# Patient Record
Sex: MALE | Race: WHITE | Employment: OTHER | URBAN - METROPOLITAN AREA
[De-identification: names, ages, dates, MRNs, and addresses within clinical notes are randomized per-mention and may not be internally consistent; named-entity substitution may affect disease eponyms.]

---

## 2017-01-13 ENCOUNTER — OFFICE VISIT (OUTPATIENT)
Dept: INTERNAL MEDICINE | Facility: CLINIC | Age: 82
End: 2017-01-13
Payer: MEDICARE

## 2017-01-13 ENCOUNTER — DOCUMENTATION ONLY (OUTPATIENT)
Dept: CARDIOLOGY | Facility: CLINIC | Age: 82
End: 2017-01-13

## 2017-01-13 ENCOUNTER — HOSPITAL ENCOUNTER (OUTPATIENT)
Dept: CARDIOLOGY | Facility: CLINIC | Age: 82
Discharge: HOME | End: 2017-01-13
Attending: INTERNAL MEDICINE | Admitting: INTERNAL MEDICINE
Payer: MEDICARE

## 2017-01-13 VITALS
HEIGHT: 72 IN | RESPIRATION RATE: 16 BRPM | BODY MASS INDEX: 25.6 KG/M2 | SYSTOLIC BLOOD PRESSURE: 128 MMHG | OXYGEN SATURATION: 96 % | WEIGHT: 189 LBS | DIASTOLIC BLOOD PRESSURE: 80 MMHG | HEART RATE: 92 BPM | TEMPERATURE: 96.9 F

## 2017-01-13 DIAGNOSIS — I48.20 CHRONIC ATRIAL FIBRILLATION (H): Primary | ICD-10-CM

## 2017-01-13 DIAGNOSIS — E78.5 HYPERLIPIDEMIA LDL GOAL <130: ICD-10-CM

## 2017-01-13 DIAGNOSIS — I71.20 THORACIC AORTIC ANEURYSM WITHOUT RUPTURE (H): ICD-10-CM

## 2017-01-13 DIAGNOSIS — Z79.01 LONG-TERM (CURRENT) USE OF ANTICOAGULANTS: ICD-10-CM

## 2017-01-13 DIAGNOSIS — Z23 NEED FOR PNEUMOCOCCAL VACCINE: ICD-10-CM

## 2017-01-13 DIAGNOSIS — Z96.652 STATUS POST TOTAL LEFT KNEE REPLACEMENT: ICD-10-CM

## 2017-01-13 DIAGNOSIS — Z00.00 ENCOUNTER FOR ROUTINE ADULT HEALTH EXAMINATION WITHOUT ABNORMAL FINDINGS: ICD-10-CM

## 2017-01-13 DIAGNOSIS — I25.10 CORONARY ARTERY DISEASE INVOLVING NATIVE CORONARY ARTERY OF NATIVE HEART WITHOUT ANGINA PECTORIS: ICD-10-CM

## 2017-01-13 LAB
ALBUMIN SERPL-MCNC: 3.6 G/DL (ref 3.4–5)
ALP SERPL-CCNC: 132 U/L (ref 40–150)
ALT SERPL W P-5'-P-CCNC: 35 U/L (ref 0–70)
ANION GAP SERPL CALCULATED.3IONS-SCNC: 3 MMOL/L (ref 3–14)
AST SERPL W P-5'-P-CCNC: 21 U/L (ref 0–45)
BILIRUB SERPL-MCNC: 0.9 MG/DL (ref 0.2–1.3)
BUN SERPL-MCNC: 28 MG/DL (ref 7–30)
CALCIUM SERPL-MCNC: 8.7 MG/DL (ref 8.5–10.1)
CHLORIDE SERPL-SCNC: 107 MMOL/L (ref 94–109)
CHOLEST SERPL-MCNC: 117 MG/DL
CO2 SERPL-SCNC: 35 MMOL/L (ref 20–32)
CREAT SERPL-MCNC: 1.1 MG/DL (ref 0.66–1.25)
ERYTHROCYTE [DISTWIDTH] IN BLOOD BY AUTOMATED COUNT: 13.9 % (ref 10–15)
GFR SERPL CREATININE-BSD FRML MDRD: 64 ML/MIN/1.7M2
GLUCOSE SERPL-MCNC: 92 MG/DL (ref 70–99)
HCT VFR BLD AUTO: 43.4 % (ref 40–53)
HDLC SERPL-MCNC: 47 MG/DL
HGB BLD-MCNC: 13.8 G/DL (ref 13.3–17.7)
LDLC SERPL CALC-MCNC: 58 MG/DL
MCH RBC QN AUTO: 30.3 PG (ref 26.5–33)
MCHC RBC AUTO-ENTMCNC: 31.8 G/DL (ref 31.5–36.5)
MCV RBC AUTO: 95 FL (ref 78–100)
NONHDLC SERPL-MCNC: 70 MG/DL
PLATELET # BLD AUTO: 252 10E9/L (ref 150–450)
POTASSIUM SERPL-SCNC: 4.7 MMOL/L (ref 3.4–5.3)
PROT SERPL-MCNC: 6.8 G/DL (ref 6.8–8.8)
RBC # BLD AUTO: 4.56 10E12/L (ref 4.4–5.9)
SODIUM SERPL-SCNC: 145 MMOL/L (ref 133–144)
TRIGL SERPL-MCNC: 61 MG/DL
WBC # BLD AUTO: 5.9 10E9/L (ref 4–11)

## 2017-01-13 PROCEDURE — 93306 TTE W/DOPPLER COMPLETE: CPT

## 2017-01-13 PROCEDURE — 80061 LIPID PANEL: CPT | Performed by: INTERNAL MEDICINE

## 2017-01-13 PROCEDURE — 93306 TTE W/DOPPLER COMPLETE: CPT | Mod: 26 | Performed by: INTERNAL MEDICINE

## 2017-01-13 PROCEDURE — 80053 COMPREHEN METABOLIC PANEL: CPT | Performed by: INTERNAL MEDICINE

## 2017-01-13 PROCEDURE — G0439 PPPS, SUBSEQ VISIT: HCPCS | Performed by: INTERNAL MEDICINE

## 2017-01-13 PROCEDURE — 90670 PCV13 VACCINE IM: CPT | Performed by: INTERNAL MEDICINE

## 2017-01-13 PROCEDURE — 36415 COLL VENOUS BLD VENIPUNCTURE: CPT | Performed by: INTERNAL MEDICINE

## 2017-01-13 PROCEDURE — 85027 COMPLETE CBC AUTOMATED: CPT | Performed by: INTERNAL MEDICINE

## 2017-01-13 PROCEDURE — G0009 ADMIN PNEUMOCOCCAL VACCINE: HCPCS | Performed by: INTERNAL MEDICINE

## 2017-01-13 RX ORDER — METOPROLOL SUCCINATE 25 MG/1
12.5 TABLET, EXTENDED RELEASE ORAL DAILY
Qty: 90 TABLET | Refills: 2 | Status: SHIPPED | OUTPATIENT
Start: 2017-01-13 | End: 2018-02-10

## 2017-01-13 RX ORDER — FUROSEMIDE 20 MG
20 TABLET ORAL DAILY
Qty: 30 TABLET | Refills: 11 | Status: SHIPPED | OUTPATIENT
Start: 2017-01-13 | End: 2018-02-04

## 2017-01-13 RX ORDER — ATORVASTATIN CALCIUM 20 MG/1
20 TABLET, FILM COATED ORAL DAILY
Qty: 90 TABLET | Refills: 1 | Status: SHIPPED | OUTPATIENT
Start: 2017-01-13 | End: 2017-08-21

## 2017-01-13 RX ORDER — WARFARIN SODIUM 5 MG/1
TABLET ORAL
Qty: 75 TABLET | Refills: 1 | Status: SHIPPED | OUTPATIENT
Start: 2017-01-13 | End: 2017-08-18

## 2017-01-13 ASSESSMENT — PAIN SCALES - GENERAL: PAINLEVEL: NO PAIN (0)

## 2017-01-13 NOTE — PROGRESS NOTES
Results of Echo 17 for surveillance of mildly dilated aortic root and ascending aorta shown below. Per report, no significant changes from previous study 3/13/15. Call made to Kit to update him w/ the results. He is scheduled to follow up w/ Dr. Estes 17. Will forward to Dr. Estes for review. DIANNA Bright RN    Results    Echocardiogram (Order [id number])         External Result Report      External Result Report       Echocardiogram   Status: Final result     Visible to patient:  Not Released     Next appt: 2017 at 01:15 PM in Anticoagulation ([facility])     Dx:  Thoracic aortic aneurysm without rupt...               Details      Reading Physician Reading Date Result Priority     Orlando Palencia MD 2017             Narrative           Interpretation Summary                    [facility]  Echocardiography Laboratory  [address] [place] [address]        Name: ELIER BUTLER  MRN: [id number]  : 1932  Study Date: 2017 09:32 AM  Age: 84 yrs  Gender: Male  Patient Location: [facility]  Reason For Study:     History: Afib, Hyperlipidemia, Ablation  Ordering Physician: SANDOVAL ESTES  Referring Physician: Gabriel Tan  Performed By: Lar[facility]     BSA: 2.0 m2  Height: 71 in  Weight: 185 lb  HR: 112  BP: 104/58 mmHg  ______________________________________________________________________________        Procedure  Complete Echo Adult.  ______________________________________________________________________________     Interpretation Summary     The rhythm was atrial fibrillation.  Left ventricular systolic function is normal.  The visual ejection fraction is estimated at 55-60%.  There is moderate concentric left ventricular hypertrophy.  The left ventricle is normal in size.  There is severe biatrial enlargement.  There is mild mitral annular calcification.  There is mild (1+) mitral regurgitation.  Mild aortic root  dilatation.  The ascending aorta is Mildly dilated.     No significant change since 3/13/2015.  ______________________________________________________________________________           Left Ventricle  The left ventricle is normal in size. There is moderate concentric left  ventricular hypertrophy. Left ventricular systolic function is normal. The  visual ejection fraction is estimated at 55-60%. No regional wall motion  abnormalities noted. There is no thrombus seen in the left ventricle.     Right Ventricle  The right ventricle is mildly dilated. There is normal right ventricular wall  thickness. The right ventricular systolic function is normal. There is no  mass or thrombus in the right ventricle.  Atria  There is severe biatrial enlargement. There is no atrial shunt seen.     Mitral Valve  There is mild mitral annular calcification. There is mild (1+) mitral  regurgitation. There is no mitral valve stenosis.     Tricuspid Valve  Normal tricuspid valve. The right ventricular systolic pressure is  approximated at 28.2 mmHg plus the right atrial pressure. Right ventricle  systolic pressure estimate normal. There is no tricuspid stenosis.     Aortic Valve  There is moderate trileaflet aortic sclerosis. No aortic regurgitation is  present. No aortic stenosis is present.     Pulmonic Valve  Normal pulmonic valve. There is mild (1+) pulmonic valvular regurgitation.  There is no pulmonic valvular stenosis.     Vessels  Mild aortic root dilatation. The ascending aorta is Mildly dilated. The IVC  is normal in size and reactivity with respiration, suggesting normal central  venous pressure. The pulmonary artery is normal size.  Pericardial/Pleural  The pericardium appears normal. There is no pleural effusion.     Rhythm  The rhythm was atrial fibrillation.     ______________________________________________________________________________  MMode/2D Measurements & Calculations  IVSd: 1.4 cm  LVIDd: 3.7 cm  LVIDs: 2.8  cm  LVPWd: 1.6 cm  FS: 24.5 %  EDV(Teich): 59.5 ml  ESV(Teich): 30.1 ml  LV mass(C)d: 214.5 grams  Ao root diam: 4.0 cm  LA dimension: 5.9 cm  asc Aorta Diam: 3.8 cm  LA/Ao: 1.5  LA Volume (BP): 85.3 ml     LA Volume Index (BP): 41.8 ml/m2        Doppler Measurements & Calculations  MV E max za: 113.8 cm/sec  MV dec time: 0.20 sec  Ao V2 max: 131.2 cm/sec  Ao max P.0 mmHg  LV V1 max PG: 3.0 mmHg  LV V1 max: 86.7 cm/sec  PA acc time: 0.12 sec  TR max za: 265.4 cm/sec  TR max P.2 mmHg  Lateral E/e': 14.3  Medial E/e': 15.4              ______________________________________________________________________________        Report approved by: Dr. Orlando Johnson 2017 12:56 PM

## 2017-01-13 NOTE — Clinical Note
[address] [address]  Phone: [phone]          January 13, 2017    Tomer Weiss  [address] [address] [place] [id number]          Dear Tomer,      LAB RESULTS:     The results of your recent lab work were good, no change with plan of care.  If you have any further questions or problems, please contact our office.          Sincerely,      Gabriel Tan MD

## 2017-01-13 NOTE — PATIENT INSTRUCTIONS
Preventive Health Recommendations:       Male Ages 65 and over    Yearly exam:             See your health care provider every year in order to  o   Review health changes.   o   Discuss preventive care.    o   Review your medicines if your doctor has prescribed any.    Talk with your health care provider about whether you should have a test to screen for prostate cancer (PSA).    Every 3 years, have a diabetes test (fasting glucose). If you are at risk for diabetes, you should have this test more often.    Every 5 years, have a cholesterol test. Have this test more often if you are at risk for high cholesterol or heart disease.     Every 10 years, have a colonoscopy. Or, have a yearly FIT test (stool test). These exams will check for colon cancer.    Talk to with your health care provider about screening for Abdominal Aortic Aneurysm if you have a family history of AAA or have a history of smoking.  Shots:     Get a flu shot each year.     Get a tetanus shot every 10 years.     Talk to your doctor about your pneumonia vaccines. There are now two you should receive - Pneumovax (PPSV 23) and Prevnar (PCV 13).    Talk to your doctor about a shingles vaccine.     Talk to your doctor about the hepatitis B vaccine.  Nutrition:     Eat at least 5 servings of fruits and vegetables each day.     Eat whole-grain bread, whole-wheat pasta and brown rice instead of white grains and rice.     Talk to your doctor about Calcium and Vitamin D.   Lifestyle    Exercise for at least 150 minutes a week (30 minutes a day, 5 days a week). This will help you control your weight and prevent disease.     Limit alcohol to one drink per day.     No smoking.     Wear sunscreen to prevent skin cancer.     See your dentist every six months for an exam and cleaning.     See your eye doctor every 1 to 2 years to screen for conditions such as glaucoma, macular degeneration and cataracts.  Service Indications Recommend Completed    Vaccines  Pneumococcal   Once for patients >65     Influenza Yearly, for all beneficiaries     Hepatitis B (if medium/high risk) Medium/high risk factors:  ESRD  Hemophiliacs who received Factor XIII or IX concentrates  Clients of institutions for mentally handicapped  Persons who live in same house as a HepB carrier  Homosexual men  Illicit injectable drug users  Health care workers  Staff of institutions for mentally handicapped     Mammogram One-time screen between age 35-39, annually for age >39     Pap and Pelvic Exam Annually if  high risk,  or childbearing age with abnormal Pap in last 3 years.  Q24 months for all other women     Prostate Cancer Screening  Digital rectal exam  PSA Annually for all men > age 50     Colorectal Cancer Screening      Diabetes self-management training Requires referral by treating physician for patient with diabetes     Diabetes screening tests  Fasting blood sugar or glucose tolerance test Patient must be diagnosed with one of the following:  Hypertension  Dyslipidemia  Obesity (BMI ? 30 kg/m )  Previous impaired fasting glucose or impaired glucose tolerance             . . . or any two of the following:  Overweight (BMI ? 25, < 30)  Family history of diabetes  Age 65 years or older  History of gestational diabetes, or birth to baby weighing > 9 lbs.     Cardiovascular screening blood tests  Total cholesterol  HDL  Triglycerides All asymptomatic beneficiaries every 5 years     Medical nutrition therapy for diabetes or renal disease Requires referral by treating physician for patient with diabetes or renal disease     Glaucoma screening Patient must have one of the following risk factors:  Diabetes mellitus  Family history of glaucoma  -American age 50 and over  -American age 65 and over     Bone Density Measurement  (Dexa scan) Patient must have one of the following risk factors:  Determined by provider to be estrogen deficient  Vertebral abnormalities on x-ray  indicative of osteoporosis, osteopenia, or vertebral fracture  Receiving, or expected to receive, equivalent of 5 mg of Prednisone, or greater, per day, for > 3 months.  Known hyperparathyroidism  Patient being monitored for response to osteoporosis therapy     One-time AAA screen  ** Must be ordered as part of Medicare IPPE Patient must have one of the following:  Family history of AAA  Age 65-75, with history of smoking at least 100 cigarettes in lifetime

## 2017-01-13 NOTE — NURSING NOTE
Chief Complaint   Patient presents with     Wellness Visit       Initial /80 mmHg  Pulse 92  Temp(Src) 96.9  F (36.1  C) (Temporal)  Resp 16  Ht 6' (1.829 m)  Wt 189 lb (85.73 kg)  BMI 25.63 kg/m2  SpO2 96% Estimated body mass index is 25.63 kg/(m^2) as calculated from the following:    Height as of this encounter: 6' (1.829 m).    Weight as of this encounter: 189 lb (85.73 kg).  BP completed using cuff size: gale Olmos MA

## 2017-01-13 NOTE — MR AVS SNAPSHOT
After Visit Summary   1/13/2017    Tomer Weiss    MRN: [id number]           Patient Information     Date Of Birth          4/9/1932        Visit Information        Provider Department      1/13/2017 8:30 AM Gabriel Tan MD [facility] Instructions      Preventive Health Recommendations:       Male Ages 65 and over    Yearly exam:             See your health care provider every year in order to  o   Review health changes.   o   Discuss preventive care.    o   Review your medicines if your doctor has prescribed any.    Talk with your health care provider about whether you should have a test to screen for prostate cancer (PSA).    Every 3 years, have a diabetes test (fasting glucose). If you are at risk for diabetes, you should have this test more often.    Every 5 years, have a cholesterol test. Have this test more often if you are at risk for high cholesterol or heart disease.     Every 10 years, have a colonoscopy. Or, have a yearly FIT test (stool test). These exams will check for colon cancer.    Talk to with your health care provider about screening for Abdominal Aortic Aneurysm if you have a family history of AAA or have a history of smoking.  Shots:     Get a flu shot each year.     Get a tetanus shot every 10 years.     Talk to your doctor about your pneumonia vaccines. There are now two you should receive - Pneumovax (PPSV 23) and Prevnar (PCV 13).    Talk to your doctor about a shingles vaccine.     Talk to your doctor about the hepatitis B vaccine.  Nutrition:     Eat at least 5 servings of fruits and vegetables each day.     Eat whole-grain bread, whole-wheat pasta and brown rice instead of white grains and rice.     Talk to your doctor about Calcium and Vitamin D.   Lifestyle    Exercise for at least 150 minutes a week (30 minutes a day, 5 days a week). This will help you control your weight and prevent disease.     Limit alcohol to one drink per  day.     No smoking.     Wear sunscreen to prevent skin cancer.     See your dentist every six months for an exam and cleaning.     See your eye doctor every 1 to 2 years to screen for conditions such as glaucoma, macular degeneration and cataracts.  Service Indications Recommend Completed   Vaccines  Pneumococcal   Once for patients >65     Influenza Yearly, for all beneficiaries     Hepatitis B (if medium/high risk) Medium/high risk factors:  ESRD  Hemophiliacs who received Factor XIII or IX concentrates  Clients of institutions for mentally handicapped  Persons who live in same house as a HepB carrier  Homosexual men  Illicit injectable drug users  Health care workers  Staff of institutions for mentally handicapped     Mammogram One-time screen between age 35-39, annually for age >39     Pap and Pelvic Exam Annually if  high risk,  or childbearing age with abnormal Pap in last 3 years.  Q24 months for all other women     Prostate Cancer Screening  Digital rectal exam  PSA Annually for all men > age 50     Colorectal Cancer Screening      Diabetes self-management training Requires referral by treating physician for patient with diabetes     Diabetes screening tests  Fasting blood sugar or glucose tolerance test Patient must be diagnosed with one of the following:  Hypertension  Dyslipidemia  Obesity (BMI ? 30 kg/m )  Previous impaired fasting glucose or impaired glucose tolerance             . . . or any two of the following:  Overweight (BMI ? 25, < 30)  Family history of diabetes  Age 65 years or older  History of gestational diabetes, or birth to baby weighing > 9 lbs.     Cardiovascular screening blood tests  Total cholesterol  HDL  Triglycerides All asymptomatic beneficiaries every 5 years     Medical nutrition therapy for diabetes or renal disease Requires referral by treating physician for patient with diabetes or renal disease     Glaucoma screening Patient must have one of the following risk  factors:  Diabetes mellitus  Family history of glaucoma  -American age 50 and over  -American age 65 and over     Bone Density Measurement  (Dexa scan) Patient must have one of the following risk factors:  Determined by provider to be estrogen deficient  Vertebral abnormalities on x-ray indicative of osteoporosis, osteopenia, or vertebral fracture  Receiving, or expected to receive, equivalent of 5 mg of Prednisone, or greater, per day, for > 3 months.  Known hyperparathyroidism  Patient being monitored for response to osteoporosis therapy     One-time AAA screen  ** Must be ordered as part of Medicare IPPE Patient must have one of the following:  Family history of AAA  Age 65-75, with history of smoking at least 100 cigarettes in lifetime               Follow-ups after your visit        Your next 10 appointments already scheduled     Jan 13, 2017 10:00 AM   Ech Complete with NEMESIO Cheek Echocardi[facility] ([facility])    [address] [place] [address] [address] [place] [id number] [phone]           1.  Please bring or wear a comfortable two-piece outfit. 2.  You may eat, drink and take your normal medicines. 3.  For any questions that cannot be answered, please contact the ordering physician            Jan 25, 2017  1:15 PM   Anticoagulation Visit with SARA ANTI COAG   [facility] ([facility])    [address] [address] [phone]            Feb 09, 2017  1:00 PM   Return Visit with MD Angel [place]eton ([facility])    [address] [id number] [phone]              Who to contact     If you have questions or need follow up information about today's clinic visit or your schedule please contact [facility] directly at [phone].  Normal or non-critical lab and imaging results will be communicated to you by MyChart, letter or phone within 4  "business days after the clinic has received the results. If you do not hear from us within 7 days, please contact the clinic through [employer] or phone. If you have a critical or abnormal lab result, we will notify you by phone as soon as possible.  Submit refill requests through [employer] or call your pharmacy and they will forward the refill request to us. Please allow 3 business days for your refill to be completed.          Additional Information About Your Visit        [employer] Information     [employer] lets you send messages to your doctor, view your test results, renew your prescriptions, schedule appointments and more. To sign up, go to [email or web address] . Click on \"Log in\" on the left side of the screen, which will take you to the Welcome page. Then click on \"Sign up Now\" on the right side of the page.     You will be asked to enter the access code listed below, as well as some personal information. Please follow the directions to create your username and password.     Your access code is: [email or web address]  Expires: 2017  8:41 AM     Your access code will  in 90 days. If you need help or a new code, please call your [facility] clinic or [phone].        Care EveryWhere ID     This is your Care EveryWhere ID. This could be used by other organizations to access your [facility] medical records  [id number]        Your Vitals Were     Pulse Temperature Respirations Height BMI (Body Mass Index) Pulse Oximetry    92 96.9  F (36.1  C) (Temporal) 16 6' (1.829 m) 25.63 kg/m2 96%       Blood Pressure from Last 3 Encounters:   17 128/80   16 102/48   16 114/60    Weight from Last 3 Encounters:   17 189 lb (85.73 kg)   16 180 lb (81.647 kg)   16 180 lb (81.647 kg)              Today, you had the following     No orders found for display       Primary Care Provider Office Phone # Fax #    Gabriel Tan [phone]       [address] " [address] [place] [address]        Thank you!     Thank you for choosing [facility]  for your care. Our goal is always to provide you with excellent care. Hearing back from our patients is one way we can continue to improve our services. Please take a few minutes to complete the written survey that you may receive in the mail after your visit with us. Thank you!             Your Updated Medication List - Protect others around you: Learn how to safely use, store and throw away your medicines at www.disposemymeds.org.          This list is accurate as of: 1/13/17  8:41 AM.  Always use your most recent med list.                   Brand Name Dispense Instructions for use    atorvastatin 20 MG tablet    LIPITOR    90 tablet    Take 1 tablet (20 mg) by mouth daily       furosemide 20 MG tablet    LASIX    30 tablet    Take 1 tablet (20 mg) by mouth daily       glucosamine 500 MG Caps      2 Tablets every morning       iron 66 MG Tabs     1 tablet    Take 1 tablet (66 mg) by mouth daily       metoprolol 25 MG 24 hr tablet    TOPROL-XL    90 tablet    Take 0.5 tablets (12.5 mg) by mouth daily       order for DME     1 Device    Equipment being ordered: Walker () Treatment Diagnosis: s/p joint replacement       warfarin 5 MG tablet    COUMADIN    75 tablet    Take 2.5 mg (1/2 tablet) Tuesday and Saturday and 5 mg other 5 days or as directed by the coumadin clinic

## 2017-01-13 NOTE — PROGRESS NOTES
SUBJECTIVE:                                                            Tomer Weiss is a 84 year old male who presents for Preventive Visit.    Are you in the first 12 months of your Medicare Part B coverage?  No    Healthy Habits:    Do you get at least three servings of calcium containing foods daily (dairy, green leafy vegetables, etc.)? 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 yes    Do you see a dentist twice per year? yes    Do you have sleep apnea, excessive snoring or daytime drowsiness?no    COGNITIVE SCREEN  1) Repeat 3 items (Banana, Sunrise, Chair)    2) Clock draw: ABNORMAL   3) 3 item recall: Recalls 1 object   Results: ABNORMAL clock, 2 items recalled: PROBABLE COGNITIVE IMPAIRMENT, **INFORM PROVIDER**  Doing well.   Mini-CogTM Copyright S Maryann. Licensed by the author for use in [facility]; reprinted with permission (sorhea@[facility]). All rights reserved.      Health has been pretty good.  Feeling good, no sob and chest pain.       All Histories reviewed and updated in [facility] as appropriate.  Social History   Substance Use Topics     Smoking status: Never Smoker      Smokeless tobacco: Never Used     Alcohol Use: No       The patient does not drink >3 drinks per day nor >7 drinks per week.    Today's PHQ-2 Score:   PHQ-2 ( 1999 Pfizer) 1/13/2017 3/7/2016   Q1: Little interest or pleasure in doing things 0 0   Q2: Feeling down, depressed or hopeless 0 0   PHQ-2 Score 0 0       Do you feel safe in your environment - Yes    Do you have a Health Care Directive?: Yes: Patient states has Advance Directive and will bring in a copy to clinic.    Current providers sharing in care for this patient include:   Patient Care Team:  Gabriel Tan MD as PCP - General (Internal  Medicine)  Deedee Friedman APRN CNP as Nurse Practitioner (Family Practice)      Hearing impairment: Yes, suppose to wear hearing aids    Ability to successfully perform activities of daily living: Yes, no assistance needed     Fall risk:  Fallen 2 or more times in the past year?: No  Any fall with injury in the past year?: No    Home safety:  none identified      The following health maintenance items are reviewed in Epic and correct as of today:  Health Maintenance   Topic Date Due     PNEUMOCOCCAL (2 of 2 - PCV13) 09/16/2010     INFLUENZA VACCINE (SYSTEM ASSIGNED)  09/01/2016     FALL RISK ASSESSMENT  03/07/2017     OP ANNUAL INR REFERRAL  03/21/2017     ADVANCE DIRECTIVE PLANNING Q5 YRS (NO INBASKET)  08/14/2020     TETANUS IMMUNIZATION (SYSTEM ASSIGNED)  07/19/2023         Pneumonia Vaccine:will do prevnar 13     ROS:  C: NEGATIVE for fever, chills, change in weight  I: NEGATIVE for worrisome rashes, moles or lesions  E: NEGATIVE for vision changes or irritation  E/M: NEGATIVE for ear, mouth and throat problems  R: NEGATIVE for significant cough or SOB  B: NEGATIVE for masses, tenderness or discharge  CV: NEGATIVE for chest pain, --some palpitations  GI: NEGATIVE for nausea, abdominal pain, heartburn, or change in bowel habits  : NEGATIVE for frequency, dysuria, or hematuria  M: NEGATIVE for significant arthralgias or myalgia  N: NEGATIVE for weakness, dizziness or paresthesias  E: NEGATIVE for temperature intolerance, skin/hair changes  H: NEGATIVE for bleeding problems  P: NEGATIVE for changes in mood or affect    Problem list, Medication list, Allergies, and Medical/Social/Surgical histories reviewed in [facility] and updated as appropriate.  OBJECTIVE:                                                            /80 mmHg  Pulse 92  Temp(Src) 96.9  F (36.1  C) (Temporal)  Resp 16  Ht 6' (1.829 m)  Wt 189 lb (85.73 kg)  BMI 25.63 kg/m2  SpO2 96% Estimated body mass index is 25.63 kg/(m^2) as  calculated from the following:    Height as of this encounter: 6' (1.829 m).    Weight as of this encounter: 189 lb (85.73 kg).  EXAM:   GENERAL: healthy, alert and no distress  EYES: Eyes grossly normal to inspection, PERRL and conjunctivae and sclerae normal  HENT: ear canals and TM's normal, nose and mouth without ulcers or lesions  NECK: no adenopathy, no asymmetry, masses, or scars and thyroid normal to palpation  RESP: lungs clear to auscultation - no rales, rhonchi or wheezes  CV: irregularly irregular rhythm, normal S1 S2, no S3 or S4, no murmur, click or rub, peripheral pulses strong and no peripheral edema  ABDOMEN: soft, nontender, no hepatosplenomegaly, no masses and bowel sounds normal  MS: no gross musculoskeletal defects noted, no edema  SKIN: no suspicious lesions or rashes  NEURO: Normal strength and tone, mentation intact and speech normal  PSYCH: mentation appears normal, affect normal/bright    ASSESSMENT / PLAN:                                                                ICD-10-CM    1. Chronic atrial fibrillation (H) I48.2 furosemide (LASIX) 20 MG tablet     metoprolol (TOPROL-XL) 25 MG 24 hr tablet     CBC with platelets     Comprehensive metabolic panel     Lipid Profile   2. Hyperlipidemia LDL goal <130 E78.5 atorvastatin (LIPITOR) 20 MG tablet     CBC with platelets     Comprehensive metabolic panel     Lipid Profile   3. Long-term (current) use of anticoagulants [Z79.01] Z79.01 warfarin (COUMADIN) 5 MG tablet     CBC with platelets     Comprehensive metabolic panel     Lipid Profile   4. Status post total left knee replacement Z96.652 CBC with platelets     Comprehensive metabolic panel     Lipid Profile   5. Encounter for routine adult health examination without abnormal findings Z00.00      Doing well, still active.  Appears to have recurrent a fib after ablation, will discuss with cardiology. Asymptomatic so may stay on regular meds and keep coumadin going.    End of Life  Planning:  Patient currently has an advanced directive: Yes.  Practitioner is supportive of decision.    COUNSELING:  Reviewed preventive health counseling, as reflected in patient instructions       Regular exercise       Healthy diet/nutrition        Estimated body mass index is 25.63 kg/(m^2) as calculated from the following:    Height as of this encounter: 6' (1.829 m).    Weight as of this encounter: 189 lb (85.73 kg).     reports that he has never smoked. He has never used smokeless tobacco.      Appropriate preventive services were discussed with this patient, including applicable screening as appropriate for cardiovascular disease, diabetes, osteopenia/osteoporosis, and glaucoma.  As appropriate for age/gender, discussed screening for colorectal cancer, prostate cancer, breast cancer, and cervical cancer. Checklist reviewing preventive services available has been given to the patient.    Reviewed patients plan of care and provided an AVS. The Basic Care Plan (routine screening as documented in Health Maintenance) for Tomer meets the Care Plan requirement. This Care Plan has been established and reviewed with the Patient.    Counseling Resources:  ATP IV Guidelines  Pooled Cohorts Equation Calculator  Breast Cancer Risk Calculator  FRAX Risk Assessment  ICSI Preventive Guidelines  Dietary Guidelines for Americans, 2010  [employer]'s MyPlate  ASA Prophylaxis  Lung CA Screening    Gabriel Tan MD  [facility]

## 2017-01-13 NOTE — PROGRESS NOTES
Screening Questionnaire for Adult Immunization    Are you sick today?   No   Do you have allergies to medications, food, a vaccine component or latex?   No   Have you ever had a serious reaction after receiving a vaccination?   No   Do you have a long-term health problem with heart disease, lung disease, asthma, kidney disease, metabolic disease (e.g. diabetes), anemia, or other blood disorder?   Yes   Do you have cancer, leukemia, HIV/AIDS, or any other immune system problem?   No   In the past 3 months, have you taken medications that affect  your immune system, such as prednisone, other steroids, or anticancer drugs; drugs for the treatment of rheumatoid arthritis, Crohn s disease, or psoriasis; or have you had radiation treatments?   No   Have you had a seizure, or a brain or other nervous system problem?   No   During the past year, have you received a transfusion of blood or blood     products, or been given immune (gamma) globulin or antiviral drug?   No   For women: Are you pregnant or is there a chance you could become        pregnant during the next month?   No   Have you received any vaccinations in the past 4 weeks?   No     Immunization questionnaire was positive for at least one answer.  Notified yes.      MNVFC doesn't apply on this patient    Per orders of Dr. Gabriel Tan, injection of prevnar given by Jana Olmos. Patient instructed to remain in clinic for 20 minutes afterwards, and to report any adverse reaction to me immediately.       Screening performed by Jana Olmos on 1/13/2017 at 9:09 AM.

## 2017-01-25 ENCOUNTER — ANTICOAGULATION THERAPY VISIT (OUTPATIENT)
Dept: ANTICOAGULATION | Facility: OTHER | Age: 82
End: 2017-01-25
Payer: MEDICARE

## 2017-01-25 DIAGNOSIS — I48.91 ATRIAL FIBRILLATION, UNSPECIFIED TYPE (H): ICD-10-CM

## 2017-01-25 DIAGNOSIS — Z79.01 LONG-TERM (CURRENT) USE OF ANTICOAGULANTS: Primary | ICD-10-CM

## 2017-01-25 LAB — INR POINT OF CARE: 2.5 (ref 0.86–1.14)

## 2017-01-25 PROCEDURE — 36416 COLLJ CAPILLARY BLOOD SPEC: CPT

## 2017-01-25 PROCEDURE — 99207 ZZC NO CHARGE NURSE ONLY: CPT

## 2017-01-25 PROCEDURE — 85610 PROTHROMBIN TIME: CPT | Mod: QW

## 2017-01-25 RX ORDER — WARFARIN SODIUM 5 MG/1
TABLET ORAL
Qty: 30 TABLET | Refills: 0 | OUTPATIENT
Start: 2017-01-25

## 2017-01-25 NOTE — MR AVS SNAPSHOT
Tomer Weiss   1/25/2017 1:15 PM   Anticoagulation Therapy Visit    Description:  84 year old male   Provider:  [facility]   Department:  [facility]           INR as of 1/25/2017     Selected INR 2.5 (1/25/2017)      Anticoagulation Summary as of 1/25/2017     INR goal 2.0-3.0   Selected INR 2.5 (1/25/2017)   Full instructions 2.5 mg on Tue, Sat; 5 mg all other days   Next INR check 3/15/2017    Indications   Long-term (current) use of anticoagulants [Z79.01] [Z79.01]  Atrial fibrillation (HCC) [I48.91] [I48.91]         Your next Anticoagulation Clinic appointment(s)     Mar 15, 2017  1:15 PM   Anticoagulation Visit with [facility]   [facility]    [address] [address] [phone]              Contact Numbers     Clinic Number:         January 2017 Details    Sun Mon Tue Wed Thu Fri Sat     1               2               3               4               5               6               7                 8               9               10               11               12               13               14                 15               16               17               18               19               20               21                 22               23               24               25      5 mg   See details      26      5 mg         27      5 mg         28      2.5 mg           29      5 mg         30      5 mg         31      2.5 mg              Date Details   01/25 This INR check               How to take your warfarin dose     To take:  2.5 mg Take 0.5 of a 5 mg tablet.    To take:  5 mg Take 1 of the 5 mg tablets.           February 2017 Details    Sun Mon Tue Wed Thu Fri Sat        1      5 mg         2      5 mg         3      5 mg         4      2.5 mg           5      5 mg         6      5 mg         7      2.5 mg         8      5 mg         9      5 mg         10      5 mg         11      2.5 mg           12       5 mg         13      5 mg         14      2.5 mg         15      5 mg         16      5 mg         17      5 mg         18      2.5 mg           19      5 mg         20      5 mg         21      2.5 mg         22      5 mg         23      5 mg         24      5 mg         25      2.5 mg           26      5 mg         27      5 mg         28      2.5 mg              Date Details   No additional details            How to take your warfarin dose     To take:  2.5 mg Take 0.5 of a 5 mg tablet.    To take:  5 mg Take 1 of the 5 mg tablets.           March 2017 Details    Sun Mon Tue Wed Thu Fri Sat        1      5 mg         2      5 mg         3      5 mg         4      2.5 mg           5      5 mg         6      5 mg         7      2.5 mg         8      5 mg         9      5 mg         10      5 mg         11      2.5 mg           12      5 mg         13      5 mg         14      2.5 mg         15            16               17               18                 19               20               21               22               23               24               25                 26               27               28               29               30               31                 Date Details   No additional details    Date of next INR:  3/15/2017         How to take your warfarin dose     To take:  2.5 mg Take 0.5 of a 5 mg tablet.    To take:  5 mg Take 1 of the 5 mg tablets.

## 2017-01-25 NOTE — PROGRESS NOTES
ANTICOAGULATION FOLLOW-UP CLINIC VISIT    Patient Name:  Tomer Weiss  Date:  1/25/2017  Contact Type:  Face to Face    SUBJECTIVE:     Patient Findings     Positives No Problem Findings    Comments Kit is going on a missions trip for 2 weeks so we'll push his next visit out 1 week             OBJECTIVE    INR PROTIME   Date Value Ref Range Status   01/25/2017 2.5* 0.86 - 1.14 Final     CHROMOGENIC FACTOR 10   Date Value Ref Range Status   04/24/2015 21* 70 - 130 % Final     Comment:     Therapeutic Range:  A Chromogenic Factor 10 level of approximately 20-40%   inversely correlates with an INR of 2-3 for patients receiving Warfarin.   Chromogenic Factor 10 levels below 20% indicate an INR greater than 3 and   levels above 40% indicate an INR less than 2.         ASSESSMENT / PLAN  INR assessment THER    Recheck INR In: 7 WEEKS    INR Location Clinic      Anticoagulation Summary as of 1/25/2017     INR goal 2.0-3.0   Selected INR 2.5 (1/25/2017)   Maintenance plan 2.5 mg (5 mg x 0.5) on Tue, Sat; 5 mg (5 mg x 1) all other days   Full instructions 2.5 mg on Tue, Sat; 5 mg all other days   Weekly total 30 mg   No change documented Tana Pérez, RN   Plan last modified Tana Pérez, RN (4/27/2016)   Next INR check 3/15/2017   Target end date     Indications   Long-term (current) use of anticoagulants [Z79.01] [Z79.01]  Atrial fibrillation (HCC) [I48.91] [I48.91]         Anticoagulation Episode Summary     INR check location     Preferred lab     Send INR reminders to MI POOL    Comments 5 mg tablets, AM dose, AVS      Anticoagulation Care Providers     Provider Role Specialty Phone number    Gabriel Tan MD [facility] Internal Medicine [phone]            See the Encounter Report to view Anticoagulation Flowsheet and Dosing Calendar (Go to Encounters tab in chart review, and find the Anticoagulation Therapy Visit)    Dosage adjustment made based on physician directed care plan.    Tana  JUANY Pérez

## 2017-02-09 ENCOUNTER — OFFICE VISIT (OUTPATIENT)
Dept: CARDIOLOGY | Facility: CLINIC | Age: 82
End: 2017-02-09
Payer: MEDICARE

## 2017-02-09 ENCOUNTER — HOSPITAL ENCOUNTER (OUTPATIENT)
Dept: CARDIOLOGY | Facility: CLINIC | Age: 82
Discharge: HOME | End: 2017-02-09
Attending: INTERNAL MEDICINE | Admitting: INTERNAL MEDICINE
Payer: MEDICARE

## 2017-02-09 VITALS
BODY MASS INDEX: 25.64 KG/M2 | WEIGHT: 189.3 LBS | SYSTOLIC BLOOD PRESSURE: 110 MMHG | OXYGEN SATURATION: 100 % | DIASTOLIC BLOOD PRESSURE: 70 MMHG | HEIGHT: 72 IN | HEART RATE: 86 BPM

## 2017-02-09 DIAGNOSIS — I25.10 CORONARY ARTERY DISEASE INVOLVING NATIVE CORONARY ARTERY OF NATIVE HEART WITHOUT ANGINA PECTORIS: ICD-10-CM

## 2017-02-09 DIAGNOSIS — I48.91 ATRIAL FIBRILLATION, UNSPECIFIED TYPE (H): Primary | ICD-10-CM

## 2017-02-09 PROCEDURE — 93010 ELECTROCARDIOGRAM REPORT: CPT | Performed by: INTERNAL MEDICINE

## 2017-02-09 PROCEDURE — 93005 ELECTROCARDIOGRAM TRACING: CPT

## 2017-02-09 PROCEDURE — 99213 OFFICE O/P EST LOW 20 MIN: CPT | Performed by: INTERNAL MEDICINE

## 2017-02-09 NOTE — MR AVS SNAPSHOT
After Visit Summary   2/9/2017    Tomer Weiss    MRN: [id number]           Patient Information     Date Of Birth          4/9/1932        Visit Information        Provider Department      2/9/2017 1:00 PM Vance Brennan MD [facility]        Today's Diagnoses     Atrial fibrillation, unspecified type (H)    -  1     Coronary artery disease involving native coronary artery of native heart without angina pectoris            Follow-ups after your visit        Additional Services     Follow-Up with Cardiologist                 Your next 10 appointments already scheduled     Mar 15, 2017  1:15 PM   Anticoagulation Visit with [facility]   [facility] ([facility]    [address] [address] [phone]              Future tests that were ordered for you today     Open Future Orders        Priority Expected Expires Ordered    Follow-Up with Cardiologist Routine 2/9/2018 6/24/2018 2/9/2017            Who to contact     If you have questions or need follow up information about today's clinic visit or your schedule please contact [facility] directly at [phone].  Normal or non-critical lab and imaging results will be communicated to you by [employer], letter or phone within 4 business days after the clinic has received the results. If you do not hear from us within 7 days, please contact the clinic through [employer] or phone. If you have a critical or abnormal lab result, we will notify you by phone as soon as possible.  Submit refill requests through [employer] or call your pharmacy and they will forward the refill request to us. Please allow 3 business days for your refill to be completed.          Additional Information About Your Visit        MyChart Information     [employer] lets you send messages to your doctor, view your test results, renew your prescriptions, schedule appointments and more. To sign up, go to  [email or web address] . Click on \"Log in\" on the left side of the screen, which will take you to the Welcome page. Then click on \"Sign up Now\" on the right side of the page.     You will be asked to enter the access code listed below, as well as some personal information. Please follow the directions to create your username and password.     Your access code is: [email or web address]  Expires: 2017  8:41 AM     Your access code will  in 90 days. If you need help or a new code, please call your [place] clinic or [phone].        Care EveryWhere ID     This is your Care EveryWhere ID. This could be used by other organizations to access your [place] medical records  [id number]        Your Vitals Were     Pulse Height BMI (Body Mass Index) Pulse Oximetry          86 1.829 m (6') 25.67 kg/m2 100%         Blood Pressure from Last 3 Encounters:   17 110/70   17 128/80   16 102/48    Weight from Last 3 Encounters:   17 85.866 kg (189 lb 4.8 oz)   17 85.73 kg (189 lb)   16 81.647 kg (180 lb)              We Performed the Following     EKG 12-LEAD CLINIC READ     Follow-Up with Cardiologist        Primary Care Provider Office Phone # Fax #    Gabriel Tan [phone]       [facility] [address] [place] [address]        Thank you!     Thank you for choosing [facility]  for your care. Our goal is always to provide you with excellent care. Hearing back from our patients is one way we can continue to improve our services. Please take a few minutes to complete the written survey that you may receive in the mail after your visit with us. Thank you!             Your Updated Medication List - Protect others around you: Learn how to safely use, store and throw away your medicines at www.disposemymeds.org.          This list is accurate as of: 17  1:31 PM.  Always use your most recent med list.                   Brand Name " Dispense Instructions for use    atorvastatin 20 MG tablet    LIPITOR    90 tablet    Take 1 tablet (20 mg) by mouth daily       furosemide 20 MG tablet    LASIX    30 tablet    Take 1 tablet (20 mg) by mouth daily       glucosamine 500 MG Caps      2 Tablets every morning       iron 66 MG Tabs     1 tablet    Take 1 tablet (66 mg) by mouth daily       metoprolol 25 MG 24 hr tablet    TOPROL-XL    90 tablet    Take 0.5 tablets (12.5 mg) by mouth daily       order for DME     1 Device    Equipment being ordered: Walker () Treatment Diagnosis: s/p joint replacement       warfarin 5 MG tablet    COUMADIN    75 tablet    Take 2.5 mg (1/2 tablet) Tuesday and Saturday and 5 mg other 5 days or as directed by the coumadin clinic

## 2017-02-09 NOTE — LETTER
2/9/2017    Gabriel Tan MD  [facility]   [address]   [place] [address]    RE: Tomer Weiss       Dear Colleague,    I had the pleasure of seeing Tomer Weiss in the [facility].    REASON FOR CLINIC VISIT:  Followup atrial fibrillation.      Mr. Weiss is a very pleasant 83-year-old gentleman with history of atrial flutter and atrial fibrillation with history of atrial flutter ablation.  The patient has been seen in the past by EP and was put on flecainide.  That had to be discontinued because stress test showed mild to moderate inferior ischemia.      Today, he is coming for routine followup.  To be noted, in terms of his coronary artery disease diagnosed on the basis of abnormal stress test, he is essentially asymptomatic without any anginal symptoms.  He is fairly active.  He walks at least 2-3 miles a day and has not noticed any chest discomfort or shortness of breath.  He had an echocardiogram done last month that showed normal LV function, severe biatrial enlargement and mildly dilated aortic root and ascending aorta similar to back in 2015.  To be noted, rhythm was noted to be atrial fibrillation on that study.  He had an EKG done today that confirms that he is in atrial fibrillation with controlled ventricular rates, about 86 beats per minute, with occasional PVC.  The patient tells me that he does not feel any palpitation or any dizziness or any presyncope or syncope.  He is tolerating Coumadin quite well.  He is on low-dose beta blocker, 12.5 mg daily of Toprol-XL, he is on atorvastatin 20 mg daily.  LDL is quite well controlled at 58.  He also takes 20 mg daily of Lasix.  He tells me at the end of the day, especially if he has been standing or walking long, he may notice a little bit of swelling over both lower extremities, but when he wakes up in the morning they are gone.  No orthopnea or PND.      PHYSICAL EXAMINATION:    VITAL SIGNS:  Blood pressure 110/70, heart rate 86 irregular, weight 189 pounds, BMI 25.73.   GENERAL:  The patient appears pleasant, comfortable.   NECK:  Normal JVP, no bruit.   CARDIOVASCULAR SYSTEM:  Irregular, normal rate, no murmur, rub or gallop.   RESPIRATORY SYSTEM:  Clear to auscultation bilaterally.   ABDOMEN:  Soft, nontender.   EXTREMITIES:  Minimal pitting pedal edema.   NEUROLOGICAL:  Alert, oriented x3.   PSYCH:  Normal affect.   SKIN:  No obvious rash.   HEENT:  No pallor or icterus.     Outpatient Encounter Prescriptions as of 2/9/2017   Medication Sig Dispense Refill     furosemide (LASIX) 20 MG tablet Take 1 tablet (20 mg) by mouth daily 30 tablet 11     atorvastatin (LIPITOR) 20 MG tablet Take 1 tablet (20 mg) by mouth daily 90 tablet 1     metoprolol (TOPROL-XL) 25 MG 24 hr tablet Take 0.5 tablets (12.5 mg) by mouth daily 90 tablet 2     warfarin (COUMADIN) 5 MG tablet Take 2.5 mg (1/2 tablet) Tuesday and Saturday and 5 mg other 5 days or as directed by the coumadin clinic 75 tablet 1     iron 66 MG TABS Take 1 tablet (66 mg) by mouth daily 1 tablet 0     GLUCOSAMINE 500 MG OR CAPS 2 Tablets every morning       order for DME Equipment being ordered: Walker ()  Treatment Diagnosis: s/p joint replacement 1 Device 0     No facility-administered encounter medications on file as of 2/9/2017.       IMPRESSION AND PLAN:  A very delightful 84-year-old gentleman with history of paroxysmal atrial fibrillation.  Rhythm is atrial fibrillation now, asymptomatic, with controlled ventricular rates on low-dose beta blocker.  Other comorbidities of CAD on the basis of abnormal Lexiscan stress test in 2015 showing evidence of small to moderate area of inferior ischemia.  Clinically, no anginal symptoms.  LDL and blood pressure are quite well controlled.  LV function is normal.  I had a long discussion with the patient regarding the atrial fibrillation, rate versus rhythm control strategy.  Given the fact  that he is essentially asymptomatic from atrial fibrillation with adequately controlled rates and also severely enlarged left atrium, we will continue rate control strategy.  To be noted, he is only on low-dose beta blocker and his ventricular rates are reasonably controlled, indicating there is some underlying conduction system slowness.  Fortunately, he does not have any evidence of presyncope, syncope or dizziness.  If he continues to feel well, we can see him back in 1 year's time.  In the interim, if he notices any dizziness, any chest discomfort or shortness of breath, especially with exertion, he will call us and we will see him sooner.     Again, thank you for allowing me to participate in the care of your patient.      Sincerely,    Vance Brennan MD     [facility]

## 2017-02-09 NOTE — Clinical Note
2/9/2017      RE: Tomer Weiss  [id number] [place] [address]       Dear Colleague,    Thank you for the opportunity to participate in the care of your patient, Tomer Weiss, at the [facility] at [facility]. Please see a copy of my visit note below.    HPI and Plan:   See dictation    Orders Placed This Encounter   Procedures     Follow-Up with Cardiologist     EKG 12-LEAD CLINIC READ     No orders of the defined types were placed in this encounter.     There are no discontinued medications.      Encounter Diagnoses   Name Primary?     Coronary artery disease involving native coronary artery of native heart without angina pectoris      Atrial fibrillation, unspecified type (H) Yes       CURRENT MEDICATIONS:  Current Outpatient Prescriptions   Medication Sig Dispense Refill     furosemide (LASIX) 20 MG tablet Take 1 tablet (20 mg) by mouth daily 30 tablet 11     atorvastatin (LIPITOR) 20 MG tablet Take 1 tablet (20 mg) by mouth daily 90 tablet 1     metoprolol (TOPROL-XL) 25 MG 24 hr tablet Take 0.5 tablets (12.5 mg) by mouth daily 90 tablet 2     warfarin (COUMADIN) 5 MG tablet Take 2.5 mg (1/2 tablet) Tuesday and Saturday and 5 mg other 5 days or as directed by the coumadin clinic 75 tablet 1     iron 66 MG TABS Take 1 tablet (66 mg) by mouth daily 1 tablet 0     GLUCOSAMINE 500 MG OR CAPS 2 Tablets every morning       order for DME Equipment being ordered: Walker ()  Treatment Diagnosis: s/p joint replacement 1 Device 0       ALLERGIES     Allergies   Allergen Reactions     No Known Drug Allergies        PAST MEDICAL HISTORY:  Past Medical History   Diagnosis Date     Hypertrophy (benign) of prostate      MILD     Contracture of palmar fascia      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     Atrial flutter (H)      atypial, RA, sp ablation 4/8/2015     Hematuria      Bladder stone      removed in 3/2015      Cancer (H)      Renal cancer-right kidney     Atrial fibrillation (H)      paroxysmal       PAST SURGICAL HISTORY:  Past Surgical History   Procedure Laterality Date     Hc ablation renal tumor percut cryotherapy unilateral  6/17/10     Right renal mass     Colonoscopy  2013     Procedure: COMBINED COLONOSCOPY, SINGLE BIOPSY/POLYPECTOMY BY BIOPSY;  Colonoscopy, with polypectomy by biopsy;  Surgeon: Fernando Mario MD;  [facility]     Cystoscopy, litholapaxy, combined N/A 2015     Procedure: COMBINED CYSTOSCOPY, LITHOLAPAXY;  Surgeon: Orlando Marr MD;  [facility]     Laser holmium lithotripsy bladder N/A 2015     Procedure: LASER HOLMIUM LITHOTRIPSY BLADDER;  Surgeon: Orlando Marr MD;  [facility]     Laser ktp green light photoselective vaporization prostate N/A 2015     Procedure: LASER KTP GREEN LIGHT PHOTOSELECTIVE VAPORIZATION PROSTATE;  Surgeon: Orlando Marr MD;  [facility]     Cystoscopy, litholapaxy, combined N/A 2015     Procedure: COMBINED CYSTOSCOPY, LITHOLAPAXY;  Surgeon: Orlando Marr MD;  [facility]     H ablation atrial flutter  4/18/15     atypical a flutter ablation & Ablation of PACs from the SVC-RA junction     Arthroplasty knee Left 3/14/2016     Procedure: ARTHROPLASTY KNEE;  Surgeon: Deonte Silver MD;  [facility]       FAMILY HISTORY:  Family History   Problem Relation Age of Onset     CANCER Mother      breast     Hypertension Mother      Alzheimer Disease Mother       at age 96.       SOCIAL HISTORY:  Social History     Social History     Marital Status:      Spouse Name: Shari     Number of Children: 4     Years of Education: N/A     Social History Main Topics     Smoking status: Never Smoker      Smokeless tobacco: Never Used     Alcohol Use: No     Drug Use: No     Sexual Activity: Not Currently     Other Topics Concern     Not on file     Social History Narrative       Review of  Systems:  Skin:  Negative     Eyes:  Positive for glasses  ENT:  Positive for hearing loss  Respiratory:  Negative    Cardiovascular:  Negative for;palpitations;chest pain;lightheadedness;dizziness Positive for;edema  Gastroenterology: Negative    Genitourinary:  Negative    Musculoskeletal:  Negative    Neurologic:  Negative    Psychiatric:  Negative    Heme/Lymph/Imm:  Negative    Endocrine:  Negative          Recent Lab Results:  LIPID RESULTS:  Lab Results   Component Value Date    CHOL 117 01/13/2017    HDL 47 01/13/2017    LDL 58 01/13/2017    TRIG 61 01/13/2017    CHOLHDLRATIO 2.9 09/08/2015       LIVER ENZYME RESULTS:  Lab Results   Component Value Date    AST 21 01/13/2017    ALT 35 01/13/2017       CBC RESULTS:  Lab Results   Component Value Date    WBC 5.9 01/13/2017    RBC 4.56 01/13/2017    HGB 13.8 01/13/2017    HCT 43.4 01/13/2017    MCV 95 01/13/2017    MCH 30.3 01/13/2017    MCHC 31.8 01/13/2017    RDW 13.9 01/13/2017     01/13/2017       BMP RESULTS:  Lab Results   Component Value Date    * 01/13/2017    POTASSIUM 4.7 01/13/2017    CHLORIDE 107 01/13/2017    CO2 35* 01/13/2017    ANIONGAP 3 01/13/2017    GLC 92 01/13/2017    BUN 28 01/13/2017    CR 1.10 01/13/2017    GFRESTIMATED 64 01/13/2017    GFRESTBLACK 77 01/13/2017    KARTHIK 8.7 01/13/2017        A1C RESULTS:  No results found for: A1C    INR RESULTS:  Lab Results   Component Value Date    INR 2.5* 01/25/2017    INR 2.3* 12/14/2016    INR 1.9* 04/15/2016    INR 2.6 04/07/2016    INR 3.1 04/01/2016    INR 1.1 03/26/2010     Please do not hesitate to contact me if you have any questions/concerns.     Sincerely,     Vance Brennan MD        CC  No referring provider defined for this encounter.

## 2017-02-09 NOTE — PROGRESS NOTES
HPI and Plan:   See dictation    Orders Placed This Encounter   Procedures     Follow-Up with Cardiologist     EKG 12-LEAD CLINIC READ     No orders of the defined types were placed in this encounter.     There are no discontinued medications.      Encounter Diagnoses   Name Primary?     Coronary artery disease involving native coronary artery of native heart without angina pectoris      Atrial fibrillation, unspecified type (H) Yes       CURRENT MEDICATIONS:  Current Outpatient Prescriptions   Medication Sig Dispense Refill     furosemide (LASIX) 20 MG tablet Take 1 tablet (20 mg) by mouth daily 30 tablet 11     atorvastatin (LIPITOR) 20 MG tablet Take 1 tablet (20 mg) by mouth daily 90 tablet 1     metoprolol (TOPROL-XL) 25 MG 24 hr tablet Take 0.5 tablets (12.5 mg) by mouth daily 90 tablet 2     warfarin (COUMADIN) 5 MG tablet Take 2.5 mg (1/2 tablet) Tuesday and Saturday and 5 mg other 5 days or as directed by the coumadin clinic 75 tablet 1     iron 66 MG TABS Take 1 tablet (66 mg) by mouth daily 1 tablet 0     GLUCOSAMINE 500 MG OR CAPS 2 Tablets every morning       order for DME Equipment being ordered: Walker ()  Treatment Diagnosis: s/p joint replacement 1 Device 0       ALLERGIES     Allergies   Allergen Reactions     No Known Drug Allergies        PAST MEDICAL HISTORY:  Past Medical History   Diagnosis Date     Hypertrophy (benign) of prostate      MILD     Contracture of palmar fascia      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     Atrial flutter (H)      atypial, RA, sp ablation 4/8/2015     Hematuria      Bladder stone      removed in 3/2015     Cancer (H)      Renal cancer-right kidney     Atrial fibrillation (H)      paroxysmal       PAST SURGICAL HISTORY:  Past Surgical History   Procedure Laterality Date     Hc ablation renal tumor percut cryotherapy unilateral  6/17/10     Right renal mass     Colonoscopy  7/17/2013     Procedure: COMBINED COLONOSCOPY, SINGLE  BIOPSY/POLYPECTOMY BY BIOPSY;  Colonoscopy, with polypectomy by biopsy;  Surgeon: Fernando Mario MD;  [facility]     Cystoscopy, litholapaxy, combined N/A 2015     Procedure: COMBINED CYSTOSCOPY, LITHOLAPAXY;  Surgeon: Orlando Marr MD;  [facility]     Laser holmium lithotripsy bladder N/A 2015     Procedure: LASER HOLMIUM LITHOTRIPSY BLADDER;  Surgeon: Orlando Marr MD;  [facility]     Laser ktp green light photoselective vaporization prostate N/A 2015     Procedure: LASER KTP GREEN LIGHT PHOTOSELECTIVE VAPORIZATION PROSTATE;  Surgeon: Orlando Marr MD;  [facility]     Cystoscopy, litholapaxy, combined N/A 2015     Procedure: COMBINED CYSTOSCOPY, LITHOLAPAXY;  Surgeon: Orlando Marr MD;  [facility]     H ablation atrial flutter  4/18/15     atypical a flutter ablation & Ablation of PACs from the SVC-RA junction     Arthroplasty knee Left 3/14/2016     Procedure: ARTHROPLASTY KNEE;  Surgeon: Deonte Silver MD;  [facility]       FAMILY HISTORY:  Family History   Problem Relation Age of Onset     CANCER Mother      breast     Hypertension Mother      Alzheimer Disease Mother       at age 96.       SOCIAL HISTORY:  Social History     Social History     Marital Status:      Spouse Name: Shari     Number of Children: 4     Years of Education: N/A     Social History Main Topics     Smoking status: Never Smoker      Smokeless tobacco: Never Used     Alcohol Use: No     Drug Use: No     Sexual Activity: Not Currently     Other Topics Concern     Not on file     Social History Narrative       Review of Systems:  Skin:  Negative     Eyes:  Positive for glasses  ENT:  Positive for hearing loss  Respiratory:  Negative    Cardiovascular:  Negative for;palpitations;chest pain;lightheadedness;dizziness Positive for;edema  Gastroenterology: Negative    Genitourinary:  Negative    Musculoskeletal:  Negative    Neurologic:   Negative    Psychiatric:  Negative    Heme/Lymph/Imm:  Negative    Endocrine:  Negative          Recent Lab Results:  LIPID RESULTS:  Lab Results   Component Value Date    CHOL 117 01/13/2017    HDL 47 01/13/2017    LDL 58 01/13/2017    TRIG 61 01/13/2017    CHOLHDLRATIO 2.9 09/08/2015       LIVER ENZYME RESULTS:  Lab Results   Component Value Date    AST 21 01/13/2017    ALT 35 01/13/2017       CBC RESULTS:  Lab Results   Component Value Date    WBC 5.9 01/13/2017    RBC 4.56 01/13/2017    HGB 13.8 01/13/2017    HCT 43.4 01/13/2017    MCV 95 01/13/2017    MCH 30.3 01/13/2017    MCHC 31.8 01/13/2017    RDW 13.9 01/13/2017     01/13/2017       BMP RESULTS:  Lab Results   Component Value Date    * 01/13/2017    POTASSIUM 4.7 01/13/2017    CHLORIDE 107 01/13/2017    CO2 35* 01/13/2017    ANIONGAP 3 01/13/2017    GLC 92 01/13/2017    BUN 28 01/13/2017    CR 1.10 01/13/2017    GFRESTIMATED 64 01/13/2017    GFRESTBLACK 77 01/13/2017    KARTHIK 8.7 01/13/2017        A1C RESULTS:  No results found for: A1C    INR RESULTS:  Lab Results   Component Value Date    INR 2.5* 01/25/2017    INR 2.3* 12/14/2016    INR 1.9* 04/15/2016    INR 2.6 04/07/2016    INR 3.1 04/01/2016    INR 1.1 03/26/2010           CC  No referring provider defined for this encounter.

## 2017-02-10 NOTE — PROGRESS NOTES
REASON FOR CLINIC VISIT:  Followup atrial fibrillation.      HISTORY OF PRESENT ILLNESS:  Mr. Weiss is a very pleasant 83-year-old gentleman with history of atrial flutter and atrial fibrillation with history of atrial flutter ablation.  The patient has been seen in the past by EP and was put on flecainide.  That had to be discontinued because stress test showed mild to moderate inferior ischemia.      Today, he is coming for routine followup.  To be noted, in terms of his coronary artery disease diagnosed on the basis of abnormal stress test, he is essentially asymptomatic without any anginal symptoms.  He is fairly active.  He walks at least 2-3 miles a day and has not noticed any chest discomfort or shortness of breath.  He had an echocardiogram done last month that showed normal LV function, severe biatrial enlargement and mildly dilated aortic root and ascending aorta similar to back in 2015.  To be noted, rhythm was noted to be atrial fibrillation on that study.  He had an EKG done today that confirms that he is in atrial fibrillation with controlled ventricular rates, about 86 beats per minute, with occasional PVC.  The patient tells me that he does not feel any palpitation or any dizziness or any presyncope or syncope.  He is tolerating Coumadin quite well.  He is on low-dose beta blocker, 12.5 mg daily of Toprol-XL, he is on atorvastatin 20 mg daily.  LDL is quite well controlled at 58.  He also takes 20 mg daily of Lasix.  He tells me at the end of the day, especially if he has been standing or walking long, he may notice a little bit of swelling over both lower extremities, but when he wakes up in the morning they are gone.  No orthopnea or PND.      PHYSICAL EXAMINATION:   VITAL SIGNS:  Blood pressure 110/70, heart rate 86 irregular, weight 189 pounds, BMI 25.73.   GENERAL:  The patient appears pleasant, comfortable.   NECK:  Normal JVP, no bruit.   CARDIOVASCULAR SYSTEM:  Irregular, normal rate, no  murmur, rub or gallop.   RESPIRATORY SYSTEM:  Clear to auscultation bilaterally.   ABDOMEN:  Soft, nontender.   EXTREMITIES:  Minimal pitting pedal edema.   NEUROLOGICAL:  Alert, oriented x3.   PSYCH:  Normal affect.   SKIN:  No obvious rash.   HEENT:  No pallor or icterus.      IMPRESSION AND PLAN:  A very delightful 84-year-old gentleman with history of paroxysmal atrial fibrillation.  Rhythm is atrial fibrillation now, asymptomatic, with controlled ventricular rates on low-dose beta blocker.  Other comorbidities of CAD on the basis of abnormal Lexiscan stress test in 2015 showing evidence of small to moderate area of inferior ischemia.  Clinically, no anginal symptoms.  LDL and blood pressure are quite well controlled.  LV function is normal.  I had a long discussion with the patient regarding the atrial fibrillation, rate versus rhythm control strategy.  Given the fact that he is essentially asymptomatic from atrial fibrillation with adequately controlled rates and also severely enlarged left atrium, we will continue rate control strategy.  To be noted, he is only on low-dose beta blocker and his ventricular rates are reasonably controlled, indicating there is some underlying conduction system slowness.  Fortunately, he does not have any evidence of presyncope, syncope or dizziness.  If he continues to feel well, we can see him back in 1 year's time.  In the interim, if he notices any dizziness, any chest discomfort or shortness of breath, especially with exertion, he will call us and we will see him sooner.         SANDOVAL ESTES MD             D: 2017 13:39   T: 2017 22:33   MT: FRANK      Name:     ELIER BUTLER   MRN:      [id number]        Account:      [id number]   :      1932           Service Date: 2017      Document: [id number]

## 2017-03-15 ENCOUNTER — ANTICOAGULATION THERAPY VISIT (OUTPATIENT)
Dept: ANTICOAGULATION | Facility: OTHER | Age: 82
End: 2017-03-15
Payer: MEDICARE

## 2017-03-15 DIAGNOSIS — Z79.01 LONG-TERM (CURRENT) USE OF ANTICOAGULANTS: ICD-10-CM

## 2017-03-15 DIAGNOSIS — I48.91 ATRIAL FIBRILLATION, UNSPECIFIED TYPE (H): ICD-10-CM

## 2017-03-15 LAB — INR POINT OF CARE: 2.9 (ref 0.86–1.14)

## 2017-03-15 PROCEDURE — 36416 COLLJ CAPILLARY BLOOD SPEC: CPT

## 2017-03-15 PROCEDURE — 99207 ZZC NO CHARGE NURSE ONLY: CPT

## 2017-03-15 PROCEDURE — 85610 PROTHROMBIN TIME: CPT | Mod: QW

## 2017-03-15 NOTE — PROGRESS NOTES
ANTICOAGULATION FOLLOW-UP CLINIC VISIT    Patient Name:  Tomer Weiss  Date:  3/15/2017  Contact Type:  Face to Face    SUBJECTIVE:     Patient Findings     Positives Antibiotic use or infection (Pt just returned from [place] (mission trip) fri and has had a cold/cough for the past week. Reports fever initially, now mostly coughing. He's going to call triage.)           OBJECTIVE    INR Protime   Date Value Ref Range Status   03/15/2017 2.9 (A) 0.86 - 1.14 Final     Chromogenic Factor 10   Date Value Ref Range Status   04/24/2015 21 (L) 70 - 130 % Final     Comment:     Therapeutic Range:  A Chromogenic Factor 10 level of approximately 20-40%   inversely correlates with an INR of 2-3 for patients receiving Warfarin.   Chromogenic Factor 10 levels below 20% indicate an INR greater than 3 and   levels above 40% indicate an INR less than 2.         ASSESSMENT / PLAN  INR assessment THER    Recheck INR In: 6 WEEKS    INR Location Clinic      Anticoagulation Summary as of 3/15/2017     INR goal 2.0-3.0   Today's INR 2.9   Maintenance plan 2.5 mg (5 mg x 0.5) on Tue, Sat; 5 mg (5 mg x 1) all other days   Full instructions 2.5 mg on Tue, Sat; 5 mg all other days   Weekly total 30 mg   No change documented Tana Pérez, RN   Plan last modified Tana Pérez, RN (4/27/2016)   Next INR check 4/26/2017   Target end date     Indications   Long-term (current) use of anticoagulants [Z79.01] [Z79.01]  Atrial fibrillation (HCC) [I48.91] [I48.91]         Anticoagulation Episode Summary     INR check location     Preferred lab     Send INR reminders to [facility] POOL    Comments 5 mg tablets, AM dose, AVS      Anticoagulation Care Providers     Provider Role Specialty Phone number    Gabriel Tan MD [facility] Internal Medicine [phone]            See the Encounter Report to view Anticoagulation Flowsheet and Dosing Calendar (Go to Encounters tab in chart review, and find the Anticoagulation Therapy  Visit)    Dosage adjustment made based on physician directed care plan.    Tana Pérez RN

## 2017-03-15 NOTE — MR AVS SNAPSHOT
Tomer Weiss   3/15/2017 1:15 PM   Anticoagulation Therapy Visit    Description:  84 year old male   Provider:  [facility]   Department:  [facility]           INR as of 3/15/2017     Today's INR 2.9      Anticoagulation Summary as of 3/15/2017     INR goal 2.0-3.0   Today's INR 2.9   Full instructions 2.5 mg on Tue, Sat; 5 mg all other days   Next INR check 4/26/2017    Indications   Long-term (current) use of anticoagulants [Z79.01] [Z79.01]  Atrial fibrillation (HCC) [I48.91] [I48.91]         Your next Anticoagulation Clinic appointment(s)     Apr 26, 2017  1:15 PM CDT   [facility]    [address], [place] [address] [phone]              Contact Numbers     Clinic Number:         March 2017 Details    Sun Mon Tue Wed Thu Fri Sat        1               2               3               4                 5               6               7               8               9               10               11                 12               13               14               15      5 mg   See details      16      5 mg         17      5 mg         18      2.5 mg           19      5 mg         20      5 mg         21      2.5 mg         22      5 mg         23      5 mg         24      5 mg         25      2.5 mg           26      5 mg         27      5 mg         28      2.5 mg         29      5 mg         30      5 mg         31      5 mg           Date Details   03/15 This INR check               How to take your warfarin dose     To take:  2.5 mg Take 0.5 of a 5 mg tablet.    To take:  5 mg Take 1 of the 5 mg tablets.           April 2017 Details    Sun Mon Tue Wed Thu Fri Sat           1      2.5 mg           2      5 mg         3      5 mg         4      2.5 mg         5      5 mg         6      5 mg         7      5 mg         8      2.5 mg           9      5 mg         10      5 mg         11       2.5 mg         12      5 mg         13      5 mg         14      5 mg         15      2.5 mg           16      5 mg         17      5 mg         18      2.5 mg         19      5 mg         20      5 mg         21      5 mg         22      2.5 mg           23      5 mg         24      5 mg         25      2.5 mg         26            27               28               29                 30                      Date Details   No additional details    Date of next INR:  4/26/2017         How to take your warfarin dose     To take:  2.5 mg Take 0.5 of a 5 mg tablet.    To take:  5 mg Take 1 of the 5 mg tablets.

## 2017-04-12 ENCOUNTER — APPOINTMENT (OUTPATIENT)
Dept: GENERAL RADIOLOGY | Facility: CLINIC | Age: 82
End: 2017-04-12
Attending: FAMILY MEDICINE
Payer: MEDICARE

## 2017-04-12 ENCOUNTER — APPOINTMENT (OUTPATIENT)
Dept: ULTRASOUND IMAGING | Facility: CLINIC | Age: 82
End: 2017-04-12
Attending: FAMILY MEDICINE
Payer: MEDICARE

## 2017-04-12 ENCOUNTER — HOSPITAL ENCOUNTER (EMERGENCY)
Facility: CLINIC | Age: 82
Discharge: HOME | End: 2017-04-12
Attending: FAMILY MEDICINE | Admitting: FAMILY MEDICINE
Payer: MEDICARE

## 2017-04-12 VITALS
BODY MASS INDEX: 25.18 KG/M2 | RESPIRATION RATE: 20 BRPM | SYSTOLIC BLOOD PRESSURE: 108 MMHG | DIASTOLIC BLOOD PRESSURE: 68 MMHG | HEIGHT: 73 IN | OXYGEN SATURATION: 98 % | TEMPERATURE: 98.5 F | WEIGHT: 190 LBS
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
## 2017-04-12 DIAGNOSIS — S80.11XA TRAUMATIC HEMATOMA OF LOWER LEG, RIGHT, INITIAL ENCOUNTER: ICD-10-CM

## 2017-04-12 DIAGNOSIS — Z79.01 LONG-TERM (CURRENT) USE OF ANTICOAGULANTS: ICD-10-CM

## 2017-04-12 LAB
BASOPHILS # BLD AUTO: 0 10E9/L (ref 0–0.2)
BASOPHILS NFR BLD AUTO: 0.3 %
DIFFERENTIAL METHOD BLD: ABNORMAL
EOSINOPHIL # BLD AUTO: 0 10E9/L (ref 0–0.7)
EOSINOPHIL NFR BLD AUTO: 0.5 %
ERYTHROCYTE [DISTWIDTH] IN BLOOD BY AUTOMATED COUNT: 15.2 % (ref 10–15)
HCT VFR BLD AUTO: 35.8 % (ref 40–53)
HGB BLD-MCNC: 11.5 G/DL (ref 13.3–17.7)
IMM GRANULOCYTES # BLD: 0 10E9/L (ref 0–0.4)
IMM GRANULOCYTES NFR BLD: 0.1 %
INR PPP: 2.36 (ref 0.86–1.14)
LYMPHOCYTES # BLD AUTO: 1 10E9/L (ref 0.8–5.3)
LYMPHOCYTES NFR BLD AUTO: 11.5 %
MCH RBC QN AUTO: 29.6 PG (ref 26.5–33)
MCHC RBC AUTO-ENTMCNC: 32.1 G/DL (ref 31.5–36.5)
MCV RBC AUTO: 92 FL (ref 78–100)
MONOCYTES # BLD AUTO: 0.8 10E9/L (ref 0–1.3)
MONOCYTES NFR BLD AUTO: 8.8 %
NEUTROPHILS # BLD AUTO: 6.9 10E9/L (ref 1.6–8.3)
NEUTROPHILS NFR BLD AUTO: 78.8 %
PLATELET # BLD AUTO: 262 10E9/L (ref 150–450)
RBC # BLD AUTO: 3.89 10E12/L (ref 4.4–5.9)
WBC # BLD AUTO: 8.8 10E9/L (ref 4–11)

## 2017-04-12 PROCEDURE — 99284 EMERGENCY DEPT VISIT MOD MDM: CPT | Mod: 25 | Performed by: FAMILY MEDICINE

## 2017-04-12 PROCEDURE — 73590 X-RAY EXAM OF LOWER LEG: CPT | Mod: TC,RT

## 2017-04-12 PROCEDURE — 99284 EMERGENCY DEPT VISIT MOD MDM: CPT | Mod: Z6 | Performed by: FAMILY MEDICINE

## 2017-04-12 PROCEDURE — 85610 PROTHROMBIN TIME: CPT | Performed by: FAMILY MEDICINE

## 2017-04-12 PROCEDURE — 85025 COMPLETE CBC W/AUTO DIFF WBC: CPT | Performed by: FAMILY MEDICINE

## 2017-04-12 PROCEDURE — 93971 EXTREMITY STUDY: CPT | Mod: RT

## 2017-04-12 RX ORDER — HYDROCODONE BITARTRATE AND ACETAMINOPHEN 5; 325 MG/1; MG/1
1-2 TABLET ORAL EVERY 6 HOURS PRN
Qty: 10 TABLET | Refills: 0 | Status: SHIPPED | OUTPATIENT
Start: 2017-04-12 | End: 2017-06-05

## 2017-04-12 NOTE — ED AVS SNAPSHOT
[facility] Emergency Department    [address] [place] [address]    Phone:  [phone]    Fax:  [phone]                                       Tomer Weiss   MRN: [id number]    Department:  [facility] Department   Date of Visit:  4/12/2017           After Visit Summary Signature Page     I have received my discharge instructions, and my questions have been answered. I have discussed any challenges I see with this plan with the nurse or doctor.    ..........................................................................................................................................  Patient/Patient Representative Signature      ..........................................................................................................................................  Patient Representative Print Name and Relationship to Patient    ..................................................               ................................................  Date                                            Time    ..........................................................................................................................................  Reviewed by Signature/Title    ...................................................              ..............................................  Date                                                            Time

## 2017-04-12 NOTE — ED AVS SNAPSHOT
[facility]    [address] [place] [address]    Phone:  [phone]    Fax:  [phone]                                       Tomer Weiss   MRN: [id number]    Department:  [facility]   Date of Visit:  4/12/2017           Patient Information     Date Of Birth          4/9/1932        Your diagnoses for this visit were:     Traumatic hematoma of lower leg, right, initial encounter     Long-term (current) use of anticoagulants [Z79.01]        You were seen by Kelli Roche MD.      Follow-up Information     Follow up with Gabriel Tan MD In 2 days.    Specialty:  Internal Medicine    Contact information:    [facility]  [address] [place] [id number] [phone]          Follow up with [facility].    Specialty:  EMERGENCY MEDICINE    Why:  If symptoms worsen    Contact information:    [address] [place] [address] [phone]    Additional information:    From [address]: Exit at [address] on [address] side of [place]. Turn right on [address]. Turn left at stoplight on [address] [employer]. [facility] will be in view two blocks ahead        Discharge Instructions       Thank you for giving us the opportunity to see you. The impression is that you have a large hematoma of the right lower leg.    This is partly because you are on blood thinners.  Please hold her Coumadin for the next 2 days, and resume on Saturday.    Elevate, and ice for another 2 days, and then add heat.    Use the Ace wrap for compression.    Follow-up in the clinic in the next 2-5 days for recheck.    Watch for any signs of compartment syndrome as we discussed such as increasing severe pain, numbness or tingling of the foot, change in color, or any signs of infection.    Return to the Emergency Department at any time if your symptoms worsen.            Discharge  References/Attachments     HEMATOMA (ENGLISH)      Future Appointments        Provider Department Dept Phone Center    4/26/2017 1:15 PM [facility] [phone] [place]      24 Hour Appointment Hotline       To make an appointment at any [facility], call [phone] ([phone]). If you don't have a family doctor or clinic, we will help you find one. [facility] are conveniently located to serve the needs of you and your family.             Review of your medicines      START taking        Dose / Directions Last dose taken    HYDROcodone-acetaminophen 5-325 MG per tablet   Commonly known as:  NORCO   Dose:  1-2 tablet   Quantity:  10 tablet        Take 1-2 tablets by mouth every 6 hours as needed for moderate to severe pain   Refills:  0          Our records show that you are taking the medicines listed below. If these are incorrect, please call your family doctor or clinic.        Dose / Directions Last dose taken    atorvastatin 20 MG tablet   Commonly known as:  LIPITOR   Dose:  20 mg   Quantity:  90 tablet        Take 1 tablet (20 mg) by mouth daily   Refills:  1        furosemide 20 MG tablet   Commonly known as:  LASIX   Dose:  20 mg   Quantity:  30 tablet        Take 1 tablet (20 mg) by mouth daily   Refills:  11        glucosamine 500 MG Caps        2 Tablets every morning   Refills:  0        iron 66 MG Tabs   Dose:  65 mg   Quantity:  1 tablet        Take 1 tablet (66 mg) by mouth daily   Refills:  0        metoprolol 25 MG 24 hr tablet   Commonly known as:  TOPROL-XL   Dose:  12.5 mg   Quantity:  90 tablet        Take 0.5 tablets (12.5 mg) by mouth daily   Refills:  2        order for DME   Quantity:  1 Device        Equipment being ordered: Walker () Treatment Diagnosis: s/p joint replacement   Refills:  0        warfarin 5 MG tablet   Commonly known as:  COUMADIN   Quantity:  75 tablet        Take 2.5 mg (1/2 tablet) Tuesday and  "Saturday and 5 mg other 5 days or as directed by the coumadin clinic   Refills:  1                Prescriptions were sent or printed at these locations (1 Prescription)                   [facility]   [address] [address]    Telephone:  [phone]   Fax:  [phone]   Hours:                  Printed at Department/Unit printer (1 of 1)         HYDROcodone-acetaminophen (NORCO) 5-325 MG per tablet                Procedures and tests performed during your visit     CBC with platelets differential    INR    Tib/Fib XR, right    US Lower Extremity Venous Duplex Right      Orders Needing Specimen Collection     None      Pending Results     Date and Time Order Name Status Description    2017 US Lower Extremity Venous Duplex Right Preliminary             Pending Culture Results     No orders found from 4/10/2017 to 2017.            Thank you for choosing [facility]       Thank you for choosing [facility] for your care. Our goal is always to provide you with excellent care. Hearing back from our patients is one way we can continue to improve our services. Please take a few minutes to complete the written survey that you may receive in the mail after you visit with us. Thank you!        [employer] Information     [employer] lets you send messages to your doctor, view your test results, renew your prescriptions, schedule appointments and more. To sign up, go to [email or web address] . Click on \"Log in\" on the left side of the screen, which will take you to the Welcome page. Then click on \"Sign up Now\" on the right side of the page.     You will be asked to enter the access code listed below, as well as some personal information. Please follow the directions to create your username and password.     Your access code is: [email or web address]  Expires: 2017  9:41 AM     Your access code will  in 90 days. If you need help or a new code, please call your [facility] " clinic or [phone].        Care EveryWhere ID     This is your Care EveryWhere ID. This could be used by other organizations to access your [place] [facility] medical records  [id number]        After Visit Summary       This is your record. Keep this with you and show to your community pharmacist(s) and doctor(s) at your next visit.

## 2017-04-13 NOTE — ED NOTES
Pt had tractor blade that fell on his right calf area on Monday.  Increasing pain and decreasing mobility

## 2017-04-13 NOTE — ED PROVIDER NOTES
[facility] ED Provider Note   CC:     Chief Complaint   Patient presents with     Leg Pain     HPI:  Tomer Weiss is a 85 year old male who presented to the emergency department with right lateral leg pain after a blunt injury to the right distal lateral leg.  Patient was removing a back blade off of a tractor, when it tilted and struck his right lower leg in a glancing blow.  Patient is on Coumadin for atrial fibrillation.  He has significant ecchymosis, swelling and pain especially with walking.  Current pain level is 2/10 at rest, and the pain increases to 9/10 with walking.  Pain does improve somewhat after he gets walking.  He denies fever, chills, chest pain, shortness of breath, nausea, vomiting.  He denies any numbness, tingling, and has sensation to the toes and foot.  Patient denies any proximal leg pain involving the knee or hip.  He does not need any pain medications at the present time.    Problem List:  Patient Active Problem List    Diagnosis     Long-term (current) use of anticoagulants [Z79.01]     Atrial fibrillation (HCC) [I48.91]     Anemia     Total knee replacement status     Post-traumatic osteoarthritis of left knee     Atrial flutter     BPH (benign prostatic hyperplasia)     Advanced directives, counseling/discussion     Dupuytren's contracture of hand     DJD (degenerative joint disease) of knee     HYPERLIPIDEMIA LDL GOAL <130     Malignant neoplasm of kidney excluding renal pelvis (H)     Unspecified hyperplasia of prostate with urinary obstruction and other lower urinary tract symptoms (LUTS)     Cervicalgia     Polymyalgia rheumatica (H)     Alopecia     Contracture of palmar fascia       MEDS:   Discharge Medication List as of 4/12/2017 11:28 PM      CONTINUE these medications which have NOT CHANGED    Details   furosemide (LASIX) 20 MG tablet Take 1 tablet (20 mg) by mouth daily, Disp-30 tablet, R-11,  "E-Prescribe      atorvastatin (LIPITOR) 20 MG tablet Take 1 tablet (20 mg) by mouth daily, Disp-90 tablet, R-1, E-Prescribe      metoprolol (TOPROL-XL) 25 MG 24 hr tablet Take 0.5 tablets (12.5 mg) by mouth daily, Disp-90 tablet, R-2, E-Prescribe      warfarin (COUMADIN) 5 MG tablet Take 2.5 mg (1/2 tablet) Tuesday and Saturday and 5 mg other 5 days or as directed by the coumadin clinic, Disp-75 tablet, R-1, E-Prescribe      iron 66 MG TABS Take 1 tablet (66 mg) by mouth daily, Disp-1 tablet, R-0, Historical      GLUCOSAMINE 500 MG OR CAPS 2 Tablets every morning, Historical      order for DME Equipment being ordered: Walker ()  Treatment Diagnosis: s/p joint replacementDisp-1 Device, R-0, Local Print             ALLERGIES:    Allergies   Allergen Reactions     No Known Drug Allergies        Past medical, surgical,  and social histories, triage and nursing notes were all reviewed.    Review of Systems   All other systems were reviewed and are negative    Physical Exam     Vitals were reviewed  Patient Vitals for the past 8 hrs:   BP Temp Temp src Heart Rate Resp SpO2 Height Weight   04/12/17 2034 108/68 98.5  F (36.9  C) Oral 107 20 98 % 1.854 m (6' 1\") 86.2 kg (190 lb)     GENERAL APPEARANCE: Alert, mild distress, laying  FACE: normal facies  EYES: Pupils are equal  HENT: normal external exam  NECK: no adenopathy or asymmetry  RESP: normal respiratory effort; clear breath sounds bilaterally  CV: Irregularly irregular rhythm; grade 2/6 murmur  ABD: soft, no tenderness; no rebound or guarding; bowel sounds are normal  MS: no gross deformities noted; normal muscle tone.  EXT: Significant large ecchymosis with some tense lateral compartment.  No pulselessness, paresthesia, pain out of proportion, or pallor.  SKIN: Significant ecchymosis.  See photo below  NEURO: no facial droop; no focal deficits, speech is normal  PSYCH: normal mood and affect              Available Lab/Imaging Results     Results for orders " placed or performed during the hospital encounter of 04/12/17 (from the past 24 hour(s))   INR   Result Value Ref Range    INR 2.36 (H) 0.86 - 1.14   CBC with platelets differential   Result Value Ref Range    WBC 8.8 4.0 - 11.0 10e9/L    RBC Count 3.89 (L) 4.4 - 5.9 10e12/L    Hemoglobin 11.5 (L) 13.3 - 17.7 g/dL    Hematocrit 35.8 (L) 40.0 - 53.0 %    MCV 92 78 - 100 fl    MCH 29.6 26.5 - 33.0 pg    MCHC 32.1 31.5 - 36.5 g/dL    RDW 15.2 (H) 10.0 - 15.0 %    Platelet Count 262 150 - 450 10e9/L    Diff Method Automated Method     % Neutrophils 78.8 %    % Lymphocytes 11.5 %    % Monocytes 8.8 %    % Eosinophils 0.5 %    % Basophils 0.3 %    % Immature Granulocytes 0.1 %    Absolute Neutrophil 6.9 1.6 - 8.3 10e9/L    Absolute Lymphocytes 1.0 0.8 - 5.3 10e9/L    Absolute Monocytes 0.8 0.0 - 1.3 10e9/L    Absolute Eosinophils 0.0 0.0 - 0.7 10e9/L    Absolute Basophils 0.0 0.0 - 0.2 10e9/L    Abs Immature Granulocytes 0.0 0 - 0.4 10e9/L   Tib/Fib XR, right    Narrative    RIGHT TIBIA AND FIBULA TWO VIEWS    4/12/2017 9:23 PM     HISTORY:  Pain.      Impression    IMPRESSION: There is a lateral subcutaneous mass at the mid leg level.  Clinical correlation recommended. There is knee medial compartment and  patellofemoral osteoarthritis. No fracture identified.     PRESLEY CORRALES MD   US Lower Extremity Venous Duplex Right    Narrative    US LOWER EXTREMITY VENOUS DUPLEX RIGHT  4/12/2017 11:10 PM     HISTORY: Right leg pain; rule out DVT. Injury to right lateral midcalf  on Monday. Bruising.    TECHNIQUE: Venous Doppler ultrasound of the lower extremity. Color  flow and spectral Doppler with waveform analysis performed.    COMPARISON: None.    FINDINGS: Ultrasound of the right leg demonstrates no deep vein  thrombus from the common femoral through popliteal veins or in the  visualized segments of posterior tibial veins in the calf. Complex  hypoechoic mass in the right midcalf laterally measuring approximately  8 x 8.9 x 3.6  cm.      Impression    IMPRESSION:   1. No DVT identified right leg.  2. Complex hypoechoic mass in the mid lateral calf at the site of pain  and bruising, most likely a hematoma. Recommend clinical correlation  and follow-up as indicated.    TERESA BENITEZ MD       Impression     Final diagnoses:   Traumatic hematoma of lower leg, right, initial encounter   Long-term (current) use of anticoagulants [Z79.01]       ED Course & Medical Decision Making   Tomer Weiss is a 85 year old male who presented to the emergency department with right distal lateral leg hematoma after sustaining a blunt injury 2 days ago.  Patient has noticed increased swelling and bruising and pain with walking.  Current pain at rest is 0-2 he's able to get his knee propped up.  Pain goes up to 9/10 with initial walking but then the pain improves after continued walking.  Patient is on Coumadin, and his INR level today was 2.36.  His white blood count 8.8 with hemoglobin of 11.5.  A x-ray of the right tib-fib was reviewed by me and reveals no fracture.  There is a subcutaneous mass that represents the large hematoma.  A photo of the hematomas noted above.  A Doppler ultrasound revealed no evidence for DVT, and a complex hypoechoic mass in the mid lateral calf at the site of the pain and bruising.  Patient had no evidence for compartment syndrome, with pain that is well managed at rests, good pulses, no pallor, and normal sensation.  I suspect that this is a venous bleed leading to the hematoma and not an arterial injury.  I asked the patient to hold his Coumadin for 2 days, and then to resume it on Saturday.  The leg was treated with a compression dressing with Ace wrap, and the patient will continue Vicodin at home for 1-2 days.  Patient was informed that this could take 4-6 weeks for the hematoma to resolve.  I asked him to follow up in the clinic in 3-5 days for recheck.  Watch for any signs of compartment syndrome or signs of  infection.  Return to the ED at any time if symptoms worsen.      Written after-visit summary and instructions were given at the time of discharge.      Discharge Medication List as of 4/12/2017 11:28 PM      START taking these medications    Details   HYDROcodone-acetaminophen (NORCO) 5-325 MG per tablet Take 1-2 tablets by mouth every 6 hours as needed for moderate to severe pain, Disp-10 tablet, R-0, Local Print               This note was completed in part using Dragon voice recognition, and may contain word and grammatical errors.        Kelli Roche MD  04/13/17 0133

## 2017-04-18 ENCOUNTER — TELEPHONE (OUTPATIENT)
Dept: INTERNAL MEDICINE | Facility: CLINIC | Age: 82
End: 2017-04-18

## 2017-04-18 NOTE — TELEPHONE ENCOUNTER
Please let him know I am out for a family emergency.  If he is feeling ok, no need to see him.  I know his leg is bruised still and will be for weeks.  He can elevate and walk some on it.

## 2017-04-18 NOTE — TELEPHONE ENCOUNTER
Message given to patient. Kit states he is doing well and doesn't feel he needs to be seen.  Charo Olmos RN

## 2017-04-18 NOTE — TELEPHONE ENCOUNTER
Reason for Call:  Same Day Appointment, Requested Provider:  Gabriel Tan MD    PCP: Gabriel Tan    Reason for visit: ED fu, bad bruise on leg.     Duration of symptoms: seen 4.12, was instructed to be seen early this week, please advise if patient can be seen today or tomorrow.    Have you been treated for this in the past? Yes    Additional comments:     Can we leave a detailed message on this number? YES    Phone number patient can be reached at: Home number on file [phone] (home)    Best Time:     Call taken on 4/18/2017 at 8:49 AM by Wanda Desai

## 2017-04-26 ENCOUNTER — ANTICOAGULATION THERAPY VISIT (OUTPATIENT)
Dept: ANTICOAGULATION | Facility: OTHER | Age: 82
End: 2017-04-26
Payer: MEDICARE

## 2017-04-26 ENCOUNTER — TELEPHONE (OUTPATIENT)
Dept: ANTICOAGULATION | Facility: OTHER | Age: 82
End: 2017-04-26

## 2017-04-26 DIAGNOSIS — I48.91 ATRIAL FIBRILLATION, UNSPECIFIED TYPE (H): ICD-10-CM

## 2017-04-26 DIAGNOSIS — Z79.01 LONG-TERM (CURRENT) USE OF ANTICOAGULANTS: ICD-10-CM

## 2017-04-26 DIAGNOSIS — I48.92 ATRIAL FLUTTER, UNSPECIFIED TYPE (H): Primary | ICD-10-CM

## 2017-04-26 LAB — INR POINT OF CARE: 2.6 (ref 0.86–1.14)

## 2017-04-26 PROCEDURE — 99207 ZZC NO CHARGE NURSE ONLY: CPT

## 2017-04-26 PROCEDURE — 36416 COLLJ CAPILLARY BLOOD SPEC: CPT

## 2017-04-26 PROCEDURE — 85610 PROTHROMBIN TIME: CPT | Mod: QW

## 2017-04-26 NOTE — PROGRESS NOTES
ANTICOAGULATION FOLLOW-UP CLINIC VISIT    Patient Name:  Tomer Weiss  Date:  4/26/2017  Contact Type:  Face to Face    SUBJECTIVE:     Patient Findings     Positives No Problem Findings           OBJECTIVE    INR Protime   Date Value Ref Range Status   04/26/2017 2.6 (A) 0.86 - 1.14 Final     Chromogenic Factor 10   Date Value Ref Range Status   04/24/2015 21 (L) 70 - 130 % Final     Comment:     Therapeutic Range:  A Chromogenic Factor 10 level of approximately 20-40%   inversely correlates with an INR of 2-3 for patients receiving Warfarin.   Chromogenic Factor 10 levels below 20% indicate an INR greater than 3 and   levels above 40% indicate an INR less than 2.         ASSESSMENT / PLAN  INR assessment THER    Recheck INR In: 6 WEEKS    INR Location Clinic      Anticoagulation Summary as of 4/26/2017     INR goal 2.0-3.0   Today's INR 2.6   Maintenance plan 2.5 mg (5 mg x 0.5) on Tue, Sat; 5 mg (5 mg x 1) all other days   Full instructions 2.5 mg on Tue, Sat; 5 mg all other days   Weekly total 30 mg   No change documented Tana Pérez, JUANY   Plan last modified Tana Pérez, RN (4/27/2016)   Next INR check 6/7/2017   Target end date     Indications   Long-term (current) use of anticoagulants [Z79.01] [Z79.01]  Atrial fibrillation (HCC) [I48.91] [I48.91]         Anticoagulation Episode Summary     INR check location     Preferred lab     Send INR reminders to [facility] POOL    Comments 5 mg tablets, AM dose, AVS      Anticoagulation Care Providers     Provider Role Specialty Phone number    Gabriel Tan MD [facility] Internal Medicine [phone]            See the Encounter Report to view Anticoagulation Flowsheet and Dosing Calendar (Go to Encounters tab in chart review, and find the Anticoagulation Therapy Visit)    Dosage adjustment made based on physician directed care plan.    Tana Pérez RN

## 2017-04-26 NOTE — MR AVS SNAPSHOT
Tomer Weiss   4/26/2017 1:15 PM   Anticoagulation Therapy Visit    Description:  85 year old male   Provider:  [facility]   Department:  [facility]           INR as of 4/26/2017     Today's INR 2.6      Anticoagulation Summary as of 4/26/2017     INR goal 2.0-3.0   Today's INR 2.6   Full instructions 2.5 mg on Tue, Sat; 5 mg all other days   Next INR check 6/7/2017    Indications   Long-term (current) use of anticoagulants [Z79.01] [Z79.01]  Atrial fibrillation (HCC) [I48.91] [I48.91]         Your next Anticoagulation Clinic appointment(s)     Jun 07, 2017  1:15 PM CDT   [facility] with [facility]   [facility]    [address] [address] [phone]              Contact Numbers     Clinic Number:         April 2017 Details    Sun Mon Tue Wed Thu Fri Sat           1                 2               3               4               5               6               7               8                 9               10               11               12               13               14               15                 16               17               18               19               20               21               22                 23               24               25               26      5 mg   See details      27      5 mg         28      5 mg         29      2.5 mg           30      5 mg                Date Details   04/26 This INR check               How to take your warfarin dose     To take:  2.5 mg Take 0.5 of a 5 mg tablet.    To take:  5 mg Take 1 of the 5 mg tablets.           May 2017 Details    Sun Mon Tue Wed Thu Fri Sat      1      5 mg         2      2.5 mg         3      5 mg         4      5 mg         5      5 mg         6      2.5 mg           7      5 mg         8      5 mg         9      2.5 mg         10      5 mg         11      5 mg         12      5 mg         13      2.5 mg           14      5  mg         15      5 mg         16      2.5 mg         17      5 mg         18      5 mg         19      5 mg         20      2.5 mg           21      5 mg         22      5 mg         23      2.5 mg         24      5 mg         25      5 mg         26      5 mg         27      2.5 mg           28      5 mg         29      5 mg         30      2.5 mg         31      5 mg             Date Details   No additional details            How to take your warfarin dose     To take:  2.5 mg Take 0.5 of a 5 mg tablet.    To take:  5 mg Take 1 of the 5 mg tablets.           June 2017 Details    Sun Mon Tue Wed Thu Fri Sat         1      5 mg         2      5 mg         3      2.5 mg           4      5 mg         5      5 mg         6      2.5 mg         7            8               9               10                 11               12               13               14               15               16               17                 18               19               20               21               22               23               24                 25               26               27               28               29               30                 Date Details   No additional details    Date of next INR:  6/7/2017         How to take your warfarin dose     To take:  2.5 mg Take 0.5 of a 5 mg tablet.    To take:  5 mg Take 1 of the 5 mg tablets.

## 2017-04-26 NOTE — TELEPHONE ENCOUNTER
Please review and renew this patients INR referral, orders pending. Thank you!      Tana Pérez, RN- Coumadin Clinic RN

## 2017-06-05 ENCOUNTER — OFFICE VISIT (OUTPATIENT)
Dept: FAMILY MEDICINE | Facility: OTHER | Age: 82
End: 2017-06-05
Payer: MEDICARE

## 2017-06-05 VITALS
HEIGHT: 73 IN | HEART RATE: 103 BPM | DIASTOLIC BLOOD PRESSURE: 68 MMHG | RESPIRATION RATE: 16 BRPM | OXYGEN SATURATION: 98 % | SYSTOLIC BLOOD PRESSURE: 116 MMHG | BODY MASS INDEX: 25.33 KG/M2 | WEIGHT: 191.1 LBS | TEMPERATURE: 97.9 F

## 2017-06-05 DIAGNOSIS — S81.801A NON-HEALING WOUND OF LOWER EXTREMITY, RIGHT, INITIAL ENCOUNTER: Primary | ICD-10-CM
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
## 2017-06-05 PROCEDURE — 99214 OFFICE O/P EST MOD 30 MIN: CPT | Performed by: PHYSICIAN ASSISTANT

## 2017-06-05 RX ORDER — DOXYCYCLINE 100 MG/1
100 CAPSULE ORAL 2 TIMES DAILY
Qty: 20 CAPSULE | Refills: 0 | Status: SHIPPED | OUTPATIENT
Start: 2017-06-05 | End: 2017-09-01

## 2017-06-05 ASSESSMENT — PAIN SCALES - GENERAL: PAINLEVEL: NO PAIN (0)

## 2017-06-05 NOTE — PROGRESS NOTES
SUBJECTIVE:                                                    Tomer Weiss is a 85 year old male who presents to clinic today for the following health issues:    Concern - tick bite     Onset: x 2 days    Description:   Patient states he is pretty sure its a deer tick. He says the head is still in there.    Intensity: mild    Progression of Symptoms:  same    Accompanying Signs & Symptoms:  Red andscabbed       Previous history of similar problem:   no    Precipitating factors:   Worsened by: no    Alleviating factors:  Improved by: no       Therapies Tried and outcome: nothing      Leg Pain     Onset: x 6-7 weeks    Description:   Location: Right lower leg  Character: no pain    Intensity: mild    Progression of Symptoms: same    Accompanying Signs & Symptoms:  Other symptoms: Scabbed, odor, swollen red   History:   Previous similar pain: no       Precipitating factors:   Trauma or overuse: YES- piece of machinery hit it.     Alleviating factors:  Improved by: nothing       Therapies Tried and outcome: nothing      Problem list and histories reviewed & adjusted, as indicated.  Additional history: as documented    Patient has a few concerns first he has a tick bite on the back of his arm that he sustained sometime over the weekend and removed about 2 days ago, he is pretty sure that it was a deer tick. He is not having any fevers but has had a little joint pain.     IN addition patient reports that he dropped a tool on his leg about 66-7 weeks ago and developed swelling and a wound to the area, he has been using ice and compression but the wound is not healing and is continuing to have some purulent drainage and that the swelling to the leg persists sincere after all this time. He has not had any spreading redness or warmth.     BP Readings from Last 3 Encounters:   06/05/17 116/68   04/12/17 108/68   02/09/17 110/70    Wt Readings from Last 3 Encounters:   06/05/17 191 lb 1.6 oz (86.7 kg)   04/12/17 190  "lb (86.2 kg)   02/09/17 189 lb 4.8 oz (85.9 kg)         Reviewed and updated as needed this visit by clinical staff       Reviewed and updated as needed this visit by Provider         ROS:  Constitutional, HEENT, cardiovascular, pulmonary, gi and gu systems are negative, except as otherwise noted.    OBJECTIVE:                                                    /68 (BP Location: Left arm, Patient Position: Chair, Cuff Size: Adult Regular)  Pulse 103  Temp 97.9  F (36.6  C) (Temporal)  Resp 16  Ht 6' 0.5\" (1.842 m)  Wt 191 lb 1.6 oz (86.7 kg)  SpO2 98%  BMI 25.56 kg/m2  Body mass index is 25.56 kg/(m^2).  GENERAL: healthy, alert and no distress  NECK: no adenopathy, no asymmetry, masses, or scars and thyroid normal to palpation  RESP: lungs clear to auscultation - no rales, rhonchi or wheezes  CV: regular rates and rhythm, peripheral pulses strong and no peripheral edema  MS: there is a large area of swelling to the lateral right leg   SKIN: there is a bite henry under the right arm, there is a quarter sized scab and underlying wound with some purulent drainage to the center of the area of swelling to the lateral right leg.     Diagnostic Test Results:  Results for orders placed or performed in visit on 04/26/17   INR point of care   Result Value Ref Range    INR Protime 2.6 (A) 0.86 - 1.14        ASSESSMENT/PLAN:                                                        ICD-10-CM    1. Non-healing wound of lower extremity, right, initial encounter S81.801A doxycycline (VIBRAMYCIN) 100 MG capsule     GENERAL SURG ADULT REFERRAL   2. Tick bite, initial encounter W57.XXXA doxycycline (VIBRAMYCIN) 100 MG capsule       Based on tick exposure and non healing wound I will treat for wound infection and cover for tick borne illness with doxycycline. For non healing wound and hematoma if not improving/resolving in the next few weeks I would have him see surgery for further evaluation and treatment follow up sooner if " new or worsening symptoms.     See Patient Instructions    Christi Reyna PA-C  [employer]

## 2017-06-05 NOTE — NURSING NOTE
"Chief Complaint   Patient presents with     Insect Bites     tick bite     Musculoskeletal Problem       Initial /68 (BP Location: Left arm, Patient Position: Chair, Cuff Size: Adult Regular)  Pulse 103  Temp 97.9  F (36.6  C) (Temporal)  Resp 16  Ht 6' 0.5\" (1.842 m)  Wt 191 lb 1.6 oz (86.7 kg)  SpO2 98%  BMI 25.56 kg/m2 Estimated body mass index is 25.56 kg/(m^2) as calculated from the following:    Height as of this encounter: 6' 0.5\" (1.842 m).    Weight as of this encounter: 191 lb 1.6 oz (86.7 kg).  Medication Reconciliation: complete    "

## 2017-06-05 NOTE — PATIENT INSTRUCTIONS
I will have you take an antibiotic to help with wound infection and swelling on the leg and to cover for tick borne illness. I will have you use compression to the wound and if not resolved follow up with surgery to discuss further treatment. all or follow up in clinic if any worsening swelling redness or drainage.    Tick Bite (No Abx)    You have been bitten by a tick. Ticks are small insects that feed on the blood of rodents, rabbits, birds, deer, dogs, and humans. The bite may cause a small local reaction like that of a spider, with a small amount of local redness, itching and slight swelling. Sometimes there is no local reaction.  Most tick bites are harmless, but some ticks carry diseases, such as Lyme disease or Augustine [place] spotted fever, that can be passed to people at the time of the bite. Lyme disease is of greatest concern. Right now you have no symptoms of Lyme disease or other serious reaction to the bite. It is important to watch for the warning signs, which could appear weeks to months after the tick bite.  Home care  The following will help you care for your bite at home:  1. If itching is a problem, avoid tight clothing and anything that heats up your skin (hot showers/baths, direct sunlight). This will tend to make the itching worse. Use ice packs to reduce redness as well as itching.  2. An ice pack (ice cubes in a plastic bag, wrapped in a towel) will reduce local areas of redness and itching. Creams containing benzocaine (available over-the-counter) will reduce itching.  3. If large areas of the skin are involved, and if no other antihistamine was given, you can use an antihistamine with diphenhydramine, which is available at drug stores or groceries. It may be used to reduce itching if large areas of the skin are involved. If symptoms continue, seek the advice of your doctor or pharmacist about over-the-counter medications.  Follow-up care  Follow up with your doctor or as advised.  When to  seek medical care  Get prompt medical attention if any of the following occur:  Signs of local infection (during next few days)    Increasing redness around the bite site    Increased pain or swelling    Fever over 100.4 F (38.0 C)    Fluid draining from the bite area  Signs of tick-related disease (during next few weeks to months)    Circular red ring-like rash appears at the bite area within 1 to 3 weeks    Tiredness, fever or chills, nausea or vomiting    Neck pain or stiffness, headache, confusion    Muscle, bone aching    Irregular or rapid heart beat    Joint pain or swelling (especially the knee joint)    Numbness or tingling or weakness in the arms or legs    Weakness on one side of the face    1290-6885 [employer]. [address] [place] [address], [place] [address]. All rights reserved. This information is not intended as a substitute for professional medical care. Always follow your healthcare professional's instructions.

## 2017-06-05 NOTE — MR AVS SNAPSHOT
After Visit Summary   6/5/2017    Tomer Weiss    MRN: [id number]           Patient Information     Date Of Birth          4/9/1932        Visit Information        Provider Department      6/5/2017 3:20 PM Christi Reyna PA-C [facility]        Today's Diagnoses     Non-healing wound of lower extremity, right, initial encounter    -  1    Tick bite, initial encounter          Care Instructions    I will have you take an antibiotic to help with wound infection and swelling on the leg and to cover for tick borne illness. I will have you use compression to the wound and if not resolved follow up with surgery to discuss further treatment. all or follow up in clinic if any worsening swelling redness or drainage.    Tick Bite (No Abx)    You have been bitten by a tick. Ticks are small insects that feed on the blood of rodents, rabbits, birds, deer, dogs, and humans. The bite may cause a small local reaction like that of a spider, with a small amount of local redness, itching and slight swelling. Sometimes there is no local reaction.  Most tick bites are harmless, but some ticks carry diseases, such as Lyme disease or [place] spotted fever, that can be passed to people at the time of the bite. Lyme disease is of greatest concern. Right now you have no symptoms of Lyme disease or other serious reaction to the bite. It is important to watch for the warning signs, which could appear weeks to months after the tick bite.  Home care  The following will help you care for your bite at home:  1. If itching is a problem, avoid tight clothing and anything that heats up your skin (hot showers/baths, direct sunlight). This will tend to make the itching worse. Use ice packs to reduce redness as well as itching.  2. An ice pack (ice cubes in a plastic bag, wrapped in a towel) will reduce local areas of redness and itching. Creams containing benzocaine (available over-the-counter) will  reduce itching.  3. If large areas of the skin are involved, and if no other antihistamine was given, you can use an antihistamine with diphenhydramine, which is available at drug stores or groceries. It may be used to reduce itching if large areas of the skin are involved. If symptoms continue, seek the advice of your doctor or pharmacist about over-the-counter medications.  Follow-up care  Follow up with your doctor or as advised.  When to seek medical care  Get prompt medical attention if any of the following occur:  Signs of local infection (during next few days)    Increasing redness around the bite site    Increased pain or swelling    Fever over 100.4 F (38.0 C)    Fluid draining from the bite area  Signs of tick-related disease (during next few weeks to months)    Circular red ring-like rash appears at the bite area within 1 to 3 weeks    Tiredness, fever or chills, nausea or vomiting    Neck pain or stiffness, headache, confusion    Muscle, bone aching    Irregular or rapid heart beat    Joint pain or swelling (especially the knee joint)    Numbness or tingling or weakness in the arms or legs    Weakness on one side of the face    5254-5112 [employer]. [address] [place] [address]. All rights reserved. This information is not intended as a substitute for professional medical care. Always follow your healthcare professional's instructions.                Follow-ups after your visit        Additional Services     GENERAL SURG ADULT REFERRAL       Your provider has referred you to: [facility]: [facility] [phone]   [email or web address]  [facility]: [facility] [phone]   [email or web address]    Please be aware that coverage of these services is subject to the terms and limitations of your health insurance plan.  Call member services at your health plan with any benefit or coverage questions.   "    Please bring the following with you to your appointment:    (1) Any X-Rays, CTs or MRIs which have been performed.  Contact the facility where they were done to arrange for  prior to your scheduled appointment.   (2) List of current medications   (3) This referral request   (4) Any documents/labs given to you for this referral                  Follow-up notes from your care team     Return if symptoms worsen or fail to improve.      Your next 10 appointments already scheduled     Jun 07, 2017  1:15 PM CDT   Anticoagulation Visit with ZM ANTI COAG   [facility]    [address] [facility] [address] [phone]            Jun 29, 2017 10:00 AM CDT   New Visit with Tunde Rondon MD   [facility] ([facility]    [address] [facility] [address] [phone]              Who to contact     If you have questions or need follow up information about today's clinic visit or your schedule please contact [facility] directly at [phone].  Normal or non-critical lab and imaging results will be communicated to you by [employer]art, letter or phone within 4 business days after the clinic has received the results. If you do not hear from us within 7 days, please contact the clinic through ELERT[employer]art or phone. If you have a critical or abnormal lab result, we will notify you by phone as soon as possible.  Submit refill requests through [employer] or call your pharmacy and they will forward the refill request to us. Please allow 3 business days for your refill to be completed.          Additional Information About Your Visit        EQ works Information     [employer] lets you send messages to your doctor, view your test results, renew your prescriptions, schedule appointments and more. To sign up, go to [email or web address] . Click on \"Log in\" on the left side of the screen, which will take you to the Welcome " "page. Then click on \"Sign up Now\" on the right side of the page.     You will be asked to enter the access code listed below, as well as some personal information. Please follow the directions to create your username and password.     Your access code is: [email or web address]  Expires: 9/3/2017  3:52 PM     Your access code will  in 90 days. If you need help or a new code, please call your [facility] clinic or [phone].        Care EveryWhere ID     This is your Care EveryWhere ID. This could be used by other organizations to access your [facility] medical records  [id number]        Your Vitals Were     Pulse Temperature Respirations Height Pulse Oximetry BMI (Body Mass Index)    103 97.9  F (36.6  C) (Temporal) 16 6' 0.5\" (1.842 m) 98% 25.56 kg/m2       Blood Pressure from Last 3 Encounters:   17 116/68   17 108/68   17 110/70    Weight from Last 3 Encounters:   17 191 lb 1.6 oz (86.7 kg)   17 190 lb (86.2 kg)   17 189 lb 4.8 oz (85.9 kg)              We Performed the Following     GENERAL SURG ADULT REFERRAL          Today's Medication Changes          These changes are accurate as of: 17  3:52 PM.  If you have any questions, ask your nurse or doctor.               Start taking these medicines.        Dose/Directions    doxycycline 100 MG capsule   Commonly known as:  VIBRAMYCIN   Used for:  Tick bite, initial encounter, Non-healing wound of lower extremity, right, initial encounter   Started by:  Christi Reyna PA-C        Dose:  100 mg   Take 1 capsule (100 mg) by mouth 2 times daily   Quantity:  20 capsule   Refills:  0            Where to get your medicines      These medications were sent to [facility] [address] [place] [address] [facility]  [address] [facility] [address]     Phone:  [phone]     doxycycline 100 MG capsule                Primary Care Provider Office Phone # Fax #    Gabriel Tan [phone]       [facility] [employer] [facility]" [facility] [address] [place] [address]        Thank you!     Thank you for choosing [facility]  for your care. Our goal is always to provide you with excellent care. Hearing back from our patients is one way we can continue to improve our services. Please take a few minutes to complete the written survey that you may receive in the mail after your visit with us. Thank you!             Your Updated Medication List - Protect others around you: Learn how to safely use, store and throw away your medicines at www.disposemymeds.org.          This list is accurate as of: 6/5/17  3:52 PM.  Always use your most recent med list.                   Brand Name Dispense Instructions for use    atorvastatin 20 MG tablet    LIPITOR    90 tablet    Take 1 tablet (20 mg) by mouth daily       doxycycline 100 MG capsule    VIBRAMYCIN    20 capsule    Take 1 capsule (100 mg) by mouth 2 times daily       furosemide 20 MG tablet    LASIX    30 tablet    Take 1 tablet (20 mg) by mouth daily       glucosamine 500 MG Caps      2 Tablets every morning       iron 66 MG Tabs     1 tablet    Take 1 tablet (66 mg) by mouth daily       metoprolol 25 MG 24 hr tablet    TOPROL-XL    90 tablet    Take 0.5 tablets (12.5 mg) by mouth daily       order for DME     1 Device    Equipment being ordered: Walker () Treatment Diagnosis: s/p joint replacement       warfarin 5 MG tablet    COUMADIN    75 tablet    Take 2.5 mg (1/2 tablet) Tuesday and Saturday and 5 mg other 5 days or as directed by the coumadin clinic

## 2017-06-16 ENCOUNTER — ANTICOAGULATION THERAPY VISIT (OUTPATIENT)
Dept: ANTICOAGULATION | Facility: OTHER | Age: 82
End: 2017-06-16
Payer: MEDICARE

## 2017-06-16 DIAGNOSIS — I48.91 ATRIAL FIBRILLATION, UNSPECIFIED TYPE (H): ICD-10-CM

## 2017-06-16 DIAGNOSIS — Z79.01 LONG-TERM (CURRENT) USE OF ANTICOAGULANTS: ICD-10-CM

## 2017-06-16 LAB — INR POINT OF CARE: 3.8 (ref 0.86–1.14)

## 2017-06-16 PROCEDURE — 99207 ZZC NO CHARGE NURSE ONLY: CPT

## 2017-06-16 PROCEDURE — 85610 PROTHROMBIN TIME: CPT | Mod: QW

## 2017-06-16 PROCEDURE — 36416 COLLJ CAPILLARY BLOOD SPEC: CPT

## 2017-06-16 NOTE — PROGRESS NOTES
ANTICOAGULATION FOLLOW-UP CLINIC VISIT    Patient Name:  Tomer Weiss  Date:  6/16/2017  Contact Type:  Face to Face    SUBJECTIVE:     Patient Findings     Positives Change in medications (Pt was put on doxycycline for 10 days (last dose yesterday) for non healing wound on righ LE. still has quite a bit of swelling, but pt said its improved greatly.)    Comments Pt has appt on 6/29 with surgeon nael for wound care- he said he will cancel if sig improvement in wound, but if not, he'll check his INR this day. Otherwise scheduled for 3 weeks. Advised to call and let us know if he gets put on abx again or with any worsening of symptoms as this may effect his INR.           OBJECTIVE    INR Protime   Date Value Ref Range Status   06/16/2017 3.8 (A) 0.86 - 1.14 Final     Chromogenic Factor 10   Date Value Ref Range Status   04/24/2015 21 (L) 70 - 130 % Final     Comment:     Therapeutic Range:  A Chromogenic Factor 10 level of approximately 20-40%   inversely correlates with an INR of 2-3 for patients receiving Warfarin.   Chromogenic Factor 10 levels below 20% indicate an INR greater than 3 and   levels above 40% indicate an INR less than 2.         ASSESSMENT / PLAN  INR assessment SUPRA    Recheck INR In: 3 WEEKS    INR Location Clinic      Anticoagulation Summary as of 6/16/2017     INR goal 2.0-3.0   Today's INR 3.8!   Maintenance plan 2.5 mg (5 mg x 0.5) on Tue, Sat; 5 mg (5 mg x 1) all other days   Full instructions 6/17: Hold; 6/18: 2.5 mg; Otherwise 2.5 mg on Tue, Sat; 5 mg all other days   Weekly total 30 mg   Plan last modified Tana Pérez, RN (4/27/2016)   Next INR check 7/7/2017   Target end date     Indications   Long-term (current) use of anticoagulants [Z79.01] [Z79.01]  Atrial fibrillation (HCC) [I48.91] [I48.91]         Anticoagulation Episode Summary     INR check location     Preferred lab     Send INR reminders to [facility] POOL    Comments 5 mg tablets, AM dose, AVS       Anticoagulation Care Providers     Provider Role Specialty Phone number    Gabriel Tan MD [facility] [phone]            See the Encounter Report to view Anticoagulation Flowsheet and Dosing Calendar (Go to Encounters tab in chart review, and find the Anticoagulation Therapy Visit)    Dosage adjustment made based on physician directed care plan.    Tana Pérez RN

## 2017-06-16 NOTE — MR AVS SNAPSHOT
Tomer Weiss   6/16/2017 1:45 PM   Anticoagulation Therapy Visit    Description:  85 year old male   Provider:  [facility]   Department:  [facility]           INR as of 6/16/2017     Today's INR 3.8!      Anticoagulation Summary as of 6/16/2017     INR goal 2.0-3.0   Today's INR 3.8!   Full instructions 6/17: Hold; 6/18: 2.5 mg; Otherwise 2.5 mg on Tue, Sat; 5 mg all other days   Next INR check 7/14/2017    Indications   Long-term (current) use of anticoagulants [Z79.01] [Z79.01]  Atrial fibrillation (HCC) [I48.91] [I48.91]         Your next Anticoagulation Clinic appointment(s)     Jul 07, 2017 10:30 AM CDT   [facility] with ZM ANTI COAG   [facility]    [address] [facility] [address] [phone]              Contact Numbers     Clinic Number:         June 2017 Details    Sun Mon Tue Wed Thu Fri Sat         1               2               3                 4               5               6               7               8               9               10                 11               12               13               14               15               16      5 mg   See details      17      Hold           18      2.5 mg         19      5 mg         20      2.5 mg         21      5 mg         22      5 mg         23      5 mg         24      2.5 mg           25      5 mg         26      5 mg         27      2.5 mg         28      5 mg         29      5 mg         30      5 mg           Date Details   06/16 This INR check               How to take your warfarin dose     To take:  2.5 mg Take 0.5 of a 5 mg tablet.    To take:  5 mg Take 1 of the 5 mg tablets.    Hold Do not take your warfarin dose. See the Details table to the right for additional instructions.                July 2017 Details    Sun Mon Tue Wed Thu Fri Sat           1      2.5 mg           2      5 mg         3      5 mg         4      2.5 mg         5      5  mg         6      5 mg         7      5 mg         8      2.5 mg           9      5 mg         10      5 mg         11      2.5 mg         12      5 mg         13      5 mg         14            15                 16               17               18               19               20               21               22                 23               24               25               26               27               28               29                 30               31                     Date Details   No additional details    Date of next INR:  7/14/2017         How to take your warfarin dose     To take:  2.5 mg Take 0.5 of a 5 mg tablet.    To take:  5 mg Take 1 of the 5 mg tablets.

## 2017-06-29 ENCOUNTER — OFFICE VISIT (OUTPATIENT)
Dept: SURGERY | Facility: OTHER | Age: 82
End: 2017-06-29
Payer: MEDICARE

## 2017-06-29 VITALS — HEART RATE: 70 BPM | WEIGHT: 191 LBS | BODY MASS INDEX: 25.55 KG/M2 | TEMPERATURE: 97.3 F

## 2017-06-29 DIAGNOSIS — S81.801A LEG WOUND, RIGHT, INITIAL ENCOUNTER: Primary | ICD-10-CM

## 2017-06-29 PROCEDURE — 99203 OFFICE O/P NEW LOW 30 MIN: CPT | Performed by: SPECIALIST

## 2017-06-29 RX ORDER — HYDROPHILIC CREAM
1 PASTE (GRAM) TOPICAL DAILY
Qty: 1 TUBE | Refills: 3 | Status: SHIPPED | OUTPATIENT
Start: 2017-06-29 | End: 2018-02-26

## 2017-06-29 ASSESSMENT — PAIN SCALES - GENERAL: PAINLEVEL: NO PAIN (0)

## 2017-06-29 NOTE — NURSING NOTE
BARTOLO Rondon-Triad applied to lower right leg wound, covered with sterile 3x3 gauze secured with tape............................................................Cata Hagan CMA  ([facility])

## 2017-06-29 NOTE — MR AVS SNAPSHOT
"              After Visit Summary   6/29/2017    Tomer Weiss    MRN: [id number]           Patient Information     Date Of Birth          4/9/1932        Visit Information        Provider Department      6/29/2017 10:00 AM Tunde Rondon MD [facility]         Follow-ups after your visit        Your next 10 appointments already scheduled     Jul 07, 2017 10:30 AM CDT   [facility] Visit with [facility]   [facility]    [address] [address] [phone]              Who to contact     If you have questions or need follow up information about today's clinic visit or your schedule please contact [facility] directly at [phone].  Normal or non-critical lab and imaging results will be communicated to you by MyChart, letter or phone within 4 business days after the clinic has received the results. If you do not hear from us within 7 days, please contact the clinic through MyChart or phone. If you have a critical or abnormal lab result, we will notify you by phone as soon as possible.  Submit refill requests through TM or call your pharmacy and they will forward the refill request to us. Please allow 3 business days for your refill to be completed.          Additional Information About Your Visit        Phone Warriorhart Information     TM lets you send messages to your doctor, view your test results, renew your prescriptions, schedule appointments and more. To sign up, go to [email or web address] . Click on \"Log in\" on the left side of the screen, which will take you to the Welcome page. Then click on \"Sign up Now\" on the right side of the page.     You will be asked to enter the access code listed below, as well as some personal information. Please follow the directions to create your username and password.     Your access code is: [email or web address]  Expires: 9/3/2017  3:52 PM     Your access code " will  in 90 days. If you need help or a new code, please call your [place] clinic or [phone].        Care EveryWhere ID     This is your Care EveryWhere ID. This could be used by other organizations to access your [place] medical records  [id number]        Your Vitals Were     Pulse Temperature BMI (Body Mass Index)             70 97.3  F (36.3  C) (Temporal) 25.55 kg/m2          Blood Pressure from Last 3 Encounters:   17 116/68   17 108/68   17 110/70    Weight from Last 3 Encounters:   17 191 lb (86.6 kg)   17 191 lb 1.6 oz (86.7 kg)   17 190 lb (86.2 kg)              Today, you had the following     No orders found for display       Primary Care Provider Office Phone # Fax #    Gabriel Tan [phone]       [facility] [address] [place] [address]        Equal Access to Services     NANCY MILAN : Hadii aad ku hadasho Soomaali, waaxda luqadaha, qaybta kaalmada adeegyada, waxay idiin hayagnieszkan nadege yang . So [facility] [phone].    ATENCIÓN: Si habla español, tiene a rojas disposición servicios gratuitos de asistencia lingüística. Llame al [phone].    We comply with applicable federal civil rights laws and [place] laws. We do not discriminate on the basis of race, color, national origin, age, disability sex, sexual orientation or gender identity.            Thank you!     Thank you for choosing [facility]  for your care. Our goal is always to provide you with excellent care. Hearing back from our patients is one way we can continue to improve our services. Please take a few minutes to complete the written survey that you may receive in the mail after your visit with us. Thank you!             Your Updated Medication List - Protect others around you: Learn how to safely use, store and throw away your medicines at www.disposemymeds.org.          This list is accurate as of: 17 10:07 AM.   Always use your most recent med list.                   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

## 2017-06-29 NOTE — PROGRESS NOTES
Consult requested by Christi Reyna    Reason for consultation - right leg wound    HPI:  Patient is an 85-year-old white male who had a piece of farm machinery hit his right leg back in April. He developed a hematoma at the time as result of being on Coumadin. Shortly thereafter wound developed over that site that is not healed. He has not been treating it with anything was hoping it would scab over. He was given a course of antibiotics without relief. Denies any fever, chills, pain or drainage from the site. He now presents for evaluation of his right leg wound.    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     Past Surgical History:   Procedure Laterality Date     ARTHROPLASTY KNEE Left 3/14/2016    Procedure: ARTHROPLASTY KNEE;  Surgeon: Deonte Silver MD;  Location: [facility]     COLONOSCOPY  7/17/2013    Procedure: COMBINED COLONOSCOPY, SINGLE BIOPSY/POLYPECTOMY BY BIOPSY;  Colonoscopy, with polypectomy by biopsy;  Surgeon: Fernando Mario MD;  Location: [facility]     CYSTOSCOPY, LITHOLAPAXY, COMBINED N/A 2/26/2015    Procedure: COMBINED CYSTOSCOPY, LITHOLAPAXY;  Surgeon: Orlando Marr MD;  Location: [facility]     CYSTOSCOPY, LITHOLAPAXY, COMBINED N/A 2/11/2015    Procedure: COMBINED CYSTOSCOPY, LITHOLAPAXY;  Surgeon: Orlando Marr MD;  Location: [facility] OR     H ABLATION ATRIAL FLUTTER  4/18/15    atypical a flutter ablation & Ablation of PACs from the SVC-RA junction     HC ABLATION RENAL TUMOR PERCUT CRYOTHERAPY UNILATERAL  6/17/10    Right renal mass     LASER HOLMIUM LITHOTRIPSY BLADDER N/A 2/26/2015    Procedure: LASER HOLMIUM LITHOTRIPSY BLADDER;  Surgeon:  Orlando Marr MD;  Location: [facility]     LASER KTP GREEN LIGHT PHOTOSELECTIVE VAPORIZATION PROSTATE N/A 2015    Procedure: LASER KTP GREEN LIGHT PHOTOSELECTIVE VAPORIZATION PROSTATE;  Surgeon: Orlando Marr MD;  Location: [facility]     Current Outpatient Prescriptions   Medication     Wound Dressings (TRIAD HYDROPHILIC WOUND DRESSI) PSTE     doxycycline (VIBRAMYCIN) 100 MG capsule     furosemide (LASIX) 20 MG tablet     atorvastatin (LIPITOR) 20 MG tablet     metoprolol (TOPROL-XL) 25 MG 24 hr tablet     warfarin (COUMADIN) 5 MG tablet     order for DME     iron 66 MG TABS     GLUCOSAMINE 500 MG OR CAPS     No current facility-administered medications for this visit.         Allergies   Allergen Reactions     No Known Drug Allergies      Social History   Substance Use Topics     Smoking status: Never Smoker     Smokeless tobacco: Never Used     Alcohol use No     Family History   Problem Relation Age of Onset     CANCER Mother      breast     Hypertension Mother      Alzheimer Disease Mother       at age 96.      ROS: 10 point ROS neg other than the symptoms noted above in the HPI.    PE:  B/P: Data Unavailable, T: 97.3, P: 70, R: Data Unavailable  General: well developed, well nourished WM who appears his stated age  HEENT: NC/AT, EOMI, (-)icterus, (-)injection  Neck: Supple, No JVD  Chest: CTA  Heart: irreg  Abd: Soft, non tender, non distended  Ext; Warm, right calf edema.  Right lateral calf wound 2x1.2cm  100% slough  Psych: AAOx3  Neuro: No focal deficits      Impression/plan:  This is an 85-year-old gentleman with a nonhealing right calf secondary to trauma. The wound is 100% slough at this time. The plan at this time is have him start washing the area daily with soap and water and applying triad. He does have some edema in the area and may benefit from compression in the future however that this be placed on hold to see how the wound progresses with proper local care. He will follow-up  with me in 2 weeks for wound check. He knows to be seen sooner should problems develop.    Tunde Rondon MD, FACS

## 2017-06-29 NOTE — NURSING NOTE
"Chief Complaint   Patient presents with     Consult     WOUND CARE     right lower extremity       Initial Pulse 70  Temp 97.3  F (36.3  C) (Temporal)  Wt 191 lb (86.6 kg)  BMI 25.55 kg/m2 Estimated body mass index is 25.55 kg/(m^2) as calculated from the following:    Height as of 6/5/17: 6' 0.5\" (1.842 m).    Weight as of this encounter: 191 lb (86.6 kg).  Medication Reconciliation: complete    "

## 2017-07-07 ENCOUNTER — ANTICOAGULATION THERAPY VISIT (OUTPATIENT)
Dept: ANTICOAGULATION | Facility: OTHER | Age: 82
End: 2017-07-07
Payer: MEDICARE

## 2017-07-07 DIAGNOSIS — Z79.01 LONG-TERM (CURRENT) USE OF ANTICOAGULANTS: ICD-10-CM

## 2017-07-07 DIAGNOSIS — I48.91 ATRIAL FIBRILLATION, UNSPECIFIED TYPE (H): ICD-10-CM

## 2017-07-07 LAB — INR POINT OF CARE: 2.8 (ref 0.86–1.14)

## 2017-07-07 PROCEDURE — 99207 ZZC NO CHARGE NURSE ONLY: CPT

## 2017-07-07 PROCEDURE — 36416 COLLJ CAPILLARY BLOOD SPEC: CPT

## 2017-07-07 PROCEDURE — 85610 PROTHROMBIN TIME: CPT | Mod: QW

## 2017-07-07 NOTE — MR AVS SNAPSHOT
Tomer Weiss   7/7/2017 10:30 AM   Anticoagulation Therapy Visit    Description:  85 year old male   Provider:  [facility]   Department:  [facility]           INR as of 7/7/2017     Today's INR 2.8      Anticoagulation Summary as of 7/7/2017     INR goal 2.0-3.0   Today's INR 2.8   Full instructions 2.5 mg on Tue, Sat; 5 mg all other days   Next INR check 7/28/2017    Indications   Long-term (current) use of anticoagulants [Z79.01] [Z79.01]  Atrial fibrillation (HCC) [I48.91] [I48.91]         Your next Anticoagulation Clinic appointment(s)     Jul 28, 2017  4:00 PM CDT   Anticoagulation Visit with ZM ANTI COAG   [facility] ([facility]    [address] [address]   [phone]              Contact Numbers     Clinic Number:         July 2017 Details    Sun Mon Tue Wed Thu Fri Sat           1                 2               3               4               5               6               7      5 mg   See details      8      2.5 mg           9      5 mg         10      5 mg         11      2.5 mg         12      5 mg         13      5 mg         14      5 mg         15      2.5 mg           16      5 mg         17      5 mg         18      2.5 mg         19      5 mg         20      5 mg         21      5 mg         22      2.5 mg           23      5 mg         24      5 mg         25      2.5 mg         26      5 mg         27      5 mg         28            29                 30               31                     Date Details   07/07 This INR check       Date of next INR:  7/28/2017         How to take your warfarin dose     To take:  2.5 mg Take 0.5 of a 5 mg tablet.    To take:  5 mg Take 1 of the 5 mg tablets.

## 2017-07-13 ENCOUNTER — OFFICE VISIT (OUTPATIENT)
Dept: SURGERY | Facility: OTHER | Age: 82
End: 2017-07-13
Payer: MEDICARE

## 2017-07-13 VITALS — TEMPERATURE: 97.6 F | WEIGHT: 188 LBS | HEART RATE: 70 BPM | BODY MASS INDEX: 25.15 KG/M2

## 2017-07-13 DIAGNOSIS — S81.801D LEG WOUND, RIGHT, SUBSEQUENT ENCOUNTER: Primary | ICD-10-CM

## 2017-07-13 PROCEDURE — 99213 OFFICE O/P EST LOW 20 MIN: CPT | Performed by: SPECIALIST

## 2017-07-13 NOTE — PROGRESS NOTES
F/U - right leg wound    Subjective:  Patient is an 85-year-old white male who had a piece of farm machinery hit his right leg back in April. He developed a hematoma at the time as result of being on Coumadin. Shortly thereafter wound developed over that site that is not healed. He has not been treating it with anything was hoping it would scab over. He was given a course of antibiotics without relief. Denies any fever, chills, pain or drainage from the site.     Has been using TRIAD for past 2 weeks      Objective:  B/P: Data Unavailable, T: 97.6, P: 70, R: Data Unavailable  Ext; Warm, right calf edema.  Right lateral calf wound 1.3x0.8cm  50% slough    Assessment/plan:  This is an 85-year-old gentleman with a nonhealing right calf secondary to trauma.  Wound is smaller and  this visit. The plan at this time is have him continue washing the area daily with soap and water and applying triad. He will follow-up with me in 2 weeks for wound check. He knows to be seen sooner should problems develop.    Tunde Rondon MD, FACS

## 2017-07-13 NOTE — NURSING NOTE
"Chief Complaint   Patient presents with     RECHECK     WOUND CARE     Triad       Initial Pulse 70  Temp 97.6  F (36.4  C) (Temporal)  Wt 188 lb (85.3 kg)  BMI 25.15 kg/m2 Estimated body mass index is 25.15 kg/(m^2) as calculated from the following:    Height as of 6/5/17: 6' 0.5\" (1.842 m).    Weight as of this encounter: 188 lb (85.3 kg).  Medication Reconciliation: complete    "

## 2017-07-13 NOTE — MR AVS SNAPSHOT
"              After Visit Summary   7/13/2017    Tomer Weiss    MRN: [id number]           Patient Information     Date Of Birth          4/9/1932        Visit Information        Provider Department      7/13/2017 9:15 AM Tunde Rondon MD [facility]        Today's Diagnoses     Leg wound, right, subsequent encounter    -  1       Follow-ups after your visit        Your next 10 appointments already scheduled     Jul 28, 2017  4:00 PM CDT   Anticoagulation Visit with ZM ANTI COAG   [facility] ([facility]    [address] [address] [phone]              Who to contact     If you have questions or need follow up information about today's clinic visit or your schedule please contact [facility] directly at [phone].  Normal or non-critical lab and imaging results will be communicated to you by [email or web address], letter or phone within 4 business days after the clinic has received the results. If you do not hear from us within 7 days, please contact the clinic through MyChart or phone. If you have a critical or abnormal lab result, we will notify you by phone as soon as possible.  Submit refill requests through [employer] or call your pharmacy and they will forward the refill request to us. Please allow 3 business days for your refill to be completed.          Additional Information About Your Visit        MyChart Information     [employer] lets you send messages to your doctor, view your test results, renew your prescriptions, schedule appointments and more. To sign up, go to [email or web address] . Click on \"Log in\" on the left side of the screen, which will take you to the Welcome page. Then click on \"Sign up Now\" on the right side of the page.     You will be asked to enter the access code listed below, as well as some personal information. Please follow the directions to create your username and password.     Your access " code is: [email or web address]  Expires: 9/3/2017  3:52 PM     Your access code will  in 90 days. If you need help or a new code, please call your [place] clinic or [phone].        Care EveryWhere ID     This is your Care EveryWhere ID. This could be used by other organizations to access your [place] medical records  [id number]        Your Vitals Were     Pulse Temperature BMI (Body Mass Index)             70 97.6  F (36.4  C) (Temporal) 25.15 kg/m2          Blood Pressure from Last 3 Encounters:   17 116/68   17 108/68   17 110/70    Weight from Last 3 Encounters:   17 188 lb (85.3 kg)   17 191 lb (86.6 kg)   17 191 lb 1.6 oz (86.7 kg)              Today, you had the following     No orders found for display       Primary Care Provider Office Phone # Fax #    Gabriel Tan [phone]       [facility] [address] [place] [address]        Equal Access to Services     [facility] : Hadii aad ku hadasho Soomaali, waaxda luqadaha, qaybta kaalmada adeegyada, mariposa mcelroy hayagnieszkan nadege yang . So [facility] [phone].    ATENCIÓN: Si habla español, tiene a rojas disposición servicios gratuitos de asistencia lingüística. Llame al [phone].    We comply with applicable federal civil rights laws and [place] laws. We do not discriminate on the basis of race, color, national origin, age, disability sex, sexual orientation or gender identity.            Thank you!     Thank you for choosing [facility]  for your care. Our goal is always to provide you with excellent care. Hearing back from our patients is one way we can continue to improve our services. Please take a few minutes to complete the written survey that you may receive in the mail after your visit with us. Thank you!             Your Updated Medication List - Protect others around you: Learn how to safely use, store and throw away your medicines at  www.disposemymeds.org.          This list is accurate as of: 7/13/17  9:59 AM.  Always use your most recent med list.                   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       iron 66 MG Tabs     1 tablet    Take 1 tablet (66 mg) by mouth daily    Low iron       metoprolol 25 MG 24 hr tablet    TOPROL-XL    90 tablet    Take 0.5 tablets (12.5 mg) by mouth daily    Chronic atrial fibrillation (H)       order for DME     1 Device    Equipment being ordered: Walker () Treatment Diagnosis: s/p joint replacement    Status post total left knee replacement       TRIAD HYDROPHILIC WOUND DRESSI Pste     1 Tube    Externally apply 1 g topically daily        warfarin 5 MG tablet    COUMADIN    75 tablet    Take 2.5 mg (1/2 tablet) Tuesday and Saturday and 5 mg other 5 days or as directed by the coumadin clinic    Long-term (current) use of anticoagulants

## 2017-07-28 ENCOUNTER — ANTICOAGULATION THERAPY VISIT (OUTPATIENT)
Dept: ANTICOAGULATION | Facility: OTHER | Age: 82
End: 2017-07-28
Payer: MEDICARE

## 2017-07-28 DIAGNOSIS — Z79.01 LONG-TERM (CURRENT) USE OF ANTICOAGULANTS: ICD-10-CM

## 2017-07-28 DIAGNOSIS — I48.91 ATRIAL FIBRILLATION, UNSPECIFIED TYPE (H): ICD-10-CM

## 2017-07-28 LAB — INR POINT OF CARE: 3.8 (ref 0.86–1.14)

## 2017-07-28 PROCEDURE — 85610 PROTHROMBIN TIME: CPT | Mod: QW

## 2017-07-28 PROCEDURE — 99207 ZZC NO CHARGE NURSE ONLY: CPT

## 2017-07-28 PROCEDURE — 36416 COLLJ CAPILLARY BLOOD SPEC: CPT

## 2017-07-28 NOTE — PROGRESS NOTES
ANTICOAGULATION FOLLOW-UP CLINIC VISIT    Patient Name:  Tomer Weiss  Date:  7/28/2017  Contact Type:  Face to Face    SUBJECTIVE:     Patient Findings     Positives Change in diet/appetite (Hasnt been eating much for greens, will eat more)           OBJECTIVE    INR Protime   Date Value Ref Range Status   07/28/2017 3.8 (A) 0.86 - 1.14 Final     Chromogenic Factor 10   Date Value Ref Range Status   04/24/2015 21 (L) 70 - 130 % Final     Comment:     Therapeutic Range:  A Chromogenic Factor 10 level of approximately 20-40%   inversely correlates with an INR of 2-3 for patients receiving Warfarin.   Chromogenic Factor 10 levels below 20% indicate an INR greater than 3 and   levels above 40% indicate an INR less than 2.         ASSESSMENT / PLAN  INR assessment SUPRA    Recheck INR In: 4 WEEKS    INR Location Clinic      Anticoagulation Summary as of 7/28/2017     INR goal 2.0-3.0   Today's INR 3.8!   Maintenance plan 2.5 mg (5 mg x 0.5) on Tue, Sat; 5 mg (5 mg x 1) all other days   Full instructions 7/29: Hold; Otherwise 2.5 mg on Tue, Sat; 5 mg all other days   Weekly total 30 mg   Plan last modified Tana Pérez RN (4/27/2016)   Next INR check 8/25/2017   Target end date     Indications   Long-term (current) use of anticoagulants [Z79.01] [Z79.01]  Atrial fibrillation (HCC) [I48.91] [I48.91]         Anticoagulation Episode Summary     INR check location     Preferred lab     Send INR reminders to [facility] POOL    Comments 5 mg tablets, AM dose, AVS      Anticoagulation Care Providers     Provider Role Specialty Phone number    aGbriel Tan MD [facility] Internal Medicine [phone]            See the Encounter Report to view Anticoagulation Flowsheet and Dosing Calendar (Go to Encounters tab in chart review, and find the Anticoagulation Therapy Visit)    Dosage adjustment made based on physician directed care plan.    Tana Pérez, JUANY

## 2017-07-28 NOTE — MR AVS SNAPSHOT
Tomer Weiss   7/28/2017 4:00 PM   Anticoagulation Therapy Visit    Description:  85 year old male   Provider:  [facility] AN[facility]   Department:  [facility]           INR as of 7/28/2017     Today's INR 3.8!      Anticoagulation Summary as of 7/28/2017     INR goal 2.0-3.0   Today's INR 3.8!   Full instructions 7/29: Hold; Otherwise 2.5 mg on Tue, Sat; 5 mg all other days   Next INR check 8/25/2017    Indications   Long-term (current) use of anticoagulants [Z79.01] [Z79.01]  Atrial fibrillation (HCC) [I48.91] [I48.91]         Your next Anticoagulation Clinic appointment(s)     Aug 25, 2017  4:00 PM CDT   [facility]   [facility]    [address] [address] [phone]              Contact Numbers     Clinic Number:         July 2017 Details    Sun Mon Tue Wed Thu Fri Sat           1                 2               3               4               5               6               7               8                 9               10               11               12               13               14               15                 16               17               18               19               20               21               22                 23               24               25               26               27               28      5 mg   See details      29      Hold           30      5 mg         31      5 mg               Date Details   07/28 This INR check               How to take your warfarin dose     To take:  5 mg Take 1 of the 5 mg tablets.    Hold Do not take your warfarin dose. See the Details table to the right for additional instructions.                August 2017 Details    Sun Mon Tue Wed Thu Fri Sat       1      2.5 mg         2      5 mg         3      5 mg         4      5 mg         5      2.5 mg           6      5 mg         7      5 mg         8      2.5 mg         9      5 mg          10      5 mg         11      5 mg         12      2.5 mg           13      5 mg         14      5 mg         15      2.5 mg         16      5 mg         17      5 mg         18      5 mg         19      2.5 mg           20      5 mg         21      5 mg         22      2.5 mg         23      5 mg         24      5 mg         25            26                 27               28               29               30               31                  Date Details   No additional details    Date of next INR:  8/25/2017         How to take your warfarin dose     To take:  2.5 mg Take 0.5 of a 5 mg tablet.    To take:  5 mg Take 1 of the 5 mg tablets.

## 2017-08-03 ENCOUNTER — OFFICE VISIT (OUTPATIENT)
Dept: SURGERY | Facility: OTHER | Age: 82
End: 2017-08-03
Payer: MEDICARE

## 2017-08-03 VITALS — BODY MASS INDEX: 25.15 KG/M2 | WEIGHT: 188 LBS | HEART RATE: 72 BPM | TEMPERATURE: 97.9 F

## 2017-08-03 DIAGNOSIS — R60.0 LEG EDEMA, RIGHT: ICD-10-CM

## 2017-08-03 DIAGNOSIS — S81.801D LEG WOUND, RIGHT, SUBSEQUENT ENCOUNTER: Primary | ICD-10-CM

## 2017-08-03 PROCEDURE — 99213 OFFICE O/P EST LOW 20 MIN: CPT | Performed by: SPECIALIST

## 2017-08-03 NOTE — PROGRESS NOTES
F/U - right leg wound    Subjective:  Patient is an 85-year-old white male who had a piece of farm machinery hit his right leg back in April. He developed a hematoma at the time as result of being on Coumadin. Shortly thereafter wound developed over that site that is not healed. He has not been treating it with anything was hoping it would scab over. He was given a course of antibiotics without relief. Denies any fever, chills, pain or drainage from the site.     Has been using TRIAD for past 2 weeks      Objective:  B/P: Data Unavailable, T: 97.9, P: 72, R: Data Unavailable  Ext; Warm, right calf edema.  Right lateral calf wound 1x0.5cm  20% slough    Assessment/plan:  This is an 85-year-old gentleman with a nonhealing right calf secondary to trauma.  Wound is smaller and  this visit. I feel he would benefit with some compression.  I discussed UNNA boot vs using compression socks.  He would prefer to try his compression socks.  The plan at this time is have him continue washing the area daily with soap and water, apply triad and wear his compression socks.. He will follow-up with me in 2 weeks for wound check. He knows to be seen sooner should problems develop.    Tunde Rondon MD, FACS

## 2017-08-03 NOTE — NURSING NOTE
"Chief Complaint   Patient presents with     WOUND CARE     leg wound, TRIAD       Initial Pulse 72  Temp 97.9  F (36.6  C) (Temporal)  Wt 188 lb (85.3 kg)  BMI 25.15 kg/m2 Estimated body mass index is 25.15 kg/(m^2) as calculated from the following:    Height as of 6/5/17: 6' 0.5\" (1.842 m).    Weight as of this encounter: 188 lb (85.3 kg).  Medication Reconciliation: complete    "

## 2017-08-03 NOTE — MR AVS SNAPSHOT
"              After Visit Summary   8/3/2017    Tomer Weiss    MRN: [id number]           Patient Information     Date Of Birth          4/9/1932        Visit Information        Provider Department      8/3/2017 3:00 PM Tunde Rondon MD [facility]        Today's Diagnoses     Leg wound, right, subsequent encounter    -  1    Leg edema, right           Follow-ups after your visit        Your next 10 appointments already scheduled     Aug 25, 2017  4:00 PM CDT   [facility] with [facility]   [facility]    [address] [address] [phone]              Who to contact     If you have questions or need follow up information about today's clinic visit or your schedule please contact [facility] directly at [phone].  Normal or non-critical lab and imaging results will be communicated to you by MyChart, letter or phone within 4 business days after the clinic has received the results. If you do not hear from us within 7 days, please contact the clinic through MyChart or phone. If you have a critical or abnormal lab result, we will notify you by phone as soon as possible.  Submit refill requests through [employer] or call your pharmacy and they will forward the refill request to us. Please allow 3 business days for your refill to be completed.          Additional Information About Your Visit        MyChart Information     [employer] lets you send messages to your doctor, view your test results, renew your prescriptions, schedule appointments and more. To sign up, go to [email or web address] . Click on \"Log in\" on the left side of the screen, which will take you to the Welcome page. Then click on \"Sign up Now\" on the right side of the page.     You will be asked to enter the access code listed below, as well as some personal information. Please follow the directions to create your username and " password.     Your access code is: [email or web address]  Expires: 9/3/2017  3:52 PM     Your access code will  in 90 days. If you need help or a new code, please call your [place] clinic or [phone].        Care EveryWhere ID     This is your Care EveryWhere ID. This could be used by other organizations to access your [place] medical records  [id number]        Your Vitals Were     Pulse Temperature BMI (Body Mass Index)             72 97.9  F (36.6  C) (Temporal) 25.15 kg/m2          Blood Pressure from Last 3 Encounters:   17 116/68   17 108/68   17 110/70    Weight from Last 3 Encounters:   17 85.3 kg (188 lb)   17 85.3 kg (188 lb)   17 86.6 kg (191 lb)              Today, you had the following     No orders found for display       Primary Care Provider Office Phone # Fax #    Gabriel Tan [phone]       [facility] [address] [place] [address]        Equal Access to Services     [facility] AH: Hadii aad ku hadasho Soomaali, waaxda luqadaha, qaybta kaalmada adeegyada, mariposa mcelroy hayagnieszkan nadege yang . So [facility] [phone].    ATENCIÓN: Si habla español, tiene a rojas disposición servicios gratuitos de asistencia lingüística. Llame al [phone].    We comply with applicable federal civil rights laws and [place] laws. We do not discriminate on the basis of race, color, national origin, age, disability sex, sexual orientation or gender identity.            Thank you!     Thank you for choosing [facility]  for your care. Our goal is always to provide you with excellent care. Hearing back from our patients is one way we can continue to improve our services. Please take a few minutes to complete the written survey that you may receive in the mail after your visit with us. Thank you!             Your Updated Medication List - Protect others around you: Learn how to safely use, store and throw away your  medicines at www.disposemymeds.org.          This list is accurate as of: 8/3/17  3:21 PM.  Always use your most recent med list.                   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       iron 66 MG Tabs     1 tablet    Take 1 tablet (66 mg) by mouth daily    Low iron       metoprolol 25 MG 24 hr tablet    TOPROL-XL    90 tablet    Take 0.5 tablets (12.5 mg) by mouth daily    Chronic atrial fibrillation (H)       order for DME     1 Device    Equipment being ordered: Walker () Treatment Diagnosis: s/p joint replacement    Status post total left knee replacement       TRIAD HYDROPHILIC WOUND DRESSI Pste     1 Tube    Externally apply 1 g topically daily        warfarin 5 MG tablet    COUMADIN    75 tablet    Take 2.5 mg (1/2 tablet) Tuesday and Saturday and 5 mg other 5 days or as directed by the coumadin clinic    Long-term (current) use of anticoagulants

## 2017-08-10 ENCOUNTER — OFFICE VISIT (OUTPATIENT)
Dept: SURGERY | Facility: OTHER | Age: 82
End: 2017-08-10
Payer: MEDICARE

## 2017-08-10 VITALS — OXYGEN SATURATION: 97 % | HEART RATE: 100 BPM | TEMPERATURE: 97.4 F | BODY MASS INDEX: 25.15 KG/M2 | WEIGHT: 188 LBS

## 2017-08-10 DIAGNOSIS — R60.0 LEG EDEMA, RIGHT: ICD-10-CM

## 2017-08-10 DIAGNOSIS — S81.801D LEG WOUND, RIGHT, SUBSEQUENT ENCOUNTER: Primary | ICD-10-CM

## 2017-08-10 PROCEDURE — 99213 OFFICE O/P EST LOW 20 MIN: CPT | Performed by: SPECIALIST

## 2017-08-10 NOTE — PROGRESS NOTES
F/U - right leg wound    Subjective:  Patient is an 85-year-old white male who had a piece of farm machinery hit his right leg back in April. He developed a hematoma at the time as result of being on Coumadin. Shortly thereafter wound developed over that site that is not healed. He has not been treating it with anything was hoping it would scab over. He was given a course of antibiotics without relief. Denies any fever, chills, pain or drainage from the site.     Has been using TRIAD with compression for past 2 weeks      Objective:  B/P: Data Unavailable, T: 97.4, P: 100, R: Data Unavailable  Ext; Warm, right calf edema.  Right lateral calf wound 0.6x0.5cm  No slough    Assessment/plan:  This is an 85-year-old gentleman with a nonhealing right calf secondary to trauma.  Wound is smaller and  this visit.  The plan at this time is have him continue washing the area daily with soap and water, apply triad and wear his compression socks.. He will follow-up with me in 2-3 weeks for wound check. He knows to be seen sooner should problems develop.    Tunde Rondon MD, FACS

## 2017-08-10 NOTE — MR AVS SNAPSHOT
"              After Visit Summary   8/10/2017    Tomer Weiss    MRN: [id number]           Patient Information     Date Of Birth          4/9/1932        Visit Information        Provider Department      8/10/2017 9:00 AM Tunde Rondon MD [facility]        Today's Diagnoses     Leg wound, right, subsequent encounter    -  1    Leg edema, right           Follow-ups after your visit        Your next 10 appointments already scheduled     Aug 25, 2017  4:00 PM CDT   [facility] with [facility] ANTI COAG   [facility]    [address] [address] [phone]              Who to contact     If you have questions or need follow up information about today's clinic visit or your schedule please contact [facility] directly at [phone].  Normal or non-critical lab and imaging results will be communicated to you by MyChart, letter or phone within 4 business days after the clinic has received the results. If you do not hear from us within 7 days, please contact the clinic through MyChart or phone. If you have a critical or abnormal lab result, we will notify you by phone as soon as possible.  Submit refill requests through [employer] [place] or call your pharmacy and they will forward the refill request to us. Please allow 3 business days for your refill to be completed.          Additional Information About Your Visit        MyChart Information     [employer] [facility] [place] lets you send messages to your doctor, view your test results, renew your prescriptions, schedule appointments and more. To sign up, go to [email or web address] . Click on \"Log in\" on the left side of the screen, which will take you to the Welcome page. Then click on \"Sign up Now\" on the right side of the page.     You will be asked to enter the access code listed below, as well as some personal information. Please follow the directions to create your username and " password.     Your access code is: [email or web address]  Expires: 9/3/2017  3:52 PM     Your access code will  in 90 days. If you need help or a new code, please call your [place] clinic or [phone].        Care EveryWhere ID     This is your Care EveryWhere ID. This could be used by other organizations to access your [place] medical records  [id number]        Your Vitals Were     Pulse Temperature Pulse Oximetry BMI (Body Mass Index)          100 97.4  F (36.3  C) (Temporal) 97% 25.15 kg/m2         Blood Pressure from Last 3 Encounters:   17 116/68   17 108/68   17 110/70    Weight from Last 3 Encounters:   08/10/17 188 lb (85.3 kg)   17 188 lb (85.3 kg)   17 188 lb (85.3 kg)              Today, you had the following     No orders found for display       Primary Care Provider Office Phone # Fax #    Gabriel Tan [phone]       [facility] [address] [place] [address]        Equal Access to Services     [facility]: Hadii aad ku hadasho Soomaali, waaxda luqadaha, qaybta kaalmada adeegyada, waxay iliain hayaan nadege cespedes laan ah. So [facility] [phone].    ATENCIÓN: Si habla español, tiene a rojas disposición servicios gratuitos de asistencia lingüística. Evelyname al [phone].    We comply with applicable federal civil rights laws and [place] laws. We do not discriminate on the basis of race, color, national origin, age, disability sex, sexual orientation or gender identity.            Thank you!     Thank you for choosing [facility]  for your care. Our goal is always to provide you with excellent care. Hearing back from our patients is one way we can continue to improve our services. Please take a few minutes to complete the written survey that you may receive in the mail after your visit with us. Thank you!             Your Updated Medication List - Protect others around you: Learn how to safely use, store and throw  away your medicines at www.disposemymeds.org.          This list is accurate as of: 8/10/17  9:20 AM.  Always use your most recent med list.                   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       iron 66 MG Tabs     1 tablet    Take 1 tablet (66 mg) by mouth daily    Low iron       metoprolol 25 MG 24 hr tablet    TOPROL-XL    90 tablet    Take 0.5 tablets (12.5 mg) by mouth daily    Chronic atrial fibrillation (H)       order for DME     1 Device    Equipment being ordered: Walker () Treatment Diagnosis: s/p joint replacement    Status post total left knee replacement       TRIAD HYDROPHILIC WOUND DRESSI Pste     1 Tube    Externally apply 1 g topically daily        warfarin 5 MG tablet    COUMADIN    75 tablet    Take 2.5 mg (1/2 tablet) Tuesday and Saturday and 5 mg other 5 days or as directed by the coumadin clinic    Long-term (current) use of anticoagulants

## 2017-08-10 NOTE — NURSING NOTE
BARTOLO Rondon-Debbi applied to lower leg wound, sterile dressing applied........................................Cata Hagan CMA  ([facility])

## 2017-08-10 NOTE — NURSING NOTE
"Chief Complaint   Patient presents with     RECHECK     WOUND CARE     TRIAD -lower leg wound       Initial Pulse 100  Temp 97.4  F (36.3  C) (Temporal)  Wt 188 lb (85.3 kg)  SpO2 97%  BMI 25.15 kg/m2 Estimated body mass index is 25.15 kg/(m^2) as calculated from the following:    Height as of 6/5/17: 6' 0.5\" (1.842 m).    Weight as of this encounter: 188 lb (85.3 kg).  Medication Reconciliation: complete    "

## 2017-08-18 DIAGNOSIS — Z79.01 LONG-TERM (CURRENT) USE OF ANTICOAGULANTS: ICD-10-CM

## 2017-08-18 NOTE — TELEPHONE ENCOUNTER
Warfarin    Last Written Prescription Date: 1/13/2017  Last Fill Qty: 75, # refills: 1  Last Office Visit with [facility], [facility] or [facility] prescribing provider: 6/05/2017  Next 5 appointments (look out 90 days)     Sep 07, 2017  1:30 PM CDT   Return Visit with Tunde Rondon MD   [facility] ([facility]    [address] [address] [phone]                   Date and Result of Last PT/INR:   Lab Results   Component Value Date    INR 3.8 07/28/2017    INR 2.8 07/07/2017    INR 2.36 04/12/2017    INR 2.6 04/07/2016

## 2017-08-21 DIAGNOSIS — E78.5 HYPERLIPIDEMIA LDL GOAL <130: ICD-10-CM

## 2017-08-21 RX ORDER — WARFARIN SODIUM 5 MG/1
TABLET ORAL
Qty: 75 TABLET | Refills: 1 | Status: SHIPPED | OUTPATIENT
Start: 2017-08-21 | End: 2017-11-20

## 2017-08-21 RX ORDER — ATORVASTATIN CALCIUM 20 MG/1
20 TABLET, FILM COATED ORAL DAILY
Qty: 90 TABLET | Refills: 1 | Status: SHIPPED | OUTPATIENT
Start: 2017-08-21 | End: 2018-02-12

## 2017-08-25 ENCOUNTER — ANTICOAGULATION THERAPY VISIT (OUTPATIENT)
Dept: ANTICOAGULATION | Facility: OTHER | Age: 82
End: 2017-08-25
Payer: MEDICARE

## 2017-08-25 DIAGNOSIS — I48.91 ATRIAL FIBRILLATION, UNSPECIFIED TYPE (H): ICD-10-CM

## 2017-08-25 DIAGNOSIS — Z79.01 LONG-TERM (CURRENT) USE OF ANTICOAGULANTS: ICD-10-CM

## 2017-08-25 LAB — INR POINT OF CARE: 2.7 (ref 0.86–1.14)

## 2017-08-25 PROCEDURE — 36416 COLLJ CAPILLARY BLOOD SPEC: CPT

## 2017-08-25 PROCEDURE — 85610 PROTHROMBIN TIME: CPT | Mod: QW

## 2017-08-25 PROCEDURE — 99207 ZZC NO CHARGE NURSE ONLY: CPT

## 2017-08-25 NOTE — MR AVS SNAPSHOT
Tomer Weiss   8/25/2017 4:00 PM   Anticoagulation Therapy Visit    Description:  85 year old male   Provider:  [facility]   Department:  [facility]           INR as of 8/25/2017     Today's INR 2.7      Anticoagulation Summary as of 8/25/2017     INR goal 2.0-3.0   Today's INR 2.7   Full instructions 2.5 mg on Tue, Sat; 5 mg all other days   Next INR check 9/29/2017    Indications   Long-term (current) use of anticoagulants [Z79.01] [Z79.01]  Atrial fibrillation (HCC) [I48.91] [I48.91]         Your next Anticoagulation Clinic appointment(s)     Sep 29, 2017  9:45 AM CDT   Anticoagulation Visit with ZM ANTI COAG   [facility]    [address] [address] [phone]              Contact Numbers     Clinic Number:         August 2017 Details    Sun Mon Tue Wed Thu Fri Sat       1               2               3               4               5                 6               7               8               9               10               11               12                 13               14               15               16               17               18               19                 20               21               22               23               24               25      5 mg   See details      26      2.5 mg           27      5 mg         28      5 mg         29      2.5 mg         30      5 mg         31      5 mg            Date Details   08/25 This INR check               How to take your warfarin dose     To take:  2.5 mg Take 0.5 of a 5 mg tablet.    To take:  5 mg Take 1 of the 5 mg tablets.           September 2017 Details    Sun Mon Tue Wed Thu Fri Sat          1      5 mg         2      2.5 mg           3      5 mg         4      5 mg         5      2.5 mg         6      5 mg         7      5 mg         8      5 mg         9      2.5 mg           10      5 mg         11      5 mg         12      2.5 mg          13      5 mg         14      5 mg         15      5 mg         16      2.5 mg           17      5 mg         18      5 mg         19      2.5 mg         20      5 mg         21      5 mg         22      5 mg         23      2.5 mg           24      5 mg         25      5 mg         26      2.5 mg         27      5 mg         28      5 mg         29            30                Date Details   No additional details    Date of next INR:  9/29/2017         How to take your warfarin dose     To take:  2.5 mg Take 0.5 of a 5 mg tablet.    To take:  5 mg Take 1 of the 5 mg tablets.

## 2017-08-25 NOTE — PROGRESS NOTES
ANTICOAGULATION FOLLOW-UP CLINIC VISIT    Patient Name:  Tomer Weiss  Date:  8/25/2017  Contact Type:  Face to Face    SUBJECTIVE:     Patient Findings     Positives No Problem Findings           OBJECTIVE    INR Protime   Date Value Ref Range Status   08/25/2017 2.7 (A) 0.86 - 1.14 Final     Chromogenic Factor 10   Date Value Ref Range Status   04/24/2015 21 (L) 70 - 130 % Final     Comment:     Therapeutic Range:  A Chromogenic Factor 10 level of approximately 20-40%   inversely correlates with an INR of 2-3 for patients receiving Warfarin.   Chromogenic Factor 10 levels below 20% indicate an INR greater than 3 and   levels above 40% indicate an INR less than 2.         ASSESSMENT / PLAN  INR assessment THER    Recheck INR In: 5 WEEKS    INR Location Clinic      Anticoagulation Summary as of 8/25/2017     INR goal 2.0-3.0   Today's INR 2.7   Maintenance plan 2.5 mg (5 mg x 0.5) on Tue, Sat; 5 mg (5 mg x 1) all other days   Full instructions 2.5 mg on Tue, Sat; 5 mg all other days   Weekly total 30 mg   No change documented Tana Pérez, JUANY   Plan last modified Tana Pérez, RN (4/27/2016)   Next INR check 9/29/2017   Target end date     Indications   Long-term (current) use of anticoagulants [Z79.01] [Z79.01]  Atrial fibrillation (HCC) [I48.91] [I48.91]         Anticoagulation Episode Summary     INR check location     Preferred lab     Send INR reminders to [facility] POOL    Comments 5 mg tablets, AM dose, AVS      Anticoagulation Care Providers     Provider Role Specialty Phone number    Gabriel Tan MD [facility] Internal Medicine [phone]            See the Encounter Report to view Anticoagulation Flowsheet and Dosing Calendar (Go to Encounters tab in chart review, and find the Anticoagulation Therapy Visit)    Dosage adjustment made based on physician directed care plan.    Tana Pérez, JUANY

## 2017-09-01 ENCOUNTER — HOSPITAL ENCOUNTER (EMERGENCY)
Facility: CLINIC | Age: 82
Discharge: HOME | End: 2017-09-01
Attending: FAMILY MEDICINE | Admitting: FAMILY MEDICINE
Payer: MEDICARE

## 2017-09-01 VITALS
WEIGHT: 188 LBS | OXYGEN SATURATION: 98 % | HEIGHT: 74 IN | SYSTOLIC BLOOD PRESSURE: 122 MMHG | TEMPERATURE: 97.6 F | BODY MASS INDEX: 24.13 KG/M2 | RESPIRATION RATE: 12 BRPM | DIASTOLIC BLOOD PRESSURE: 83 MMHG | HEART RATE: 90 BPM

## 2017-09-01 DIAGNOSIS — T14.8XXA WOUND INFECTION: ICD-10-CM

## 2017-09-01 DIAGNOSIS — L08.9 LOCAL SKIN INFECTION: ICD-10-CM

## 2017-09-01 DIAGNOSIS — Z79.01 LONG TERM (CURRENT) USE OF ANTICOAGULANTS: ICD-10-CM
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
## 2017-09-01 DIAGNOSIS — L08.9 WOUND INFECTION: ICD-10-CM

## 2017-09-01 DIAGNOSIS — S81.801A WOUND OF RIGHT LEG, INITIAL ENCOUNTER: ICD-10-CM

## 2017-09-01 LAB
GRAM STN SPEC: ABNORMAL
SPECIMEN SOURCE: ABNORMAL

## 2017-09-01 PROCEDURE — 99284 EMERGENCY DEPT VISIT MOD MDM: CPT | Mod: Z6 | Performed by: FAMILY MEDICINE

## 2017-09-01 PROCEDURE — 87077 CULTURE AEROBIC IDENTIFY: CPT | Performed by: FAMILY MEDICINE

## 2017-09-01 PROCEDURE — 87186 SC STD MICRODIL/AGAR DIL: CPT | Performed by: FAMILY MEDICINE

## 2017-09-01 PROCEDURE — 99283 EMERGENCY DEPT VISIT LOW MDM: CPT | Performed by: FAMILY MEDICINE

## 2017-09-01 PROCEDURE — 87205 SMEAR GRAM STAIN: CPT | Performed by: FAMILY MEDICINE

## 2017-09-01 PROCEDURE — 87070 CULTURE OTHR SPECIMN AEROBIC: CPT | Performed by: FAMILY MEDICINE

## 2017-09-01 RX ORDER — CLINDAMYCIN HCL 300 MG
300 CAPSULE ORAL 4 TIMES DAILY
Qty: 40 CAPSULE | Refills: 0 | Status: SHIPPED | OUTPATIENT
Start: 2017-09-01 | End: 2017-09-11

## 2017-09-01 ASSESSMENT — ENCOUNTER SYMPTOMS
WOUND: 1
FEVER: 0

## 2017-09-01 NOTE — ED PROVIDER NOTES
"  History     Chief Complaint   Patient presents with     Wound Infection     The history is provided by the patient.     Tomer Weiss is a 85 year old male, with a history of long term anticoagulant therapy to treat Atrial Fibrillation, who presents to the ED complaining of a non-healing wound on his right lower extremity. 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 He was hit in the right lateral calf \"with the back blade of a tractor\" when trying to remove it. Patient presented to the ED two days later, at which time a X-ray and U/S were both consistent with a hematoma. At that time, patient was encouraged to hold his Coumadin for two days and wear compression stockings. Patient followed up with the clinic on 6/5 at which time he reported that his wound hadn't been healing. He was started on a course of Doxycyline at that time without relief. He has followed up for evaluation with general surgery 4 times since then. He was encouraged to wash this area regularly, use compression, and apply Triad. Today, patient presents to the ED complaining of increased swelling, redness, and drainage to the site for 1 week. He says that the discharge is mostly bloody, though he has noted a \"small amount\" of purulent discharge. This discharge worsened this morning. He also complains that the skin on the area has been peeling. Patient denies any fevers or other associated symptoms. He is scheduled to follow up with Dr. Rondon of general surgery on 9/7. His last INR was 2.7 on 8/25, one week ago.     Patient Active Problem List   Diagnosis     Contracture of palmar fascia     Alopecia     Polymyalgia rheumatica (H)     Cervicalgia     Unspecified hyperplasia of prostate with urinary obstruction and other lower urinary tract symptoms (LUTS)     Malignant neoplasm of kidney excluding renal pelvis (H)     " HYPERLIPIDEMIA LDL GOAL <130     Dupuytren's contracture of hand     DJD (degenerative joint disease) of knee     BPH (benign prostatic hyperplasia)     Advanced directives, counseling/discussion     Atrial flutter     Post-traumatic osteoarthritis of left knee     Total knee replacement status     Anemia     Long-term (current) use of anticoagulants [Z79.01]     Atrial fibrillation (HCC) [I48.91]     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       Past Surgical History:   Procedure Laterality Date     ARTHROPLASTY KNEE Left 3/14/2016    Procedure: ARTHROPLASTY KNEE;  Surgeon: Deonte Silver MD;  Location: [facility]     COLONOSCOPY  7/17/2013    Procedure: COMBINED COLONOSCOPY, SINGLE BIOPSY/POLYPECTOMY BY BIOPSY;  Colonoscopy, with polypectomy by biopsy;  Surgeon: Fernando Mario MD;  Location: [facility]     CYSTOSCOPY, LITHOLAPAXY, COMBINED N/A 2/26/2015    Procedure: COMBINED CYSTOSCOPY, LITHOLAPAXY;  Surgeon: Orlando Marr MD;  Location: [facility]     CYSTOSCOPY, LITHOLAPAXY, COMBINED N/A 2/11/2015    Procedure: COMBINED CYSTOSCOPY, LITHOLAPAXY;  Surgeon: Orlando Marr MD;  Location: [facility]     H ABLATION ATRIAL FLUTTER  4/18/15    atypical a flutter ablation & Ablation of PACs from the SVC-RA junction     HC ABLATION RENAL TUMOR PERCUT CRYOTHERAPY UNILATERAL  6/17/10    Right renal mass     LASER HOLMIUM LITHOTRIPSY BLADDER N/A 2/26/2015    Procedure: LASER HOLMIUM LITHOTRIPSY BLADDER;  Surgeon: Orlando Marr MD;  Location: [facility]     LASER KTP GREEN LIGHT PHOTOSELECTIVE VAPORIZATION PROSTATE N/A 2/11/2015    Procedure: LASER KTP GREEN LIGHT PHOTOSELECTIVE  VAPORIZATION PROSTATE;  Surgeon: Orlando Marr MD;  Location: [facility]       Family History   Problem Relation Age of Onset     CANCER Mother      breast     Hypertension Mother      Alzheimer Disease Mother       at age 96.       Social History   Substance Use Topics     Smoking status: Never Smoker     Smokeless tobacco: Never Used     Alcohol use No        Immunization History   Administered Date(s) Administered     Pneumococcal (PCV 13) 2017     Pneumococcal 23 valent 2009     TD (ADULT, 7+) 1999, 2009     TDAP Vaccine (Boostrix) 2013          Allergies   Allergen Reactions     No Known Drug Allergies        Current Outpatient Prescriptions   Medication Sig Dispense Refill     clindamycin (CLEOCIN) 300 MG capsule Take 1 capsule (300 mg) by mouth 4 times daily for 10 days 40 capsule 0     warfarin (COUMADIN) 5 MG tablet Take 2.5 mg (1/2 tablet) Tuesday and Saturday and 5 mg other 5 days or as directed by the coumadin clinic 75 tablet 1     atorvastatin (LIPITOR) 20 MG tablet Take 1 tablet (20 mg) by mouth daily 90 tablet 1     furosemide (LASIX) 20 MG tablet Take 1 tablet (20 mg) by mouth daily 30 tablet 11     metoprolol (TOPROL-XL) 25 MG 24 hr tablet Take 0.5 tablets (12.5 mg) by mouth daily 90 tablet 2     iron 66 MG TABS Take 1 tablet (66 mg) by mouth daily 1 tablet 0     GLUCOSAMINE 500 MG OR CAPS 2 Tablets every morning       Wound Dressings (TRIAD HYDROPHILIC WOUND DRESSI) PSTE Externally apply 1 g topically daily 1 Tube 3     order for DME Equipment being ordered: Walker ()  Treatment Diagnosis: s/p joint replacement (Patient not taking: Reported on 2017) 1 Device 0     I have reviewed the Medications, Allergies, Past Medical and Surgical History, and Social History in the Epic system.    Review of Systems   Constitutional: Negative for fever.   Skin: Positive for wound (non healing wound with redness, swelling, and drainage on the right lateral calf ).  "  All other systems reviewed and are negative.      Physical Exam   BP: 128/82  Pulse: 90  Temp: 97.6  F (36.4  C)  Resp: 12  Height: 188 cm (6' 2\")  Weight: 85.3 kg (188 lb)  SpO2: 100 %    Physical Exam   Constitutional: He is oriented to person, place, and time. He appears well-developed and well-nourished. No distress.   Cardiovascular: Intact distal pulses.    Pulmonary/Chest: Effort normal. No respiratory distress.   Musculoskeletal: He exhibits edema (2+/4).   Neurological: He is alert and oriented to person, place, and time.   Skin: Skin is warm and dry. He is not diaphoretic. There is erythema.   Area of mild erythema surrounding an open wound on the right lateral lower leg.    See photo below.   Psychiatric: He has a normal mood and affect. His behavior is normal.   Nursing note and vitals reviewed.          ED Course    Culture and Gram stain was sent of some drainage that was expressed.  It was thin, watery but slightly cloudy and bloody .   I spoke with Dr Rondon.  He agreed with starting him on antibiotics and he will see him next week as scheduled.  He should continue with soap and water cleaning and to keep the wound covered.     I was going to put him on Keflex and Bactrim but he is on Coumadin so elected to use Clindamycin.        ED Course     Procedures             Assessments & Plan (with Medical Decision Making)    (T14.8,  L08.9) Wound infection  Comment: chronic, right lateral lower leg with some surrounding cellulitis.  Plan: clindamycin (CLEOCIN) 300 MG capsule        Cx pending.          I have reviewed the nursing notes.    I have reviewed the findings, diagnosis, plan and need for follow up with the patient.       New Prescriptions    CLINDAMYCIN (CLEOCIN) 300 MG CAPSULE    Take 1 capsule (300 mg) by mouth 4 times daily for 10 days       Final diagnoses:   Wound infection     This document serves as a record of services personally performed by Shaun Jurado MD. It was created " on their behalf by Chastity Collins, a trained medical scribe. The creation of this record is based on the provider's personal observations and the statements of the patient. This document has been checked and approved by the attending provider.    Note: Chart documentation done in part with Dragon Voice Recognition software. Although reviewed after completion, some word and grammatical errors may remain.    9/1/2017   [facility] EMERGENCY DEPARTMENT     Shaun Jurado MD  09/01/17 [id number]

## 2017-09-01 NOTE — ED AVS SNAPSHOT
[facility] Emergency Department    [address] [place] [address]    Phone:  [phone]    Fax:  [phone]                                       Tomer Weiss   MRN: [id number]    Department:  [facility] Emergency Department   Date of Visit:  9/1/2017           Patient Information     Date Of Birth          4/9/1932        Your diagnoses for this visit were:     Wound infection        You were seen by Shaun Jurado MD.      Follow-up Information     Follow up with Tunde Rondon MD On 9/7/2017.    Specialty:  General Surgery    Why:  as scheduled    Contact information:    [address] [place] [address]  [phone]          Discharge Instructions       Continue cleaning the wound was soap and water.  Keep it covered.  Elevate your legs above heart level as much as possible to help decrease the swelling.  Take the Clindamycin as directed for infection.  See Dr Rondon next week as scheduled.  Return to the ED if worse/concerns.  It was a pleasure visiting with you again this afternoon.  I hope this heals up quickly for you.    Thank you for choosing [facility]. We appreciate the opportunity to meet your urgent medical needs. Please let us know if we could have done anything to make your stay more satisfying.    After discharge, please closely monitor for any new or worsening symptoms. Return to the Emergency Department if you develop any acute worsening signs or symptoms.    If you had lab work, cultures or imaging studies done during your stay, the final results may still be pending. We will call you if your plan of care needs to change. However, if you are not improving as expected, please follow up with your primary care provider or clinic.     Start any prescription medications that were prescribed to you and take them as directed.     Please see additional handouts that may be pertinent to your condition.        Future  Appointments        Provider Department Dept Phone Center    9/7/2017 1:30 PM Tunde Rondon MD [facility] [phone] [place]    [facility]/29/2017 9:[facility] [phone] [place]      24 Hour Appointment Hotline       To make an appointment at any [facility], call [phone] ([phone]). If you don't have a family doctor or clinic, we will help you find one. [facility] are conveniently located to serve the needs of you and your family.             Review of your medicines      START taking        Dose / Directions Last dose taken    clindamycin 300 MG capsule   Commonly known as:  CLEOCIN   Dose:  300 mg   Quantity:  40 capsule        Take 1 capsule (300 mg) by mouth 4 times daily for 10 days   Refills:  0          Our records show that you are taking the medicines listed below. If these are incorrect, please call your family doctor or clinic.        Dose / Directions Last dose taken    atorvastatin 20 MG tablet   Commonly known as:  LIPITOR   Dose:  20 mg   Quantity:  90 tablet        Take 1 tablet (20 mg) by mouth daily   Refills:  1        furosemide 20 MG tablet   Commonly known as:  LASIX   Dose:  20 mg   Quantity:  30 tablet        Take 1 tablet (20 mg) by mouth daily   Refills:  11        glucosamine 500 MG Caps        2 Tablets every morning   Refills:  0        iron 66 MG Tabs   Dose:  65 mg   Quantity:  1 tablet        Take 1 tablet (66 mg) by mouth daily   Refills:  0        metoprolol 25 MG 24 hr tablet   Commonly known as:  TOPROL-XL   Dose:  12.5 mg   Quantity:  90 tablet        Take 0.5 tablets (12.5 mg) by mouth daily   Refills:  2        order for DME   Quantity:  1 Device        Equipment being ordered: Walker () Treatment Diagnosis: s/p joint replacement   Refills:  0        TRIAD HYDROPHILIC WOUND DRESSI Pste   Dose:  1 applicator   Quantity:  1 Tube        Externally apply 1 g topically daily    Refills:  3        warfarin 5 MG tablet   Commonly known as:  COUMADIN   Quantity:  75 tablet        Take 2.5 mg (1/2 tablet) Tuesday and Saturday and 5 mg other 5 days or as directed by the coumadin clinic   Refills:  1                Prescriptions were sent or printed at these locations (1 Prescription)                   [facility] [address] [place] [id number] [facility]   [address] [facility] [address]    Telephone:  [phone]   Fax:  [phone]   Hours:                  E-Prescribed (1 of 1)         clindamycin (CLEOCIN) 300 MG capsule                Procedures and tests performed during your visit     Gram stain    Wound Culture Aerobic Bacterial      Orders Needing Specimen Collection     None      Pending Results     Date and Time Order Name Status Description    9/1/2017 1229 Gram stain In process     9/1/2017 1229 Wound Culture Aerobic Bacterial In process             Pending Culture Results     Date and Time Order Name Status Description    9/1/2017 1229 Gram stain In process     9/1/2017 1229 Wound Culture Aerobic Bacterial In process             Pending Results Instructions     If you had any lab results that were not finalized at the time of your Discharge, you can call the ED Lab Result RN at [phone]. You will be contacted by this team for any positive Lab results or changes in treatment. The nurses are available 7 days a week from 10A to 6:30P.  You can leave a message 24 hours per day and they will return your call.        Thank you for choosing [facility]       Thank you for choosing [facility] for your care. Our goal is always to provide you with excellent care. Hearing back from our patients is one way we can continue to improve our services. Please take a few minutes to complete the written survey that you may receive in the mail after you visit with us. Thank you!        [employer] Information     [email or web address] lets you send messages to your doctor, view your test results, renew  "your prescriptions, schedule appointments and more. To sign up, go to [email or web address] . Click on \"Log in\" on the left side of the screen, which will take you to the Welcome page. Then click on \"Sign up Now\" on the right side of the page.     You will be asked to enter the access code listed below, as well as some personal information. Please follow the directions to create your username and password.     Your access code is: [email or web address]  Expires: 9/3/2017  3:52 PM     Your access code will  in 90 days. If you need help or a new code, please call your [place] clinic or [phone].        Care EveryWhere ID     This is your Care EveryWhere ID. This could be used by other organizations to access your [place] medical records  [id number]        Equal Access to Services     NANCY MILAN : Hadcara Hilario, rocky robb, todd ferguson, mariposa yang . So [facility] [phone].    ATENCIÓN: Si habla español, tiene a rojas disposición servicios gratuitos de asistencia lingüística. Llame al [phone].    We comply with applicable federal civil rights laws and [place] laws. We do not discriminate on the basis of race, color, national origin, age, disability sex, sexual orientation or gender identity.            After Visit Summary       This is your record. Keep this with you and show to your community pharmacist(s) and doctor(s) at your next visit.                  "

## 2017-09-01 NOTE — ED NOTES
Pt here with a wound that he has had since Easter, he thinks it is getting worse. He is scheduled to see Dr Rondon next week.

## 2017-09-01 NOTE — ED AVS SNAPSHOT
[facility] Emergency Department    [address] [place] [address]    Phone:  [phone]    Fax:  [phone]                                       Toemr Weiss   MRN: [id number]    Department:  [facility] Emergency Department   Date of Visit:  9/1/2017           After Visit Summary Signature Page     I have received my discharge instructions, and my questions have been answered. I have discussed any challenges I see with this plan with the nurse or doctor.    ..........................................................................................................................................  Patient/Patient Representative Signature      ..........................................................................................................................................  Patient Representative Print Name and Relationship to Patient    ..................................................               ................................................  Date                                            Time    ..........................................................................................................................................  Reviewed by Signature/Title    ...................................................              ..............................................  Date                                                            Time

## 2017-09-01 NOTE — DISCHARGE INSTRUCTIONS
Continue cleaning the wound was soap and water.  Keep it covered.  Elevate your legs above heart level as much as possible to help decrease the swelling.  Take the Clindamycin as directed for infection.  See Dr Rondon next week as scheduled.  Return to the ED if worse/concerns.  It was a pleasure visiting with you again this afternoon.  I hope this heals up quickly for you.    Thank you for choosing [facility]. We appreciate the opportunity to meet your urgent medical needs. Please let us know if we could have done anything to make your stay more satisfying.    After discharge, please closely monitor for any new or worsening symptoms. Return to the Emergency Department if you develop any acute worsening signs or symptoms.    If you had lab work, cultures or imaging studies done during your stay, the final results may still be pending. We will call you if your plan of care needs to change. However, if you are not improving as expected, please follow up with your primary care provider or clinic.     Start any prescription medications that were prescribed to you and take them as directed.     Please see additional handouts that may be pertinent to your condition.

## 2017-09-03 LAB
BACTERIA SPEC CULT: ABNORMAL
BACTERIA SPEC CULT: ABNORMAL
Lab: ABNORMAL
SPECIMEN SOURCE: ABNORMAL

## 2017-09-04 ENCOUNTER — TELEPHONE (OUTPATIENT)
Dept: EMERGENCY MEDICINE | Facility: CLINIC | Age: 82
End: 2017-09-04

## 2017-09-04 NOTE — TELEPHONE ENCOUNTER
"[facility] Emergency Department Lab result notification [Adult-Male]    [facility] ED lab result protocol used  General Culture Protocol - Wound    Reason for call  Notify of lab results, assess symptoms,  review ED providers recommendations/discharge instructions (if necessary) and advise per ED lab result f/u protocol    Lab Result (including Rx patient on, if applicable)  Final Wound Culture Report on 09/04/2017.  [place] ED discharge antibiotic's: Clindamycin (Cleocin) 300 mg PO capsule,  Take 1 capsule (300 mg) by mouth 4 times daily for 10 days  Bacteria #1: Light growth Morganella (Proteus) morganii is NOT TESTED to ED discharge antibiotic.  Bacteria #2: Light growth Staphylococcus aureus is RESISTANT to ED discharge antibiotic.  Incision and Drainage performed in [place] ED [Yes / No]: no  Recommendations in treatment per  ED Lab result protocol.    Information table from ED Provider visit on 09/01/2017  ED diagnosis: Wound infection   ED provider Shaun Jurado MD   Symptoms reported at ED visit (Chief complaint, HPI) a 85 year old male, with a history of long term anticoagulant therapy to treat Atrial Fibrillation, who presents to the ED complaining of a non-healing wound on his right lower extremity. 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 He was hit in the right lateral calf \"with the back blade of a tractor\" when trying to remove it. Patient presented to the ED two days later, at which time a X-ray and U/S were both consistent with a hematoma. At that time, patient was encouraged to hold his Coumadin for two days and wear compression stockings. Patient followed up with the clinic on 6/5 at which time he reported that his wound hadn't been healing. He was started on a course of Doxycyline at that time without relief. He has followed up for evaluation with general " "surgery 4 times since then. He was encouraged to wash this area regularly, use compression, and apply Triad. Today, patient presents to the ED complaining of increased swelling, redness, and drainage to the site for 1 week. He says that the discharge is mostly bloody, though he has noted a \"small amount\" of purulent discharge. This discharge worsened this morning. He also complains that the skin on the area has been peeling. Patient denies any fevers or other associated symptoms. He is scheduled to follow up with Dr. Rondon of general surgery on 9/7. His last INR was 2.7 on 8/25, one week ago.    ED providers Impression and Plan (applicable information) Culture and Gram stain was sent of some drainage that was expressed.  It was thin, watery but slightly cloudy and bloody .   I spoke with Dr Rondon.  He agreed with starting him on antibiotics and he will see him next week as scheduled.  He should continue with soap and water cleaning and to keep the wound covered.      I was going to put him on Keflex and Bactrim but he is on Coumadin so elected to use Clindamycin.   Significant Medical hx, if applicable Reviewed   Coumadin/Warfarin [Yes or No] Yes   Creatinine Level (mg/dl) 1.10   Creatinine clearance (ml/min), if applicable 58.8   Allergies NKDA   Weight, if applicable 85.3   Misc:  original wound - 04/10 - has followed up was on doxycycline for tick borne illness between then and now.  No other antibiotics initiated.        RN Assessment (Patient s current Symptoms), include time called.  [Insert Left message here if message left]  At 1252 Left voicemail message requesting a call back to [phone] between 10 a.m. and 6:30 p.m., 7 days a week for patient's ED/UC lab results.  May leave a message 24/7, if no one available.     Graciela Bocanegra RN  [facility] RN  Lung Nodule and ED Lab Result F/u RN  Epic pool (ED late result f/u RN): [id number]  FV INCIDENTAL RADIOLOGY F/U NURSES: P [id number]  Ph# " [phone]       Copy of Lab result   Exam Information   Exam Date Exam Time Accession # Results    9/1/17 12:20 PM [id number]    Component Results   Component Collected Lab   Specimen Description 09/01/2017 12:20 PM 75   Right Leg Wound   Special Requests 09/01/2017 12:20 PM 75   Specimen collected in eSwab transport (white cap)   Culture Micro (Abnormal) 09/01/2017 12:20 PM 75   Light growth   Morganella (Proteus) morganii      Culture Micro (Abnormal) 09/01/2017 12:20 PM 75   Light growth   Staphylococcus aureus   This isolate is presumed to be clindamycin resistant based on detection of inducible   clindamycin resistance. Erythromycin and clindamycin are resistant, therefore, they are   not recommended for use.      Culture & Susceptibility   MORGANELLA (PROTEUS) MORGANII   Antibiotic Interpretation Sensitivity Unit Method Status   AMIKACIN Sensitive <=2 ug/mL CAITLIN Final   AMPICILLIN Resistant >=32 ug/mL CAITLIN Final   AMPICILLIN/SULBACTAM Resistant >=32 ug/mL CAITLIN Final   CEFEPIME Sensitive <=1 ug/mL CAITLIN Final   CEFTAZIDIME Sensitive <=1 ug/mL CAITLIN Final   CEFTRIAXONE Sensitive <=1 ug/mL CAITLIN Final   CIPROFLOXACIN Sensitive <=0.25 ug/mL CAITLIN Final   GENTAMICIN Sensitive <=1 ug/mL CAITLIN Final   LEVOFLOXACIN Sensitive <=0.12 ug/mL CAITLIN Final   MEROPENEM Sensitive <=0.25 ug/mL CAITLIN Final   Piperacillin/Tazo Sensitive <=4 ug/mL CAITLIN Final   TOBRAMYCIN Sensitive <=1 ug/mL CAITLIN Final   Trimethoprim/Sulfa Sensitive <=1/19 ug/mL CAITLIN Final         STAPHYLOCOCCUS AUREUS   Antibiotic Interpretation Sensitivity Unit Method Status   CIPROFLOXACIN Sensitive <=0.5 ug/mL CAITLIN Final   CLINDAMYCIN Resistant  ug/mL CAITLIN Final   ERYTHROMYCIN Resistant >=8 ug/mL CAITLIN Final   GENTAMICIN Sensitive <=0.5 ug/mL CAITLIN Final   LEVOFLOXACIN Sensitive 0.25 ug/mL CAITLIN Final   OXACILLIN Sensitive 0.5 ug/mL CAITLIN Final   PENICILLIN Resistant  ug/mL CAITLIN Final   TETRACYCLINE Sensitive <=1 ug/mL CAITLIN Final   Trimethoprim/Sulfa Sensitive <=0.5/9.5 ug/mL CAITLIN  Final   VANCOMYCIN Sensitive <=0.5 ug/mL CAITLIN Final

## 2017-09-07 ENCOUNTER — OFFICE VISIT (OUTPATIENT)
Dept: SURGERY | Facility: OTHER | Age: 82
End: 2017-09-07
Payer: MEDICARE

## 2017-09-07 VITALS — HEART RATE: 70 BPM | WEIGHT: 190 LBS | TEMPERATURE: 97.7 F | BODY MASS INDEX: 24.39 KG/M2

## 2017-09-07 DIAGNOSIS — S81.801D LEG WOUND, RIGHT, SUBSEQUENT ENCOUNTER: Primary | ICD-10-CM

## 2017-09-07 DIAGNOSIS — T14.8XXA INFECTION OF WOUND HEMATOMA: ICD-10-CM

## 2017-09-07 DIAGNOSIS — L08.9 INFECTION OF WOUND HEMATOMA: ICD-10-CM

## 2017-09-07 PROCEDURE — 99214 OFFICE O/P EST MOD 30 MIN: CPT | Performed by: SPECIALIST

## 2017-09-07 RX ORDER — OXYCODONE HYDROCHLORIDE 5 MG/1
5-10 TABLET ORAL EVERY 6 HOURS PRN
Qty: 18 TABLET | Refills: 0 | Status: SHIPPED | OUTPATIENT
Start: 2017-09-07 | End: 2017-11-27

## 2017-09-07 RX ORDER — CIPROFLOXACIN 500 MG/1
500 TABLET, FILM COATED ORAL 2 TIMES DAILY
Qty: 20 TABLET | Refills: 0 | Status: SHIPPED | OUTPATIENT
Start: 2017-09-07 | End: 2017-10-23

## 2017-09-07 ASSESSMENT — PAIN SCALES - GENERAL: PAINLEVEL: MODERATE PAIN (4)

## 2017-09-07 NOTE — PROGRESS NOTES
F/U - right leg wound    Subjective:  Patient is an 85-year-old white male who had a piece of farm machinery hit his right leg back in April. He developed a hematoma at the time as result of being on Coumadin. Shortly thereafter wound developed over that site that is not healed. He has not been treating it with anything was hoping it would scab over.      Was using triad.  2 weeks ago he developed swelling around wound.  Became painful Friday - was seen in ER and started on Keflex without improvement.      Objective:  B/P: Data Unavailable, T: 97.4, P: 100, R: Data Unavailable  Ext; Warm, Swelling around calf wound.  Large amt of hematoma expressed from wound.  New more inferior wound - connected using cautery - large amt of clot expressed with old exudate.  Base granulating.      Assessment/plan:  This is an 85-year-old gentleman with a nonhealing right calf secondary to trauma now with hematoma that became infected..   Evacuated in clinic.  Wound packed with betadine gauze.  Will change abx to Cipro,  F/U tomorrow for wound check.    Tunde Rondon MD, FACS

## 2017-09-07 NOTE — MR AVS SNAPSHOT
"              After Visit Summary   9/7/2017    Tomer Weiss    MRN: [id number]           Patient Information     Date Of Birth          4/9/1932        Visit Information        Provider Department      9/7/2017 1:30 PM Tunde Rondon MD [facility]        Today's Diagnoses     Leg wound, right, subsequent encounter    -  1    Infection of wound hematoma           Follow-ups after your visit        Your next 10 appointments already scheduled     Sep 08, 2017  8:00 AM CDT   [facility]    [address]  [facility] [address]   [phone]            Sep 29, 2017  9:45 AM CDT   Anticoagulation Visit with ZM ANTI COAG   [facility]    [address] [address] [phone]              Who to contact     If you have questions or need follow up information about today's clinic visit or your schedule please contact [facility] directly at [phone].  Normal or non-critical lab and imaging results will be communicated to you by Minehart, letter or phone within 4 business days after the clinic has received the results. If you do not hear from us within 7 days, please contact the clinic through Amulytet or phone. If you have a critical or abnormal lab result, we will notify you by phone as soon as possible.  Submit refill requests through Inkventors or call your pharmacy and they will forward the refill request to us. Please allow 3 business days for your refill to be completed.          Additional Information About Your Visit        Minehart Information     Inkventors lets you send messages to your doctor, view your test results, renew your prescriptions, schedule appointments and more. To sign up, go to [email or web address] . Click on \"Log in\" on the left side of the screen, which will take you to the Welcome page. " "Then click on \"Sign up Now\" on the right side of the page.     You will be asked to enter the access code listed below, as well as some personal information. Please follow the directions to create your username and password.     Your access code is: [email or web address]  Expires: 2017  2:28 PM     Your access code will  in 90 days. If you need help or a new code, please call your [facility] clinic or [phone].        Care EveryWhere ID     This is your Care EveryWhere ID. This could be used by other organizations to access your [facility] medical records  [id number]        Your Vitals Were     Pulse Temperature BMI (Body Mass Index)             70 97.7  F (36.5  C) (Temporal) 24.39 kg/m2          Blood Pressure from Last 3 Encounters:   17 122/83   17 116/68   17 108/68    Weight from Last 3 Encounters:   17 190 lb (86.2 kg)   17 188 lb (85.3 kg)   08/10/17 188 lb (85.3 kg)              Today, you had the following     No orders found for display         Today's Medication Changes          These changes are accurate as of: 17  2:28 PM.  If you have any questions, ask your nurse or doctor.               Start taking these medicines.        Dose/Directions    ciprofloxacin 500 MG tablet   Commonly known as:  CIPRO   Used for:  Infection of wound hematoma   Started by:  Tunde Rondon MD        Dose:  500 mg   Take 1 tablet (500 mg) by mouth 2 times daily   Quantity:  20 tablet   Refills:  0       oxyCODONE 5 MG IR tablet   Commonly known as:  ROXICODONE   Used for:  Leg wound, right, subsequent encounter   Started by:  Tunde Rondon MD        Dose:  5-10 mg   Take 1-2 tablets (5-10 mg) by mouth every 6 hours as needed for pain   Quantity:  18 tablet   Refills:  0            Where to get your medicines      These medications were sent to [facility] [address] [place] [address] [address] [address]     Phone:  [phone]     " ciprofloxacin 500 MG tablet         Some of these will need a paper prescription and others can be bought over the counter.  Ask your nurse if you have questions.     Bring a paper prescription for each of these medications     oxyCODONE 5 MG IR tablet                Primary Care Provider Office Phone # Fax #    Gabriel Tan [phone]       [facility] [address] [place] [address]        Equal Access to Services     [facility] Hospital[facility] : Hadii aad ku hadasho Soomaali, waaxda luqadaha, qaybta kaalmada adeegyada, waxay idiin hayaan adeeg kharash la'aan ah. So [facility] [phone].    ATENCIÓN: Si alainala espvenecia, tiene a rojas disposición servicios gratuitos de asistencia lingüística. Llame al [phone].    We comply with applicable federal civil rights laws and [place] laws. We do not discriminate on the basis of race, color, national origin, age, disability sex, sexual orientation or gender identity.            Thank you!     Thank you for choosing [facility]  for your care. Our goal is always to provide you with excellent care. Hearing back from our patients is one way we can continue to improve our services. Please take a few minutes to complete the written survey that you may receive in the mail after your visit with us. Thank you!             Your Updated Medication List - Protect others around you: Learn how to safely use, store and throw away your medicines at www.disposemymeds.org.          This list is accurate as of: 9/7/17  2:28 PM.  Always use your most recent med list.                   Brand Name Dispense Instructions for use Diagnosis    atorvastatin 20 MG tablet    LIPITOR    90 tablet    Take 1 tablet (20 mg) by mouth daily    Hyperlipidemia LDL goal <130       ciprofloxacin 500 MG tablet    CIPRO    20 tablet    Take 1 tablet (500 mg) by mouth 2 times daily    Infection of wound hematoma       clindamycin 300 MG capsule    CLEOCIN    40 capsule    Take  1 capsule (300 mg) by mouth 4 times daily for 10 days    Wound infection       furosemide 20 MG tablet    LASIX    30 tablet    Take 1 tablet (20 mg) by mouth daily    Chronic atrial fibrillation (H)       glucosamine 500 MG Caps      2 Tablets every morning    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       iron 66 MG Tabs     1 tablet    Take 1 tablet (66 mg) by mouth daily    Low iron       metoprolol 25 MG 24 hr tablet    TOPROL-XL    90 tablet    Take 0.5 tablets (12.5 mg) by mouth daily    Chronic atrial fibrillation (H)       order for DME     1 Device    Equipment being ordered: Walker () Treatment Diagnosis: s/p joint replacement    Status post total left knee replacement       oxyCODONE 5 MG IR tablet    ROXICODONE    18 tablet    Take 1-2 tablets (5-10 mg) by mouth every 6 hours as needed for pain    Leg wound, right, subsequent encounter       TRIAD HYDROPHILIC WOUND DRESSI Pste     1 Tube    Externally apply 1 g topically daily        warfarin 5 MG tablet    COUMADIN    75 tablet    Take 2.5 mg (1/2 tablet) Tuesday and Saturday and 5 mg other 5 days or as directed by the coumadin clinic    Long-term (current) use of anticoagulants

## 2017-09-07 NOTE — NURSING NOTE
"Chief Complaint   Patient presents with     RECHECK     WOUND CARE     patient seen in ED 9-1-2017-started on Antibotics       Initial Pulse 70  Temp 97.7  F (36.5  C) (Temporal)  Wt 190 lb (86.2 kg)  BMI 24.39 kg/m2 Estimated body mass index is 24.39 kg/(m^2) as calculated from the following:    Height as of 9/1/17: 6' 2\" (1.88 m).    Weight as of this encounter: 190 lb (86.2 kg).  Medication Reconciliation: complete      "

## 2017-09-08 ENCOUNTER — OFFICE VISIT (OUTPATIENT)
Dept: SURGERY | Facility: OTHER | Age: 82
End: 2017-09-08
Payer: MEDICARE

## 2017-09-08 VITALS — BODY MASS INDEX: 24.27 KG/M2 | WEIGHT: 189 LBS | HEART RATE: 72 BPM | TEMPERATURE: 97.7 F

## 2017-09-08 DIAGNOSIS — S81.801D LEG WOUND, RIGHT, SUBSEQUENT ENCOUNTER: Primary | ICD-10-CM

## 2017-09-08 DIAGNOSIS — L08.9 INFECTION OF WOUND HEMATOMA: ICD-10-CM

## 2017-09-08 DIAGNOSIS — T14.8XXA INFECTION OF WOUND HEMATOMA: ICD-10-CM

## 2017-09-08 PROCEDURE — 99213 OFFICE O/P EST LOW 20 MIN: CPT | Performed by: SPECIALIST

## 2017-09-08 NOTE — PROGRESS NOTES
F/U - right leg wound    Subjective:  Patient is an 85-year-old white male who had a piece of farm machinery hit his right leg back in April. He developed a hematoma at the time as result of being on Coumadin. Shortly thereafter wound developed over that site that is not healed. .      Was using triad.  2 weeks ago he developed swelling around wound.  Became painful Friday - was seen in ER and started on Keflex without improvement.  Had infected hematoma evacuated yesterday. Now in for dressing change with daughter.      Objective:  B/P: Data Unavailable, T: Data Unavailable, P: Data Unavailable, R: Data Unavailable  Ext; Warm,   Edema much improved.  Wound clean w/o bleeding.  Granulation tissue in base.    Assessment/plan:  This is an 85-year-old gentleman with a nonhealing right calf secondary to trauma now with hematoma that became infected..   Evacuated in clinic. Wound clean. Daughter taught how to change dressing.  Finish Abx.  F/U 1 week      Tunde Rondon MD, FACS

## 2017-09-08 NOTE — NURSING NOTE
"    Chief Complaint   Patient presents with     RECHECK       Initial Pulse 72  Temp 97.7  F (36.5  C) (Temporal)  Wt 85.7 kg (189 lb)  BMI 24.27 kg/m2 Estimated body mass index is 24.27 kg/(m^2) as calculated from the following:    Height as of 9/1/17: 1.88 m (6' 2\").    Weight as of this encounter: 85.7 kg (189 lb)..  BP completed using cuff size: NA (Not Taken)      Cata Hagan CMA  (AAMA)    "

## 2017-09-08 NOTE — MR AVS SNAPSHOT
"              After Visit Summary   9/8/2017    Tomer Weiss    MRN: [id number]           Patient Information     Date Of Birth          4/9/1932        Visit Information        Provider Department      9/8/2017 8:00 AM Tunde Rondon MD [facility]        Today's Diagnoses     Leg wound, right, subsequent encounter    -  1    Infection of wound hematoma           Follow-ups after your visit        Your next 10 appointments already scheduled     Sep 14, 2017  9:00 AM CDT   [facility]de [facility]   [facility]    [address] [address] [phone]            Sep 29, 2017  9:45 AM CDT   Anticoagulation Visit with SARA ANTI COAG   [facility]    [address] [address] [phone]              Who to contact     If you have questions or need follow up information about today's clinic visit or your schedule please contact [facility] directly at [phone].  Normal or non-critical lab and imaging results will be communicated to you by [email or web address], letter or phone within 4 business days after the clinic has received the results. If you do not hear from us within 7 days, please contact the clinic through [email or web address] or phone. If you have a critical or abnormal lab result, we will notify you by phone as soon as possible.  Submit refill requests through Seelio or call your pharmacy and they will forward the refill request to us. Please allow 3 business days for your refill to be completed.          Additional Information About Your Visit        MyChart Information     Seelio lets you send messages to your doctor, view your test results, renew your prescriptions, schedule appointments and more. To sign up, go to [email or web address] . Click on \"Log in\" on the left side of the screen, which will take you to the Welcome page. Then click " "on \"Sign up Now\" on the right side of the page.     You will be asked to enter the access code listed below, as well as some personal information. Please follow the directions to create your username and password.     Your access code is: [email or web address]  Expires: 2017  2:28 PM     Your access code will  in 90 days. If you need help or a new code, please call your [facility] clinic or [phone].        Care EveryWhere ID     This is your Care EveryWhere ID. This could be used by other organizations to access your [facility] medical records  [id number]        Your Vitals Were     Pulse Temperature BMI (Body Mass Index)             72 97.7  F (36.5  C) (Temporal) 24.27 kg/m2          Blood Pressure from Last 3 Encounters:   17 122/83   17 116/68   17 108/68    Weight from Last 3 Encounters:   17 85.7 kg (189 lb)   17 86.2 kg (190 lb)   17 85.3 kg (188 lb)              Today, you had the following     No orders found for display       Primary Care Provider Office Phone # Fax #    Gabriel Tan [phone]       [facility] [address] [place] [address]        Equal Access to Services     NANCY MILAN : Hadii whitney ku hadasho Soomaali, waaxda luqadaha, qaybta kaalmada adeegyada, mariposa johnson. So [facility] [phone].    ATENCIÓN: Si habla español, tiene a rojas disposición servicios gratuitos de asistencia lingüística. Llame al [phone].    We comply with applicable federal civil rights laws and [place] laws. We do not discriminate on the basis of race, color, national origin, age, disability sex, sexual orientation or gender identity.            Thank you!     Thank you for choosing [facility]  for your care. Our goal is always to provide you with excellent care. Hearing back from our patients is one way we can continue to improve our services. Please take a few minutes to complete the written " survey that you may receive in the mail after your visit with us. Thank you!             Your Updated Medication List - Protect others around you: Learn how to safely use, store and throw away your medicines at www.disposemymeds.org.          This list is accurate as of: 9/8/17  8:41 AM.  Always use your most recent med list.                   Brand Name Dispense Instructions for use Diagnosis    atorvastatin 20 MG tablet    LIPITOR    90 tablet    Take 1 tablet (20 mg) by mouth daily    Hyperlipidemia LDL goal <130       ciprofloxacin 500 MG tablet    CIPRO    20 tablet    Take 1 tablet (500 mg) by mouth 2 times daily    Infection of wound hematoma       clindamycin 300 MG capsule    CLEOCIN    40 capsule    Take 1 capsule (300 mg) by mouth 4 times daily for 10 days    Wound infection       furosemide 20 MG tablet    LASIX    30 tablet    Take 1 tablet (20 mg) by mouth daily    Chronic atrial fibrillation (H)       glucosamine 500 MG Caps      2 Tablets every morning    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       iron 66 MG Tabs     1 tablet    Take 1 tablet (66 mg) by mouth daily    Low iron       metoprolol 25 MG 24 hr tablet    TOPROL-XL    90 tablet    Take 0.5 tablets (12.5 mg) by mouth daily    Chronic atrial fibrillation (H)       order for DME     1 Device    Equipment being ordered: Walker () Treatment Diagnosis: s/p joint replacement    Status post total left knee replacement       oxyCODONE 5 MG IR tablet    ROXICODONE    18 tablet    Take 1-2 tablets (5-10 mg) by mouth every 6 hours as needed for pain    Leg wound, right, subsequent encounter       TRIAD HYDROPHILIC WOUND DRESSI Pste     1 Tube    Externally apply 1 g topically daily        warfarin 5 MG tablet    COUMADIN    75 tablet    Take 2.5 mg (1/2 tablet) Tuesday and Saturday and 5 mg other 5 days or as directed by the coumadin clinic    Long-term (current) use of anticoagulants

## 2017-09-14 ENCOUNTER — OFFICE VISIT (OUTPATIENT)
Dept: SURGERY | Facility: OTHER | Age: 82
End: 2017-09-14
Payer: MEDICARE

## 2017-09-14 VITALS — HEART RATE: 70 BPM | TEMPERATURE: 97.7 F | WEIGHT: 187 LBS | BODY MASS INDEX: 24.01 KG/M2

## 2017-09-14 DIAGNOSIS — L08.9 INFECTION OF WOUND HEMATOMA: ICD-10-CM

## 2017-09-14 DIAGNOSIS — S81.801D LEG WOUND, RIGHT, SUBSEQUENT ENCOUNTER: Primary | ICD-10-CM

## 2017-09-14 DIAGNOSIS — T14.8XXA INFECTION OF WOUND HEMATOMA: ICD-10-CM

## 2017-09-14 PROCEDURE — 99213 OFFICE O/P EST LOW 20 MIN: CPT | Performed by: SPECIALIST

## 2017-09-14 NOTE — NURSING NOTE
"Chief Complaint   Patient presents with     RECHECK     Leg wound, right,     WOUND CARE       Initial Pulse 70  Temp 97.7  F (36.5  C) (Temporal)  Wt 187 lb (84.8 kg)  BMI 24.01 kg/m2 Estimated body mass index is 24.01 kg/(m^2) as calculated from the following:    Height as of 9/1/17: 6' 2\" (1.88 m).    Weight as of this encounter: 187 lb (84.8 kg).  Medication Reconciliation: complete      "

## 2017-09-14 NOTE — MR AVS SNAPSHOT
"              After Visit Summary   9/14/2017    Tomer Weiss    MRN: [id number]           Patient Information     Date Of Birth          4/9/1932        Visit Information        Provider Department      9/14/2017 9:00 AM Tunde Rondon MD [facility]         Follow-ups after your visit        Your next 10 appointments already scheduled     Sep 25, 2017  9:00 AM CDT   [facility] Visit with Tunde Rondon MD   [facility]    [address]  [facility] [address] [phone]            Sep 29, 2017  9:45 AM CDT   Anticoagulation Visit with [facility]M ANTI COAG   [facility]    [address] [facility] [address] [phone]              Who to contact     If you have questions or need follow up information about today's clinic visit or your schedule please contact [facility] directly at [phone].  Normal or non-critical lab and imaging results will be communicated to you by HYLA Mobilehart, letter or phone within 4 business days after the clinic has received the results. If you do not hear from us within 7 days, please contact the clinic through Fiestaht or phone. If you have a critical or abnormal lab result, we will notify you by phone as soon as possible.  Submit refill requests through LVL6 or call your pharmacy and they will forward the refill request to us. Please allow 3 business days for your refill to be completed.          Additional Information About Your Visit        HYLA MobileharTakumii [place] Information     LVL6 lets you send messages to your doctor, view your test results, renew your prescriptions, schedule appointments and more. To sign up, go to [email or web address] . Click on \"Log in\" on the left side of the screen, which will take you to the Welcome page. Then click on \"Sign up Now\" on the right side of the page.     You will be asked to enter the access code listed " below, as well as some personal information. Please follow the directions to create your username and password.     Your access code is: [email or web address]  Expires: 2017  2:28 PM     Your access code will  in 90 days. If you need help or a new code, please call your [facility] clinic or [phone].        Care EveryWhere ID     This is your Care EveryWhere ID. This could be used by other organizations to access your [facility] medical records  [id number]        Your Vitals Were     Pulse Temperature BMI (Body Mass Index)             70 97.7  F (36.5  C) (Temporal) 24.01 kg/m2          Blood Pressure from Last 3 Encounters:   17 122/83   17 116/68   17 108/68    Weight from Last 3 Encounters:   17 187 lb (84.8 kg)   17 189 lb (85.7 kg)   17 190 lb (86.2 kg)              Today, you had the following     No orders found for display       Primary Care Provider Office Phone # Fax #    Gabriel Tan [phone]       [facility] [address] [place] [address]        Equal Access to Services     NANCY MILAN AH: Hadii aad ku hadasho Soomaali, waaxda luqadaha, qaybta kaalmada adeegyada, waxay idiin hayagnieszkan nadeeg cespedes laan ah. So [facility] [phone].    ATENCIÓN: Si habla español, tiene a rojas disposición servicios gratuitos de asistencia lingüística. Llame al [phone].    We comply with applicable federal civil rights laws and [place] laws. We do not discriminate on the basis of race, color, national origin, age, disability sex, sexual orientation or gender identity.            Thank you!     Thank you for choosing [facility]  for your care. Our goal is always to provide you with excellent care. Hearing back from our patients is one way we can continue to improve our services. Please take a few minutes to complete the written survey that you may receive in the mail after your visit with us. Thank you!             Your Updated  Medication List - Protect others around you: Learn how to safely use, store and throw away your medicines at www.disposemymeds.org.          This list is accurate as of: 9/14/17  9:59 AM.  Always use your most recent med list.                   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       iron 66 MG Tabs     1 tablet    Take 1 tablet (66 mg) by mouth daily    Low iron       metoprolol 25 MG 24 hr tablet    TOPROL-XL    90 tablet    Take 0.5 tablets (12.5 mg) by mouth daily    Chronic atrial fibrillation (H)       order for DME     1 Device    Equipment being ordered: Walker () Treatment Diagnosis: s/p joint replacement    Status post total left knee replacement       oxyCODONE 5 MG IR tablet    ROXICODONE    18 tablet    Take 1-2 tablets (5-10 mg) by mouth every 6 hours as needed for pain    Leg wound, right, subsequent encounter       TRIAD HYDROPHILIC WOUND DRESSI Pste     1 Tube    Externally apply 1 g topically daily        warfarin 5 MG tablet    COUMADIN    75 tablet    Take 2.5 mg (1/2 tablet) Tuesday and Saturday and 5 mg other 5 days or as directed by the coumadin clinic    Long-term (current) use of anticoagulants

## 2017-09-14 NOTE — NURSING NOTE
BARTOLO Rondon-corner of sterile 4x4 gauze moistened with sterile saline, place gently in leg wound, covered with sterile gauze. Secured with tape...................................Cata Hagan CMA  ([facility])

## 2017-09-14 NOTE — PROGRESS NOTES
F/U - right leg wound    Subjective:  Patient is an 85-year-old white male who had a piece of farm machinery hit his right leg back in April. He developed a hematoma at the time as result of being on Coumadin. Shortly thereafter wound developed over that site that is not healed. .      Had infected hematoma evacuated last week.  Feeling much better    Objective:  B/P: Data Unavailable, T: 97.7, P: 70, R: Data Unavailable  Ext; Warm,   Edema much improved.  Wound clean - 3.5x1x0.8cm 90% Granulation tissue in base.    Assessment/plan:  This is an 85-year-old gentleman with a nonhealing right calf secondary to trauma and hematoma that became infected.  Much improved this week.  Will continue daily packing and compression socks.  F/U 2 weeks for wound check.    Tnude Rondon MD, FACS

## 2017-09-25 ENCOUNTER — OFFICE VISIT (OUTPATIENT)
Dept: SURGERY | Facility: OTHER | Age: 82
End: 2017-09-25
Payer: MEDICARE

## 2017-09-25 VITALS — WEIGHT: 190.6 LBS | HEART RATE: 78 BPM | BODY MASS INDEX: 24.47 KG/M2 | TEMPERATURE: 97.6 F

## 2017-09-25 DIAGNOSIS — S81.801D LEG WOUND, RIGHT, SUBSEQUENT ENCOUNTER: Primary | ICD-10-CM

## 2017-09-25 PROCEDURE — 99213 OFFICE O/P EST LOW 20 MIN: CPT | Performed by: SPECIALIST

## 2017-09-25 NOTE — MR AVS SNAPSHOT
"              After Visit Summary   9/25/2017    Tomer Weiss    MRN: [id number]           Patient Information     Date Of Birth          4/9/1932        Visit Information        Provider Department      9/25/2017 9:00 AM Tunde Rondon MD [facility]         Follow-ups after your visit        Your next 10 appointments already scheduled     Sep 29, 2017  9:45 AM CDT   An[facility]agulation Visit with ZM ANTI COAG   [facility]    [address] [facility] [address] [phone]              Who to contact     If you have questions or need follow up information about today's clinic visit or your schedule please contact [facility] directly at [phone].  Normal or non-critical lab and imaging results will be communicated to you by MyChart, letter or phone within 4 business days after the clinic has received the results. If you do not hear from us within 7 days, please contact the clinic through MyChart or phone. If you have a critical or abnormal lab result, we will notify you by phone as soon as possible.  Submit refill requests through enercast or call your pharmacy and they will forward the refill request to us. Please allow 3 business days for your refill to be completed.          Additional Information About Your Visit        Amazing Photo Lettershart Information     enercast lets you send messages to your doctor, view your test results, renew your prescriptions, schedule appointments and more. To sign up, go to [email or web address] . Click on \"Log in\" on the left side of the screen, which will take you to the Welcome page. Then click on \"Sign up Now\" on the right side of the page.     You will be asked to enter the access code listed below, as well as some personal information. Please follow the directions to create your username and password.     Your access code is: [email or web address]  Expires: 12/6/2017  2:28 PM     Your access code " will  in 90 days. If you need help or a new code, please call your [facility] clinic or [phone].        Care EveryWhere ID     This is your Care EveryWhere ID. This could be used by other organizations to access your [facility] medical records  [id number]        Your Vitals Were     Pulse Temperature BMI (Body Mass Index)             78 97.6  F (36.4  C) (Temporal) 24.47 kg/m2          Blood Pressure from Last 3 Encounters:   17 122/83   17 116/68   17 108/68    Weight from Last 3 Encounters:   17 190 lb 9.6 oz (86.5 kg)   17 187 lb (84.8 kg)   17 189 lb (85.7 kg)              Today, you had the following     No orders found for display       Primary Care Provider Office Phone # Fax #    Gabriel Tan [phone]       [facility] [address] [place] [address]        Equal Access to Services     NANCY MILAN : Hadii aad ku hadasho Soomaali, waaxda luqadaha, qaybta kaalmada adeegyada, waxay idiin hayagnieszkan nadege yang . So [facility] [phone].    ATENCIÓN: Si habla español, tiene a rojas disposición servicios gratuitos de asistencia lingüística. Llame al [phone].    We comply with applicable federal civil rights laws and [place] laws. We do not discriminate on the basis of race, color, national origin, age, disability sex, sexual orientation or gender identity.            Thank you!     Thank you for choosing [facility]  for your care. Our goal is always to provide you with excellent care. Hearing back from our patients is one way we can continue to improve our services. Please take a few minutes to complete the written survey that you may receive in the mail after your visit with us. Thank you!             Your Updated Medication List - Protect others around you: Learn how to safely use, store and throw away your medicines at www.disposemymeds.org.          This list is accurate as of: 17  9:08 AM.   Always use your most recent med list.                   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       iron 66 MG Tabs     1 tablet    Take 1 tablet (66 mg) by mouth daily    Low iron       metoprolol 25 MG 24 hr tablet    TOPROL-XL    90 tablet    Take 0.5 tablets (12.5 mg) by mouth daily    Chronic atrial fibrillation (H)       order for DME     1 Device    Equipment being ordered: Walker () Treatment Diagnosis: s/p joint replacement    Status post total left knee replacement       oxyCODONE 5 MG IR tablet    ROXICODONE    18 tablet    Take 1-2 tablets (5-10 mg) by mouth every 6 hours as needed for pain    Leg wound, right, subsequent encounter       TRIAD HYDROPHILIC WOUND DRESSI Pste     1 Tube    Externally apply 1 g topically daily        warfarin 5 MG tablet    COUMADIN    75 tablet    Take 2.5 mg (1/2 tablet) Tuesday and Saturday and 5 mg other 5 days or as directed by the coumadin clinic    Long-term (current) use of anticoagulants

## 2017-09-25 NOTE — NURSING NOTE
Per VO Dr. Rondon-Triad applied to right lower leg, sterile dressing applied, secured with tape....................................Cata Hagan CMA  ([facility])

## 2017-09-25 NOTE — PROGRESS NOTES
F/U - right leg wound    Subjective:  Patient is an 85-year-old white male who had a piece of farm machinery hit his right leg back in April. He developed a hematoma at the time as result of being on Coumadin. Shortly thereafter wound developed over that site that is not healed. .      Had infected hematoma evacuated.   Feeling much better    Objective:  B/P: Data Unavailable, T: 97.7, P: 70, R: Data Unavailable  Ext; Warm,   Edema much improved.  Wound clean - 3.5x0.5x0.8cm 60% Granulation tissue in base.    Assessment/plan:  This is an 85-year-old gentleman with a nonhealing right calf secondary to trauma and hematoma that became infected.  Much improved this week though increased slough.  Will change to TRIAD  and compression socks.  F/U 2 weeks for wound check.    Tunde Rondon MD, FACS

## 2017-09-25 NOTE — NURSING NOTE
"Chief Complaint   Patient presents with     RECHECK     WOUND CARE       Initial Pulse 78  Temp 97.6  F (36.4  C) (Temporal)  Wt 190 lb 9.6 oz (86.5 kg)  BMI 24.47 kg/m2 Estimated body mass index is 24.47 kg/(m^2) as calculated from the following:    Height as of 9/1/17: 6' 2\" (1.88 m).    Weight as of this encounter: 190 lb 9.6 oz (86.5 kg).  Medication Reconciliation: complete      "

## 2017-09-29 ENCOUNTER — ANTICOAGULATION THERAPY VISIT (OUTPATIENT)
Dept: ANTICOAGULATION | Facility: OTHER | Age: 82
End: 2017-09-29
Payer: MEDICARE

## 2017-09-29 DIAGNOSIS — Z79.01 LONG-TERM (CURRENT) USE OF ANTICOAGULANTS: ICD-10-CM

## 2017-09-29 DIAGNOSIS — I48.91 ATRIAL FIBRILLATION, UNSPECIFIED TYPE (H): ICD-10-CM

## 2017-09-29 LAB — INR POINT OF CARE: 3.2 (ref 0.86–1.14)

## 2017-09-29 PROCEDURE — 99207 ZZC NO CHARGE NURSE ONLY: CPT

## 2017-09-29 PROCEDURE — 85610 PROTHROMBIN TIME: CPT | Mod: QW

## 2017-09-29 PROCEDURE — 36416 COLLJ CAPILLARY BLOOD SPEC: CPT

## 2017-09-29 NOTE — MR AVS SNAPSHOT
Tomer Weiss   9/29/2017 9:45 AM   Anticoagulation Therapy Visit    Description:  85 year old male   Provider:  [facility]   Department:  [facility]           INR as of 9/29/2017     Today's INR 3.2!      Anticoagulation Summary as of 9/29/2017     INR goal 2.0-3.0   Today's INR 3.2!   Full instructions 2.5 mg on Tue, Sat; 5 mg all other days   Next INR check 10/27/2017    Indications   Long-term (current) use of anticoagulants [Z79.01] [Z79.01]  Atrial fibrillation (HCC) [I48.91] [I48.91]         Your next Anticoagulation Clinic appointment(s)     Oct 27, 2017  8:45 AM CDT   [facility]agulation [facility] with ZM ANTI COAG   [facility]    [address] [id number] [phone]              Contact Numbers     Clinic Number:         September 2017 Details    Sun Mon Tue Wed Thu Fri Sat          1               2                 3               4               5               6               7               8               9                 10               11               12               13               14               15               16                 17               18               19               20               21               22               23                 24               25               26               27               28               29      5 mg   See details      30      2.5 mg          Date Details   09/29 This INR check               How to take your warfarin dose     To take:  2.5 mg Take 0.5 of a 5 mg tablet.    To take:  5 mg Take 1 of the 5 mg tablets.           October 2017 Details    Sun Mon Tue Wed Thu Fri Sat     1      5 mg         2      5 mg         3      2.5 mg         4      5 mg         5      5 mg         6      5 mg         7      2.5 mg           8      5 mg         9      5 mg         10      2.5 mg         11      5 mg         12      5 mg         13      5 mg         14      2.5 mg            15      5 mg         16      5 mg         17      2.5 mg         18      5 mg         19      5 mg         20      5 mg         21      2.5 mg           22      5 mg         23      5 mg         24      2.5 mg         25      5 mg         26      5 mg         27            28                 29               30               31                    Date Details   No additional details    Date of next INR:  10/27/2017         How to take your warfarin dose     To take:  2.5 mg Take 0.5 of a 5 mg tablet.    To take:  5 mg Take 1 of the 5 mg tablets.

## 2017-09-29 NOTE — PROGRESS NOTES
"  ANTICOAGULATION FOLLOW-UP CLINIC VISIT    Patient Name:  Tomer Weiss  Date:  9/29/2017  Contact Type:  Face to Face    SUBJECTIVE:     Patient Findings     Positives Change in medications (Pt was on cipro 2 weeks ago for his leg infection which may have increased his INR then. Gisell asked that in the future if he gets put on abx, he should call and let us know), Inflammation, Antibiotic use or infection (Pt had a \"bad cold\" earlier this week with fever. Is feeling mostly better now)           OBJECTIVE    INR Protime   Date Value Ref Range Status   09/29/2017 3.2 (A) 0.86 - 1.14 Final     Chromogenic Factor 10   Date Value Ref Range Status   04/24/2015 21 (L) 70 - 130 % Final     Comment:     Therapeutic Range:  A Chromogenic Factor 10 level of approximately 20-40%   inversely correlates with an INR of 2-3 for patients receiving Warfarin.   Chromogenic Factor 10 levels below 20% indicate an INR greater than 3 and   levels above 40% indicate an INR less than 2.         ASSESSMENT / PLAN  INR assessment THER    Recheck INR In: 4 WEEKS    INR Location Clinic      Anticoagulation Summary as of 9/29/2017     INR goal 2.0-3.0   Today's INR 3.2!   Maintenance plan 2.5 mg (5 mg x 0.5) on Tue, Sat; 5 mg (5 mg x 1) all other days   Full instructions 2.5 mg on Tue, Sat; 5 mg all other days   Weekly total 30 mg   No change documented Tana Pérez, RN   Plan last modified Tana Pérez, RN (4/27/2016)   Next INR check 10/27/2017   Target end date     Indications   Long-term (current) use of anticoagulants [Z79.01] [Z79.01]  Atrial fibrillation (HCC) [I48.91] [I48.91]         Anticoagulation Episode Summary     INR check location     Preferred lab     Send INR reminders to MIHM POOL    Comments 5 mg tablets, AM dose, AVS      Anticoagulation Care Providers     Provider Role Specialty Phone number    Gabriel Tan MD [facility] [phone]            See the Encounter Report to view " Anticoagulation Flowsheet and Dosing Calendar (Go to Encounters tab in chart review, and find the Anticoagulation Therapy Visit)    Dosage adjustment made based on physician directed care plan.    Tana Pérez RN

## 2017-10-09 ENCOUNTER — OFFICE VISIT (OUTPATIENT)
Dept: SURGERY | Facility: OTHER | Age: 82
End: 2017-10-09
Payer: MEDICARE

## 2017-10-09 VITALS — HEART RATE: 70 BPM | TEMPERATURE: 97.3 F | BODY MASS INDEX: 24.52 KG/M2 | WEIGHT: 191 LBS

## 2017-10-09 DIAGNOSIS — L08.9 INFECTION OF WOUND HEMATOMA: ICD-10-CM

## 2017-10-09 DIAGNOSIS — S81.801D LEG WOUND, RIGHT, SUBSEQUENT ENCOUNTER: Primary | ICD-10-CM

## 2017-10-09 DIAGNOSIS — R60.0 LEG EDEMA, RIGHT: ICD-10-CM

## 2017-10-09 DIAGNOSIS — T14.8XXA INFECTION OF WOUND HEMATOMA: ICD-10-CM

## 2017-10-09 PROCEDURE — 99213 OFFICE O/P EST LOW 20 MIN: CPT | Performed by: SPECIALIST

## 2017-10-09 NOTE — MR AVS SNAPSHOT
"              After Visit Summary   10/9/2017    Tomer Weiss    MRN: [id number]           Patient Information     Date Of Birth          4/9/1932        Visit Information        Provider Department      10/9/2017 9:00 AM Tunde Rondon MD [facility]        Today's Diagnoses     Leg wound, right, subsequent encounter    -  1    Infection of wound hematoma        Leg edema, right           Follow-ups after your visit        Your next 10 appointments already scheduled     Oct 23, 2017  9:00 AM CDT   Return Visit with Tunde Rondon MD   [facility]    [address] [facility] [facility] [address]   [phone]            Oct 27, 2017  8:45 AM CDT   Anticoagulation Visit with ZM ANTI COAG   [facility]    [address] [facility] [address] [phone]              Who to contact     If you have questions or need follow up information about today's clinic visit or your schedule please contact [facility] directly at [phone].  Normal or non-critical lab and imaging results will be communicated to you by SHEEXhart, letter or phone within 4 business days after the clinic has received the results. If you do not hear from us within 7 days, please contact the clinic through SHEEXhart or phone. If you have a critical or abnormal lab result, we will notify you by phone as soon as possible.  Submit refill requests through CoworkingON or call your pharmacy and they will forward the refill request to us. Please allow 3 business days for your refill to be completed.          Additional Information About Your Visit        SHEEXhart Information     CoworkingON lets you send messages to your doctor, view your test results, renew your prescriptions, schedule appointments and more. To sign up, go to [email or web address] . Click on \"Log in\" on the left side of the screen, which will take you to " "the Welcome page. Then click on \"Sign up Now\" on the right side of the page.     You will be asked to enter the access code listed below, as well as some personal information. Please follow the directions to create your username and password.     Your access code is: [email or web address]  Expires: 2017  2:28 PM     Your access code will  in 90 days. If you need help or a new code, please call your [facility] clinic or [phone].        Care EveryWhere ID     This is your Care EveryWhere ID. This could be used by other organizations to access your [facility] medical records  [id number]        Your Vitals Were     Pulse Temperature BMI (Body Mass Index)             70 97.3  F (36.3  C) (Temporal) 24.52 kg/m2          Blood Pressure from Last 3 Encounters:   17 122/83   17 116/68   17 108/68    Weight from Last 3 Encounters:   10/09/17 191 lb (86.6 kg)   17 190 lb 9.6 oz (86.5 kg)   17 187 lb (84.8 kg)              Today, you had the following     No orders found for display       Primary Care Provider Office Phone # Fax #    Gabriel Tan [phone]       [facility] [address] [place] [address]        Equal Access to Services     NANCY MILAN AH: Hadii whitney conte hadasho Soalexandra, waaxda luqadaha, qaybta kaalmada marty, mariposa yang . So [facility] [phone].    ATENCIÓN: Si habla español, tiene a rojas disposición servicios gratuitos de asistencia lingüística. Ellis al [phone].    We comply with applicable federal civil rights laws and [place] laws. We do not discriminate on the basis of race, color, national origin, age, disability, sex, sexual orientation, or gender identity.            Thank you!     Thank you for choosing [facility]  for your care. Our goal is always to provide you with excellent care. Hearing back from our patients is one way we can continue to improve our services. Please take a " few minutes to complete the written survey that you may receive in the mail after your visit with us. Thank you!             Your Updated Medication List - Protect others around you: Learn how to safely use, store and throw away your medicines at www.disposemymeds.org.          This list is accurate as of: 10/9/17  9:14 AM.  Always use your most recent med list.                   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       iron 66 MG Tabs     1 tablet    Take 1 tablet (66 mg) by mouth daily    Low iron       metoprolol 25 MG 24 hr tablet    TOPROL-XL    90 tablet    Take 0.5 tablets (12.5 mg) by mouth daily    Chronic atrial fibrillation (H)       order for DME     1 Device    Equipment being ordered: Walker () Treatment Diagnosis: s/p joint replacement    Status post total left knee replacement       oxyCODONE 5 MG IR tablet    ROXICODONE    18 tablet    Take 1-2 tablets (5-10 mg) by mouth every 6 hours as needed for pain    Leg wound, right, subsequent encounter       TRIAD HYDROPHILIC WOUND DRESSI Pste     1 Tube    Externally apply 1 g topically daily        warfarin 5 MG tablet    COUMADIN    75 tablet    Take 2.5 mg (1/2 tablet) Tuesday and Saturday and 5 mg other 5 days or as directed by the coumadin clinic    Long-term (current) use of anticoagulants

## 2017-10-09 NOTE — NURSING NOTE
"Chief Complaint   Patient presents with     RECHECK     WOUND CARE       Initial Pulse 70  Temp 97.3  F (36.3  C) (Temporal)  Wt 191 lb (86.6 kg)  BMI 24.52 kg/m2 Estimated body mass index is 24.52 kg/(m^2) as calculated from the following:    Height as of 9/1/17: 6' 2\" (1.88 m).    Weight as of this encounter: 191 lb (86.6 kg).  Medication Reconciliation: complete    "

## 2017-10-09 NOTE — PROGRESS NOTES
F/U - right leg wound    Subjective:  Patient is an 85-year-old white male who had a piece of farm machinery hit his right leg back in April. He developed a hematoma at the time as result of being on Coumadin. Shortly thereafter wound developed over that site that is not healed. .      Had infected hematoma evacuated.   Feeling much better    Objective:  B/P: Data Unavailable, T: 97.3, P: 70, R: Data Unavailable  Ext; Warm,   Edema much improved.  Wound clean - 3.0x0.5x0.5cm 80% Granulation tissue in base.    Assessment/plan:  This is an 85-year-old gentleman with a nonhealing right calf secondary to trauma and hematoma that became infected.  Much improved this week.  Will continue TRIAD  and compression socks.  F/U 2 weeks for wound check.    Tunde Rondon MD, FACS

## 2017-10-09 NOTE — NURSING NOTE
BARTOLO Rondon-Debbi applied to lower leg wound, sterile dressing applied...............................Cata Hagan CMA  ([facility])

## 2017-10-23 ENCOUNTER — OFFICE VISIT (OUTPATIENT)
Dept: SURGERY | Facility: OTHER | Age: 82
End: 2017-10-23
Payer: MEDICARE

## 2017-10-23 VITALS — WEIGHT: 190 LBS | HEART RATE: 70 BPM | TEMPERATURE: 97.2 F | BODY MASS INDEX: 24.39 KG/M2

## 2017-10-23 DIAGNOSIS — S81.801D LEG WOUND, RIGHT, SUBSEQUENT ENCOUNTER: Primary | ICD-10-CM

## 2017-10-23 PROCEDURE — 99213 OFFICE O/P EST LOW 20 MIN: CPT | Performed by: SPECIALIST

## 2017-10-23 NOTE — NURSING NOTE
"Chief Complaint   Patient presents with     RECHECK     WOUND CARE     right leg wound-  Triad       Initial Pulse 70  Temp 97.2  F (36.2  C) (Temporal)  Wt 190 lb (86.2 kg)  BMI 24.39 kg/m2 Estimated body mass index is 24.39 kg/(m^2) as calculated from the following:    Height as of 9/1/17: 6' 2\" (1.88 m).    Weight as of this encounter: 190 lb (86.2 kg).  Medication Reconciliation: complete    "

## 2017-10-23 NOTE — PROGRESS NOTES
F/U - right leg wound    Subjective:  Patient is an 85-year-old white male who had a piece of farm machinery hit his right leg back in April. He developed a hematoma at the time as result of being on Coumadin. Shortly thereafter wound developed over that site that is not healed. .      Had infected hematoma evacuated.   Feeling much better    Objective:  B/P: Data Unavailable, T: 97.2, P: 70, R: Data Unavailable  Ext; Warm,   Edema much improved.  Wound clean - 3.0x0.5x0.3cm 90% Granulation tissue in base.    Assessment/plan:  This is an 85-year-old gentleman with a nonhealing right calf secondary to trauma and hematoma that became infected.  Much improved this week - more filled in.  Will continue TRIAD  and compression socks.  F/U 2 weeks for wound check.    Tunde Rondon MD, FACS

## 2017-10-23 NOTE — NURSING NOTE
BARTOLO nayak-triad applied to right lower leg wound, sterile dressing applied, secured with tape...............................Cata Hagan CMA  ([facility])

## 2017-10-23 NOTE — MR AVS SNAPSHOT
"              After Visit Summary   10/23/2017    Tomer Weiss    MRN: [id number]           Patient Information     Date Of Birth          4/9/1932        Visit Information        Provider Department      10/23/2017 9:00 AM Tunde Rondon MD [facility]        Today's Diagnoses     Leg wound, right, subsequent encounter    -  1       Follow-ups after your visit        Your next 10 appointments already scheduled     Oct 27, 2017  8:45 AM CDT   [facility]    [address] [place] [address] [phone]              Who to contact     If you have questions or need follow up information about today's clinic visit or your schedule please contact [facility] directly at [phone].  Normal or non-critical lab and imaging results will be communicated to you by Sobrrhart, letter or phone within 4 business days after the clinic has received the results. If you do not hear from us within 7 days, please contact the clinic through MyChart or phone. If you have a critical or abnormal lab result, we will notify you by phone as soon as possible.  Submit refill requests through Gobooks or call your pharmacy and they will forward the refill request to us. Please allow 3 business days for your refill to be completed.          Additional Information About Your Visit        MyChart Information     Gobooks lets you send messages to your doctor, view your test results, renew your prescriptions, schedule appointments and more. To sign up, go to [email or web address] . Click on \"Log in\" on the left side of the screen, which will take you to the Welcome page. Then click on \"Sign up Now\" on the right side of the page.     You will be asked to enter the access code listed below, as well as some personal information. Please follow the directions to create your username and password.     Your " access code is: [email or web address]  Expires: 2017  2:28 PM     Your access code will  in 90 days. If you need help or a new code, please call your [facility] clinic or [phone].        Care EveryWhere ID     This is your Care EveryWhere ID. This could be used by other organizations to access your [facility] medical records  [id number]        Your Vitals Were     Pulse Temperature BMI (Body Mass Index)             70 97.2  F (36.2  C) (Temporal) 24.39 kg/m2          Blood Pressure from Last 3 Encounters:   17 122/83   17 116/68   17 108/68    Weight from Last 3 Encounters:   10/23/17 190 lb (86.2 kg)   10/09/17 191 lb (86.6 kg)   17 190 lb 9.6 oz (86.5 kg)              Today, you had the following     No orders found for display       Primary Care Provider Office Phone # Fax #    Gabriel Tan [phone]       [facility] [address] [place] [address]        Equal Access to Services     [facility] : Hadii aad ku hadasho Soomaali, waaxda luqadaha, qaybta kaalmada adeegyada, mariposa mcelroy hayagnieszkan nadege yang . So [facility] [phone].    ATENCIÓN: Si habla español, tiene a rojas disposición servicios gratuitos de asistencia lingüística. Llame al [phone].    We comply with applicable federal civil rights laws and [place] laws. We do not discriminate on the basis of race, color, national origin, age, disability, sex, sexual orientation, or gender identity.            Thank you!     Thank you for choosing [facility]  for your care. Our goal is always to provide you with excellent care. Hearing back from our patients is one way we can continue to improve our services. Please take a few minutes to complete the written survey that you may receive in the mail after your visit with us. Thank you!             Your Updated Medication List - Protect others around you: Learn how to safely use, store and throw away your medicines at  www.disposemymeds.org.          This list is accurate as of: 10/23/17  9:08 AM.  Always use your most recent med list.                   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       iron 66 MG Tabs     1 tablet    Take 1 tablet (66 mg) by mouth daily    Low iron       metoprolol 25 MG 24 hr tablet    TOPROL-XL    90 tablet    Take 0.5 tablets (12.5 mg) by mouth daily    Chronic atrial fibrillation (H)       order for DME     1 Device    Equipment being ordered: Walker () Treatment Diagnosis: s/p joint replacement    Status post total left knee replacement       oxyCODONE 5 MG IR tablet    ROXICODONE    18 tablet    Take 1-2 tablets (5-10 mg) by mouth every 6 hours as needed for pain    Leg wound, right, subsequent encounter       TRIAD HYDROPHILIC WOUND DRESSI Pste     1 Tube    Externally apply 1 g topically daily        warfarin 5 MG tablet    COUMADIN    75 tablet    Take 2.5 mg (1/2 tablet) Tuesday and Saturday and 5 mg other 5 days or as directed by the coumadin clinic    Long-term (current) use of anticoagulants

## 2017-10-27 ENCOUNTER — ANTICOAGULATION THERAPY VISIT (OUTPATIENT)
Dept: ANTICOAGULATION | Facility: OTHER | Age: 82
End: 2017-10-27
Payer: MEDICARE

## 2017-10-27 DIAGNOSIS — I48.91 ATRIAL FIBRILLATION, UNSPECIFIED TYPE (H): ICD-10-CM

## 2017-10-27 DIAGNOSIS — Z79.01 LONG-TERM (CURRENT) USE OF ANTICOAGULANTS: ICD-10-CM

## 2017-10-27 LAB — INR POINT OF CARE: 3 (ref 0.86–1.14)

## 2017-10-27 PROCEDURE — 85610 PROTHROMBIN TIME: CPT | Mod: QW

## 2017-10-27 PROCEDURE — 36416 COLLJ CAPILLARY BLOOD SPEC: CPT

## 2017-10-27 PROCEDURE — 99207 ZZC NO CHARGE NURSE ONLY: CPT

## 2017-10-27 NOTE — PROGRESS NOTES
ANTICOAGULATION FOLLOW-UP CLINIC VISIT    Patient Name:  Tomer Weiss  Date:  10/27/2017  Contact Type:  Face to Face    SUBJECTIVE:     Patient Findings     Positives No Problem Findings           OBJECTIVE    INR Protime   Date Value Ref Range Status   10/27/2017 3.0 (A) 0.86 - 1.14 Final     Chromogenic Factor 10   Date Value Ref Range Status   04/24/2015 21 (L) 70 - 130 % Final     Comment:     Therapeutic Range:  A Chromogenic Factor 10 level of approximately 20-40%   inversely correlates with an INR of 2-3 for patients receiving Warfarin.   Chromogenic Factor 10 levels below 20% indicate an INR greater than 3 and   levels above 40% indicate an INR less than 2.         ASSESSMENT / PLAN  INR assessment THER    Recheck INR In: 5 WEEKS    INR Location Clinic      Anticoagulation Summary as of 10/27/2017     INR goal 2.0-3.0   Today's INR 3.0   Maintenance plan 2.5 mg (5 mg x 0.5) on Tue, Sat; 5 mg (5 mg x 1) all other days   Full instructions 2.5 mg on Tue, Sat; 5 mg all other days   Weekly total 30 mg   No change documented Tana Pérez, JUANY   Plan last modified Tana Pérez, RN (4/27/2016)   Next INR check 11/29/2017   Target end date     Indications   Long-term (current) use of anticoagulants [Z79.01] [Z79.01]  Atrial fibrillation (HCC) [I48.91] [I48.91]         Anticoagulation Episode Summary     INR check location     Preferred lab     Send INR reminders to [facility] POOL    Comments 5 mg tablets, AM dose, AVS      Anticoagulation Care Providers     Provider Role Specialty Phone number    Gabriel Tan MD [facility] Internal Medicine [phone]            See the Encounter Report to view Anticoagulation Flowsheet and Dosing Calendar (Go to Encounters tab in chart review, and find the Anticoagulation Therapy Visit)    Dosage adjustment made based on physician directed care plan.    Tana Pérez RN

## 2017-10-27 NOTE — MR AVS SNAPSHOT
Tomer Weiss   10/27/2017 8:45 AM   Anticoagulation Therapy Visit    Description:  85 year old male   Provider:  [facility]   Department:  [facility]           INR as of 10/27/2017     Today's INR 3.0      Anticoagulation Summary as of 10/27/2017     INR goal 2.0-3.0   Today's INR 3.0   Full instructions 2.5 mg on Tue, Sat; 5 mg all other days   Next INR check 11/29/2017    Indications   Long-term (current) use of anticoagulants [Z79.01] [Z79.01]  Atrial fibrillation (HCC) [I48.91] [I48.91]         Your next Anticoagulation Clinic appointment(s)     Nov 29, 2017  2:15 PM CST   Anticoagulation Visit with ZM ANTI COAG   [facility] ([facility]    [address] [address] [phone]              Contact Numbers     Clinic Number:         October 2017 Details    Sun Mon Tue Wed Thu Fri Sat     1               2               3               4               5               6               7                 8               9               10               11               12               13               14                 15               16               17               18               19               20               21                 22               23               24               25               26               27      5 mg   See details      28      2.5 mg           29      5 mg         30      5 mg         31      2.5 mg              Date Details   10/27 This INR check               How to take your warfarin dose     To take:  2.5 mg Take 0.5 of a 5 mg tablet.    To take:  5 mg Take 1 of the 5 mg tablets.           November 2017 Details    Sun Mon Tue Wed Thu Fri Sat        1      5 mg         2      5 mg         3      5 mg         4      2.5 mg           5      5 mg         6      5 mg         7      2.5 mg         8      5 mg         9      5 mg         10      5 mg         11      2.5 mg           12      5 mg         13      5  mg         14      2.5 mg         15      5 mg         16      5 mg         17      5 mg         18      2.5 mg           19      5 mg         20      5 mg         21      2.5 mg         22      5 mg         23      5 mg         24      5 mg         25      2.5 mg           26      5 mg         27      5 mg         28      2.5 mg         29            30                  Date Details   No additional details    Date of next INR:  11/29/2017         How to take your warfarin dose     To take:  2.5 mg Take 0.5 of a 5 mg tablet.    To take:  5 mg Take 1 of the 5 mg tablets.

## 2017-11-06 ENCOUNTER — OFFICE VISIT (OUTPATIENT)
Dept: SURGERY | Facility: OTHER | Age: 82
End: 2017-11-06
Payer: MEDICARE

## 2017-11-06 VITALS — BODY MASS INDEX: 24.83 KG/M2 | WEIGHT: 193.4 LBS | RESPIRATION RATE: 14 BRPM | HEART RATE: 72 BPM

## 2017-11-06 DIAGNOSIS — S81.801D LEG WOUND, RIGHT, SUBSEQUENT ENCOUNTER: Primary | ICD-10-CM

## 2017-11-06 PROCEDURE — 99213 OFFICE O/P EST LOW 20 MIN: CPT | Performed by: SPECIALIST

## 2017-11-06 RX ORDER — HYDROPHILIC CREAM
1 PASTE (GRAM) TOPICAL DAILY
Qty: 1 TUBE | Refills: 3 | Status: SHIPPED | OUTPATIENT
Start: 2017-11-06 | End: 2018-02-26

## 2017-11-06 NOTE — MR AVS SNAPSHOT
"              After Visit Summary   11/6/2017    Tomer Weiss    MRN: [id number]           Patient Information     Date Of Birth          4/9/1932        Visit Information        Provider Department      11/6/2017 10:30 AM Tunde Rondon MD [facility]        Today's Diagnoses     Leg wound, right, subsequent encounter    -  1       Follow-ups after your visit        Your next 10 appointments already scheduled     Nov 29, 2017  2:15 PM CST   Anticoagulation Visit with ZM ANTI COAG   [facility]    [address] [address] [phone]              Who to contact     If you have questions or need follow up information about today's clinic visit or your schedule please contact [facility] directly at [phone].  Normal or non-critical lab and imaging results will be communicated to you by PrivateFlyhart, letter or phone within 4 business days after the clinic has received the results. If you do not hear from us within 7 days, please contact the clinic through MyChart or phone. If you have a critical or abnormal lab result, we will notify you by phone as soon as possible.  Submit refill requests through [employer] or call your pharmacy and they will forward the refill request to us. Please allow 3 business days for your refill to be completed.          Additional Information About Your Visit        MyChart Information     [employer] lets you send messages to your doctor, view your test results, renew your prescriptions, schedule appointments and more. To sign up, go to [email or web address] . Click on \"Log in\" on the left side of the screen, which will take you to the Welcome page. Then click on \"Sign up Now\" on the right side of the page.     You will be asked to enter the access code listed below, as well as some personal information. Please follow the directions to create your username and password.     Your access " code is: [email or web address]  Expires: 2017  1:28 PM     Your access code will  in 90 days. If you need help or a new code, please call your [facility] clinic or [phone].        Care EveryWhere ID     This is your Care EveryWhere ID. This could be used by other organizations to access your [facility] medical records  [id number]        Your Vitals Were     Pulse Respirations BMI (Body Mass Index)             72 14 24.83 kg/m2          Blood Pressure from Last 3 Encounters:   17 122/83   17 116/68   17 108/68    Weight from Last 3 Encounters:   17 193 lb 6.4 oz (87.7 kg)   10/23/17 190 lb (86.2 kg)   10/09/17 191 lb (86.6 kg)              Today, you had the following     No orders found for display       Primary Care Provider Office Phone # Fax #    Gabriel Tan [phone]       [facility] [address] [place] [address]        Equal Access to Services     [facility]: Hadii aad ku hadasho Soomaali, waaxda luqadaha, qaybta kaalmada adeegyada, mariposa yang . So [facility] [phone].    ATENCIÓN: Si habla español, tiene a rojas disposición servicios gratuitos de asistencia lingüística. Ellis al [phone].    We comply with applicable federal civil rights laws and [place] laws. We do not discriminate on the basis of race, color, national origin, age, disability, sex, sexual orientation, or gender identity.            Thank you!     Thank you for choosing [facility]  for your care. Our goal is always to provide you with excellent care. Hearing back from our patients is one way we can continue to improve our services. Please take a few minutes to complete the written survey that you may receive in the mail after your visit with us. Thank you!             Your Updated Medication List - Protect others around you: Learn how to safely use, store and throw away your medicines at www.disposemymeds.org.          This  list is accurate as of: 11/6/17 10:46 AM.  Always use your most recent med list.                   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       iron 66 MG Tabs     1 tablet    Take 1 tablet (66 mg) by mouth daily    Low iron       metoprolol 25 MG 24 hr tablet    TOPROL-XL    90 tablet    Take 0.5 tablets (12.5 mg) by mouth daily    Chronic atrial fibrillation (H)       order for DME     1 Device    Equipment being ordered: Walker () Treatment Diagnosis: s/p joint replacement    Status post total left knee replacement       oxyCODONE IR 5 MG tablet    ROXICODONE    18 tablet    Take 1-2 tablets (5-10 mg) by mouth every 6 hours as needed for pain    Leg wound, right, subsequent encounter       TRIAD HYDROPHILIC WOUND DRESSI Pste     1 Tube    Externally apply 1 g topically daily        warfarin 5 MG tablet    COUMADIN    75 tablet    Take 2.5 mg (1/2 tablet) Tuesday and Saturday and 5 mg other 5 days or as directed by the coumadin clinic    Long-term (current) use of anticoagulants

## 2017-11-20 ENCOUNTER — TELEPHONE (OUTPATIENT)
Dept: INTERNAL MEDICINE | Facility: CLINIC | Age: 82
End: 2017-11-20

## 2017-11-20 DIAGNOSIS — Z79.01 LONG-TERM (CURRENT) USE OF ANTICOAGULANTS: ICD-10-CM

## 2017-11-20 RX ORDER — WARFARIN SODIUM 5 MG/1
TABLET ORAL
Qty: 75 TABLET | Refills: 1 | Status: SHIPPED | OUTPATIENT
Start: 2017-11-20 | End: 2018-02-12

## 2017-11-20 NOTE — TELEPHONE ENCOUNTER
I have attempted to contact pt to assist with scheduling. I left a message for him to return my call. Please assist with scheduling when call is returned. Joann Gaytan CMA ([facility])

## 2017-11-20 NOTE — TELEPHONE ENCOUNTER
Tomer Weiss is a 85 year old male who calls with abdominal pain.    NURSING ASSESSMENT:  Description:  Patient is calling to report he has been having abdominal pain for the past couple of days that is not really bothering him, but he thought he would get checked out.  He is reporting the pain at 1/10 that is just under his left ribs.  He states he is still able to work, and the pain isn't really bothering him.  He cannot come in this week to be seen, and was hoping to be seen early next week for this.  Patient is denying the following: severe pain, lightheadedness, dizziness, weakness, radiation of the pain, vomiting, diarrhea, black or bloody stools, diabetes, fever, nausea, pain interfering with activity.  Patient should be seen in 24 hours, but does not want to be seen until early next week.  Routing to PCP for a good spot to put patient.  He is aware she will need to be seen sooner with worsening symptoms.  Onset/duration:  A couple of days  Precip. factors:  none  Associated symptoms:  See above  Improves/worsens symptoms:  same  Pain scale (0-10)   1/10  Last exam/Treatment:  1/13/17  Allergies:   Allergies   Allergen Reactions     No Known Drug Allergies          NURSING PLAN: Routed to provider Yes    RECOMMENDED DISPOSITION:  See in 24 hours - cannot make it in until next week  Will comply with recommendation: Yes  If further questions/concerns or if symptoms do not improve, worsen or new symptoms develop, call your PCP or [facility] as soon as possible.      Guideline used: Abdominal pain, adult  Telephone Triage Protocols for Nurses, Fifth Edition, Belgica Haro RN

## 2017-11-27 ENCOUNTER — OFFICE VISIT (OUTPATIENT)
Dept: INTERNAL MEDICINE | Facility: CLINIC | Age: 82
End: 2017-11-27
Payer: MEDICARE

## 2017-11-27 VITALS
WEIGHT: 192 LBS | DIASTOLIC BLOOD PRESSURE: 66 MMHG | TEMPERATURE: 98.5 F | HEART RATE: 94 BPM | SYSTOLIC BLOOD PRESSURE: 116 MMHG | BODY MASS INDEX: 24.65 KG/M2 | RESPIRATION RATE: 16 BRPM | OXYGEN SATURATION: 97 %

## 2017-11-27 DIAGNOSIS — M25.571 PAIN IN JOINT INVOLVING ANKLE AND FOOT, RIGHT: ICD-10-CM

## 2017-11-27 DIAGNOSIS — Z79.01 LONG-TERM (CURRENT) USE OF ANTICOAGULANTS: ICD-10-CM

## 2017-11-27 DIAGNOSIS — I48.91 ATRIAL FIBRILLATION, UNSPECIFIED TYPE (H): ICD-10-CM

## 2017-11-27 DIAGNOSIS — R10.32 ABDOMINAL PAIN, LEFT LOWER QUADRANT: Primary | ICD-10-CM

## 2017-11-27 LAB
ALBUMIN SERPL-MCNC: 3.5 G/DL (ref 3.4–5)
ALBUMIN UR-MCNC: NEGATIVE MG/DL
ALP SERPL-CCNC: 122 U/L (ref 40–150)
ALT SERPL W P-5'-P-CCNC: 42 U/L (ref 0–70)
ANION GAP SERPL CALCULATED.3IONS-SCNC: 6 MMOL/L (ref 3–14)
APPEARANCE UR: CLEAR
AST SERPL W P-5'-P-CCNC: 37 U/L (ref 0–45)
BACTERIA #/AREA URNS HPF: ABNORMAL /HPF
BILIRUB SERPL-MCNC: 1 MG/DL (ref 0.2–1.3)
BILIRUB UR QL STRIP: NEGATIVE
BUN SERPL-MCNC: 26 MG/DL (ref 7–30)
CALCIUM SERPL-MCNC: 8.3 MG/DL (ref 8.5–10.1)
CHLORIDE SERPL-SCNC: 106 MMOL/L (ref 94–109)
CO2 SERPL-SCNC: 32 MMOL/L (ref 20–32)
COLOR UR AUTO: YELLOW
CREAT SERPL-MCNC: 0.98 MG/DL (ref 0.66–1.25)
ERYTHROCYTE [DISTWIDTH] IN BLOOD BY AUTOMATED COUNT: 14.5 % (ref 10–15)
GFR SERPL CREATININE-BSD FRML MDRD: 72 ML/MIN/1.7M2
GLUCOSE SERPL-MCNC: 79 MG/DL (ref 70–99)
GLUCOSE UR STRIP-MCNC: NEGATIVE MG/DL
HCT VFR BLD AUTO: 43.8 % (ref 40–53)
HGB BLD-MCNC: 13.6 G/DL (ref 13.3–17.7)
HGB UR QL STRIP: ABNORMAL
HYALINE CASTS #/AREA URNS LPF: 2 /LPF (ref 0–2)
KETONES UR STRIP-MCNC: NEGATIVE MG/DL
LEUKOCYTE ESTERASE UR QL STRIP: ABNORMAL
MCH RBC QN AUTO: 30.3 PG (ref 26.5–33)
MCHC RBC AUTO-ENTMCNC: 31.1 G/DL (ref 31.5–36.5)
MCV RBC AUTO: 98 FL (ref 78–100)
MUCOUS THREADS #/AREA URNS LPF: PRESENT /LPF
NITRATE UR QL: NEGATIVE
PH UR STRIP: 5 PH (ref 5–7)
PLATELET # BLD AUTO: 225 10E9/L (ref 150–450)
POTASSIUM SERPL-SCNC: 4.6 MMOL/L (ref 3.4–5.3)
PROT SERPL-MCNC: 7 G/DL (ref 6.8–8.8)
RBC # BLD AUTO: 4.49 10E12/L (ref 4.4–5.9)
RBC #/AREA URNS AUTO: 6 /HPF (ref 0–2)
SODIUM SERPL-SCNC: 144 MMOL/L (ref 133–144)
SOURCE: ABNORMAL
SP GR UR STRIP: 1.01 (ref 1–1.03)
UROBILINOGEN UR STRIP-MCNC: 0 MG/DL (ref 0–2)
WBC # BLD AUTO: 7 10E9/L (ref 4–11)
WBC #/AREA URNS AUTO: 32 /HPF (ref 0–2)

## 2017-11-27 PROCEDURE — 80053 COMPREHEN METABOLIC PANEL: CPT | Performed by: INTERNAL MEDICINE

## 2017-11-27 PROCEDURE — 81001 URINALYSIS AUTO W/SCOPE: CPT | Performed by: INTERNAL MEDICINE

## 2017-11-27 PROCEDURE — 99214 OFFICE O/P EST MOD 30 MIN: CPT | Performed by: INTERNAL MEDICINE

## 2017-11-27 PROCEDURE — 36415 COLL VENOUS BLD VENIPUNCTURE: CPT | Performed by: INTERNAL MEDICINE

## 2017-11-27 PROCEDURE — 87086 URINE CULTURE/COLONY COUNT: CPT | Performed by: INTERNAL MEDICINE

## 2017-11-27 PROCEDURE — 85027 COMPLETE CBC AUTOMATED: CPT | Performed by: INTERNAL MEDICINE

## 2017-11-27 ASSESSMENT — PAIN SCALES - GENERAL: PAINLEVEL: MILD PAIN (3)

## 2017-11-27 NOTE — NURSING NOTE
"Chief Complaint   Patient presents with     Abdominal Pain     left side abdominal pain for about a week     Foot Pain     right foot and ankle pain, noticed this morning  NKI       Initial /66  Pulse 94  Temp 98.5  F (36.9  C) (Temporal)  Resp 16  Wt 192 lb (87.1 kg)  SpO2 97%  BMI 24.65 kg/m2 Estimated body mass index is 24.65 kg/(m^2) as calculated from the following:    Height as of 9/1/17: 6' 2\" (1.88 m).    Weight as of this encounter: 192 lb (87.1 kg).  Medication Reconciliation: complete    "

## 2017-11-27 NOTE — MR AVS SNAPSHOT
"              After Visit Summary   11/27/2017    Tomer Weiss    MRN: [id number]           Patient Information     Date Of Birth          4/9/1932        Visit Information        Provider Department      11/27/2017 8:45 AM Gabriel Tan MD [facility]         Follow-ups after your visit        Your next 10 appointments already scheduled     Nov 29, 2017  2:15 PM CST   Anticoagulation Visit with ZM ANTI COAG   [facility]    [address] [id number] [phone]            Dec 04, 2017  9:00 AM CST   Return Visit with Tunde Rondon MD   [facility] ([facility])    [address] [address] [phone]              Who to contact     If you have questions or need follow up information about today's clinic visit or your schedule please contact [facility] directly at [phone].  Normal or non-critical lab and imaging results will be communicated to you by Scopelyhart, letter or phone within 4 business days after the clinic has received the results. If you do not hear from us within 7 days, please contact the clinic through No Paper Just Vaport or phone. If you have a critical or abnormal lab result, we will notify you by phone as soon as possible.  Submit refill requests through [employer] or call your pharmacy and they will forward the refill request to us. Please allow 3 business days for your refill to be completed.          Additional Information About Your Visit        ScopelyharLinkCycle Information     [employer] lets you send messages to your doctor, view your test results, renew your prescriptions, schedule appointments and more. To sign up, go to [email or web address] . Click on \"Log in\" on the left side of the screen, which will take you to the Welcome page. Then click on \"Sign up Now\" on the right side of the page.     You will be asked to enter the access code listed " below, as well as some personal information. Please follow the directions to create your username and password.     Your access code is: [email or web address]  Expires: 2017  1:28 PM     Your access code will  in 90 days. If you need help or a new code, please call your [facility] or [phone].        Care EveryWhere ID     This is your Care EveryWhere ID. This could be used by other organizations to access your [facility] medical records  [id number]        Your Vitals Were     Pulse Temperature Respirations Pulse Oximetry BMI (Body Mass Index)       94 98.5  F (36.9  C) (Temporal) 16 97% 24.65 kg/m2        Blood Pressure from Last 3 Encounters:   17 116/66   17 122/83   17 116/68    Weight from Last 3 Encounters:   17 192 lb (87.1 kg)   17 193 lb 6.4 oz (87.7 kg)   10/23/17 190 lb (86.2 kg)              Today, you had the following     No orders found for display       Primary Care Provider Office Phone # Fax #    Gabriel Tan [phone]       [facility] [address] [place] [address]        Equal Access to Services     NANCY MILAN AH: Hadii whitney ku hadasho Soomaali, waaxda luqadaha, qaybta kaalmada adeegyada, waxay idiin hayagnieszkan nadege johnson. So [facility] [phone].    ATENCIÓN: Si habla español, tiene a rojas disposición servicios gratuitos de asistencia lingüística. Llame al [phone].    We comply with applicable federal civil rights laws and [place] laws. We do not discriminate on the basis of race, color, national origin, age, disability, sex, sexual orientation, or gender identity.            Thank you!     Thank you for choosing [facility]  for your care. Our goal is always to provide you with excellent care. Hearing back from our patients is one way we can continue to improve our services. Please take a few minutes to complete the written survey that you may receive in the mail after your visit with us.  Thank you!             Your Updated Medication List - Protect others around you: Learn how to safely use, store and throw away your medicines at www.disposemymeds.org.          This list is accurate as of: 11/27/17  8:50 AM.  Always use your most recent med list.                   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       iron 66 MG Tabs     1 tablet    Take 1 tablet (66 mg) by mouth daily    Low iron       metoprolol 25 MG 24 hr tablet    TOPROL-XL    90 tablet    Take 0.5 tablets (12.5 mg) by mouth daily    Chronic atrial fibrillation (H)       order for DME     1 Device    Equipment being ordered: Walker () Treatment Diagnosis: s/p joint replacement    Status post total left knee replacement       * TRIAD HYDROPHILIC WOUND DRESSI Pste     1 Tube    Externally apply 1 g topically daily        * TRIAD HYDROPHILIC WOUND DRESSI Pste     1 Tube    Externally apply 1 g topically daily    Leg wound, right, subsequent encounter       warfarin 5 MG tablet    COUMADIN    75 tablet    Take 2.5 mg (1/2 tablet) Tuesday and Saturday and 5 mg other 5 days or as directed by the coumadin clinic    Long-term (current) use of anticoagulants       * Notice:  This list has 2 medication(s) that are the same as other medications prescribed for you. Read the directions carefully, and ask your doctor or other care provider to review them with you.

## 2017-11-27 NOTE — PROGRESS NOTES
SUBJECTIVE:   Tomer Weiss is a 85 year old male who presents to clinic today for the following health issues:    Chief Complaint   Patient presents with     Abdominal Pain     left side abdominal pain for about a week     Foot Pain     right foot and ankle pain, noticed this morning  NKI     Left side abdominal pain occasionally, reminds him of 2009. Comes and goes sometimes.  He is very active.  Had kidney cancer in 2009-10 and right cryoablation. No diarrhea, no blood in the stool.  Has some gas.       Also has some right ankle pains, he walks a lot, outside on the uneven ground.    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      Social History   Substance Use Topics     Smoking status: Never Smoker     Smokeless tobacco: Never Used     Alcohol use No     Review of Systems  Constitutional-No fevers, chills, or weight changes..  ENT-No earpain, sore throat, voice changes or rhinitis.  Cardiac-No chest pain or palpitations.  Respiratory-No cough, sob, or hemoptysis.  GI-left mid abd pains, no other symptoms, feels like kidney cancer he had on the right .  -No dysuria or hematuria.  Musculoskeletal-right ankle  pains.      Physical Exam  /66  Pulse 94  Temp 98.5  F (36.9  C) (Temporal)  Resp 16  Wt 192 lb (87.1 kg)  SpO2 97%  BMI 24.65 kg/m2  General Appearance-healthy, alert, no distress  Cardiac-irregularly irregular rhythm  Lungs-clear to auscultation  GI-Soft, nontender.  Normal bowel sounds.  No hepatosplenomegaly or abnormal masses  Musculoskeletal-mild right lateral malleolus pain medicine a     ASSESSMENT:  85-year-old gentleman has had some left upper to mid sided abdominal pain. He has no associated symptoms such as diarrhea, vomiting, reflux or blood in the stool. He states that this feels like the pain he had on the right side when he had kidney cancer 7 years ago. 7 follow-up CT scans in the last one was done in 2014. He did have cryoablation at that time.  Otherwise he is doing pretty well with his energy level, very active on his [place].  We will check labs today for a CBC and liver and kidney function. We'll then set him up for an abdominal pelvic CT to evaluate this pain.    Patient also has some right ankle pain on the lateral malleolus, minimal swelling which is chronic. Mild tenderness over the Achilles and the lateral malleolus. Will have him ice, rest, elevate          Electronically signed by Gabriel Tan MD

## 2017-11-28 ENCOUNTER — HOSPITAL ENCOUNTER (OUTPATIENT)
Dept: CT IMAGING | Facility: CLINIC | Age: 82
Discharge: HOME | End: 2017-11-28
Attending: INTERNAL MEDICINE | Admitting: INTERNAL MEDICINE
Payer: MEDICARE

## 2017-11-28 ENCOUNTER — DOCUMENTATION ONLY (OUTPATIENT)
Dept: FAMILY MEDICINE | Facility: CLINIC | Age: 82
End: 2017-11-28

## 2017-11-28 DIAGNOSIS — R30.0 DYSURIA: Primary | ICD-10-CM

## 2017-11-28 DIAGNOSIS — R10.32 ABDOMINAL PAIN, LEFT LOWER QUADRANT: ICD-10-CM

## 2017-11-28 LAB
BACTERIA SPEC CULT: NORMAL
BACTERIA SPEC CULT: NORMAL
Lab: NORMAL
SPECIMEN SOURCE: NORMAL

## 2017-11-28 PROCEDURE — 25000128 H RX IP 250 OP 636: Performed by: RADIOLOGY

## 2017-11-28 PROCEDURE — 25000125 ZZHC RX 250: Performed by: RADIOLOGY

## 2017-11-28 PROCEDURE — 74177 CT ABD & PELVIS W/CONTRAST: CPT

## 2017-11-28 RX ORDER — CEPHALEXIN 500 MG/1
500 CAPSULE ORAL 2 TIMES DAILY
Qty: 20 CAPSULE | Refills: 0 | Status: SHIPPED | OUTPATIENT
Start: 2017-11-28 | End: 2017-12-04

## 2017-11-28 RX ORDER — IOPAMIDOL 755 MG/ML
500 INJECTION, SOLUTION INTRAVASCULAR ONCE
Status: COMPLETED | OUTPATIENT
Start: 2017-11-28 | End: 2017-11-28

## 2017-11-28 RX ADMIN — IOPAMIDOL 94 ML: 755 INJECTION, SOLUTION INTRAVENOUS at 09:41

## 2017-11-28 RX ADMIN — SODIUM CHLORIDE 60 ML: 9 INJECTION, SOLUTION INTRAVENOUS at 09:41

## 2017-11-28 NOTE — TELEPHONE ENCOUNTER
Left message to call back. Please rely message to pt when calls back and routed to Dr. Tan to sign RX.

## 2017-11-28 NOTE — TELEPHONE ENCOUNTER
----- Message from Gabriel Tan MD sent at 11/28/2017  8:38 AM CST -----  His urine shows a possible infection.  I would have him take keflex 500mg tid for 7 days then repeat a ua.

## 2017-11-29 ENCOUNTER — TELEPHONE (OUTPATIENT)
Dept: INTERNAL MEDICINE | Facility: CLINIC | Age: 82
End: 2017-11-29

## 2017-11-29 DIAGNOSIS — N32.89 BLADDER WALL THICKENING: Primary | ICD-10-CM

## 2017-11-29 DIAGNOSIS — I51.7 CARDIOMEGALY: ICD-10-CM

## 2017-11-29 NOTE — PROGRESS NOTES
This patient did not show up for the labs that were ordered today.  I have canceled and reordered as future, expiring on December 15.  Please contact the patient to come back in.  Thank you! -Yudi BUSTOS, Lab

## 2017-11-29 NOTE — TELEPHONE ENCOUNTER
Reason for Call:  Other     Detailed comments: Patient called stating he missed a call from Dr. Tan. I don't see a message that he called him. I did see result notes from his UA. I did give him that information. If there was something else he was calling about please call his cell phone.     Phone Number Patient can be reached at: Cell number on file:    Telephone Information:   Mobile [phone]     Best Time: any    Can we leave a detailed message on this number? YES If you leave a message it would need to be on his home phone. He isn't able to access [employer] on his cell phone [phone]    Call taken on 11/29/2017 at 1:35 PM by Lubna Rosenbaum

## 2017-11-30 NOTE — TELEPHONE ENCOUNTER
Notes Recorded by Gabriel Tan MD on 11/29/2017 at 4:54 PM  Please let him know his CT scan shows the kidney as ok but the bladder has some abnormal thickening and irritation-for that I would like him to see Dr Marr, urology.  Also the radiologist thought his heart was enlarged but the ct isn't a great test for the heart so I would like to get an echo on him.

## 2017-12-01 ENCOUNTER — HOSPITAL ENCOUNTER (OUTPATIENT)
Dept: CARDIOLOGY | Facility: CLINIC | Age: 82
Discharge: HOME | End: 2017-12-01
Attending: INTERNAL MEDICINE | Admitting: INTERNAL MEDICINE
Payer: MEDICARE

## 2017-12-01 DIAGNOSIS — I51.7 CARDIOMEGALY: ICD-10-CM

## 2017-12-01 PROCEDURE — 93306 TTE W/DOPPLER COMPLETE: CPT

## 2017-12-01 PROCEDURE — 93306 TTE W/DOPPLER COMPLETE: CPT | Mod: 26 | Performed by: INTERNAL MEDICINE

## 2017-12-04 ENCOUNTER — OFFICE VISIT (OUTPATIENT)
Dept: SURGERY | Facility: OTHER | Age: 82
End: 2017-12-04
Payer: MEDICARE

## 2017-12-04 ENCOUNTER — TELEPHONE (OUTPATIENT)
Dept: FAMILY MEDICINE | Facility: CLINIC | Age: 82
End: 2017-12-04

## 2017-12-04 VITALS — WEIGHT: 190 LBS | TEMPERATURE: 97.8 F | HEART RATE: 68 BPM | BODY MASS INDEX: 24.39 KG/M2

## 2017-12-04 DIAGNOSIS — S81.801D LEG WOUND, RIGHT, SUBSEQUENT ENCOUNTER: Primary | ICD-10-CM

## 2017-12-04 PROCEDURE — 99213 OFFICE O/P EST LOW 20 MIN: CPT | Performed by: SPECIALIST

## 2017-12-04 NOTE — NURSING NOTE
"Chief Complaint   Patient presents with     RECHECK     WOUND CARE     lower leg wound, Triad       Initial Pulse 68  Temp 97.8  F (36.6  C) (Temporal)  Wt 190 lb (86.2 kg)  BMI 24.39 kg/m2 Estimated body mass index is 24.39 kg/(m^2) as calculated from the following:    Height as of 9/1/17: 6' 2\" (1.88 m).    Weight as of this encounter: 190 lb (86.2 kg).  Medication Reconciliation: complete    "

## 2017-12-04 NOTE — NURSING NOTE
BARTOLO Rondon-Debbi applied to lower leg wound, sterile dressing applied.................................Cata Hagan CMA  ([facility])

## 2017-12-04 NOTE — TELEPHONE ENCOUNTER
----- Message from Gabriel Tan MD sent at 12/4/2017 12:57 PM CST -----  His echo shows changes affecting the right side of his heart same as seen last January.  Continue his water pill, if he has new symptoms of sob or syncope let me know.

## 2017-12-04 NOTE — MR AVS SNAPSHOT
"              After Visit Summary   12/4/2017    Tomer Weiss    MRN: [id number]           Patient Information     Date Of Birth          4/9/1932        Visit Information        Provider Department      12/4/2017 9:00 AM Tunde Rondon MD [facility]        Today's Diagnoses     Leg wound, right, subsequent encounter    -  1       Follow-ups after your visit        Your next 10 appointments already scheduled     Dec 13, 2017  9:45 AM CST   [facility] Visit with [facility]   [facility]    [address] [facility] [id number] [phone]              Who to contact     If you have questions or need follow up information about today's clinic visit or your schedule please contact [facility] directly at [phone].  Normal or non-critical lab and imaging results will be communicated to you by MyChart, letter or phone within 4 business days after the clinic has received the results. If you do not hear from us within 7 days, please contact the clinic through MyChart or phone. If you have a critical or abnormal lab result, we will notify you by phone as soon as possible.  Submit refill requests through [employer] or call your pharmacy and they will forward the refill request to us. Please allow 3 business days for your refill to be completed.          Additional Information About Your Visit        MyChart Information     [employer] lets you send messages to your doctor, view your test results, renew your prescriptions, schedule appointments and more. To sign up, go to [email or web address] . Click on \"Log in\" on the left side of the screen, which will take you to the Welcome page. Then click on \"Sign up Now\" on the right side of the page.     You will be asked to enter the access code listed below, as well as some personal information. Please follow the directions to create your username and password.     Your access code is: " [email or web address]  Expires: 2017  1:28 PM     Your access code will  in 90 days. If you need help or a new code, please call your [facility] clinic or [phone].        Care EveryWhere ID     This is your Care EveryWhere ID. This could be used by other organizations to access your [facility] medical records  [id number]        Your Vitals Were     Pulse Temperature BMI (Body Mass Index)             68 97.8  F (36.6  C) (Temporal) 24.39 kg/m2          Blood Pressure from Last 3 Encounters:   17 116/66   17 122/83   17 116/68    Weight from Last 3 Encounters:   17 190 lb (86.2 kg)   17 192 lb (87.1 kg)   17 193 lb 6.4 oz (87.7 kg)              Today, you had the following     No orders found for display       Primary Care Provider Office Phone # Fax #    Gabriel Tan [phone]       [place] [address] [place] [address]        Equal Access to Services     MIKE MILAN AH: Hadii aad ku hadasho Soomaali, waaxda luqadaha, qaybta kaalmada adeegyada, mariposa yang . So [facility] [phone].    ATENCIÓN: Si habla español, tiene a rojas disposición servicios gratuitos de asistencia lingüística. LlRegency Hospital Company [phone].    We comply with applicable federal civil rights laws and [place] laws. We do not discriminate on the basis of race, color, national origin, age, disability, sex, sexual orientation, or gender identity.            Thank you!     Thank you for choosing [facility]  for your care. Our goal is always to provide you with excellent care. Hearing back from our patients is one way we can continue to improve our services. Please take a few minutes to complete the written survey that you may receive in the mail after your visit with us. Thank you!             Your Updated Medication List - Protect others around you: Learn how to safely use, store and throw away your medicines at  www.disposemymeds.org.          This list is accurate as of: 12/4/17  9:05 AM.  Always use your most recent med list.                   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       iron 66 MG Tabs     1 tablet    Take 1 tablet (66 mg) by mouth daily    Low iron       metoprolol 25 MG 24 hr tablet    TOPROL-XL    90 tablet    Take 0.5 tablets (12.5 mg) by mouth daily    Chronic atrial fibrillation (H)       order for DME     1 Device    Equipment being ordered: Walker () Treatment Diagnosis: s/p joint replacement    Status post total left knee replacement       * TRIAD HYDROPHILIC WOUND DRESSI Pste     1 Tube    Externally apply 1 g topically daily        * TRIAD HYDROPHILIC WOUND DRESSI Pste     1 Tube    Externally apply 1 g topically daily    Leg wound, right, subsequent encounter       warfarin 5 MG tablet    COUMADIN    75 tablet    Take 2.5 mg (1/2 tablet) Tuesday and Saturday and 5 mg other 5 days or as directed by the coumadin clinic    Long-term (current) use of anticoagulants       * Notice:  This list has 2 medication(s) that are the same as other medications prescribed for you. Read the directions carefully, and ask your doctor or other care provider to review them with you.

## 2017-12-04 NOTE — PROGRESS NOTES
F/U - right leg wound    Subjective:  Patient is an 85-year-old white male who had a piece of farm machinery hit his right leg back in April. He developed a hematoma at the time as result of being on Coumadin. Shortly thereafter wound developed over that site that is not healed. .      Had infected hematoma evacuated.   Feeling much better    Objective:  B/P: Data Unavailable, T: 97.8, P: 68, R: Data Unavailable  Ext; Warm,   Edema much improved.  Wound clean - 1.5x0.3x0.2cm 90% Granulation tissue in base.    Assessment/plan:  This is an 85-year-old gentleman with a nonhealing right calf secondary to trauma and hematoma that became infected.  Much improved this week - more filled in - smaller.  Will continue TRIAD  and compression socks.  F/U 3-4 weeks for wound check.    Tunde Rondon MD, FACS

## 2017-12-05 NOTE — TELEPHONE ENCOUNTER
Pt was advised of the results and recommendations from Dr. Tan and verbalized understanding. No further questions at this time.

## 2017-12-13 ENCOUNTER — OFFICE VISIT (OUTPATIENT)
Dept: UROLOGY | Facility: OTHER | Age: 82
End: 2017-12-13
Payer: MEDICARE

## 2017-12-13 VITALS
WEIGHT: 186.5 LBS | SYSTOLIC BLOOD PRESSURE: 100 MMHG | HEIGHT: 74 IN | DIASTOLIC BLOOD PRESSURE: 56 MMHG | BODY MASS INDEX: 23.93 KG/M2

## 2017-12-13 DIAGNOSIS — N21.0 BLADDER STONE: ICD-10-CM

## 2017-12-13 DIAGNOSIS — N32.89 BLADDER WALL THICKENING: Primary | ICD-10-CM

## 2017-12-13 LAB
ALBUMIN UR-MCNC: 30 MG/DL
APPEARANCE UR: CLEAR
BILIRUB UR QL STRIP: NEGATIVE
COLOR UR AUTO: YELLOW
GLUCOSE UR STRIP-MCNC: NEGATIVE MG/DL
HGB UR QL STRIP: ABNORMAL
KETONES UR STRIP-MCNC: NEGATIVE MG/DL
LEUKOCYTE ESTERASE UR QL STRIP: ABNORMAL
MUCOUS THREADS #/AREA URNS LPF: PRESENT /LPF
NITRATE UR QL: NEGATIVE
PH UR STRIP: 6 PH (ref 5–7)
RBC #/AREA URNS AUTO: ABNORMAL /HPF
SOURCE: ABNORMAL
SP GR UR STRIP: 1.02 (ref 1–1.03)
UROBILINOGEN UR STRIP-ACNC: 0.2 EU/DL (ref 0.2–1)
WBC #/AREA URNS AUTO: ABNORMAL /HPF

## 2017-12-13 PROCEDURE — 99213 OFFICE O/P EST LOW 20 MIN: CPT | Mod: 25 | Performed by: UROLOGY

## 2017-12-13 PROCEDURE — 51798 US URINE CAPACITY MEASURE: CPT | Performed by: UROLOGY

## 2017-12-13 PROCEDURE — 81001 URINALYSIS AUTO W/SCOPE: CPT | Performed by: UROLOGY

## 2017-12-13 NOTE — PATIENT INSTRUCTIONS
Your next cystoscopy is scheduled 1/10/2018 @ 10:00AM Please call  if you need to reschedule this appointment.

## 2017-12-13 NOTE — PROGRESS NOTES
Chief Complaint   Patient presents with     Consult     Bladder wall thickening.       Tomer Weiss is a 85 year old male who presents today for follow up of   Chief Complaint   Patient presents with     Consult     Bladder wall thickening.    patient with h/o benign prostatic hyperplasia/bladder stones.  He was seen recently for abdominal pain.  CT scan showed bladder stone and bladder wall thickening.  He is s/p laser turp and bladder stones removal in 2015.  He has no voiding symptoms.    Current Outpatient Prescriptions   Medication Sig Dispense Refill     warfarin (COUMADIN) 5 MG tablet Take 2.5 mg (1/2 tablet) Tuesday and Saturday and 5 mg other 5 days or as directed by the coumadin clinic 75 tablet 1     Wound Dressings (TRIAD HYDROPHILIC WOUND DRESSI) PSTE Externally apply 1 g topically daily 1 Tube 3     atorvastatin (LIPITOR) 20 MG tablet Take 1 tablet (20 mg) by mouth daily 90 tablet 1     Wound Dressings (TRIAD HYDROPHILIC WOUND DRESSI) PSTE Externally apply 1 g topically daily 1 Tube 3     furosemide (LASIX) 20 MG tablet Take 1 tablet (20 mg) by mouth daily 30 tablet 11     metoprolol (TOPROL-XL) 25 MG 24 hr tablet Take 0.5 tablets (12.5 mg) by mouth daily 90 tablet 2     iron 66 MG TABS Take 1 tablet (66 mg) by mouth daily 1 tablet 0     GLUCOSAMINE 500 MG OR CAPS 2 Tablets every morning       order for DME Equipment being ordered: Walker ()  Treatment Diagnosis: s/p joint replacement (Patient not taking: Reported on 12/13/2017) 1 Device 0     Allergies   Allergen Reactions     No Known Drug Allergies       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      Past Surgical History:   Procedure Laterality Date     ARTHROPLASTY KNEE Left 3/14/2016    Procedure: ARTHROPLASTY KNEE;  Surgeon: Deonte Silver MD;  Location: [facility] OR     COLONOSCOPY  2013    Procedure: COMBINED COLONOSCOPY, SINGLE BIOPSY/POLYPECTOMY BY BIOPSY;  Colonoscopy, with polypectomy by biopsy;  Surgeon: Fernando Mario MD;  Location: [facility] GI     CYSTOSCOPY, LITHOLAPAXY, COMBINED N/A 2015    Procedure: COMBINED CYSTOSCOPY, LITHOLAPAXY;  Surgeon: Orlando Marr MD;  Location: [facility]     CYSTOSCOPY, LITHOLAPAXY, COMBINED N/A 2015    Procedure: COMBINED CYSTOSCOPY, LITHOLAPAXY;  Surgeon: Orlando Marr MD;  Location: [facility] OR     H ABLATION ATRIAL FLUTTER  4/18/15    atypical a flutter ablation & Ablation of PACs from the SVC-RA junction     HC ABLATION RENAL TUMOR PERCUT CRYOTHERAPY UNILATERAL  6/17/10    Right renal mass     LASER HOLMIUM LITHOTRIPSY BLADDER N/A 2015    Procedure: LASER HOLMIUM LITHOTRIPSY BLADDER;  Surgeon: Orlando Marr MD;  Location: [facility]     LASER KTP GREEN LIGHT PHOTOSELECTIVE VAPORIZATION PROSTATE N/A 2015    Procedure: LASER KTP GREEN LIGHT PHOTOSELECTIVE VAPORIZATION PROSTATE;  Surgeon: Orlando Marr MD;  Location: [facility] OR     Family History   Problem Relation Age of Onset     CANCER Mother      breast     Hypertension Mother      Alzheimer Disease Mother       at age 96.     Social History     Social History     Marital status:      Spouse name: Shari     Number of children: 4     Years of education: N/A     Social History Main Topics     Smoking status: Never Smoker     Smokeless tobacco: Never Used     Alcohol use No     Drug use: No     Sexual activity: Not Currently     Other Topics Concern     None     Social History Narrative       REVIEW OF SYSTEMS  =================  C: NEGATIVE for fever, chills, change in weight  I: NEGATIVE for worrisome rashes, moles or lesions  E/M: NEGATIVE for ear, mouth and  "throat problems  R: NEGATIVE for significant cough or SHORTNESS OF BREATH,   CV: NEGATIVE for chest pain, palpitations or peripheral edema  GI: NEGATIVE for nausea, abdominal pain, heartburn, or change in bowel habits  NEURO: NEGATIVE any motor/sensory changes  PSYCH: NEGATIVE for recent mood disorder    Physical Exam:  /56 (BP Location: Right arm, Patient Position: Chair, Cuff Size: Adult Regular)  Ht 6' 2\" (1.88 m)  Wt 186 lb 8 oz (84.6 kg)  BMI 23.95 kg/m2   Patient is pleasant, in no acute distress, good general condition.  Lung: no evidence of respiratory distress    Abdomen: Soft, nondistended, non tender. No masses. No rebound or guarding.   Exam: no cva tenderness.  Bladder scan zero cc.  Skin: Warm and dry.  No redness.  Psych: normal mood and affect  Neuro: alert and oriented    Assessment/Plan:   (N32.89) Bladder wall thickening  (primary encounter diagnosis)  Comment: most likely related to h/o bph     (N21.0) Bladder stone  Comment:    Plan: cysto next .  UA today.                    "

## 2017-12-13 NOTE — PROGRESS NOTES
Bladder Scan performed. 0ML maximum residual urine detected after 3 scans. MD informed.    Rj Saravia, CMA

## 2017-12-13 NOTE — MR AVS SNAPSHOT
"              After Visit Summary   12/13/2017    Tomer Weiss    MRN: [id number]           Patient Information     Date Of Birth          4/9/1932        Visit Information        Provider Department      12/13/2017 9:45 AM Orlando Marr MD [facility]        Today's Diagnoses     Bladder wall thickening    -  1      Care Instructions    Your next cystoscopy is scheduled 1/10/2018 @ 10:00AM Please call  if you need to reschedule this appointment.            Follow-ups after your visit        Your next 10 appointments already scheduled     Steve 10, 2018 10:00 AM CST   Return Visit with Orlando Marr MD   [facility]    [address] [employer] [address] [phone]              Who to contact     If you have questions or need follow up information about today's clinic visit or your schedule please contact [facility] directly at [phone].  Normal or non-critical lab and imaging results will be communicated to you by DCMobilityhart, letter or phone within 4 business days after the clinic has received the results. If you do not hear from us within 7 days, please contact the clinic through DCMobilityhart or phone. If you have a critical or abnormal lab result, we will notify you by phone as soon as possible.  Submit refill requests through Little Pim or call your pharmacy and they will forward the refill request to us. Please allow 3 business days for your refill to be completed.          Additional Information About Your Visit        DCMobilityhart Information     Little Pim lets you send messages to your doctor, view your test results, renew your prescriptions, schedule appointments and more. To sign up, go to [email or web address] . Click on \"Log in\" on the left side of the screen, which will take you to the Welcome page. Then click on \"Sign up Now\" on the right side of the page.     You will be asked to enter the access " "code listed below, as well as some personal information. Please follow the directions to create your username and password.     Your access code is: [email or web address]  Expires: 3/13/2018 10:11 AM     Your access code will  in 90 days. If you need help or a new code, please call your [place] clinic or [phone].        Care EveryWhere ID     This is your Care EveryWhere ID. This could be used by other organizations to access your [place] medical records  [id number]        Your Vitals Were     Height BMI (Body Mass Index)                6' 2\" (1.88 m) 23.95 kg/m2           Blood Pressure from Last 3 Encounters:   17 100/56   17 116/66   17 122/83    Weight from Last 3 Encounters:   17 186 lb 8 oz (84.6 kg)   17 190 lb (86.2 kg)   17 192 lb (87.1 kg)              We Performed the Following     MEASURE POST-VOID RESIDUAL URINE/BLADDER CAPACITY, US NON-IMAGING     UA reflex to Microscopic and Culture        Primary Care Provider Office Phone # Fax #    Gabriel Tan [phone]       [facility] [address] [place] [address]        Equal Access to Services     NANCY MILAN AH: Hadii aad ku hadasho Soomaali, waaxda luqadaha, qaybta kaalmada adeegyada, waxay idiin hayaan nadege johnson. So [facility] [phone].    ATENCIÓN: Si habla español, tiene a rojas disposición servicios gratuitos de asistencia lingüística. Llame al [phone].    We comply with applicable federal civil rights laws and [place] laws. We do not discriminate on the basis of race, color, national origin, age, disability, sex, sexual orientation, or gender identity.            Thank you!     Thank you for choosing [facility]  for your care. Our goal is always to provide you with excellent care. Hearing back from our patients is one way we can continue to improve our services. Please take a few minutes to complete the written survey that you may receive in " the mail after your visit with us. Thank you!             Your Updated Medication List - Protect others around you: Learn how to safely use, store and throw away your medicines at www.disposemymeds.org.          This list is accurate as of: 12/13/17 10:11 AM.  Always use your most recent med list.                   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       iron 66 MG Tabs     1 tablet    Take 1 tablet (66 mg) by mouth daily    Low iron       metoprolol 25 MG 24 hr tablet    TOPROL-XL    90 tablet    Take 0.5 tablets (12.5 mg) by mouth daily    Chronic atrial fibrillation (H)       order for DME     1 Device    Equipment being ordered: Walker () Treatment Diagnosis: s/p joint replacement    Status post total left knee replacement       * TRIAD HYDROPHILIC WOUND DRESSI Pste     1 Tube    Externally apply 1 g topically daily        * TRIAD HYDROPHILIC WOUND DRESSI Pste     1 Tube    Externally apply 1 g topically daily    Leg wound, right, subsequent encounter       warfarin 5 MG tablet    COUMADIN    75 tablet    Take 2.5 mg (1/2 tablet) Tuesday and Saturday and 5 mg other 5 days or as directed by the coumadin clinic    Long-term (current) use of anticoagulants       * Notice:  This list has 2 medication(s) that are the same as other medications prescribed for you. Read the directions carefully, and ask your doctor or other care provider to review them with you.

## 2017-12-13 NOTE — NURSING NOTE
"Chief Complaint   Patient presents with     Consult     Bladder wall thickening.       Initial /56 (BP Location: Right arm, Patient Position: Chair, Cuff Size: Adult Regular)  Ht 6' 2\" (1.88 m)  Wt 186 lb 8 oz (84.6 kg)  BMI 23.95 kg/m2 Estimated body mass index is 23.95 kg/(m^2) as calculated from the following:    Height as of this encounter: 6' 2\" (1.88 m).    Weight as of this encounter: 186 lb 8 oz (84.6 kg).  Medication Reconciliation: complete     Raeann Perez CMA         "

## 2017-12-15 ENCOUNTER — ANTICOAGULATION THERAPY VISIT (OUTPATIENT)
Dept: ANTICOAGULATION | Facility: OTHER | Age: 82
End: 2017-12-15
Payer: MEDICARE

## 2017-12-15 DIAGNOSIS — I48.91 ATRIAL FIBRILLATION, UNSPECIFIED TYPE (H): ICD-10-CM

## 2017-12-15 DIAGNOSIS — Z79.01 LONG-TERM (CURRENT) USE OF ANTICOAGULANTS: ICD-10-CM

## 2017-12-15 LAB — INR POINT OF CARE: 2.4 (ref 0.86–1.14)

## 2017-12-15 PROCEDURE — 85610 PROTHROMBIN TIME: CPT | Mod: QW

## 2017-12-15 PROCEDURE — 99207 ZZC NO CHARGE NURSE ONLY: CPT

## 2017-12-15 PROCEDURE — 36416 COLLJ CAPILLARY BLOOD SPEC: CPT

## 2017-12-15 NOTE — PROGRESS NOTES
ANTICOAGULATION FOLLOW-UP CLINIC VISIT    Patient Name:  Tomer Weiss  Date:  12/15/2017  Contact Type:  Face to Face    SUBJECTIVE:     Patient Findings     Positives No Problem Findings           OBJECTIVE    INR Protime   Date Value Ref Range Status   12/15/2017 2.4 (A) 0.86 - 1.14 Final     Chromogenic Factor 10   Date Value Ref Range Status   04/24/2015 21 (L) 70 - 130 % Final     Comment:     Therapeutic Range:  A Chromogenic Factor 10 level of approximately 20-40%   inversely correlates with an INR of 2-3 for patients receiving Warfarin.   Chromogenic Factor 10 levels below 20% indicate an INR greater than 3 and   levels above 40% indicate an INR less than 2.         ASSESSMENT / PLAN  INR assessment THER    Recheck INR In: 5 WEEKS    INR Location Clinic      Anticoagulation Summary as of 12/15/2017     INR goal 2.0-3.0   Today's INR 2.4   Maintenance plan 2.5 mg (5 mg x 0.5) on Tue, Sat; 5 mg (5 mg x 1) all other days   Full instructions 2.5 mg on Tue, Sat; 5 mg all other days   Weekly total 30 mg   No change documented Tana Pérez, JUANY   Plan last modified Tana Pérez, RN (4/27/2016)   Next INR check 1/19/2018   Target end date     Indications   Long-term (current) use of anticoagulants [Z79.01] [Z79.01]  Atrial fibrillation (HCC) [I48.91] [I48.91]         Anticoagulation Episode Summary     INR check location     Preferred lab     Send INR reminders to [facility] POOL    Comments 5 mg tablets, AM dose, AVS      Anticoagulation Care Providers     Provider Role Specialty Phone number    Gabriel Tan MD [facility] Internal Medicine [phone]            See the Encounter Report to view Anticoagulation Flowsheet and Dosing Calendar (Go to Encounters tab in chart review, and find the Anticoagulation Therapy Visit)    Dosage adjustment made based on physician directed care plan.    Tana Pérez, JUANY

## 2017-12-15 NOTE — MR AVS SNAPSHOT
Tomer Weiss   12/15/2017 1:30 PM   Anticoagulation Therapy Visit    Description:  85 year old male   Provider:  [facility]   Department:  [facility]           INR as of 12/15/2017     Today's INR 2.4      Anticoagulation Summary as of 12/15/2017     INR goal 2.0-3.0   Today's INR 2.4   Full instructions 2.5 mg on Tue, Sat; 5 mg all other days   Next INR check 1/19/2018    Indications   Long-term (current) use of anticoagulants [Z79.01] [Z79.01]  Atrial fibrillation (HCC) [I48.91] [I48.91]         Your next Anticoagulation Clinic appointment(s)     Jan 19, 2018  9:00 AM CST   Anticoagulation Visit with ZM ANTI COAG   [facility]    [address] [address] [phone]              Contact Numbers     Clinic Number:         December 2017 Details    Sun Mon Tue Wed Thu Fri Sat          1               2                 3               4               5               6               7               8               9                 10               11               12               13               14               15      5 mg   See details      16      2.5 mg           17      5 mg         18      5 mg         19      2.5 mg         20      5 mg         21      5 mg         22      5 mg         23      2.5 mg           24      5 mg         25      5 mg         26      2.5 mg         27      5 mg         28      5 mg         29      5 mg         30      2.5 mg           31      5 mg                Date Details   12/15 This INR check               How to take your warfarin dose     To take:  2.5 mg Take 0.5 of a 5 mg tablet.    To take:  5 mg Take 1 of the 5 mg tablets.           January 2018 Details    Sun Mon Tue Wed Thu Fri Sat      1      5 mg         2      2.5 mg         3      5 mg         4      5 mg         5      5 mg         6      2.5 mg           7      5 mg         8      5 mg         9      2.5 mg         10      5 mg          11      5 mg         12      5 mg         13      2.5 mg           14      5 mg         15      5 mg         16      2.5 mg         17      5 mg         18      5 mg         19            20                 21               22               23               24               25               26               27                 28               29               30               31                   Date Details   No additional details    Date of next INR:  1/19/2018         How to take your warfarin dose     To take:  2.5 mg Take 0.5 of a 5 mg tablet.    To take:  5 mg Take 1 of the 5 mg tablets.

## 2018-01-01 ENCOUNTER — TELEPHONE (OUTPATIENT)
Dept: FAMILY MEDICINE | Facility: OTHER | Age: 83
End: 2018-01-01

## 2018-01-01 ENCOUNTER — ANTICOAGULATION THERAPY VISIT (OUTPATIENT)
Dept: ANTICOAGULATION | Facility: OTHER | Age: 83
End: 2018-01-01
Payer: MEDICARE

## 2018-01-01 ENCOUNTER — RADIANT APPOINTMENT (OUTPATIENT)
Dept: ULTRASOUND IMAGING | Facility: OTHER | Age: 83
End: 2018-01-01
Attending: PHYSICIAN ASSISTANT
Payer: MEDICARE

## 2018-01-01 ENCOUNTER — OFFICE VISIT (OUTPATIENT)
Dept: FAMILY MEDICINE | Facility: OTHER | Age: 83
End: 2018-01-01
Payer: MEDICARE

## 2018-01-01 ENCOUNTER — OFFICE VISIT (OUTPATIENT)
Dept: UROLOGY | Facility: OTHER | Age: 83
End: 2018-01-01
Payer: MEDICARE

## 2018-01-01 ENCOUNTER — OFFICE VISIT (OUTPATIENT)
Dept: ORTHOPEDICS | Facility: CLINIC | Age: 83
End: 2018-01-01
Payer: MEDICARE

## 2018-01-01 ENCOUNTER — TELEPHONE (OUTPATIENT)
Dept: FAMILY MEDICINE | Facility: CLINIC | Age: 83
End: 2018-01-01

## 2018-01-01 ENCOUNTER — ANTICOAGULATION THERAPY VISIT (OUTPATIENT)
Dept: ANTICOAGULATION | Facility: OTHER | Age: 83
End: 2018-01-01

## 2018-01-01 ENCOUNTER — OFFICE VISIT (OUTPATIENT)
Dept: INTERNAL MEDICINE | Facility: CLINIC | Age: 83
End: 2018-01-01
Payer: MEDICARE

## 2018-01-01 ENCOUNTER — TELEPHONE (OUTPATIENT)
Dept: INTERNAL MEDICINE | Facility: CLINIC | Age: 83
End: 2018-01-01

## 2018-01-01 VITALS
HEART RATE: 100 BPM | SYSTOLIC BLOOD PRESSURE: 136 MMHG | RESPIRATION RATE: 16 BRPM | DIASTOLIC BLOOD PRESSURE: 84 MMHG | WEIGHT: 208 LBS | TEMPERATURE: 97.3 F | BODY MASS INDEX: 28.21 KG/M2 | OXYGEN SATURATION: 98 %

## 2018-01-01 VITALS
WEIGHT: 225.7 LBS | DIASTOLIC BLOOD PRESSURE: 84 MMHG | RESPIRATION RATE: 20 BRPM | BODY MASS INDEX: 30.61 KG/M2 | SYSTOLIC BLOOD PRESSURE: 136 MMHG | TEMPERATURE: 97.5 F | HEART RATE: 88 BPM

## 2018-01-01 VITALS — TEMPERATURE: 97.7 F | DIASTOLIC BLOOD PRESSURE: 88 MMHG | SYSTOLIC BLOOD PRESSURE: 134 MMHG | HEART RATE: 78 BPM

## 2018-01-01 VITALS
WEIGHT: 202 LBS | DIASTOLIC BLOOD PRESSURE: 86 MMHG | BODY MASS INDEX: 27.36 KG/M2 | HEART RATE: 81 BPM | HEIGHT: 72 IN | SYSTOLIC BLOOD PRESSURE: 136 MMHG

## 2018-01-01 DIAGNOSIS — Z96.652 STATUS POST TOTAL LEFT KNEE REPLACEMENT: ICD-10-CM

## 2018-01-01 DIAGNOSIS — R60.0 BILATERAL LEG EDEMA: ICD-10-CM

## 2018-01-01 DIAGNOSIS — I48.20 CHRONIC ATRIAL FIBRILLATION (H): ICD-10-CM

## 2018-01-01 DIAGNOSIS — Z79.01 LONG-TERM (CURRENT) USE OF ANTICOAGULANTS: ICD-10-CM

## 2018-01-01 DIAGNOSIS — R60.0 LOCALIZED EDEMA: ICD-10-CM

## 2018-01-01 DIAGNOSIS — C64.9 MALIGNANT NEOPLASM OF KIDNEY EXCLUDING RENAL PELVIS, UNSPECIFIED LATERALITY (H): ICD-10-CM

## 2018-01-01 DIAGNOSIS — Z79.01 LONG TERM CURRENT USE OF ANTICOAGULANT THERAPY: Primary | ICD-10-CM

## 2018-01-01 DIAGNOSIS — S72.452D CLOSED DISPLACED SUPRACONDYLAR FRACTURE OF DISTAL END OF LEFT FEMUR WITHOUT INTRACONDYLAR EXTENSION WITH ROUTINE HEALING, SUBSEQUENT ENCOUNTER: Primary | ICD-10-CM

## 2018-01-01 DIAGNOSIS — Z87.438: ICD-10-CM

## 2018-01-01 DIAGNOSIS — L65.9 HAIR LOSS: ICD-10-CM

## 2018-01-01 DIAGNOSIS — N50.89 SCROTAL SWELLING: ICD-10-CM

## 2018-01-01 DIAGNOSIS — N48.89 PENILE EDEMA: Primary | ICD-10-CM

## 2018-01-01 DIAGNOSIS — M97.12XD PERIPROSTHETIC FRACTURE AROUND INTERNAL PROSTHETIC LEFT KNEE JOINT, SUBSEQUENT ENCOUNTER: ICD-10-CM

## 2018-01-01 DIAGNOSIS — I27.20 PULMONARY HYPERTENSION (H): Primary | ICD-10-CM

## 2018-01-01 DIAGNOSIS — N48.89 EDEMA OF PENIS: ICD-10-CM

## 2018-01-01 DIAGNOSIS — Z79.01 LONG TERM CURRENT USE OF ANTICOAGULANT THERAPY: ICD-10-CM

## 2018-01-01 DIAGNOSIS — C64.9 MALIGNANT NEOPLASM OF KIDNEY EXCLUDING RENAL PELVIS, UNSPECIFIED LATERALITY (H): Primary | ICD-10-CM

## 2018-01-01 DIAGNOSIS — N52.9 ERECTILE DYSFUNCTION, UNSPECIFIED ERECTILE DYSFUNCTION TYPE: Primary | ICD-10-CM

## 2018-01-01 DIAGNOSIS — Z12.5 ENCOUNTER FOR SCREENING FOR MALIGNANT NEOPLASM OF PROSTATE: ICD-10-CM

## 2018-01-01 DIAGNOSIS — I50.40 COMBINED SYSTOLIC AND DIASTOLIC HEART FAILURE, UNSPECIFIED HF CHRONICITY (H): ICD-10-CM

## 2018-01-01 DIAGNOSIS — I48.3 TYPICAL ATRIAL FLUTTER (H): ICD-10-CM

## 2018-01-01 LAB
ALBUMIN SERPL-MCNC: 3.4 G/DL (ref 3.4–5)
ALBUMIN UR-MCNC: 30 MG/DL
ALP SERPL-CCNC: 166 U/L (ref 40–150)
ALT SERPL W P-5'-P-CCNC: 35 U/L (ref 0–70)
AMORPH CRY #/AREA URNS HPF: ABNORMAL /HPF
ANION GAP SERPL CALCULATED.3IONS-SCNC: 6 MMOL/L (ref 3–14)
APPEARANCE UR: CLEAR
AST SERPL W P-5'-P-CCNC: 38 U/L (ref 0–45)
BACTERIA #/AREA URNS HPF: ABNORMAL /HPF
BILIRUB SERPL-MCNC: 1.1 MG/DL (ref 0.2–1.3)
BILIRUB UR QL STRIP: NEGATIVE
BUN SERPL-MCNC: 38 MG/DL (ref 7–30)
CALCIUM SERPL-MCNC: 8.1 MG/DL (ref 8.5–10.1)
CHLORIDE SERPL-SCNC: 109 MMOL/L (ref 94–109)
CO2 SERPL-SCNC: 28 MMOL/L (ref 20–32)
COLOR UR AUTO: YELLOW
CREAT SERPL-MCNC: 1.06 MG/DL (ref 0.66–1.25)
GFR SERPL CREATININE-BSD FRML MDRD: 66 ML/MIN/1.7M2
GLUCOSE SERPL-MCNC: 75 MG/DL (ref 70–99)
GLUCOSE UR STRIP-MCNC: NEGATIVE MG/DL
HGB UR QL STRIP: NEGATIVE
HYALINE CASTS #/AREA URNS LPF: ABNORMAL /LPF
INR BLD: 2.1 (ref 0.86–1.14)
INR POINT OF CARE: 2.2 (ref 0.86–1.14)
INR POINT OF CARE: 3.4 (ref 0.86–1.14)
KETONES UR STRIP-MCNC: NEGATIVE MG/DL
LEUKOCYTE ESTERASE UR QL STRIP: NEGATIVE
MUCOUS THREADS #/AREA URNS LPF: PRESENT /LPF
NITRATE UR QL: NEGATIVE
NT-PROBNP SERPL-MCNC: 5102 PG/ML (ref 0–450)
PH UR STRIP: 5.5 PH (ref 5–7)
POTASSIUM SERPL-SCNC: 4.9 MMOL/L (ref 3.4–5.3)
PROT SERPL-MCNC: 7 G/DL (ref 6.8–8.8)
PSA SERPL-ACNC: 1.31 UG/L (ref 0–4)
RBC #/AREA URNS AUTO: ABNORMAL /HPF
SODIUM SERPL-SCNC: 143 MMOL/L (ref 133–144)
SOURCE: ABNORMAL
SP GR UR STRIP: 1.02 (ref 1–1.03)
UROBILINOGEN UR STRIP-ACNC: 0.2 EU/DL (ref 0.2–1)
WBC #/AREA URNS AUTO: ABNORMAL /HPF

## 2018-01-01 PROCEDURE — 93976 VASCULAR STUDY: CPT

## 2018-01-01 PROCEDURE — 99214 OFFICE O/P EST MOD 30 MIN: CPT | Performed by: INTERNAL MEDICINE

## 2018-01-01 PROCEDURE — 36416 COLLJ CAPILLARY BLOOD SPEC: CPT

## 2018-01-01 PROCEDURE — 85610 PROTHROMBIN TIME: CPT | Mod: QW

## 2018-01-01 PROCEDURE — G0103 PSA SCREENING: HCPCS | Performed by: PHYSICIAN ASSISTANT

## 2018-01-01 PROCEDURE — 83880 ASSAY OF NATRIURETIC PEPTIDE: CPT | Performed by: PHYSICIAN ASSISTANT

## 2018-01-01 PROCEDURE — 99214 OFFICE O/P EST MOD 30 MIN: CPT | Performed by: PHYSICIAN ASSISTANT

## 2018-01-01 PROCEDURE — 99213 OFFICE O/P EST LOW 20 MIN: CPT | Performed by: UROLOGY

## 2018-01-01 PROCEDURE — 36415 COLL VENOUS BLD VENIPUNCTURE: CPT | Performed by: PHYSICIAN ASSISTANT

## 2018-01-01 PROCEDURE — 81001 URINALYSIS AUTO W/SCOPE: CPT | Performed by: PHYSICIAN ASSISTANT

## 2018-01-01 PROCEDURE — 99207 ZZC NO CHARGE NURSE ONLY: CPT

## 2018-01-01 PROCEDURE — 36416 COLLJ CAPILLARY BLOOD SPEC: CPT | Performed by: FAMILY MEDICINE

## 2018-01-01 PROCEDURE — 99213 OFFICE O/P EST LOW 20 MIN: CPT | Performed by: PHYSICIAN ASSISTANT

## 2018-01-01 PROCEDURE — 85610 PROTHROMBIN TIME: CPT | Mod: QW | Performed by: FAMILY MEDICINE

## 2018-01-01 PROCEDURE — 99207 ZZC NO CHARGE NURSE ONLY: CPT | Performed by: INTERNAL MEDICINE

## 2018-01-01 PROCEDURE — 76870 US EXAM SCROTUM: CPT

## 2018-01-01 PROCEDURE — 80053 COMPREHEN METABOLIC PANEL: CPT | Performed by: PHYSICIAN ASSISTANT

## 2018-01-01 RX ORDER — WARFARIN SODIUM 5 MG/1
TABLET ORAL
Qty: 30 TABLET | Refills: 1 | Status: SHIPPED | OUTPATIENT
Start: 2018-01-01 | End: 2019-01-01

## 2018-01-01 RX ORDER — FUROSEMIDE 20 MG
20 TABLET ORAL 2 TIMES DAILY
Qty: 90 TABLET | Refills: 1 | Status: SHIPPED | OUTPATIENT
Start: 2018-01-01 | End: 2019-01-01

## 2018-01-01 RX ORDER — SILDENAFIL CITRATE 20 MG/1
TABLET ORAL
Qty: 12 TABLET | Refills: 3 | Status: SHIPPED | OUTPATIENT
Start: 2018-01-01

## 2018-01-01 ASSESSMENT — PAIN SCALES - GENERAL
PAINLEVEL: NO PAIN (0)

## 2018-01-10 ENCOUNTER — OFFICE VISIT (OUTPATIENT)
Dept: UROLOGY | Facility: OTHER | Age: 83
End: 2018-01-10
Payer: MEDICARE

## 2018-01-10 VITALS — HEART RATE: 78 BPM | RESPIRATION RATE: 16 BRPM | DIASTOLIC BLOOD PRESSURE: 71 MMHG | SYSTOLIC BLOOD PRESSURE: 122 MMHG

## 2018-01-10 DIAGNOSIS — N21.0 BLADDER STONE: Primary | ICD-10-CM

## 2018-01-10 PROCEDURE — 52000 CYSTOURETHROSCOPY: CPT | Performed by: UROLOGY

## 2018-01-10 PROCEDURE — 99207 ZZC DROP WITH A PROCEDURE: CPT | Performed by: UROLOGY

## 2018-01-10 NOTE — NURSING NOTE
"Chief Complaint   Patient presents with     Cystoscopy       Initial /71 (BP Location: Right arm, Patient Position: Chair, Cuff Size: Adult Regular)  Pulse 78  Resp 16 Estimated body mass index is 23.95 kg/(m^2) as calculated from the following:    Height as of 12/13/17: 6' 2\" (1.88 m).    Weight as of 12/13/17: 186 lb 8 oz (84.6 kg).  Medication Reconciliation: complete     Rj Saravia CMA      "

## 2018-01-10 NOTE — PROGRESS NOTES
S: Tomer Weiss is a 85 year old male returns for cystosco[y.    Patient is draped and prepped.  Flexible cystoscopy placed under direct vision.      The anterior urethra is normal   The prostatic urethra opened post TURP changes.         In the bladder there is trabeculation grade 2-3 with large bladder stone.    Assessment/Plan:  (N21.0) Bladder stone  (primary encounter diagnosis)  Comment:    Plan: schedule for cysto and bladder stone removal next

## 2018-01-10 NOTE — MR AVS SNAPSHOT
"              After Visit Summary   1/10/2018    Tomer Weiss    MRN: [id number]           Patient Information     Date Of Birth          4/9/1932        Visit Information        Provider Department      1/10/2018 10:00 AM Orlando Marr MD [facility]        Today's Diagnoses     Bladder stone    -  1       Follow-ups after your visit        Your next 10 appointments already scheduled     Jan 19, 2018  9:00 AM CST   [facility] with ZM ANTI COAG   [facility]    [address] [address] [phone]            Feb 22, 2018 11:00 AM CST   Return Visit with Vance Brennan MD   [facility]    [address] [address] [phone]              Who to contact     If you have questions or need follow up information about today's clinic visit or your schedule please contact [facility] directly at [phone].  Normal or non-critical lab and imaging results will be communicated to you by MyChart, letter or phone within 4 business days after the clinic has received the results. If you do not hear from us within 7 days, please contact the clinic through [employer]etti Computinghart or phone. If you have a critical or abnormal lab result, we will notify you by phone as soon as possible.  Submit refill requests through ROBLOX or call your pharmacy and they will forward the refill request to us. Please allow 3 business days for your refill to be completed.          Additional Information About Your Visit        Rigetti Computinghart Information     ROBLOX lets you send messages to your doctor, view your test results, renew your prescriptions, schedule appointments and more. To sign up, go to [email or web address] . Click on \"Log in\" on the left side of the screen, which will take you to the Welcome page. Then click on \"Sign up Now\" on the right " side of the page.     You will be asked to enter the access code listed below, as well as some personal information. Please follow the directions to create your username and password.     Your access code is: [email or web address]  Expires: 3/13/2018 10:11 AM     Your access code will  in 90 days. If you need help or a new code, please call your [place] clinic or [phone].        Care EveryWhere ID     This is your Care EveryWhere ID. This could be used by other organizations to access your [place] medical records  [id number]        Your Vitals Were     Pulse Respirations                78 16           Blood Pressure from Last 3 Encounters:   01/10/18 122/71   17 100/56   17 116/66    Weight from Last 3 Encounters:   17 84.6 kg (186 lb 8 oz)   17 86.2 kg (190 lb)   17 87.1 kg (192 lb)              We Performed the Following     CYSTOURETHROSCOPY        Primary Care Provider Office Phone # Fax #    Gabriel Tan [phone]       [facility] [address] [place] [address]        Equal Access to Services     MIKE MILAN AH: Hadii aad ku hadasho Soomaali, waaxda luqadaha, qaybta kaalmada adeegyada, waxay idiin hayagnieszkan nadege cespedes laisaacn ah. So [facility] [phone].    ATENCIÓN: Si habla español, tiene a rojas disposición servicios gratuitos de asistencia lingüística. [facility] [phone].    We comply with applicable federal civil rights laws and [place] laws. We do not discriminate on the basis of race, color, national origin, age, disability, sex, sexual orientation, or gender identity.            Thank you!     Thank you for choosing [facility]  for your care. Our goal is always to provide you with excellent care. Hearing back from our patients is one way we can continue to improve our services. Please take a few minutes to complete the written survey that you may receive in the mail after your visit with us. Thank you!              Your Updated Medication List - Protect others around you: Learn how to safely use, store and throw away your medicines at www.disposemymeds.org.          This list is accurate as of: 1/10/18 10:30 AM.  Always use your most recent med list.                   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       iron 66 MG Tabs     1 tablet    Take 1 tablet (66 mg) by mouth daily    Low iron       metoprolol 25 MG 24 hr tablet    TOPROL-XL    90 tablet    Take 0.5 tablets (12.5 mg) by mouth daily    Chronic atrial fibrillation (H)       order for DME     1 Device    Equipment being ordered: Walker () Treatment Diagnosis: s/p joint replacement    Status post total left knee replacement       * TRIAD HYDROPHILIC WOUND DRESSI Pste     1 Tube    Externally apply 1 g topically daily        * TRIAD HYDROPHILIC WOUND DRESSI Pste     1 Tube    Externally apply 1 g topically daily    Leg wound, right, subsequent encounter       warfarin 5 MG tablet    COUMADIN    75 tablet    Take 2.5 mg (1/2 tablet) Tuesday and Saturday and 5 mg other 5 days or as directed by the coumadin clinic    Long-term (current) use of anticoagulants       * Notice:  This list has 2 medication(s) that are the same as other medications prescribed for you. Read the directions carefully, and ask your doctor or other care provider to review them with you.

## 2018-01-12 ENCOUNTER — TELEPHONE (OUTPATIENT)
Dept: UROLOGY | Facility: CLINIC | Age: 83
End: 2018-01-12

## 2018-01-12 NOTE — TELEPHONE ENCOUNTER
Patient advised surgery date of 2/8 at [facility]. Patient advised to arrange pre-op and stop OTC blood thinners. Surgery packet mailed to patient home address. precert sent to managed care.   Sabrina Paul CMA

## 2018-01-12 NOTE — LETTER
[address]  [place] [address]  [phone]          January 12, 2018    Tomer Weiss                                                                                                                     [id number]  [place] [address]            Dear Tomer,        Enclosed is information about your upcomming surgery. Please call our office if you have questions, [phone].    Sincerely,     [facility]

## 2018-01-24 ENCOUNTER — TELEPHONE (OUTPATIENT)
Dept: ANTICOAGULATION | Facility: OTHER | Age: 83
End: 2018-01-24

## 2018-01-24 ENCOUNTER — ANTICOAGULATION THERAPY VISIT (OUTPATIENT)
Dept: ANTICOAGULATION | Facility: OTHER | Age: 83
End: 2018-01-24
Payer: MEDICARE

## 2018-01-24 DIAGNOSIS — I48.91 ATRIAL FIBRILLATION, UNSPECIFIED TYPE (H): ICD-10-CM

## 2018-01-24 DIAGNOSIS — Z79.01 LONG-TERM (CURRENT) USE OF ANTICOAGULANTS: ICD-10-CM

## 2018-01-24 LAB — INR POINT OF CARE: 3 (ref 0.86–1.14)

## 2018-01-24 PROCEDURE — 36416 COLLJ CAPILLARY BLOOD SPEC: CPT

## 2018-01-24 PROCEDURE — 99207 ZZC NO CHARGE NURSE ONLY: CPT

## 2018-01-24 PROCEDURE — 85610 PROTHROMBIN TIME: CPT | Mod: QW

## 2018-01-24 NOTE — MR AVS SNAPSHOT
Tomer Weiss   1/24/2018 3:30 PM   Anticoagulation Therapy Visit    Description:  85 year old male   Provider:  [facility]   Department:  [facility]           INR as of 1/24/2018     Today's INR 3.0      Anticoagulation Summary as of 1/24/2018     INR goal 2.0-3.0   Today's INR 3.0   Full instructions 2/3: Hold; 2/4: Hold; 2/5: Hold; 2/6: Hold; 2/7: Hold; Otherwise 2.5 mg on Tue, Sat; 5 mg all other days   Next INR check 2/21/2018    Indications   Long-term (current) use of anticoagulants [Z79.01] [Z79.01]  Atrial fibrillation (HCC) [I48.91] [I48.91]         Your next Anticoagulation Clinic appointment(s)     Feb 21, 2018  3:30 PM CST   Anticoagulation Visit with SARA ANTI COAG   [facility]    [address] [address] [phone]              Contact Numbers     Clinic Number:         January 2018 Details    Sun Mon Tue Wed Thu Fri Sat      1               2               3               4               5               6                 7               8               9               10               11               12               13                 14               15               16               17               18               19               20                 21               22               23               24      5 mg   See details      25      5 mg         26      5 mg         27      2.5 mg           28      5 mg         29      5 mg         30      2.5 mg         31      5 mg             Date Details   01/24 This INR check               How to take your warfarin dose     To take:  2.5 mg Take 0.5 of a 5 mg tablet.    To take:  5 mg Take 1 of the 5 mg tablets.           February 2018 Details    Sun Mon Tue Wed Thu Fri Sat         1      5 mg         2      5 mg         3      Hold           4      Hold         5      Hold         6      Hold         7      Hold         8      5 mg         9      5 mg         10       2.5 mg           11      5 mg         12      5 mg         13      2.5 mg         14      5 mg         15      5 mg         16      5 mg         17      2.5 mg           18      5 mg         19      5 mg         20      2.5 mg         21            22               23               24                 25               26               27               28                   Date Details   No additional details    Date of next INR:  2/21/2018         How to take your warfarin dose     To take:  2.5 mg Take 0.5 of a 5 mg tablet.    To take:  5 mg Take 1 of the 5 mg tablets.    Hold Do not take your warfarin dose. See the Details table to the right for additional instructions.

## 2018-01-24 NOTE — PROGRESS NOTES
ANTICOAGULATION FOLLOW-UP CLINIC VISIT    Patient Name:  Tomer Weiss  Date:  1/24/2018  Contact Type:  Face to Face    SUBJECTIVE:     Patient Findings     Positives No Problem Findings    Comments Pt has bladder surgery coming up 2/8- will verify with PCP that he does not need lovenox, likely just hold 5 days prior.           OBJECTIVE    INR Protime   Date Value Ref Range Status   01/24/2018 3.0 (A) 0.86 - 1.14 Final     Chromogenic Factor 10   Date Value Ref Range Status   04/24/2015 21 (L) 70 - 130 % Final     Comment:     Therapeutic Range:  A Chromogenic Factor 10 level of approximately 20-40%   inversely correlates with an INR of 2-3 for patients receiving Warfarin.   Chromogenic Factor 10 levels below 20% indicate an INR greater than 3 and   levels above 40% indicate an INR less than 2.         ASSESSMENT / PLAN  INR assessment THER    Recheck INR In: 4 WEEKS    INR Location Clinic      Anticoagulation Summary as of 1/24/2018     INR goal 2.0-3.0   Today's INR 3.0   Maintenance plan 2.5 mg (5 mg x 0.5) on Tue, Sat; 5 mg (5 mg x 1) all other days   Full instructions 2/3: Hold; 2/4: Hold; 2/5: Hold; 2/6: Hold; 2/7: Hold; Otherwise 2.5 mg on Tue, Sat; 5 mg all other days   Weekly total 30 mg   Plan last modified Tana Pérez, RN (4/27/2016)   Next INR check 2/21/2018   Target end date     Indications   Long-term (current) use of anticoagulants [Z79.01] [Z79.01]  Atrial fibrillation (HCC) [I48.91] [I48.91]         Anticoagulation Episode Summary     INR check location     Preferred lab     Send INR reminders to [facility] POOL    Comments 5 mg tablets, AM dose, AVS      Anticoagulation Care Providers     Provider Role Specialty Phone number    Gabriel Tan MD [facility] Internal Medicine [phone]            See the Encounter Report to view Anticoagulation Flowsheet and Dosing Calendar (Go to Encounters tab in chart review, and find the Anticoagulation Therapy Visit)    Dosage adjustment made  based on physician directed care plan.    Tana Pérez RN

## 2018-01-24 NOTE — TELEPHONE ENCOUNTER
Pt his having a bladder procedure 2/8. Would you recommend bridging with lovenox for him? Or hold coumadin 5 days prior? Pt had knee surgery in 2016 where he did not bridge. Please advise. Thanks!     Tana Pérez RN- Coumadin Clinic RN

## 2018-01-31 ENCOUNTER — OFFICE VISIT (OUTPATIENT)
Dept: INTERNAL MEDICINE | Facility: CLINIC | Age: 83
End: 2018-01-31
Payer: MEDICARE

## 2018-01-31 VITALS
OXYGEN SATURATION: 99 % | TEMPERATURE: 97.6 F | WEIGHT: 187 LBS | BODY MASS INDEX: 24 KG/M2 | RESPIRATION RATE: 16 BRPM | HEIGHT: 74 IN | SYSTOLIC BLOOD PRESSURE: 114 MMHG | HEART RATE: 86 BPM | DIASTOLIC BLOOD PRESSURE: 68 MMHG

## 2018-01-31 DIAGNOSIS — N21.0 BLADDER STONE: ICD-10-CM

## 2018-01-31 DIAGNOSIS — Z01.818 PREOP GENERAL PHYSICAL EXAM: Primary | ICD-10-CM

## 2018-01-31 DIAGNOSIS — Z79.01 LONG-TERM (CURRENT) USE OF ANTICOAGULANTS: ICD-10-CM

## 2018-01-31 DIAGNOSIS — I48.91 ATRIAL FIBRILLATION, UNSPECIFIED TYPE (H): ICD-10-CM

## 2018-01-31 PROCEDURE — 99214 OFFICE O/P EST MOD 30 MIN: CPT | Performed by: INTERNAL MEDICINE

## 2018-01-31 ASSESSMENT — PAIN SCALES - GENERAL: PAINLEVEL: NO PAIN (0)

## 2018-01-31 NOTE — PROGRESS NOTES
[address] [address]  [phone]  Dept: [phone]    PRE-OP EVALUATION:  Today's date: 2018    Tomer Weiss (: 1932) presents for pre-operative evaluation assessment as requested by Dr. Marr.  He requires evaluation and anesthesia risk assessment prior to undergoing surgery/procedure for treatment of bladder stone .  Proposed procedure: cystoscopy bladder stone removal    Date of Surgery/ Procedure: 18  Time of Surgery/ Procedure:   Hospital/Surgical Facility: [facility]  Fax number for surgical facility:   Primary Physician: Gabriel Tan  Type of Anesthesia Anticipated: to be determined    Patient has a Health Care Directive or Living Will:  YES     Preop Questions 2018   1.  Do you have a history of heart attack, stroke, stent, bypass or surgery on an artery in the head, neck, heart or legs? YES - been stable and very active.   2.  Do you ever have any pain or discomfort in your chest? No   3.  Do you have a history of  Heart Failure? No   4.   Are you troubled by shortness of breath when:  walking on a level surface, or up a slight hill, or at night? No   5.  Do you currently have a cold, bronchitis or other respiratory infection? No   6.  Do you have a cough, shortness of breath, or wheezing? No   7.  Do you sometimes get pains in the calves of your legs when you walk? No   8. Do you or anyone in your family have previous history of blood clots? No   9.  Do you or does anyone in your family have a serious bleeding problem such as prolonged bleeding following surgeries or cuts? No   10. Have you ever had problems with anemia or been told to take iron pills? YES -    11. Have you had any abnormal blood loss such as black, tarry or bloody stools? No   12. Have you ever had a blood transfusion? No   13. Have you or any of your relatives ever had problems with anesthesia? No   14. Do you have sleep apnea, excessive snoring  or daytime drowsiness? No   15. Do you have any prosthetic heart valves? No   16. Do you have prosthetic joints? YES -          HPI:                                                      Brief HPI related to upcoming procedure:stone and suha has been irritating. More issues in the evening and some blood in the urine.      See problem list for active medical problems.  Problems all longstanding and stable, except as noted/documented.  See ROS for pertinent symptoms related to these conditions.                                                                                                  .    MEDICAL HISTORY:                                                      Patient Active Problem List    Diagnosis Date Noted     Long-term (current) use of anticoagulants [Z79.01] 03/18/2016     Priority: Medium     Atrial fibrillation (HCC) [I48.91] 03/18/2016     Priority: Medium     Anemia 03/17/2016     Priority: Medium     Total knee replacement status 03/14/2016     Priority: Medium     Post-traumatic osteoarthritis of left knee 12/03/2015     Priority: Medium     Atrial flutter 03/16/2015     Priority: Medium     BPH (benign prostatic hyperplasia) 12/20/2011     Priority: Medium     Advanced directives, counseling/discussion 12/20/2011     Priority: Medium     Has a living will and will bring it in  MP/MA       Dupuytren's contracture of hand 03/15/2011     Priority: Medium     DJD (degenerative joint disease) of knee 03/15/2011     Priority: Medium     HYPERLIPIDEMIA LDL GOAL <130 10/31/2010     Priority: Medium     Malignant neoplasm of kidney excluding renal pelvis (H) 01/22/2010     Priority: Medium     SURGERY, PATHOLOGY, AND TREATMENT HISTORY FOR KIDNEY CANCER  6/17/10 Right Laparoscopic Cryoablation.  Dr Irwin Rey, [facility].  Pathology not done as no biopsy was able to be taken.      Problem list name updated by automated process. Provider to review       Unspecified hyperplasia of  prostate with urinary obstruction and other lower urinary tract symptoms (LUTS) 08/10/2007     Priority: Medium     Cervicalgia 09/18/2006     Priority: Medium     Polymyalgia rheumatica (H) 06/28/2005     Priority: Medium     Alopecia 05/07/2002     Priority: Medium     Problem list name updated by automated process. 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     Past Surgical History:   Procedure Laterality Date     ARTHROPLASTY KNEE Left 3/14/2016    Procedure: ARTHROPLASTY KNEE;  Surgeon: Deonte Silver MD;  Location: [facility] OR     COLONOSCOPY  7/17/2013    Procedure: COMBINED COLONOSCOPY, SINGLE BIOPSY/POLYPECTOMY BY BIOPSY;  Colonoscopy, with polypectomy by biopsy;  Surgeon: Fernando Mario MD;  Location: [facility] GI     CYSTOSCOPY, LITHOLAPAXY, COMBINED N/A 2/26/2015    Procedure: COMBINED CYSTOSCOPY, LITHOLAPAXY;  Surgeon: Orlando Marr MD;  Location: [facility]     CYSTOSCOPY, LITHOLAPAXY, COMBINED N/A 2/11/2015    Procedure: COMBINED CYSTOSCOPY, LITHOLAPAXY;  Surgeon: Orlando Marr MD;  Location: [facility] OR     H ABLATION ATRIAL FLUTTER  4/18/15    atypical a flutter ablation & Ablation of PACs from the SVC-RA junction     HC ABLATION RENAL TUMOR PERCUT CRYOTHERAPY UNILATERAL  6/17/10    Right renal mass     LASER HOLMIUM LITHOTRIPSY BLADDER N/A 2/26/2015    Procedure: LASER HOLMIUM LITHOTRIPSY BLADDER;  Surgeon: Orlando Marr MD;  Location: [facility]     LASER KTP GREEN LIGHT PHOTOSELECTIVE VAPORIZATION PROSTATE N/A 2/11/2015    Procedure: LASER KTP GREEN LIGHT PHOTOSELECTIVE  "VAPORIZATION PROSTATE;  Surgeon: Orlando Marr MD;  Location: [facility] OR     Current Outpatient Prescriptions   Medication Sig Dispense Refill     warfarin (COUMADIN) 5 MG tablet Take 2.5 mg (1/2 tablet) Tuesday and Saturday and 5 mg other 5 days or as directed by the coumadin clinic 75 tablet 1     atorvastatin (LIPITOR) 20 MG tablet Take 1 tablet (20 mg) by mouth daily 90 tablet 1     furosemide (LASIX) 20 MG tablet Take 1 tablet (20 mg) by mouth daily 30 tablet 11     metoprolol (TOPROL-XL) 25 MG 24 hr tablet Take 0.5 tablets (12.5 mg) by mouth daily 90 tablet 2     order for DME Equipment being ordered: Walker ()  Treatment Diagnosis: s/p joint replacement 1 Device 0     GLUCOSAMINE 500 MG OR CAPS 2 Tablets every morning       Wound Dressings (TRIAD HYDROPHILIC WOUND DRESSI) PSTE Externally apply 1 g topically daily 1 Tube 3     Wound Dressings (TRIAD HYDROPHILIC WOUND DRESSI) PSTE Externally apply 1 g topically daily 1 Tube 3     iron 66 MG TABS Take 1 tablet (66 mg) by mouth daily 1 tablet 0     OTC products: None, except as noted above    Allergies   Allergen Reactions     No Known Drug Allergies       Latex Allergy: NO    Social History   Substance Use Topics     Smoking status: Never Smoker     Smokeless tobacco: Never Used     Alcohol use No     History   Drug Use No       REVIEW OF SYSTEMS:                                                    Constitutional, neuro, ENT, endocrine, pulmonary, cardiac, gastrointestinal, genitourinary, musculoskeletal, integument and psychiatric systems are negative, except as otherwise noted.    EXAM:                                                    /68  Pulse 86  Temp 97.6  F (36.4  C) (Temporal)  Resp 16  Ht 6' 2\" (1.88 m)  Wt 187 lb (84.8 kg)  SpO2 99%  BMI 24.01 kg/m2    GENERAL APPEARANCE: healthy, alert and no distress     HENT: ear canals and TM's normal and nose and mouth without ulcers or lesions     NECK: no adenopathy, no asymmetry, masses, or " scars and thyroid normal to palpation     RESP: lungs clear to auscultation - no rales, rhonchi or wheezes     CV: irregularly irregular rhythm     MS: extremities normal- no gross deformities noted, no evidence of inflammation in joints, FROM in all extremities.     SKIN: no suspicious lesions or rashes     NEURO: Normal strength and tone, sensory exam grossly normal, mentation intact and speech normal     PSYCH: mentation appears normal. and affect normal/bright     LYMPHATICS: No axillary, cervical, or supraclavicular nodes    DIAGNOSTICS:                                                    Recent Echo in December 2017 showed pulm htn but good LV function    Recent Labs   Lab Test 01/24/18 12/15/17  11/27/17   0929   04/12/17   2100   01/13/17   0915   HGB   --    --   13.6   --   11.5*   --   13.8   PLT   --    --   225   --   262   --   252   INR  3.0*  2.4*   --    < >  2.36*   < >   --    NA   --    --   144   --    --    --   145*   POTASSIUM   --    --   4.6   --    --    --   4.7   CR   --    --   0.98   --    --    --   1.10    < > = values in this interval not displayed.        IMPRESSION:                                                    Reason for surgery/procedure: bladder stone    The proposed surgical procedure is considered INTERMEDIATE risk.    REVISED CARDIAC RISK INDEX  The patient has the following serious cardiovascular risks for perioperative complications such as (MI, PE, VFib and 3  AV Block):  No serious cardiac risks  INTERPRETATION: 2 risks: Class III (moderate risk - 6.6% complication rate)    The patient has the following additional risks for perioperative complications:  No identified additional risks      ICD-10-CM    1. Preop general physical exam Z01.818        RECOMMENDATIONS:                                                        Cardiovascular Risk  Patient is already on a Beta Blocker. Continue Betablocker therapy after surgery, using Beta blocker order set as necessary for NPO  status.        Anticoagulant or Antiplatelet Medication Use  WARFARIN: Thromboembolic risk is low (e.g. Single VTE >12 months ago, A fib and CHADS<3 without prior CVA/TIA) and warfarin may be discontinued in the perioperative period        APPROVAL GIVEN to proceed with proposed procedure, without further diagnostic evaluation       Signed Electronically by: Gabriel Tan MD    Copy of this evaluation report is provided to requesting physician.    [facility] Preop Guidelines

## 2018-01-31 NOTE — MR AVS SNAPSHOT
After Visit Summary   1/31/2018    Tomer Weiss    MRN: [id number]           Patient Information     Date Of Birth          4/9/1932        Visit Information        Provider Department      1/31/2018 11:00 AM Gabriel Tan MD [facility]        Today's Diagnoses     Preop general physical exam    -  1      Care Instructions      Before Your Surgery      Call your surgeon if there is any change in your health. This includes signs of a cold or flu (such as a sore throat, runny nose, cough, rash or fever).    Do not smoke, drink alcohol or take over the counter medicine (unless your surgeon or primary care doctor tells you to) for the 24 hours before and after surgery.    If you take prescribed drugs: Follow your doctor s orders about which medicines to take and which to stop until after surgery.    Eating and drinking prior to surgery: follow the instructions from your surgeon    Take a shower or bath the night before surgery. Use the soap your surgeon gave you to gently clean your skin. If you do not have soap from your surgeon, use your regular soap. Do not shave or scrub the surgery site.  Wear clean pajamas and have clean sheets on your bed.           Follow-ups after your visit        Your next 10 appointments already scheduled     Feb 08, 2018   Procedure with Orlando Marr MD   [facility] ([facility])    [address] [place] [address]  [place] [id number] [phone]           From [facility]: Exit at [address] on [place] side of [place]. Turn right on [address]. Turn left at stoplight on [address]. [facility] will be in view two blocks ahead            Feb 08, 2018  1:15 PM CST   XR SURGERY GLO LESS THAN 5 MIN FLUORO W STILLS with PHCARM2   [facility] ([facility])    [address] [id number] [phone]           Please bring a list of your current  "medicines to your exam. (Include vitamins, minerals and over-thecounter medicines.) Leave your valuables at home.  Tell your doctor if there is a chance you may be pregnant.  You do not need to do anything special for this exam.            Feb 21, 2018  3:30 PM CST   Anticoagulation Visit with ZM ANTI COAG   [facility]    [address] [id number] [phone]            Feb 22, 2018 11:00 AM CST   Return Visit with Vance Brennan MD   [facility] ([facility])    [address] [address] [phone]              Who to contact     If you have questions or need follow up information about today's clinic visit or your schedule please contact [facility] directly at [phone].  Normal or non-critical lab and imaging results will be communicated to you by MyChart, letter or phone within 4 business days after the clinic has received the results. If you do not hear from us within 7 days, please contact the clinic through [employer] or phone. If you have a critical or abnormal lab result, we will notify you by phone as soon as possible.  Submit refill requests through [employer] or call your pharmacy and they will forward the refill request to us. Please allow 3 business days for your refill to be completed.          Additional Information About Your Visit        [employer] Information     [employer] lets you send messages to your doctor, view your test results, renew your prescriptions, schedule appointments and more. To sign up, go to [email or web address] . Click on \"Log in\" on the left side of the screen, which will take you to the Welcome page. Then click on \"Sign up Now\" on the right side of the page.     You will be asked to enter the access code listed below, as well as some personal information. Please follow the directions to create your username and " "password.     Your access code is: [email or web address]  Expires: 3/13/2018 10:11 AM     Your access code will  in 90 days. If you need help or a new code, please call your [place] clinic or [phone].        Care EveryWhere ID     This is your Care EveryWhere ID. This could be used by other organizations to access your [facility] medical records  [id number]        Your Vitals Were     Pulse Temperature Respirations Height Pulse Oximetry BMI (Body Mass Index)    86 97.6  F (36.4  C) (Temporal) 16 6' 2\" (1.88 m) 99% 24.01 kg/m2       Blood Pressure from Last 3 Encounters:   18 114/68   01/10/18 122/71   17 100/56    Weight from Last 3 Encounters:   18 187 lb (84.8 kg)   17 186 lb 8 oz (84.6 kg)   17 190 lb (86.2 kg)              Today, you had the following     No orders found for display       Primary Care Provider Office Phone # Fax #    Gabriel Tan [phone]       [facility] [address] [place] [address]        Equal Access to Services     NANCY MILAN AH: Hadii aad ku hadasho Soomaali, waaxda luqadaha, qaybta kaalmada adeegyada, waxay idiin hayagnieszkan edieg coy laan ah. So [facility] [phone].    ATENCIÓN: Si habla español, tiene a rojas disposición servicios gratuitos de asistencia lingüística. Llkyung al [phone].    We comply with applicable federal civil rights laws and [place] laws. We do not discriminate on the basis of race, color, national origin, age, disability, sex, sexual orientation, or gender identity.            Thank you!     Thank you for choosing [facility]  for your care. Our goal is always to provide you with excellent care. Hearing back from our patients is one way we can continue to improve our services. Please take a few minutes to complete the written survey that you may receive in the mail after your visit with us. Thank you!             Your Updated Medication List - Protect others around you: Learn " how to safely use, store and throw away your medicines at www.disposemymeds.org.          This list is accurate as of 1/31/18 11:09 AM.  Always use your most recent med list.                   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       iron 66 MG Tabs     1 tablet    Take 1 tablet (66 mg) by mouth daily    Low iron       metoprolol succinate 25 MG 24 hr tablet    TOPROL-XL    90 tablet    Take 0.5 tablets (12.5 mg) by mouth daily    Chronic atrial fibrillation (H)       order for DME     1 Device    Equipment being ordered: Walker () Treatment Diagnosis: s/p joint replacement    Status post total left knee replacement       * TRIAD HYDROPHILIC WOUND DRESSI Pste     1 Tube    Externally apply 1 g topically daily        * TRIAD HYDROPHILIC WOUND DRESSI Pste     1 Tube    Externally apply 1 g topically daily    Leg wound, right, subsequent encounter       warfarin 5 MG tablet    COUMADIN    75 tablet    Take 2.5 mg (1/2 tablet) Tuesday and Saturday and 5 mg other 5 days or as directed by the coumadin clinic    Long-term (current) use of anticoagulants       * Notice:  This list has 2 medication(s) that are the same as other medications prescribed for you. Read the directions carefully, and ask your doctor or other care provider to review them with you.

## 2018-01-31 NOTE — NURSING NOTE
"Chief Complaint   Patient presents with     Pre-Op Exam       Initial /68  Pulse 86  Temp 97.6  F (36.4  C) (Temporal)  Resp 16  Ht 6' 2\" (1.88 m)  Wt 187 lb (84.8 kg)  SpO2 99%  BMI 24.01 kg/m2 Estimated body mass index is 24.01 kg/(m^2) as calculated from the following:    Height as of this encounter: 6' 2\" (1.88 m).    Weight as of this encounter: 187 lb (84.8 kg).  Medication Reconciliation: complete    "

## 2018-02-02 NOTE — H&P (VIEW-ONLY)
[address] [address]  [phone]  Dept: [phone]    PRE-OP EVALUATION:  Today's date: 2018    Tomer Weiss (: 1932) presents for pre-operative evaluation assessment as requested by Dr. Marr.  He requires evaluation and anesthesia risk assessment prior to undergoing surgery/procedure for treatment of bladder stone .  Proposed procedure: cystoscopy bladder stone removal    Date of Surgery/ Procedure: 18  Time of Surgery/ Procedure:   Hospital/Surgical Facility: [facility]  Fax number for surgical facility:   Primary Physician: Gabriel Tan  Type of Anesthesia Anticipated: to be determined    Patient has a Health Care Directive or Living Will:  YES     Preop Questions 2018   1.  Do you have a history of heart attack, stroke, stent, bypass or surgery on an artery in the head, neck, heart or legs? YES - been stable and very active.   2.  Do you ever have any pain or discomfort in your chest? No   3.  Do you have a history of  Heart Failure? No   4.   Are you troubled by shortness of breath when:  walking on a level surface, or up a slight hill, or at night? No   5.  Do you currently have a cold, bronchitis or other respiratory infection? No   6.  Do you have a cough, shortness of breath, or wheezing? No   7.  Do you sometimes get pains in the calves of your legs when you walk? No   8. Do you or anyone in your family have previous history of blood clots? No   9.  Do you or does anyone in your family have a serious bleeding problem such as prolonged bleeding following surgeries or cuts? No   10. Have you ever had problems with anemia or been told to take iron pills? YES -    11. Have you had any abnormal blood loss such as black, tarry or bloody stools? No   12. Have you ever had a blood transfusion? No   13. Have you or any of your relatives ever had problems with anesthesia? No   14. Do you have sleep apnea, excessive snoring  or daytime drowsiness? No   15. Do you have any prosthetic heart valves? No   16. Do you have prosthetic joints? YES -          HPI:                                                      Brief HPI related to upcoming procedure:stone and suha has been irritating. More issues in the evening and some blood in the urine.      See problem list for active medical problems.  Problems all longstanding and stable, except as noted/documented.  See ROS for pertinent symptoms related to these conditions.                                                                                                  .    MEDICAL HISTORY:                                                      Patient Active Problem List    Diagnosis Date Noted     Long-term (current) use of anticoagulants [Z79.01] 03/18/2016     Priority: Medium     Atrial fibrillation (HCC) [I48.91] 03/18/2016     Priority: Medium     Anemia 03/17/2016     Priority: Medium     Total knee replacement status 03/14/2016     Priority: Medium     Post-traumatic osteoarthritis of left knee 12/03/2015     Priority: Medium     Atrial flutter 03/16/2015     Priority: Medium     BPH (benign prostatic hyperplasia) 12/20/2011     Priority: Medium     Advanced directives, counseling/discussion 12/20/2011     Priority: Medium     Has a living will and will bring it in  MP/MA       Dupuytren's contracture of hand 03/15/2011     Priority: Medium     DJD (degenerative joint disease) of knee 03/15/2011     Priority: Medium     HYPERLIPIDEMIA LDL GOAL <130 10/31/2010     Priority: Medium     Malignant neoplasm of kidney excluding renal pelvis (H) 01/22/2010     Priority: Medium     SURGERY, PATHOLOGY, AND TREATMENT HISTORY FOR KIDNEY CANCER  6/17/10 Right Laparoscopic Cryoablation.  Dr Irwin Rey, [facility].  Pathology not done as no biopsy was able to be taken.      Problem list name updated by automated process. Provider to review       Unspecified hyperplasia of  prostate with urinary obstruction and other lower urinary tract symptoms (LUTS) 08/10/2007     Priority: Medium     Cervicalgia 09/18/2006     Priority: Medium     Polymyalgia rheumatica (H) 06/28/2005     Priority: Medium     Alopecia 05/07/2002     Priority: Medium     Problem list name updated by automated process. 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     Past Surgical History:   Procedure Laterality Date     ARTHROPLASTY KNEE Left 3/14/2016    Procedure: ARTHROPLASTY KNEE;  Surgeon: Deonte Silver MD;  Location: [facility]     COLONOSCOPY  7/17/2013    Procedure: COMBINED COLONOSCOPY, SINGLE BIOPSY/POLYPECTOMY BY BIOPSY;  Colonoscopy, with polypectomy by biopsy;  Surgeon: Fernando Mario MD;  Location: [facility]     CYSTOSCOPY, LITHOLAPAXY, COMBINED N/A 2/26/2015    Procedure: COMBINED CYSTOSCOPY, LITHOLAPAXY;  Surgeon: Orlando Marr MD;  Location: [facility]     CYSTOSCOPY, LITHOLAPAXY, COMBINED N/A 2/11/2015    Procedure: COMBINED CYSTOSCOPY, LITHOLAPAXY;  Surgeon: Orlando Marr MD;  Location: [facility] OR     H ABLATION ATRIAL FLUTTER  4/18/15    atypical a flutter ablation & Ablation of PACs from the SVC-RA junction     HC ABLATION RENAL TUMOR PERCUT CRYOTHERAPY UNILATERAL  6/17/10    Right renal mass     LASER HOLMIUM LITHOTRIPSY BLADDER N/A 2/26/2015    Procedure: LASER HOLMIUM LITHOTRIPSY BLADDER;  Surgeon: Orlando Marr MD;  Location: [facility]     LASER KTP GREEN LIGHT PHOTOSELECTIVE VAPORIZATION PROSTATE N/A 2/11/2015    Procedure: LASER KTP GREEN LIGHT PHOTOSELECTIVE  "VAPORIZATION PROSTATE;  Surgeon: Orlando Marr MD;  Location: [facility]     Current Outpatient Prescriptions   Medication Sig Dispense Refill     warfarin (COUMADIN) 5 MG tablet Take 2.5 mg (1/2 tablet) Tuesday and Saturday and 5 mg other 5 days or as directed by the coumadin clinic 75 tablet 1     atorvastatin (LIPITOR) 20 MG tablet Take 1 tablet (20 mg) by mouth daily 90 tablet 1     furosemide (LASIX) 20 MG tablet Take 1 tablet (20 mg) by mouth daily 30 tablet 11     metoprolol (TOPROL-XL) 25 MG 24 hr tablet Take 0.5 tablets (12.5 mg) by mouth daily 90 tablet 2     order for DME Equipment being ordered: Walker ()  Treatment Diagnosis: s/p joint replacement 1 Device 0     GLUCOSAMINE 500 MG OR CAPS 2 Tablets every morning       Wound Dressings (TRIAD HYDROPHILIC WOUND DRESSI) PSTE Externally apply 1 g topically daily 1 Tube 3     Wound Dressings (TRIAD HYDROPHILIC WOUND DRESSI) PSTE Externally apply 1 g topically daily 1 Tube 3     iron 66 MG TABS Take 1 tablet (66 mg) by mouth daily 1 tablet 0     OTC products: None, except as noted above    Allergies   Allergen Reactions     No Known Drug Allergies       Latex Allergy: NO    Social History   Substance Use Topics     Smoking status: Never Smoker     Smokeless tobacco: Never Used     Alcohol use No     History   Drug Use No       REVIEW OF SYSTEMS:                                                    Constitutional, neuro, ENT, endocrine, pulmonary, cardiac, gastrointestinal, genitourinary, musculoskeletal, integument and psychiatric systems are negative, except as otherwise noted.    EXAM:                                                    /68  Pulse 86  Temp 97.6  F (36.4  C) (Temporal)  Resp 16  Ht 6' 2\" (1.88 m)  Wt 187 lb (84.8 kg)  SpO2 99%  BMI 24.01 kg/m2    GENERAL APPEARANCE: healthy, alert and no distress     HENT: ear canals and TM's normal and nose and mouth without ulcers or lesions     NECK: no adenopathy, no asymmetry, masses, or " scars and thyroid normal to palpation     RESP: lungs clear to auscultation - no rales, rhonchi or wheezes     CV: irregularly irregular rhythm     MS: extremities normal- no gross deformities noted, no evidence of inflammation in joints, FROM in all extremities.     SKIN: no suspicious lesions or rashes     NEURO: Normal strength and tone, sensory exam grossly normal, mentation intact and speech normal     PSYCH: mentation appears normal. and affect normal/bright     LYMPHATICS: No axillary, cervical, or supraclavicular nodes    DIAGNOSTICS:                                                    Recent Echo in December 2017 showed pulm htn but good LV function    Recent Labs   Lab Test 01/24/18 12/15/17  11/27/17   0929   04/12/17   2100   01/13/17   0915   HGB   --    --   13.6   --   11.5*   --   13.8   PLT   --    --   225   --   262   --   252   INR  3.0*  2.4*   --    < >  2.36*   < >   --    NA   --    --   144   --    --    --   145*   POTASSIUM   --    --   4.6   --    --    --   4.7   CR   --    --   0.98   --    --    --   1.10    < > = values in this interval not displayed.        IMPRESSION:                                                    Reason for surgery/procedure: bladder stone    The proposed surgical procedure is considered INTERMEDIATE risk.    REVISED CARDIAC RISK INDEX  The patient has the following serious cardiovascular risks for perioperative complications such as (MI, PE, VFib and 3  AV Block):  No serious cardiac risks  INTERPRETATION: 2 risks: Class III (moderate risk - 6.6% complication rate)    The patient has the following additional risks for perioperative complications:  No identified additional risks      ICD-10-CM    1. Preop general physical exam Z01.818        RECOMMENDATIONS:                                                        Cardiovascular Risk  Patient is already on a Beta Blocker. Continue Betablocker therapy after surgery, using Beta blocker order set as necessary for NPO  status.        Anticoagulant or Antiplatelet Medication Use  WARFARIN: Thromboembolic risk is low (e.g. Single VTE >12 months ago, A fib and CHADS<3 without prior CVA/TIA) and warfarin may be discontinued in the perioperative period        APPROVAL GIVEN to proceed with proposed procedure, without further diagnostic evaluation       Signed Electronically by: Gabriel Tan MD    Copy of this evaluation report is provided to requesting physician.    [place] Preop Guidelines

## 2018-02-08 ENCOUNTER — ANESTHESIA (OUTPATIENT)
Dept: SURGERY | Facility: CLINIC | Age: 83
End: 2018-02-08
Payer: MEDICARE

## 2018-02-08 ENCOUNTER — HOSPITAL ENCOUNTER (OUTPATIENT)
Facility: CLINIC | Age: 83
Discharge: HOME | End: 2018-02-08
Attending: UROLOGY | Admitting: UROLOGY
Payer: MEDICARE

## 2018-02-08 ENCOUNTER — SURGERY (OUTPATIENT)
Age: 83
End: 2018-02-08

## 2018-02-08 ENCOUNTER — ANESTHESIA EVENT (OUTPATIENT)
Dept: SURGERY | Facility: CLINIC | Age: 83
End: 2018-02-08
Payer: MEDICARE

## 2018-02-08 VITALS
OXYGEN SATURATION: 97 % | RESPIRATION RATE: 16 BRPM | SYSTOLIC BLOOD PRESSURE: 119 MMHG | DIASTOLIC BLOOD PRESSURE: 75 MMHG | HEART RATE: 54 BPM | TEMPERATURE: 97.6 F

## 2018-02-08 DIAGNOSIS — N21.0 BLADDER STONE: Primary | ICD-10-CM

## 2018-02-08 LAB — INR PPP: 1.06 (ref 0.86–1.14)

## 2018-02-08 PROCEDURE — 36000052 ZZH SURGERY LEVEL 2 EA 15 ADDTL MIN: Performed by: UROLOGY

## 2018-02-08 PROCEDURE — 71000027 ZZH RECOVERY PHASE 2 EACH 15 MINS: Performed by: UROLOGY

## 2018-02-08 PROCEDURE — 25000128 H RX IP 250 OP 636: Performed by: NURSE ANESTHETIST, CERTIFIED REGISTERED

## 2018-02-08 PROCEDURE — 40000306 ZZH STATISTIC PRE PROC ASSESS II: Performed by: UROLOGY

## 2018-02-08 PROCEDURE — 37000008 ZZH ANESTHESIA TECHNICAL FEE, 1ST 30 MIN: Performed by: UROLOGY

## 2018-02-08 PROCEDURE — 36000054 ZZH SURGERY LEVEL 2 W FLUORO 1ST 30 MIN: Performed by: UROLOGY

## 2018-02-08 PROCEDURE — 52317 REMOVE BLADDER STONE: CPT | Performed by: UROLOGY

## 2018-02-08 PROCEDURE — 27210794 ZZH OR GENERAL SUPPLY STERILE: Performed by: UROLOGY

## 2018-02-08 PROCEDURE — 85610 PROTHROMBIN TIME: CPT | Performed by: NURSE ANESTHETIST, CERTIFIED REGISTERED

## 2018-02-08 PROCEDURE — C1726 CATH, BAL DIL, NON-VASCULAR: HCPCS | Performed by: UROLOGY

## 2018-02-08 PROCEDURE — 25000125 ZZHC RX 250: Performed by: NURSE ANESTHETIST, CERTIFIED REGISTERED

## 2018-02-08 PROCEDURE — 37000009 ZZH ANESTHESIA TECHNICAL FEE, EACH ADDTL 15 MIN: Performed by: UROLOGY

## 2018-02-08 PROCEDURE — C1769 GUIDE WIRE: HCPCS | Performed by: UROLOGY

## 2018-02-08 PROCEDURE — 25000128 H RX IP 250 OP 636: Performed by: UROLOGY

## 2018-02-08 RX ORDER — HYDROCODONE BITARTRATE AND ACETAMINOPHEN 5; 325 MG/1; MG/1
1-2 TABLET ORAL EVERY 4 HOURS PRN
Qty: 15 TABLET | Refills: 0 | Status: SHIPPED | OUTPATIENT
Start: 2018-02-08 | End: 2018-02-26

## 2018-02-08 RX ORDER — PROPOFOL 10 MG/ML
INJECTION, EMULSION INTRAVENOUS CONTINUOUS PRN
Status: DISCONTINUED | OUTPATIENT
Start: 2018-02-08 | End: 2018-02-08

## 2018-02-08 RX ORDER — METOPROLOL TARTRATE 1 MG/ML
1-2 INJECTION, SOLUTION INTRAVENOUS EVERY 5 MIN PRN
Status: DISCONTINUED | OUTPATIENT
Start: 2018-02-08 | End: 2018-02-08 | Stop reason: HOSPADM

## 2018-02-08 RX ORDER — CIPROFLOXACIN 500 MG/1
500 TABLET, FILM COATED ORAL 2 TIMES DAILY
Qty: 6 TABLET | Refills: 0 | Status: SHIPPED | OUTPATIENT
Start: 2018-02-08 | End: 2018-02-11

## 2018-02-08 RX ORDER — OXYCODONE HYDROCHLORIDE 5 MG/1
10 TABLET ORAL EVERY 4 HOURS PRN
Status: DISCONTINUED | OUTPATIENT
Start: 2018-02-08 | End: 2018-02-08 | Stop reason: HOSPADM

## 2018-02-08 RX ORDER — ONDANSETRON 2 MG/ML
4 INJECTION INTRAMUSCULAR; INTRAVENOUS EVERY 30 MIN PRN
Status: DISCONTINUED | OUTPATIENT
Start: 2018-02-08 | End: 2018-02-08 | Stop reason: HOSPADM

## 2018-02-08 RX ORDER — FENTANYL CITRATE 50 UG/ML
25-50 INJECTION, SOLUTION INTRAMUSCULAR; INTRAVENOUS
Status: DISCONTINUED | OUTPATIENT
Start: 2018-02-08 | End: 2018-02-08 | Stop reason: HOSPADM

## 2018-02-08 RX ORDER — CEFAZOLIN SODIUM 1 G/3ML
1 INJECTION, POWDER, FOR SOLUTION INTRAMUSCULAR; INTRAVENOUS SEE ADMIN INSTRUCTIONS
Status: DISCONTINUED | OUTPATIENT
Start: 2018-02-08 | End: 2018-02-08 | Stop reason: HOSPADM

## 2018-02-08 RX ORDER — LIDOCAINE HYDROCHLORIDE 20 MG/ML
INJECTION, SOLUTION INFILTRATION; PERINEURAL PRN
Status: DISCONTINUED | OUTPATIENT
Start: 2018-02-08 | End: 2018-02-08

## 2018-02-08 RX ORDER — ONDANSETRON 2 MG/ML
INJECTION INTRAMUSCULAR; INTRAVENOUS PRN
Status: DISCONTINUED | OUTPATIENT
Start: 2018-02-08 | End: 2018-02-08

## 2018-02-08 RX ORDER — NALOXONE HYDROCHLORIDE 0.4 MG/ML
.1-.4 INJECTION, SOLUTION INTRAMUSCULAR; INTRAVENOUS; SUBCUTANEOUS
Status: DISCONTINUED | OUTPATIENT
Start: 2018-02-08 | End: 2018-02-08 | Stop reason: HOSPADM

## 2018-02-08 RX ORDER — ONDANSETRON 4 MG/1
4 TABLET, ORALLY DISINTEGRATING ORAL EVERY 30 MIN PRN
Status: DISCONTINUED | OUTPATIENT
Start: 2018-02-08 | End: 2018-02-08 | Stop reason: HOSPADM

## 2018-02-08 RX ORDER — CEFAZOLIN SODIUM 2 G/100ML
2 INJECTION, SOLUTION INTRAVENOUS
Status: COMPLETED | OUTPATIENT
Start: 2018-02-08 | End: 2018-02-08

## 2018-02-08 RX ORDER — PROPOFOL 10 MG/ML
INJECTION, EMULSION INTRAVENOUS PRN
Status: DISCONTINUED | OUTPATIENT
Start: 2018-02-08 | End: 2018-02-08

## 2018-02-08 RX ORDER — FENTANYL CITRATE 50 UG/ML
25-50 INJECTION, SOLUTION INTRAMUSCULAR; INTRAVENOUS EVERY 5 MIN PRN
Status: DISCONTINUED | OUTPATIENT
Start: 2018-02-08 | End: 2018-02-08 | Stop reason: HOSPADM

## 2018-02-08 RX ORDER — LIDOCAINE 40 MG/G
CREAM TOPICAL
Status: DISCONTINUED | OUTPATIENT
Start: 2018-02-08 | End: 2018-02-08 | Stop reason: HOSPADM

## 2018-02-08 RX ORDER — MEPERIDINE HYDROCHLORIDE 25 MG/ML
12.5 INJECTION INTRAMUSCULAR; INTRAVENOUS; SUBCUTANEOUS
Status: DISCONTINUED | OUTPATIENT
Start: 2018-02-08 | End: 2018-02-08 | Stop reason: HOSPADM

## 2018-02-08 RX ORDER — FENTANYL CITRATE 50 UG/ML
INJECTION, SOLUTION INTRAMUSCULAR; INTRAVENOUS PRN
Status: DISCONTINUED | OUTPATIENT
Start: 2018-02-08 | End: 2018-02-08

## 2018-02-08 RX ORDER — HYDROMORPHONE HYDROCHLORIDE 1 MG/ML
.3-.5 INJECTION, SOLUTION INTRAMUSCULAR; INTRAVENOUS; SUBCUTANEOUS EVERY 10 MIN PRN
Status: DISCONTINUED | OUTPATIENT
Start: 2018-02-08 | End: 2018-02-08 | Stop reason: HOSPADM

## 2018-02-08 RX ORDER — SODIUM CHLORIDE, SODIUM LACTATE, POTASSIUM CHLORIDE, CALCIUM CHLORIDE 600; 310; 30; 20 MG/100ML; MG/100ML; MG/100ML; MG/100ML
INJECTION, SOLUTION INTRAVENOUS CONTINUOUS
Status: DISCONTINUED | OUTPATIENT
Start: 2018-02-08 | End: 2018-02-08 | Stop reason: HOSPADM

## 2018-02-08 RX ADMIN — ONDANSETRON 4 MG: 2 INJECTION INTRAMUSCULAR; INTRAVENOUS at 13:41

## 2018-02-08 RX ADMIN — MIDAZOLAM 1 MG: 1 INJECTION INTRAMUSCULAR; INTRAVENOUS at 13:19

## 2018-02-08 RX ADMIN — LIDOCAINE HYDROCHLORIDE 40 MG: 20 INJECTION, SOLUTION INFILTRATION; PERINEURAL at 13:24

## 2018-02-08 RX ADMIN — SODIUM CHLORIDE, POTASSIUM CHLORIDE, SODIUM LACTATE AND CALCIUM CHLORIDE: 600; 310; 30; 20 INJECTION, SOLUTION INTRAVENOUS at 13:01

## 2018-02-08 RX ADMIN — LIDOCAINE HYDROCHLORIDE 1 ML: 10 INJECTION, SOLUTION EPIDURAL; INFILTRATION; INTRACAUDAL; PERINEURAL at 13:02

## 2018-02-08 RX ADMIN — PROPOFOL 100 MCG/KG/MIN: 10 INJECTION, EMULSION INTRAVENOUS at 13:26

## 2018-02-08 RX ADMIN — PROPOFOL 40 MG: 10 INJECTION, EMULSION INTRAVENOUS at 13:26

## 2018-02-08 RX ADMIN — FENTANYL CITRATE 50 MCG: 50 INJECTION, SOLUTION INTRAMUSCULAR; INTRAVENOUS at 13:19

## 2018-02-08 RX ADMIN — CEFAZOLIN SODIUM 2 G: 2 INJECTION, SOLUTION INTRAVENOUS at 13:24

## 2018-02-08 ASSESSMENT — ENCOUNTER SYMPTOMS: DYSRHYTHMIAS: 1

## 2018-02-08 NOTE — BRIEF OP NOTE
[facility]  Brief Operative Note    Pre-operative diagnosis: Bladder stone   Post-operative diagnosis same   Procedure: Procedure(s):  COMBINED CYSTOSCOPY EXTRACT STONE   Surgeon: Orlando Marr MD   Assistants(s): None   Anesthesia: Local with MAC    Estimated blood loss: minimal                   Specimens: None   Implants: None       Complications: None   Condition on discharge: Stable   Findings: Bladder stone  See dictated operative report for full details

## 2018-02-08 NOTE — ANESTHESIA POSTPROCEDURE EVALUATION
Patient: Tomer Weiss    Procedure(s):  cystoscopy, bladder stone removal - Wound Class: II-Clean Contaminated    Diagnosis:bladder stone  Diagnosis Additional Information: No value filed.    Anesthesia Type:  MAC    Note:  Anesthesia Post Evaluation    Patient location during evaluation: Phase 2 and Bedside  Patient participation: Able to fully participate in evaluation  Level of consciousness: awake  Pain management: adequate  Airway patency: patent  Cardiovascular status: acceptable and hemodynamically stable  Respiratory status: acceptable, room air and nonlabored ventilation  Hydration status: stable  PONV: none     Anesthetic complications: None    Comments: Patient was happy with the anesthesia care received and no anesthesia related complications were noted.  I will follow up with the patient again if it is needed.        Last vitals:  Vitals:    02/08/18 1200 02/08/18 1357 02/08/18 1400   BP: 123/88 128/79 128/79   Pulse: 54     Resp: 16 16 18   Temp: 97.6  F (36.4  C)     SpO2: 93% 99% 98%         Electronically Signed By: ECHO Marcus CRNA  February 8, 2018  2:25 PM

## 2018-02-08 NOTE — IP AVS SNAPSHOT
MRN:[id number]                      After Visit Summary   2/8/2018    Tomer Weiss    MRN: [id number]           Thank you!     Thank you for choosing [facility] for your care. Our goal is always to provide you with excellent care. Hearing back from our patients is one way we can continue to improve our services. Please take a few minutes to complete the written survey that you may receive in the mail after you visit with us. Thank you!        Patient Information     Date Of Birth          4/9/1932        About your hospital stay     You were admitted on:  February 8, 2018 You last received care in the:  [facility]    You were discharged on:  February 8, 2018       Who to Call     For medical emergencies, please call 911.  For non-urgent questions about your medical care, please call your primary care provider or clinic, [phone]  For questions related to your surgery, please call your surgery clinic        Attending Provider     Provider Specialty    Orlando Marr MD Urology       Primary Care Provider Office Phone # Fax #    Gabriel Tan [phone]      After Care Instructions     Diet Instructions       Resume pre procedure diet            Discharge Instructions       Resume Aspirin / warfarin (COUMADIN) when urine is clear to faint pink            Discharge Instructions       Instruct patient how to remove ojeda catheter tomorrow am            Encourage fluids       Encourage fluids at home to keep urine clear to light pink            No Alcohol       for 24 hours post procedure            No driving or operating machinery        until the day after procedure                  Your next 10 appointments already scheduled     Feb 21, 2018  3:30 PM CST   Anticoagulation Visit with ZM ANTI COAG   [facility]    [address]  [facility] [address] [phone]            Feb 22, 2018 11:00 AM  CST   Return Visit with Vance Brennan MD   [facility] ([facility])    [address] [facility] [address] [phone]              Further instructions from your care team       [facility]  Adult Discharge Orders & Instructions     For 24 hours after surgery    1. Get plenty of rest.  A responsible adult must stay with you for at least 24 hours after you leave the hospital.   2. Do not drive or use heavy equipment.  If you have weakness or tingling, don't drive or use heavy equipment until this feeling goes away.  3. Do not drink alcohol.  4. Avoid strenuous or risky activities.  Ask for help when climbing stairs.   5. You may feel lightheaded.  IF so, sit for a few minutes before standing.  Have someone help you get up.   6. If you have nausea (feel sick to your stomach): Drink only clear liquids such as apple juice, ginger ale, broth or 7-Up.  Rest may also help.  Be sure to drink enough fluids.  Move to a regular diet as you feel able.  7. You may have a slight fever. Call the doctor if your fever is over 100 F (37.7 C) (taken under the tongue) or lasts longer than 24 hours.  8. You may have a dry mouth, a sore throat, muscle aches or trouble sleeping.  These should go away after 24 hours.  9. Do not make important or legal decisions.   Call your doctor for any of the followin.  Signs of infection (fever, growing tenderness at the surgery site, a large amount of drainage or bleeding, severe pain, foul-smelling drainage, redness, swelling).    2. It has been over 8 to 10 hours since surgery and you are still not able to urinate (pass water).    3.  Headache for over 24 hours.    To contact a doctor, call [phone], a 24 hour nurse advice line.       Pending Results     No orders found from 2018 to 2018.            Admission Information     Date & Time Provider Department Dept. Phone    2018 [facility]  "Orlando Ireland MD [facility] Phase [phone]      Your Vitals Were     Blood Pressure Pulse Temperature Respirations Pulse Oximetry       123/81 54 97.6  F (36.4  C) (Oral) 16 97%       MyChart Information     [employer] lets you send messages to your doctor, view your test results, renew your prescriptions, schedule appointments and more. To sign up, go to [email or web address] . Click on \"Log in\" on the left side of the screen, which will take you to the Welcome page. Then click on \"Sign up Now\" on the right side of the page.     You will be asked to enter the access code listed below, as well as some personal information. Please follow the directions to create your username and password.     Your access code is: [email or web address]  Expires: 3/13/2018 10:11 AM     Your access code will  in 90 days. If you need help or a new code, please call your [facility] clinic or [phone].        Care EveryWhere ID     This is your Care EveryWhere ID. This could be used by other organizations to access your [facility] medical records  [id number]        Equal Access to Services     NANCY MILAN : Hadii whitney Hilario, waaxda luluis aadaha, qaybta kaalmada marty, mariposa johnson. So [facility] [phone].    ATENCIÓN: Si habla español, tiene a rojas disposición servicios gratuitos de asistencia lingüística. Ellis al [phone].    We comply with applicable federal civil rights laws and [place] laws. We do not discriminate on the basis of race, color, national origin, age, disability, sex, sexual orientation, or gender identity.               Review of your medicines      UNREVIEWED medicines. 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        2 Tablets every morning   Refills:  0       iron 66 MG Tabs   Used for:  Low iron        Dose:  65 mg   Take 1 tablet (66 mg) by mouth daily   Quantity:  1 tablet   Refills:  0       metoprolol succinate 25 MG 24 hr tablet   Commonly known as:  TOPROL-XL   Used for:  Chronic atrial fibrillation (H)        Dose:  12.5 mg   Take 0.5 tablets (12.5 mg) by mouth daily   Quantity:  90 tablet   Refills:  2       * TRIAD HYDROPHILIC WOUND DRESSI Pste        Dose:  1 applicator   Externally apply 1 g topically daily   Quantity:  1 Tube   Refills:  3       * TRIAD HYDROPHILIC WOUND DRESSI Pste   Used for:  Leg wound, right, subsequent encounter        Dose:  1 applicator   Externally apply 1 g topically daily   Quantity:  1 Tube   Refills:  3       warfarin 5 MG tablet   Commonly known as:  COUMADIN   Used for:  Long-term (current) use of anticoagulants        Take 2.5 mg (1/2 tablet) Tuesday and Saturday and 5 mg other 5 days or as directed by the coumadin clinic   Quantity:  75 tablet   Refills:  1       * Notice:  This list has 2 medication(s) that are the same as other medications prescribed for you. Read the directions carefully, and ask your doctor or other care provider to review them with you.      START taking        Dose / Directions    ciprofloxacin 500 MG tablet   Commonly known as:  CIPRO   Used for:  Bladder stone        Dose:  500 mg   Take 1 tablet (500 mg) by mouth 2 times daily for 3 days Start to take morning of the procedure   Quantity:  6 tablet   Refills:  0       HYDROcodone-acetaminophen 5-325 MG per tablet   Commonly known as:  NORCO   Used for:  Bladder stone        Dose:  1-2 tablet   Take 1-2 tablets by mouth every 4 hours as needed for moderate to  severe pain (Moderate to Severe Pain)   Quantity:  15 tablet   Refills:  0         CONTINUE these medicines which have NOT CHANGED        Dose / Directions    order for DME   Used for:  Status post total left knee replacement        Equipment being ordered: Walker () Treatment Diagnosis: s/p joint replacement   Quantity:  1 Device   Refills:  0            Where to get your medicines      These medications were sent to [facility] [address] [place] [address] [facility]  [address] [facility] [address]     Phone:  [phone]     ciprofloxacin 500 MG tablet         Some of these will need a paper prescription and others can be bought over the counter. Ask your nurse if you have questions.     Bring a paper prescription for each of these medications     HYDROcodone-acetaminophen 5-325 MG per tablet                Protect others around you: Learn how to safely use, store and throw away your medicines at www.disposemymeds.org.        ANTIBIOTIC INSTRUCTION     You've Been Prescribed an Antibiotic - Now What?  Your healthcare team thinks that you or your loved one might have an infection. Some infections can be treated with antibiotics, which are powerful, life-saving drugs. Like all medications, antibiotics have side effects and should only be used when necessary. There are some important things you should know about your antibiotic treatment.      Your healthcare team may run tests before you start taking an antibiotic.    Your team may take samples (e.g., from your blood, urine or other areas) to run tests to look for bacteria. These test can be important to determine if you need an antibiotic at all and, if you do, which antibiotic will work best.      Within a few days, your healthcare team might change or even stop your antibiotic.    Your team may start you on an antibiotic while they are working to find out what is making you sick.    Your team might change your antibiotic because test results  show that a different antibiotic would be better to treat your infection.    In some cases, once your team has more information, they learn that you do not need an antibiotic at all. They may find out that you don't have an infection, or that the antibiotic you're taking won't work against your infection. For example, an infection caused by a virus can't be treated with antibiotics. Staying on an antibiotic when you don't need it is more likely to be harmful than helpful.      You may experience side effects from your antibiotic.    Like all medications, antibiotics have side effects. Some of these can be serious.    Let you healthcare team know if you have any known allergies when you are admitted to the hospital.    One significant side effect of nearly all antibiotics is the risk of severe and sometimes deadly diarrhea caused by Clostridium difficile (C. Difficile). This occurs when a person takes antibiotics because some good germs are destroyed. Antibiotic use allows C. diificile to take over, putting patients at high risk for this serious infection.    As a patient or caregiver, it is important to understand your or your loved one's antibiotic treatment. It is especially important for caregivers to speak up when patients can't speak for themselves. Here are some important questions to ask your healthcare team.    What infection is this antibiotic treating and how do you know I have that infection?    What side effects might occur from this antibiotic?    How long will I need to take this antibiotic?    Is it safe to take this antibiotic with other medications or supplements (e.g., vitamins) that I am taking?     Are there any special directions I need to know about taking this antibiotic? For example, should I take it with food?    How will I be monitored to know whether my infection is responding to the antibiotic?    What tests may help to make sure the right antibiotic is prescribed for me?      Information  provided by:  www.cdc.gov/getsmart  U.S. Department of Health and Human Services  Centers for disease Control and Prevention  National Center for Emerging and Zoonotic Infectious Diseases  Division of Healthcare Quality Promotion        Information about OPIOIDS     PRESCRIPTION OPIOIDS: WHAT YOU NEED TO KNOW    Prescription opioids can be used to help relieve moderate to severe pain and are often prescribed following a surgery or injury, or for certain health conditions. These medications can be an important part of treatment but also come with serious risks. It is important to work with your health care provider to make sure you are getting the safest, most effective care.    WHAT ARE THE RISKS AND SIDE EFFECTS OF OPIOID USE?  Prescription opioids carry serious risks of addiction and overdose, especially with prolonged use. An opioid overdose, often marked by slowed breathing can cause sudden death. The use of prescription opioids can have a number of side effects as well, even when taken as directed:      Tolerance - meaning you might need to take more of a medication for the same pain relief    Physical dependence - meaning you have symptoms of withdrawal when a medication is stopped    Increased sensitivity to pain    Constipation    Nausea, vomiting, and dry mouth    Sleepiness and dizziness    Confusion    Depression    Low levels of testosterone that can result in lower sex drive, energy, and strength    Itching and sweating    RISKS ARE GREATER WITH:    History of drug misuse, substance use disorder, or overdose    Mental health conditions (such as depression or anxiety)    Sleep apnea    Older age (65 years or older)    Pregnancy    Avoid alcohol while taking prescription opioids.   Also, unless specifically advised by your health care provider, medications to avoid include:    Benzodiazepines (such as Xanax or Valium)    Muscle relaxants (such as Soma or Flexeril)    Hypnotics (such as Ambien or  Lunesta)    Other prescription opioids    KNOW YOUR OPTIONS:  Talk to your health care provider about ways to manage your pain that do not involve prescription opioids. Some of these options may actually work better and have fewer risks and side effects:    Pain relievers such as acetaminophen, ibuprofen, and naproxen    Some medications that are also used for depression or seizures    Physical therapy and exercise    Cognitive behavioral therapy, a psychological, goal-directed approach, in which patients learn how to modify physical, behavioral, and emotional triggers of pain and stress    IF YOU ARE PRESCRIBED OPIOIDS FOR PAIN:    Never take opioids in greater amounts or more often than prescribed    Follow up with your primary health care provider and work together to create a plan on how to manage your pain.    Talk about ways to help manage your pain that do not involve prescription opioids    Talk about all concerns and side effects    Help prevent misuse and abuse    Never sell or share prescription opioids    Never use another person's prescription opioids    Store prescription opioids in a secure place and out of reach of others (this may include visitors, children, friends, and family)    Visit www.cdc.gov/drugoverdose to learn about risks of opioid abuse and overdose    If you believe you may be struggling with addiction, tell your health care provider and ask for guidance or call [facility]'s National Helpline at 0-923-957-HELP    LEARN MORE / www.cdc.gov/drugoverdose/prescribing/guideline.html    Safely dispose of unused prescription opioids: Find your local drug take-back programs and more information about the importance of safe disposal at www.doseofreality.mn.gov             Medication List: This is a list of all your medications and when to take them. Check marks below indicate your daily home schedule. Keep this list as a reference.      Medications           Morning Afternoon Evening Bedtime As  Needed    atorvastatin 20 MG tablet   Commonly known as:  LIPITOR   Take 1 tablet (20 mg) by mouth daily                                ciprofloxacin 500 MG tablet   Commonly known as:  CIPRO   Take 1 tablet (500 mg) by mouth 2 times daily for 3 days Start to take morning of the procedure                                furosemide 20 MG tablet   Commonly known as:  LASIX   TAKE ONE TABLET BY MOUTH ONCE DAILY                                glucosamine 500 MG Caps   2 Tablets every morning                                HYDROcodone-acetaminophen 5-325 MG per tablet   Commonly known as:  NORCO   Take 1-2 tablets by mouth every 4 hours as needed for moderate to severe pain (Moderate to Severe Pain)                                iron 66 MG Tabs   Take 1 tablet (66 mg) by mouth daily                                metoprolol succinate 25 MG 24 hr tablet   Commonly known as:  TOPROL-XL   Take 0.5 tablets (12.5 mg) by mouth daily                                order for DME   Equipment being ordered: Walker () Treatment Diagnosis: s/p joint replacement                                * TRIAD HYDROPHILIC WOUND DRESSI Pste   Externally apply 1 g topically daily                                * TRIAD HYDROPHILIC WOUND DRESSI Pste   Externally apply 1 g topically daily                                warfarin 5 MG tablet   Commonly known as:  COUMADIN   Take 2.5 mg (1/2 tablet) Tuesday and Saturday and 5 mg other 5 days or as directed by the coumadin clinic                                * Notice:  This list has 2 medication(s) that are the same as other medications prescribed for you. Read the directions carefully, and ask your doctor or other care provider to review them with you.              More Information        Roman Catheter Removal     The syringe is put into the balloon port to allow the balloon to empty. Once the balloon is empty, the catheter can be removed.   Your healthcare provider has instructed you to  remove your Roman catheter. This is a thin, flexible tube that allows urine to drain out of your bladder and into a bag. It s important to properly remove your catheter to help prevent infection and other complications. If you have any questions about removing the Roman catheter, ask your healthcare provider before trying to remove it. Otherwise, follow the instructions on this sheet.  Roman catheter  The Roman catheter is held in place by a small balloon that s filled with water. To remove the catheter, you must first drain the water from the balloon. This is done using a syringe and the balloon port. This is the opening in the catheter that isn t attached to the bag. It allows you to get to the balloon.  Instructions for removing the catheter  Follow the directions closely. Note: If the catheter doesn t come out with gentle pulling, stop and call your healthcare provider right away.    Empty the bag of urine if needed.    Wash your hands with soap and warm water. Dry them well.    Gather your supplies. This includes a syringe, wastebasket, a towel, and a syringe that was given to you by your healthcare provider.    Put the syringe into the balloon port on the catheter. The syringe fits tightly into the port with a firm push and twist motion.    Wait as the water from the balloon empties into the syringe. Depending on how large the balloon is, you may need to repeat this process several times until all of the water is out of the balloon.    Once the balloon is emptied, gently pull out the catheter.    Put the used catheter in the wastebasket. Also throw away the syringe.    Use the towel to wipe up any spilled water or urine if needed.    Wash your hands again.  When to call your healthcare provider  Call the healthcare provider right away if:    You have a fever of 100.4 F (38 C) or higher, or as directed by your healthcare provider.    You have questions about removing the catheter.    The catheter doesn t come  out with gentle pulling.    You can t urinate within 8 hours after removing the catheter.    Your belly (abdomen) is painful or bloated.    You have burning pain with urination that lasts for 24 hours.    You see a lot of blood in the urine. Light bleeding for 24 hours is normal.    It feels like the bladder is not emptying.   Date Last Reviewed: 12/1/2016 2000-2017 [employer]. [address] [place] [address], [place] [address]. All rights reserved. This information is not intended as a substitute for professional medical care. Always follow your healthcare professional's instructions.

## 2018-02-08 NOTE — IP AVS SNAPSHOT
[facility] Phase [facility]    [address] [facility]     [place] [address]    Phone:  [phone]                                       After Visit Summary   2/8/2018    Tomer Weiss    MRN: [id number]           After Visit Summary Signature Page     I have received my discharge instructions, and my questions have been answered. I have discussed any challenges I see with this plan with the nurse or doctor.    ..........................................................................................................................................  Patient/Patient Representative Signature      ..........................................................................................................................................  Patient Representative Print Name and Relationship to Patient    ..................................................               ................................................  Date                                            Time    ..........................................................................................................................................  Reviewed by Signature/Title    ...................................................              ..............................................  Date                                                            Time

## 2018-02-08 NOTE — OP NOTE
Procedure Date: 2018      PREOPERATIVE DIAGNOSIS:  Large bladder stone.      POSTOPERATIVE DIAGNOSIS:  Large bladder stone.      PROCEDURE:  Litholapaxy greater than 2 cm bladder stone.      DESCRIPTION OF PROCEDURE:  The patient was brought to the Operating Room.  After sedation was induced, he was placed in a dorsal lithotomy position.  He was prepped and draped in the usual surgical fashion.  Preop antibiotic was given.  A 25-British cystoscope was then placed into the bladder under direct vision.  The stone was identified.  It is quite large, probably over 2 cm in size.  Using a stone , the stone was broken down into multiple small pieces.  These stone fragments were then evacuated.  After this was done, a 20-British Roman catheter was placed into the bladder under direct vision.  The patient tolerated the procedure well.  There were no complications identified during the procedure.  There was minimal bleeding during the operation.         CARLOS ALBERTO SCHUMACHER MD             D: 2018   T: 2018   MT: ADRIAN      Name:     ELIER BUTLER   MRN:      [id number]        Account:        [id number]   :      1932           Procedure Date: 2018      Document: [id number]

## 2018-02-08 NOTE — ANESTHESIA PREPROCEDURE EVALUATION
Anesthesia Evaluation     . Pt has had prior anesthetic. Type: General and MAC    No history of anesthetic complications          ROS/MED HX    ENT/Pulmonary:  - neg pulmonary ROS     Neurologic:  - neg neurologic ROS     Cardiovascular: Comment: Has had ablation for A-flutter 2015, pt currently in A-FIB    (+) Dyslipidemia, ----. Taking blood thinners Pt has received instructions: . . . :. dysrhythmias a-flutter and a-fib, Irregular Heartbeat/Palpitations, . pulmonary hypertension, Previous cardiac testing Echodate:, 17results:EF=45%-    Echocardiogram Complete   Order: [id number]   Status: Edited Result - FINAL   Visible to patient: No (Inaccessible in MyChart) Next appt: Today at 01:15 PM in Radiology. ([facility] GLO 2) Dx: Cardiomegaly   Notes Recorded by Gabriel Tan MD on 2017 at 12:57 PM  His echo shows changes affecting the right side of his heart same as seen last January.  Continue his water pill, if he has new symptoms of sob or syncope let me know.    Details     Reading Physician Reading Date Result Priority  Charlene Bhatt MD 2017   Narrative        [id number]  [id number]  [id number]  [id number]^AMADA^GABRIEL^ANGELA           [facility]  Echocardiography Laboratory  [address] [place] [address]        Name: ELIER BUTLER  MRN: [id number]  : 1932  Study Date: 2017 09:05 AM  Age: 85 yrs  Gender: Male  Patient Location: [facility]  Reason For Study: Cardiomegaly  History: Afib, Hyperlipid  Ordering Physician: GABRIEL TAN  Referring Physician: GABRIEL TAN  Performed By: Larisa De Luna     BSA: 2.1 m2  Height: 74 in  Weight: 192 lb  HR: 84  BP: 110/66 mmHg  _____________________________________________________________________________  __        Procedure  Complete Echo Adult.  _____________________________________________________________________________  __        Interpretation Summary     Mild aortic root  dilatation.  The ascending aorta is Mildly dilated.  The right atrium is severely dilated.  The right ventricle is severely dilated.  Right ventricular systolic pressure is elevated, consistent with moderate to  severe pulmonary hypertension.  This is overall similar to prior study of 1/13/2017 on [id number]  comparison.  Left ventricular systolic function is normal.  _____________________________________________________________________________  __        Left Ventricle  The left ventricle is normal in size. There is moderate concentric left  ventricular hypertrophy. Left ventricular systolic function is normal. The  visual ejection fraction is estimated at 55-60%. Left ventricular diastolic  function is indeterminate. E by E prime ratio is less than 8, that likely  suggests normal left ventricular filling pressures. No regional wall motion  abnormalities noted.     Right Ventricle  The right ventricle is severely dilated. The right ventricular systolic  function is moderate to severely reduced.     Atria  The left atrium is severely dilated. The chamber is not well visualized and  appears underestimated by volumetrics. The right atrium is severely dilated.  There is no atrial shunt seen.     Mitral Valve  The mitral valve leaflets appear thickened, but open well. There is mild to  moderate mitral annular calcification. There is mild (1+) mitral  regurgitation.        Tricuspid Valve  Normal tricuspid valve. There is trace tricuspid regurgitation. The right  ventricular systolic pressure is approximated at 31.0 mmHg plus the right  atrial pressure. Dilated IVC (>2.5cm) with no respiratory collapse; right  atrial pressure is estimated at >20mmHg. Right ventricular systolic pressure  is elevated, consistent with moderate to severe pulmonary hypertension.     Aortic Valve  The aortic valve is trileaflet. There is moderate trileaflet aortic sclerosis.  No aortic regurgitation is present. No hemodynamically significant  valvular  aortic stenosis. The peak AoV pressure gradient is 6.0 mmHg.     Pulmonic Valve  The pulmonic valve is not well visualized. There is no pulmonic valvular  regurgitation. Normal pulmonic valve velocity.     Vessels  Mild aortic root dilatation. The ascending aorta is Mildly dilated. The IVC is  dilated and fails to change with respiration, suggesting elevated central  venous pressure.     Pericardium  There is no pericardial effusion.        Rhythm  The rhythm was atrial fibrillation.  _____________________________________________________________________________  __  MMode/2D Measurements & Calculations  IVSd: 1.7 cm     LVIDd: 3.9 cm  LVIDs: 2.9 cm  LVPWd: 1.8 cm  FS: 25.9 %  EDV(Teich): 64.0 ml  ESV(Teich): 31.0 ml  LV mass(C)d: 281.3 grams  LV mass(C)dI: 131.7 grams/m2  Ao root diam: 3.9 cm  LA dimension: 5.7 cm  asc Aorta Diam: 4.1 cm  LA/Ao: 1.5  LA Volume (BP): 75.5 ml  LA Volume Index (BP): 35.3 ml/m2  RWT: 0.94           Doppler Measurements & Calculations  MV dec time: 0.16 sec  Ao V2 max: 122.1 cm/sec  Ao max P.0 mmHg  LV V1 max PG: 3.3 mmHg  LV V1 max: 90.1 cm/sec  PA acc time: 0.14 sec  TR max za: 278.3 cm/sec  TR max P.0 mmHg           _____________________________________________________________________________  __           Report approved by: Harsha Kimbrough 2017 12:45 PM               date: results:ECG reviewed date:17 results:A-fib, occ PVCs,RVH date: results:          METS/Exercise Tolerance:  3 - Able to walk 1-2 blocks without stopping   Hematologic:  - neg hematologic  ROS       Musculoskeletal:   (+) arthritis, , , -       GI/Hepatic:  - neg GI/hepatic ROS       Renal/Genitourinary:     (+) chronic renal disease, Nephrolithiasis , Other Renal/ Genitourinary, Kidney/bladder CA      Endo:  - neg endo ROS       Psychiatric:  - neg psychiatric ROS       Infectious Disease:  - neg infectious disease ROS       Malignancy:   (+) Malignancy History of  Other  Other CA Kidney/bladder CA Active status post         Other:    (+) No chance of pregnancy C-spine cleared: N/A, no H/O Chronic Pain,no other significant disability                    Physical Exam  Normal systems: pulmonary    Airway   Mallampati: II  TM distance: >3 FB  Neck ROM: full    Dental   (+) missing    Cardiovascular   Rhythm and rate: irregular and normal      Pulmonary    breath sounds clear to auscultation                 PAC Discussion and Assessment    ASA Classification:   Case is suitable for:   Anesthetic techniques and relevant risks discussed:   Invasive monitoring and risk discussed:   Types:   Possibility and Risk of blood transfusion discussed:   NPO instructions given:   Additional anesthetic preparation and risks discussed:   Needs early admission to pre-op area: No  Other:     PAC Resident/NP Anesthesia Assessment:        Mid-Level Provider/Resident:   Date:   Time:     Attending Anesthesiologist Anesthesia Assessment:        Anesthesiologist:   Date:   Time:   Pass/Fail:   Disposition:     PAC Pharmacist Assessment:        Pharmacist:   Date:   Time:      Anesthesia Plan      History & Physical Review  History and physical reviewed and following examination; no interval change.    ASA Status:  3 .    NPO Status:  > 8 hours    Plan for MAC with Intravenous and Propofol induction. Reason for MAC:  Deep or markedly invasive procedure (G8)  PONV prophylaxis:  Ondansetron (or other 5HT-3)       Postoperative Care  Postoperative pain management:  IV analgesics and Oral pain medications.      Consents  Anesthetic plan, risks, benefits and alternatives discussed with:  Patient or representative and Patient.  Use of blood products discussed: No .   .                          .

## 2018-02-08 NOTE — DISCHARGE INSTRUCTIONS
[facility] Anesthesia  Adult Discharge Orders & Instructions     For 24 hours after surgery    1. Get plenty of rest.  A responsible adult must stay with you for at least 24 hours after you leave the hospital.   2. Do not drive or use heavy equipment.  If you have weakness or tingling, don't drive or use heavy equipment until this feeling goes away.  3. Do not drink alcohol.  4. Avoid strenuous or risky activities.  Ask for help when climbing stairs.   5. You may feel lightheaded.  IF so, sit for a few minutes before standing.  Have someone help you get up.   6. If you have nausea (feel sick to your stomach): Drink only clear liquids such as apple juice, ginger ale, broth or 7-Up.  Rest may also help.  Be sure to drink enough fluids.  Move to a regular diet as you feel able.  7. You may have a slight fever. Call the doctor if your fever is over 100 F (37.7 C) (taken under the tongue) or lasts longer than 24 hours.  8. You may have a dry mouth, a sore throat, muscle aches or trouble sleeping.  These should go away after 24 hours.  9. Do not make important or legal decisions.   Call your doctor for any of the followin.  Signs of infection (fever, growing tenderness at the surgery site, a large amount of drainage or bleeding, severe pain, foul-smelling drainage, redness, swelling).    2. It has been over 8 to 10 hours since surgery and you are still not able to urinate (pass water).    3.  Headache for over 24 hours.    To contact a doctor, call [phone], a 24 hour nurse advice line.

## 2018-02-08 NOTE — ANESTHESIA CARE TRANSFER NOTE
Patient: Tomer Weiss    Procedure(s):  cystoscopy, bladder stone removal - Wound Class: II-Clean Contaminated    Diagnosis: bladder stone  Diagnosis Additional Information: No value filed.    Anesthesia Type:   MAC     Note:  Airway :Face Mask  Patient transferred to:Phase II  Handoff Report: Identifed the Patient, Identified the Reponsible Provider, Reviewed the pertinent medical history, Discussed the surgical course, Reviewed Intra-OP anesthesia mangement and issues during anesthesia, Set expectations for post-procedure period and Allowed opportunity for questions and acknowledgement of understanding      Vitals: (Last set prior to Anesthesia Care Transfer)    CRNA VITALS  2/8/2018 1324 - 2/8/2018 1400      2/8/2018             Pulse: 74    SpO2: 100 %                Electronically Signed By: ECHO Mracus CRNA  February 8, 2018  2:00 PM

## 2018-02-10 DIAGNOSIS — I48.20 CHRONIC ATRIAL FIBRILLATION (H): ICD-10-CM

## 2018-02-12 DIAGNOSIS — Z79.01 LONG-TERM (CURRENT) USE OF ANTICOAGULANTS: ICD-10-CM

## 2018-02-12 DIAGNOSIS — E78.5 HYPERLIPIDEMIA LDL GOAL <130: ICD-10-CM

## 2018-02-12 RX ORDER — METOPROLOL TARTRATE 25 MG/1
TABLET, FILM COATED ORAL
Qty: 45 TABLET | Refills: 9 | Status: SHIPPED | OUTPATIENT
Start: 2018-02-12 | End: 2018-05-09

## 2018-02-12 RX ORDER — WARFARIN SODIUM 5 MG/1
TABLET ORAL
Qty: 75 TABLET | Refills: 1 | Status: SHIPPED | OUTPATIENT
Start: 2018-02-12 | End: 2018-03-07

## 2018-02-12 RX ORDER — ATORVASTATIN CALCIUM 20 MG/1
20 TABLET, FILM COATED ORAL DAILY
Qty: 10 TABLET | Refills: 0 | Status: SHIPPED | OUTPATIENT
Start: 2018-02-12 | End: 2018-02-28

## 2018-02-12 NOTE — TELEPHONE ENCOUNTER
"Requested Prescriptions   Pending Prescriptions Disp Refills     warfarin (COUMADIN) 5 MG tablet 75 tablet 1     Sig: Take 2.5 mg (1/2 tablet) Tuesday and Saturday and 5 mg other 5 days or as directed by the coumadin clinic    Vitamin K Antagonists Failed    2/12/2018 11:45 AM       Failed - INR is within goal in the past 6 weeks    Confirm INR is within goal in the past 6 weeks.     Recent Labs   Lab Test  02/08/18   1223   INR  1.06                      Passed - Recent or future visit with authorizing provider's specialty    Patient had office visit in the last year or has a visit in the next 30 days with authorizing provider.  See \"Patient Info\" tab in inbasket, or \"Choose Columns\" in Meds & Orders section of the refill encounter.            Passed - Patient is 18 years of age or older          Last Written Prescription Date:  11/20/17  Last Fill Quantity: 75,  # refills: 1   Last Office Visit with [facility] or [facility] prescribing provider:  1/31/18   Future Office Visit:    Next 5 appointments (look out 90 days)     Feb 22, 2018 11:00 AM CST   Return Visit with Vance Brennan MD   [facility] ([facility])    [address] [place] [address] [phone]                   "

## 2018-02-21 ENCOUNTER — ANTICOAGULATION THERAPY VISIT (OUTPATIENT)
Dept: ANTICOAGULATION | Facility: OTHER | Age: 83
End: 2018-02-21
Payer: MEDICARE

## 2018-02-21 DIAGNOSIS — Z79.01 LONG-TERM (CURRENT) USE OF ANTICOAGULANTS: ICD-10-CM

## 2018-02-21 DIAGNOSIS — I48.91 ATRIAL FIBRILLATION, UNSPECIFIED TYPE (H): ICD-10-CM

## 2018-02-21 LAB — INR POINT OF CARE: 2.3 (ref 0.86–1.14)

## 2018-02-21 PROCEDURE — 99207 ZZC NO CHARGE NURSE ONLY: CPT

## 2018-02-21 PROCEDURE — 36416 COLLJ CAPILLARY BLOOD SPEC: CPT

## 2018-02-21 PROCEDURE — 85610 PROTHROMBIN TIME: CPT | Mod: QW

## 2018-02-21 NOTE — MR AVS SNAPSHOT
Tomer Weiss   2/21/2018 3:30 PM   Anticoagulation Therapy Visit    Description:  85 year old male   Provider:  [facility]   Department:  [facility]           INR as of 2/21/2018     Today's INR 2.3      Anticoagulation Summary as of 2/21/2018     INR goal 2.0-3.0   Today's INR 2.3   Full instructions 2.5 mg on Tue, Sat; 5 mg all other days   Next INR check 3/28/2018    Indications   Long-term (current) use of anticoagulants [Z79.01] [Z79.01]  Atrial fibrillation (HCC) [I48.91] [I48.91]         Your next Anticoagulation Clinic appointment(s)     Mar 28, 2018  3:00 PM CDT   [facility]agulation [facility] with [facility]   [facility]    [address] [address] [phone]              Contact Numbers     Clinic Number:         February 2018 Details    Sun Mon Tue Wed Thu Fri Sat         1               2               3                 4               5               6               7               8               9               10                 11               12               13               14               15               16               17                 18               19               20               21      5 mg   See details      22      5 mg         23      5 mg         24      2.5 mg           25      5 mg         26      5 mg         27      2.5 mg         28      5 mg             Date Details   02/21 This INR check               How to take your warfarin dose     To take:  2.5 mg Take 0.5 of a 5 mg tablet.    To take:  5 mg Take 1 of the 5 mg tablets.           March 2018 Details    Sun Mon Tue Wed Thu Fri Sat         1      5 mg         2      5 mg         3      2.5 mg           4      5 mg         5      5 mg         6      2.5 mg         7      5 mg         8      5 mg         9      5 mg         10      2.5 mg           11      5 mg         12      5 mg         13      2.5 mg         14      5 mg         15       5 mg         16      5 mg         17      2.5 mg           18      5 mg         19      5 mg         20      2.5 mg         21      5 mg         22      5 mg         23      5 mg         24      2.5 mg           25      5 mg         26      5 mg         27      2.5 mg         28            29               30               31                Date Details   No additional details    Date of next INR:  3/28/2018         How to take your warfarin dose     To take:  2.5 mg Take 0.5 of a 5 mg tablet.    To take:  5 mg Take 1 of the 5 mg tablets.

## 2018-02-21 NOTE — PROGRESS NOTES
ANTICOAGULATION FOLLOW-UP CLINIC VISIT    Patient Name:  Tomer Weiss  Date:  2/21/2018  Contact Type:  Face to Face    SUBJECTIVE:     Patient Findings     Positives No Problem Findings           OBJECTIVE    INR Protime   Date Value Ref Range Status   02/21/2018 2.3 (A) 0.86 - 1.14 Final     Chromogenic Factor 10   Date Value Ref Range Status   04/24/2015 21 (L) 70 - 130 % Final     Comment:     Therapeutic Range:  A Chromogenic Factor 10 level of approximately 20-40%   inversely correlates with an INR of 2-3 for patients receiving Warfarin.   Chromogenic Factor 10 levels below 20% indicate an INR greater than 3 and   levels above 40% indicate an INR less than 2.         ASSESSMENT / PLAN  INR assessment THER    Recheck INR In: 5 WEEKS    INR Location Clinic      Anticoagulation Summary as of 2/21/2018     INR goal 2.0-3.0   Today's INR 2.3   Maintenance plan 2.5 mg (5 mg x 0.5) on Tue, Sat; 5 mg (5 mg x 1) all other days   Full instructions 2.5 mg on Tue, Sat; 5 mg all other days   Weekly total 30 mg   No change documented Tana Pérez, JUANY   Plan last modified Tana Pérez, RN (4/27/2016)   Next INR check 3/28/2018   Target end date     Indications   Long-term (current) use of anticoagulants [Z79.01] [Z79.01]  Atrial fibrillation (HCC) [I48.91] [I48.91]         Anticoagulation Episode Summary     INR check location     Preferred lab     Send INR reminders to [facility] POOL    Comments 5 mg tablets, AM dose, AVS      Anticoagulation Care Providers     Provider Role Specialty Phone number    Gabriel Tan MD [facility] Internal Medicine [phone]            See the Encounter Report to view Anticoagulation Flowsheet and Dosing Calendar (Go to Encounters tab in chart review, and find the Anticoagulation Therapy Visit)    Dosage adjustment made based on physician directed care plan.    Tana Pérez, JUANY

## 2018-02-22 ENCOUNTER — OFFICE VISIT (OUTPATIENT)
Dept: CARDIOLOGY | Facility: CLINIC | Age: 83
End: 2018-02-22
Payer: MEDICARE

## 2018-02-22 VITALS
BODY MASS INDEX: 25.44 KG/M2 | SYSTOLIC BLOOD PRESSURE: 126 MMHG | OXYGEN SATURATION: 90 % | HEIGHT: 74 IN | HEART RATE: 80 BPM | DIASTOLIC BLOOD PRESSURE: 70 MMHG | WEIGHT: 198.2 LBS

## 2018-02-22 DIAGNOSIS — I27.20 PULMONARY HYPERTENSION (H): Primary | ICD-10-CM

## 2018-02-22 DIAGNOSIS — I25.10 CORONARY ARTERY DISEASE INVOLVING NATIVE CORONARY ARTERY OF NATIVE HEART WITHOUT ANGINA PECTORIS: ICD-10-CM

## 2018-02-22 DIAGNOSIS — I48.91 ATRIAL FIBRILLATION, UNSPECIFIED TYPE (H): ICD-10-CM

## 2018-02-22 PROCEDURE — 99214 OFFICE O/P EST MOD 30 MIN: CPT | Performed by: INTERNAL MEDICINE

## 2018-02-22 NOTE — MR AVS SNAPSHOT
After Visit Summary   2/22/2018    Tomer Weiss    MRN: [id number]           Patient Information     Date Of Birth          4/9/1932        Visit Information        Provider Department      2/22/2018 11:00 AM Vance Brennan MD [facility]        Today's Diagnoses     Pulmonary hypertension    -  1    Coronary artery disease involving native coronary artery of native heart without angina pectoris        Atrial fibrillation, unspecified type (H)           Follow-ups after your visit        Additional Services     Follow-Up with Pulmonary Hypertension Clinic                 Your next 10 appointments already scheduled     Mar 28, 2018  3:00 PM CDT   Anticoagulation Visit with [facility]   [facility]    [address] [address] [phone]              Future tests that were ordered for you today     Open Future Orders        Priority Expected Expires Ordered    Follow-Up with Pulmonary Hypertension Clinic Routine 2/22/2018 2/22/2019 2/22/2018            Who to contact     If you have questions or need follow up information about today's clinic visit or your schedule please contact [facility] directly at [phone].  Normal or non-critical lab and imaging results will be communicated to you by MyChart, letter or phone within 4 business days after the clinic has received the results. If you do not hear from us within 7 days, please contact the clinic through [employer] or phone. If you have a critical or abnormal lab result, we will notify you by phone as soon as possible.  Submit refill requests through [employer] or call your pharmacy and they will forward the refill request to us. Please allow 3 business days for your refill to be completed.          Additional Information About Your Visit        MyChart Information     [employer] lets you send  "messages to your doctor, view your test results, renew your prescriptions, schedule appointments and more. To sign up, go to [email or web address] . Click on \"Log in\" on the left side of the screen, which will take you to the Welcome page. Then click on \"Sign up Now\" on the right side of the page.     You will be asked to enter the access code listed below, as well as some personal information. Please follow the directions to create your username and password.     Your access code is: [email or web address]  Expires: 3/13/2018 10:11 AM     Your access code will  in 90 days. If you need help or a new code, please call your [facility] clinic or [phone].        Care EveryWhere ID     This is your [facility] EveryWhere ID. This could be used by other organizations to access your [facility] medical records  [id number]        Your Vitals Were     Pulse Height Pulse Oximetry BMI (Body Mass Index)          80 1.88 m (6' 2\") 90% 25.45 kg/m2         Blood Pressure from Last 3 Encounters:   18 126/70   18 119/75   18 114/68    Weight from Last 3 Encounters:   18 89.9 kg (198 lb 3.2 oz)   18 84.8 kg (187 lb)   17 84.6 kg (186 lb 8 oz)              We Performed the Following     Follow-Up with Cardiologist        Primary Care Provider Office Phone # Fax #    Gabriel Tan [phone]       [facility] [address] [place] [address]        Equal Access to Services     [facility] : Hadii whitney pelayo Soalexandra, waaxda luqadaha, qaybta kaalmaalex ferguson, mariposa johnson. So [facility] [phone].    ATENCIÓN: Si habla español, tiene a rojas disposición servicios gratuitos de asistencia lingüística. Ellis al [phone].    We comply with applicable federal civil rights laws and [place] laws. We do not discriminate on the basis of race, color, national origin, age, disability, sex, sexual orientation, or gender identity.            Thank " you!     Thank you for choosing [facility]  for your care. Our goal is always to provide you with excellent care. Hearing back from our patients is one way we can continue to improve our services. Please take a few minutes to complete the written survey that you may receive in the mail after your visit with us. Thank you!             Your Updated Medication List - Protect others around you: Learn how to safely use, store and throw away your medicines at www.disposemymeds.org.          This list is accurate as of 2/22/18 11:30 AM.  Always use your most recent med list.                   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       HYDROcodone-acetaminophen 5-325 MG per tablet    NORCO    15 tablet    Take 1-2 tablets by mouth every 4 hours as needed for moderate to severe pain (Moderate to Severe Pain)    Bladder stone       iron 66 MG Tabs     1 tablet    Take 1 tablet (66 mg) by mouth daily    Low iron       metoprolol tartrate 25 MG tablet    LOPRESSOR    45 tablet    TAKE ONE-HALF TABLET BY MOUTH ONCE DAILY    Chronic atrial fibrillation (H)       order for DME     1 Device    Equipment being ordered: Walker () Treatment Diagnosis: s/p joint replacement    Status post total left knee replacement       * TRIAD HYDROPHILIC WOUND DRESSI Pste     1 Tube    Externally apply 1 g topically daily        * TRIAD HYDROPHILIC WOUND DRESSI Pste     1 Tube    Externally apply 1 g topically daily    Leg wound, right, subsequent encounter       warfarin 5 MG tablet    COUMADIN    75 tablet    Take 2.5 mg (1/2  tablet) Tuesday and Saturday and 5 mg other 5 days or as directed by the coumadin clinic    Long-term (current) use of anticoagulants       * Notice:  This list has 2 medication(s) that are the same as other medications prescribed for you. Read the directions carefully, and ask your doctor or other care provider to review them with you.

## 2018-02-22 NOTE — LETTER
2/22/2018      Gabriel Tan MD  [facility] [address]   [place] [address]      RE: Tomer Weiss       Dear Colleague,    I had the pleasure of seeing Tomer Weiss in the [facility].    Service Date: 02/22/2018      REASON FOR CLINIC VISIT:  Followup atrial fibrillation, CAD.      HISTORY OF PRESENT ILLNESS:  Mr. Weiss is a very pleasant 85-year-old gentleman with history of atrial flutter and atrial fibrillation with history of atrial flutter ablation.  He has been seen in the past by EP and was put on flecainide, but that had to be discontinued because stress test showed mild to moderate inferior ischemia.  To be noted, in terms of coronary artery disease he has been essentially asymptomatic without any anginal symptoms and he has been medically managed for that.  Today, he is coming for routine followup.  He recently had a bladder stone removed and is feeling much better.  He denies any particular shortness of breath, chest discomfort, dizziness, presyncope or syncope.  He walks 2-3 miles a day and yesterday did a very brisk 1 mile walk, again no symptoms with that.  Remarkably, the patient had an echocardiogram done about 2 months ago that showed normal LV function, but the RV was increased in size and it was reported the RV systolic function is decreased.  There is also evidence of pulmonary hypertension with some intraventricular flattening noticed, and RVSP was measured at 31 mmHg plus RA, and IVC was dilated without respiratory variation.  I reviewed those images personally. The RV is indeed enlarged, at least moderate if not severely enlarged.  The RV systolic function overall appears normal to near normal with normal RV systolic excursion tissue velocity and normal TAPSE.  Left and right atrium are both severely enlarged.  There is mild tricuspid regurgitation.  The LV function looks normal.  The patient tells me very  intermittently he can notice some swelling over his extremities at the end of the day, but they are gone by the time he wakes up in the morning.  He is on Coumadin.  He takes low-dose metoprolol tartrate, he is on Lipitor 20 mg daily, and he takes low-dose Lasix 20 mg daily.  The patient is getting  in April.  He is also going on his 27th mission trip to [place] next month.      PHYSICAL EXAMINATION:   VITAL SIGNS:  Blood pressure 126/70, heart rate 80 and regular, weight 198 pounds, BMI 25.5.   GENERAL:  The patient appears pleasant, comfortable.   NECK:  JVP appears elevated to around 10 cm with positive hepatojugular reflux.   CARDIOVASCULAR SYSTEM:  Irregular, normal rate, no murmur, rub or gallop.   RESPIRATORY SYSTEM:  Clear to auscultation bilaterally.   GASTROINTESTINAL SYSTEM:  Abdomen soft, nontender.   EXTREMITIES:  No pitting pedal edema.   NEUROLOGICAL:  Alert, oriented x3.   PSYCHIATRIC:  Normal affect.   SKIN:  No obvious rash.   HEENT:  No pallor or icterus.      IMPRESSION AND PLAN:  A very pleasant 85-year-old gentleman with history of asymptomatic CAD on the basis of stress test in 2015 showing inferior ischemia, chronic atrial fibrillation, adequately rate controlled on low-dose beta blocker therapy, on Coumadin for stroke prophylaxis, with CHADS2-VASc score of 3, recently had bladder surgery and recent echocardiogram showing evidence of RV enlargement with evidence of significant pulmonary hypertension.  I had a long discussion with the patient regarding the recent echocardiographic findings.  Compared to previous echo, the RV was slightly enlarged -- was mildly enlarged on previous study, and on the present study it is more enlarged.  Remarkably, the patient does not have any history of COPD, emphysema or tobacco exposure.  He does not have any history of PE, and to be noted he has been on Coumadin for long-term for stroke prophylaxis in the setting of atrial fibrillation, making chronic  thromboembolic disease quite unlikely.  Remarkably, he is not in congestive heart failure either left-sided or right-sided, and I doubt that the pulmonary hypertension is from left-sided elevated filling pressure.  I discussed the option of further investigation regarding pulmonary hypertension, and at this time I recommend patient see my colleague, Dr. John, in the Pulmonary Hypertension Clinic.  We also briefly discussed that patient may end up requiring a right heart catheterization to further evaluate the severity and the nature of pulmonary hypertension.  Regarding CAD, he is essentially asymptomatic and I recommend continuing current medical therapy with Coumadin providing secondary CAD prophylaxis.  He is already on statin therapy.  Moderate intensity is reasonable given his age and well-controlled LDL and low-dose beta blocker therapy.  Regarding atrial fibrillation, he is essentially asymptomatic from it and rates are well controlled.  I recommend continuing rate control strategy along with Coumadin.      1.  CAD, asymptomatic.  Continue medical management.   2.  Chronic atrial fibrillation.  CHADS2-VASc score of 3, on Coumadin for stroke prophylaxis.  Ventricular rate is well controlled.  Asymptomatic.   3.  Pulmonary hypertension with RV enlargement with borderline reduced RV systolic function to normal RV systolic function.  Clinically, not in congestive heart failure.  Etiology of his pulmonary hypertension is not clear.  Clinically, I doubt that this is from left-sided elevated filling pressure, and he does not have any history of sleep apnea, COPD, emphysema and unlikely to have chronic thromboembolic disease given that he is on chronic Coumadin therapy.      RECOMMENDATIONS:   1.  Continue Coumadin, low-dose beta blocker, atorvastatin.   2.  Evaluation in [facility].         SANDOVAL ESTES MD             D: 02/22/2018   T: 02/22/2018   MT: FRANK      Name:     ELIER BUTLER   MRN:      [id number]         Account:      [id number]   :      1932           Service Date: 2018      Document: [id number]         Outpatient Encounter Prescriptions as of 2018   Medication Sig Dispense Refill     metoprolol tartrate (LOPRESSOR) 25 MG tablet TAKE ONE-HALF TABLET BY MOUTH ONCE DAILY 45 tablet 9     warfarin (COUMADIN) 5 MG tablet Take 2.5 mg (1/2 tablet) Tuesday and Saturday and 5 mg other 5 days or as directed by the coumadin clinic 75 tablet 1     atorvastatin (LIPITOR) 20 MG tablet Take 1 tablet (20 mg) by mouth daily 10 tablet 0     [DISCONTINUED] HYDROcodone-acetaminophen (NORCO) 5-325 MG per tablet Take 1-2 tablets by mouth every 4 hours as needed for moderate to severe pain (Moderate to Severe Pain) (Patient not taking: Reported on 2018) 15 tablet 0     furosemide (LASIX) 20 MG tablet TAKE ONE TABLET BY MOUTH ONCE DAILY 90 tablet 3     [DISCONTINUED] Wound Dressings (TRIAD HYDROPHILIC WOUND DRESSI) PSTE Externally apply 1 g topically daily 1 Tube 3     [DISCONTINUED] Wound Dressings (TRIAD HYDROPHILIC WOUND DRESSI) PSTE Externally apply 1 g topically daily 1 Tube 3     iron 66 MG TABS Take 1 tablet (66 mg) by mouth daily 1 tablet 0     GLUCOSAMINE 500 MG OR CAPS 2 Tablets every morning       order for DME Equipment being ordered: Walker ()  Treatment Diagnosis: s/p joint replacement 1 Device 0     No facility-administered encounter medications on file as of 2018.        Again, thank you for allowing me to participate in the care of your patient.      Sincerely,    Vance Brennan MD     [facility]

## 2018-02-22 NOTE — LETTER
2/22/2018    Gabriel Tan MD  [facility] [address] [place] [address]    RE: Tomer Weiss       Dear Colleague,    I had the pleasure of seeing Tomer Weiss in the [facility].    HPI and Plan:   See dictation(#[id number])    Orders Placed This Encounter   Procedures     Follow-Up with Pulmonary Hypertension Clinic       No orders of the defined types were placed in this encounter.      There are no discontinued medications.      Encounter Diagnoses   Name Primary?     Coronary artery disease involving native coronary artery of native heart without angina pectoris      Atrial fibrillation, unspecified type (H)      Pulmonary hypertension Yes       CURRENT MEDICATIONS:  Current Outpatient Prescriptions   Medication Sig Dispense Refill     metoprolol tartrate (LOPRESSOR) 25 MG tablet TAKE ONE-HALF TABLET BY MOUTH ONCE DAILY 45 tablet 9     warfarin (COUMADIN) 5 MG tablet Take 2.5 mg (1/2 tablet) Tuesday and Saturday and 5 mg other 5 days or as directed by the coumadin clinic 75 tablet 1     atorvastatin (LIPITOR) 20 MG tablet Take 1 tablet (20 mg) by mouth daily 10 tablet 0     HYDROcodone-acetaminophen (NORCO) 5-325 MG per tablet Take 1-2 tablets by mouth every 4 hours as needed for moderate to severe pain (Moderate to Severe Pain) 15 tablet 0     furosemide (LASIX) 20 MG tablet TAKE ONE TABLET BY MOUTH ONCE DAILY 90 tablet 3     Wound Dressings (TRIAD HYDROPHILIC WOUND DRESSI) PSTE Externally apply 1 g topically daily 1 Tube 3     Wound Dressings (TRIAD HYDROPHILIC WOUND DRESSI) PSTE Externally apply 1 g topically daily 1 Tube 3     iron 66 MG TABS Take 1 tablet (66 mg) by mouth daily 1 tablet 0     GLUCOSAMINE 500 MG OR CAPS 2 Tablets every morning       order for DME Equipment being ordered: Walker ()  Treatment Diagnosis: s/p joint replacement 1 Device 0       ALLERGIES     Allergies   Allergen Reactions     No Known Drug Allergies         PAST MEDICAL HISTORY:  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       PAST SURGICAL HISTORY:  Past Surgical History:   Procedure Laterality Date     ARTHROPLASTY KNEE Left 3/14/2016    Procedure: ARTHROPLASTY KNEE;  Surgeon: Deonte Silver MD;  [facility]     COLONOSCOPY  7/17/2013    Procedure: COMBINED COLONOSCOPY, SINGLE BIOPSY/POLYPECTOMY BY BIOPSY;  Colonoscopy, with polypectomy by biopsy;  Surgeon: Fernando Mario MD;  [facility]     CYSTOSCOPY, LITHOLAPAXY, COMBINED N/A 2/26/2015    Procedure: COMBINED CYSTOSCOPY, LITHOLAPAXY;  Surgeon: Orlando Marr MD;  [facility]     CYSTOSCOPY, LITHOLAPAXY, COMBINED N/A 2/11/2015    Procedure: COMBINED CYSTOSCOPY, LITHOLAPAXY;  Surgeon: Orlando Marr MD;  [facility]     CYSTOSCOPY, RETROGRADES, EXTRACT STONE, COMBINED N/A 2/8/2018    Procedure: COMBINED CYSTOSCOPY, RETROGRADES, EXTRACT STONE;  cystoscopy, bladder stone removal;  Surgeon: Orlando Marr MD;  [facility]     H ABLATION ATRIAL FLUTTER  4/18/15    atypical a flutter ablation & Ablation of PACs from the SVC-RA junction     HC ABLATION RENAL TUMOR PERCUT CRYOTHERAPY UNILATERAL  6/17/10    Right renal mass     LASER HOLMIUM LITHOTRIPSY BLADDER N/A 2/26/2015    Procedure: LASER HOLMIUM LITHOTRIPSY BLADDER;  Surgeon: Orlando Marr MD;  [facility]     LASER KTP GREEN LIGHT PHOTOSELECTIVE VAPORIZATION PROSTATE N/A 2/11/2015    Procedure: LASER KTP GREEN LIGHT PHOTOSELECTIVE VAPORIZATION PROSTATE;  Surgeon: Orlando Marr MD;  [facility]       FAMILY HISTORY:  Family History   Problem Relation Age of  "Onset     CANCER Mother      breast     Hypertension Mother      Alzheimer Disease Mother       at age 96.       SOCIAL HISTORY:  Social History     Social History     Marital status:      Spouse name: Shari     Number of children: 4     Years of education: N/A     Social History Main Topics     Smoking status: Never Smoker     Smokeless tobacco: Never Used     Alcohol use No     Drug use: No     Sexual activity: Not Currently     Other Topics Concern     Not on file     Social History Narrative       Review of Systems:  Skin:  Negative       Eyes:  Positive for glasses    ENT:  Positive for hearing loss    Respiratory:  Negative       Cardiovascular:  Negative for;palpitations;chest pain;lightheadedness;dizziness Positive for;edema    Gastroenterology: Negative      Genitourinary:  Positive for   recent bladder surgery  Musculoskeletal:  Negative      Neurologic:  Negative      Psychiatric:  Negative      Heme/Lymph/Imm:  Negative      Endocrine:  Negative        Physical Exam:  Vitals: /70 (BP Location: Right arm, Patient Position: Fowlers, Cuff Size: Adult Regular)  Pulse 80  Ht 1.88 m (6' 2\")  Wt 89.9 kg (198 lb 3.2 oz)  SpO2 90%  BMI 25.45 kg/m2    Constitutional:           Skin:             Head:           Eyes:           Lymph:      ENT:           Neck:           Respiratory:            Cardiac:                                                           GI:           Extremities and Muscular Skeletal:                 Neurological:           Psych:             CC  Vance Brennan MD  [address]  [place] [address]                    Thank you for allowing me to participate in the care of your patient.      Sincerely,     Vance Brennan MD     [facility]    cc:   Vance Brennan MD  [address]  [place] [address]        "

## 2018-02-22 NOTE — PROGRESS NOTES
HPI and Plan:   See dictation(#[id number])    Orders Placed This Encounter   Procedures     Follow-Up with Pulmonary Hypertension Clinic       No orders of the defined types were placed in this encounter.      There are no discontinued medications.      Encounter Diagnoses   Name Primary?     Coronary artery disease involving native coronary artery of native heart without angina pectoris      Atrial fibrillation, unspecified type (H)      Pulmonary hypertension Yes       CURRENT MEDICATIONS:  Current Outpatient Prescriptions   Medication Sig Dispense Refill     metoprolol tartrate (LOPRESSOR) 25 MG tablet TAKE ONE-HALF TABLET BY MOUTH ONCE DAILY 45 tablet 9     warfarin (COUMADIN) 5 MG tablet Take 2.5 mg (1/2 tablet) Tuesday and Saturday and 5 mg other 5 days or as directed by the coumadin clinic 75 tablet 1     atorvastatin (LIPITOR) 20 MG tablet Take 1 tablet (20 mg) by mouth daily 10 tablet 0     HYDROcodone-acetaminophen (NORCO) 5-325 MG per tablet Take 1-2 tablets by mouth every 4 hours as needed for moderate to severe pain (Moderate to Severe Pain) 15 tablet 0     furosemide (LASIX) 20 MG tablet TAKE ONE TABLET BY MOUTH ONCE DAILY 90 tablet 3     Wound Dressings (TRIAD HYDROPHILIC WOUND DRESSI) PSTE Externally apply 1 g topically daily 1 Tube 3     Wound Dressings (TRIAD HYDROPHILIC WOUND DRESSI) PSTE Externally apply 1 g topically daily 1 Tube 3     iron 66 MG TABS Take 1 tablet (66 mg) by mouth daily 1 tablet 0     GLUCOSAMINE 500 MG OR CAPS 2 Tablets every morning       order for DME Equipment being ordered: Walker ()  Treatment Diagnosis: s/p joint replacement 1 Device 0       ALLERGIES     Allergies   Allergen Reactions     No Known Drug Allergies        PAST MEDICAL HISTORY:  Past Medical History:   Diagnosis Date     Atrial fibrillation (H)     paroxysmal     Atrial flutter (H)     atypial, RA, sp ablation 4/8/2015     Bladder stone     removed in 3/2015     Cancer (H)     Renal cancer-right kidney      Contracture of palmar fascia      Hematuria      Hypertrophy (benign) of prostate     MILD     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       PAST SURGICAL HISTORY:  Past Surgical History:   Procedure Laterality Date     ARTHROPLASTY KNEE Left 3/14/2016    Procedure: ARTHROPLASTY KNEE;  Surgeon: Deonte Silver MD;  [facility]     COLONOSCOPY  2013    Procedure: COMBINED COLONOSCOPY, SINGLE BIOPSY/POLYPECTOMY BY BIOPSY;  Colonoscopy, with polypectomy by biopsy;  Surgeon: Fernando Mario MD;  [facility]     CYSTOSCOPY, LITHOLAPAXY, COMBINED N/A 2015    Procedure: COMBINED CYSTOSCOPY, LITHOLAPAXY;  Surgeon: Orlando Marr MD;  [facility]     CYSTOSCOPY, LITHOLAPAXY, COMBINED N/A 2015    Procedure: COMBINED CYSTOSCOPY, LITHOLAPAXY;  Surgeon: Orlando Marr MD;  [facility]     CYSTOSCOPY, RETROGRADES, EXTRACT STONE, COMBINED N/A 2018    Procedure: COMBINED CYSTOSCOPY, RETROGRADES, EXTRACT STONE;  cystoscopy, bladder stone removal;  Surgeon: Orlando Marr MD;  [facility]     H ABLATION ATRIAL FLUTTER  4/18/15    atypical a flutter ablation & Ablation of PACs from the SVC-RA junction     HC ABLATION RENAL TUMOR PERCUT CRYOTHERAPY UNILATERAL  6/17/10    Right renal mass     LASER HOLMIUM LITHOTRIPSY BLADDER N/A 2015    Procedure: LASER HOLMIUM LITHOTRIPSY BLADDER;  Surgeon: Orlando Marr MD;  [facility]     LASER KTP GREEN LIGHT PHOTOSELECTIVE VAPORIZATION PROSTATE N/A 2015    Procedure: LASER KTP GREEN LIGHT PHOTOSELECTIVE VAPORIZATION PROSTATE;  Surgeon: Orlando Marr MD;  Location[facility]       FAMILY HISTORY:  Family History   Problem Relation Age of Onset     CANCER Mother      breast     Hypertension Mother      Alzheimer Disease Mother       at age 96.       SOCIAL HISTORY:  Social History     Social History     Marital status:      Spouse name: Shari     Number of children: 4     Years  "of education: N/A     Social History Main Topics     Smoking status: Never Smoker     Smokeless tobacco: Never Used     Alcohol use No     Drug use: No     Sexual activity: Not Currently     Other Topics Concern     Not on file     Social History Narrative       Review of Systems:  Skin:  Negative       Eyes:  Positive for glasses    ENT:  Positive for hearing loss    Respiratory:  Negative       Cardiovascular:  Negative for;palpitations;chest pain;lightheadedness;dizziness Positive for;edema    Gastroenterology: Negative      Genitourinary:  Positive for   recent bladder surgery  Musculoskeletal:  Negative      Neurologic:  Negative      Psychiatric:  Negative      Heme/Lymph/Imm:  Negative      Endocrine:  Negative        Physical Exam:  Vitals: /70 (BP Location: Right arm, Patient Position: Fowlers, Cuff Size: Adult Regular)  Pulse 80  Ht 1.88 m (6' 2\")  Wt 89.9 kg (198 lb 3.2 oz)  SpO2 90%  BMI 25.45 kg/m2    Constitutional:           Skin:             Head:           Eyes:           Lymph:      ENT:           Neck:           Respiratory:            Cardiac:                                                           GI:           Extremities and Muscular Skeletal:                 Neurological:           Psych:             CC  Vance Brennan MD  [address]  [place] [id number]                  "

## 2018-02-22 NOTE — PROGRESS NOTES
Service Date: 02/22/2018      REASON FOR CLINIC VISIT:  Followup atrial fibrillation, CAD.      HISTORY OF PRESENT ILLNESS:  Mr. Weiss is a very pleasant 85-year-old gentleman with history of atrial flutter and atrial fibrillation with history of atrial flutter ablation.  He has been seen in the past by EP and was put on flecainide, but that had to be discontinued because stress test showed mild to moderate inferior ischemia.  To be noted, in terms of coronary artery disease he has been essentially asymptomatic without any anginal symptoms and he has been medically managed for that.  Today, he is coming for routine followup.  He recently had a bladder stone removed and is feeling much better.  He denies any particular shortness of breath, chest discomfort, dizziness, presyncope or syncope.  He walks 2-3 miles a day and yesterday did a very brisk 1 mile walk, again no symptoms with that.  Remarkably, the patient had an echocardiogram done about 2 months ago that showed normal LV function, but the RV was increased in size and it was reported the RV systolic function is decreased.  There is also evidence of pulmonary hypertension with some intraventricular flattening noticed, and RVSP was measured at 31 mmHg plus RA, and IVC was dilated without respiratory variation.  I reviewed those images personally. The RV is indeed enlarged, at least moderate if not severely enlarged.  The RV systolic function overall appears normal to near normal with normal RV systolic excursion tissue velocity and normal TAPSE.  Left and right atrium are both severely enlarged.  There is mild tricuspid regurgitation.  The LV function looks normal.  The patient tells me very intermittently he can notice some swelling over his extremities at the end of the day, but they are gone by the time he wakes up in the morning.  He is on Coumadin.  He takes low-dose metoprolol tartrate, he is on Lipitor 20 mg daily, and he takes low-dose Lasix 20 mg  daily.  The patient is getting  in April.  He is also going on his 27th mission trip to [place] next month.      PHYSICAL EXAMINATION:   VITAL SIGNS:  Blood pressure 126/70, heart rate 80 and regular, weight 198 pounds, BMI 25.5.   GENERAL:  The patient appears pleasant, comfortable.   NECK:  JVP appears elevated to around 10 cm with positive hepatojugular reflux.   CARDIOVASCULAR SYSTEM:  Irregular, normal rate, no murmur, rub or gallop.   RESPIRATORY SYSTEM:  Clear to auscultation bilaterally.   GASTROINTESTINAL SYSTEM:  Abdomen soft, nontender.   EXTREMITIES:  No pitting pedal edema.   NEUROLOGICAL:  Alert, oriented x3.   PSYCHIATRIC:  Normal affect.   SKIN:  No obvious rash.   HEENT:  No pallor or icterus.      IMPRESSION AND PLAN:  A very pleasant 85-year-old gentleman with history of asymptomatic CAD on the basis of stress test in 2015 showing inferior ischemia, chronic atrial fibrillation, adequately rate controlled on low-dose beta blocker therapy, on Coumadin for stroke prophylaxis, with CHADS2-VASc score of 3, recently had bladder surgery and recent echocardiogram showing evidence of RV enlargement with evidence of significant pulmonary hypertension.  I had a long discussion with the patient regarding the recent echocardiographic findings.  Compared to previous echo, the RV was slightly enlarged -- was mildly enlarged on previous study, and on the present study it is more enlarged.  Remarkably, the patient does not have any history of COPD, emphysema or tobacco exposure.  He does not have any history of PE, and to be noted he has been on Coumadin for long-term for stroke prophylaxis in the setting of atrial fibrillation, making chronic thromboembolic disease quite unlikely.  Remarkably, he is not in congestive heart failure either left-sided or right-sided, and I doubt that the pulmonary hypertension is from left-sided elevated filling pressure.  I discussed the option of further investigation  regarding pulmonary hypertension, and at this time I recommend patient see my colleague, Dr. John, in the Pulmonary Hypertension Clinic.  We also briefly discussed that patient may end up requiring a right heart catheterization to further evaluate the severity and the nature of pulmonary hypertension.  Regarding CAD, he is essentially asymptomatic and I recommend continuing current medical therapy with Coumadin providing secondary CAD prophylaxis.  He is already on statin therapy.  Moderate intensity is reasonable given his age and well-controlled LDL and low-dose beta blocker therapy.  Regarding atrial fibrillation, he is essentially asymptomatic from it and rates are well controlled.  I recommend continuing rate control strategy along with Coumadin.      1.  CAD, asymptomatic.  Continue medical management.   2.  Chronic atrial fibrillation.  CHADS2-VASc score of 3, on Coumadin for stroke prophylaxis.  Ventricular rate is well controlled.  Asymptomatic.   3.  Pulmonary hypertension with RV enlargement with borderline reduced RV systolic function to normal RV systolic function.  Clinically, not in congestive heart failure.  Etiology of his pulmonary hypertension is not clear.  Clinically, I doubt that this is from left-sided elevated filling pressure, and he does not have any history of sleep apnea, COPD, emphysema and unlikely to have chronic thromboembolic disease given that he is on chronic Coumadin therapy.      RECOMMENDATIONS:   1.  Continue Coumadin, low-dose beta blocker, atorvastatin.   2.  Evaluation in [facility].         SANDOVAL ESTES MD             D: 2018   T: 2018   MT: FRANK      Name:     ELIER BUTLER   MRN:      [id number]        Account:      [id number]   :      1932           Service Date: 2018      Document: [id number]

## 2018-02-26 ENCOUNTER — HOSPITAL ENCOUNTER (OUTPATIENT)
Dept: NUCLEAR MEDICINE | Facility: CLINIC | Age: 83
Setting detail: NUCLEAR MEDICINE
End: 2018-02-26
Attending: INTERNAL MEDICINE
Payer: MEDICARE

## 2018-02-26 ENCOUNTER — HOSPITAL ENCOUNTER (OUTPATIENT)
Facility: CLINIC | Age: 83
End: 2018-02-26
Admitting: INTERNAL MEDICINE

## 2018-02-26 ENCOUNTER — OFFICE VISIT (OUTPATIENT)
Dept: CARDIOLOGY | Facility: CLINIC | Age: 83
End: 2018-02-26
Attending: INTERNAL MEDICINE
Payer: MEDICARE

## 2018-02-26 ENCOUNTER — HOSPITAL ENCOUNTER (OUTPATIENT)
Dept: GENERAL RADIOLOGY | Facility: CLINIC | Age: 83
Discharge: HOME | End: 2018-02-26
Attending: INTERNAL MEDICINE | Admitting: INTERNAL MEDICINE
Payer: MEDICARE

## 2018-02-26 VITALS
WEIGHT: 198.4 LBS | OXYGEN SATURATION: 96 % | DIASTOLIC BLOOD PRESSURE: 70 MMHG | BODY MASS INDEX: 25.46 KG/M2 | HEART RATE: 80 BPM | SYSTOLIC BLOOD PRESSURE: 120 MMHG | HEIGHT: 74 IN

## 2018-02-26 DIAGNOSIS — I48.20 CHRONIC ATRIAL FIBRILLATION (H): ICD-10-CM

## 2018-02-26 DIAGNOSIS — I27.20 PULMONARY HYPERTENSION (H): ICD-10-CM

## 2018-02-26 DIAGNOSIS — I51.7 RIGHT HEART ENLARGEMENT: ICD-10-CM

## 2018-02-26 DIAGNOSIS — Z79.01 WARFARIN ANTICOAGULATION: ICD-10-CM

## 2018-02-26 DIAGNOSIS — I51.7 RIGHT HEART ENLARGEMENT: Primary | ICD-10-CM

## 2018-02-26 LAB
ALBUMIN SERPL-MCNC: 3.8 G/DL (ref 3.4–5)
ALP SERPL-CCNC: 128 U/L (ref 40–150)
ALT SERPL W P-5'-P-CCNC: 31 U/L (ref 0–70)
ANION GAP SERPL CALCULATED.3IONS-SCNC: 5 MMOL/L (ref 3–14)
AST SERPL W P-5'-P-CCNC: 27 U/L (ref 0–45)
BASOPHILS # BLD AUTO: 0 10E9/L (ref 0–0.2)
BASOPHILS NFR BLD AUTO: 0.6 %
BILIRUB SERPL-MCNC: 1.5 MG/DL (ref 0.2–1.3)
BUN SERPL-MCNC: 26 MG/DL (ref 7–30)
CALCIUM SERPL-MCNC: 8.3 MG/DL (ref 8.5–10.1)
CHLORIDE SERPL-SCNC: 106 MMOL/L (ref 94–109)
CO2 SERPL-SCNC: 28 MMOL/L (ref 20–32)
CREAT SERPL-MCNC: 0.94 MG/DL (ref 0.66–1.25)
DIFFERENTIAL METHOD BLD: NORMAL
EOSINOPHIL # BLD AUTO: 0.2 10E9/L (ref 0–0.7)
EOSINOPHIL NFR BLD AUTO: 2.6 %
ERYTHROCYTE [DISTWIDTH] IN BLOOD BY AUTOMATED COUNT: 14.6 % (ref 10–15)
GFR SERPL CREATININE-BSD FRML MDRD: 77 ML/MIN/1.7M2
GLUCOSE SERPL-MCNC: 89 MG/DL (ref 70–99)
HBV CORE AB SERPL QL IA: NONREACTIVE
HCT VFR BLD AUTO: 44.1 % (ref 40–53)
HGB BLD-MCNC: 14.2 G/DL (ref 13.3–17.7)
HIV 1+2 AB+HIV1 P24 AG SERPL QL IA: NONREACTIVE
IMM GRANULOCYTES # BLD: 0 10E9/L (ref 0–0.4)
IMM GRANULOCYTES NFR BLD: 0.2 %
LYMPHOCYTES # BLD AUTO: 1.3 10E9/L (ref 0.8–5.3)
LYMPHOCYTES NFR BLD AUTO: 19.5 %
MCH RBC QN AUTO: 30.8 PG (ref 26.5–33)
MCHC RBC AUTO-ENTMCNC: 32.2 G/DL (ref 31.5–36.5)
MCV RBC AUTO: 96 FL (ref 78–100)
MONOCYTES # BLD AUTO: 0.6 10E9/L (ref 0–1.3)
MONOCYTES NFR BLD AUTO: 9.4 %
NEUTROPHILS # BLD AUTO: 4.4 10E9/L (ref 1.6–8.3)
NEUTROPHILS NFR BLD AUTO: 67.7 %
NRBC # BLD AUTO: 0 10*3/UL
NRBC BLD AUTO-RTO: 0 /100
PLATELET # BLD AUTO: 272 10E9/L (ref 150–450)
POTASSIUM SERPL-SCNC: 4.4 MMOL/L (ref 3.4–5.3)
PROT SERPL-MCNC: 7.6 G/DL (ref 6.8–8.8)
RBC # BLD AUTO: 4.61 10E12/L (ref 4.4–5.9)
SODIUM SERPL-SCNC: 139 MMOL/L (ref 133–144)
T4 FREE SERPL-MCNC: 0.95 NG/DL (ref 0.76–1.46)
TSH SERPL DL<=0.005 MIU/L-ACNC: 4.96 MU/L (ref 0.4–4)
WBC # BLD AUTO: 6.5 10E9/L (ref 4–11)

## 2018-02-26 PROCEDURE — 85025 COMPLETE CBC W/AUTO DIFF WBC: CPT | Performed by: INTERNAL MEDICINE

## 2018-02-26 PROCEDURE — 86431 RHEUMATOID FACTOR QUANT: CPT | Performed by: INTERNAL MEDICINE

## 2018-02-26 PROCEDURE — 86038 ANTINUCLEAR ANTIBODIES: CPT | Performed by: INTERNAL MEDICINE

## 2018-02-26 PROCEDURE — 36415 COLL VENOUS BLD VENIPUNCTURE: CPT | Performed by: INTERNAL MEDICINE

## 2018-02-26 PROCEDURE — 84439 ASSAY OF FREE THYROXINE: CPT | Performed by: INTERNAL MEDICINE

## 2018-02-26 PROCEDURE — 84443 ASSAY THYROID STIM HORMONE: CPT | Performed by: INTERNAL MEDICINE

## 2018-02-26 PROCEDURE — 93000 ELECTROCARDIOGRAM COMPLETE: CPT | Performed by: INTERNAL MEDICINE

## 2018-02-26 PROCEDURE — 87389 HIV-1 AG W/HIV-1&-2 AB AG IA: CPT | Performed by: INTERNAL MEDICINE

## 2018-02-26 PROCEDURE — 86704 HEP B CORE ANTIBODY TOTAL: CPT | Performed by: INTERNAL MEDICINE

## 2018-02-26 PROCEDURE — 99214 OFFICE O/P EST MOD 30 MIN: CPT | Performed by: INTERNAL MEDICINE

## 2018-02-26 PROCEDURE — A9540 TC99M MAA: HCPCS | Performed by: INTERNAL MEDICINE

## 2018-02-26 PROCEDURE — 78580 LUNG PERFUSION IMAGING: CPT

## 2018-02-26 PROCEDURE — 34300033 ZZH RX 343: Performed by: INTERNAL MEDICINE

## 2018-02-26 PROCEDURE — 80053 COMPREHEN METABOLIC PANEL: CPT | Performed by: INTERNAL MEDICINE

## 2018-02-26 PROCEDURE — 71046 X-RAY EXAM CHEST 2 VIEWS: CPT

## 2018-02-26 RX ADMIN — Medication 3.3 MILLICURIE: at 11:35

## 2018-02-26 NOTE — LETTER
2018      Gabriel Tan MD  [facility] [address] [facility]   [place] [address]      RE: Tomer Weiss       Dear Colleague,    I had the pleasure of seeing Tomer Jaimenathalia Isela in the [facility].    Service Date: 2018      REFERRING PROVIDER AND PRIMARY CARDIOLOGIST:  Dr. Vance Brennan MD, cardiologist [facility].     PRIMARY CARE PROVIDER:  Gabriel Tan MD       REASON FOR VISIT:  Right heart enlargement and pulmonary hypertension noted incidentally on recent preoperative echocardiogram.      HISTORY OF PRESENT ILLNESS:    The patient is new to my practice.  He has established care with my cardiologist colleague, Dr. Vance Brennan, who last saw him on 2018.  He is a delightful, 85 year old man who appears significantly younger than his stated age.  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  Unfortunately, his wife of 55 years  3 years ago, but he has met someone else since and is due to get  for the second time on .  He is understandably quite excited about this.  He is a lifelong nonsmoker, nonalcohol drinker, BMI 25.5 kg/m2.  His medical history is significant for chronic atrial fibrillation (rate controlled on warfarin anticoagulation strategy), treated hyperlipidemia, history of atrial flutter, status post catheter ablation in , and a presumptive diagnosis of CAD based on a mildly abnormal nuclear stress test in 2015 (no coronary angiogram done and asymptomatic).      I am seeing him because of abnormal echocardiogram findings of right heart enlargement and pulmonary hypertension. The echo was done prior to his recent bladder stone surgery.  I personally reviewed the images with the patient.  The right atrium is severely enlarged.  Right ventricle is moderate to severely enlarged with moderately decreased systolic function.  There is only mild tricuspid regurgitation.  The IVC is dilated to  3.3 cm with reduced collapsibility.  There is no obvious interatrial shunt on the color Doppler, but the images were suboptimal.  The left atrium is also significantly dilated (in the context of chronic atrial fibrillation).  Moderate to severe concentric left ventricular hypertrophy with normal LVEF of 55%-60%.  There is evidence of pulmonary hypertension with systolic flattening of the interventricular septum, and estimated RVSP is about 40-50 mmHg.  No pericardial effusion. In 2015, his right ventricle was mildly dilated with mildly decreased systolic function, and the right atrium was severely dilated.  In 2015, the patient had a BERNARDO attempted, but could not be completed due to inability to pass the probe into the esophagus.      From a symptomatic standpoint, the patient is very well compensated.  Three years ago he started developing mild lower extremity edema, which is most noticeable at the end of the day.  He has no dyspnea (he walks briskly for a mile almost every day), no orthopnea, PND, palpitations, fatigue, change in appetite, abdominal distention, chest pain or syncope.  He has never had a pulmonary embolus, DVT, clinical suspicion of sleep apnea is low, no connective tissue diseases.  No concerning family history.  He is chronically anticoagulated on warfarin with therapeutic INRs and no bleeding issues.     Recent INRs are therapeutic.  His last labs are from 11/2017 and essentially satisfactory with creatinine of 0.98 and normal liver enzymes.  Serum albumin is mildly low at 3.5.      ECG done today shows rate-controlled atrial fibrillation of about 70 BPM.      Nuclear stress test 08/04/2015:  Moderate mixed defect in the inferior wall. He has not had a coronary angiogram.     Please see my attached note in Epic for a comprehensive review of systems, medications, past medical, surgical, social and family history and detailed vital signs and physical examination.       CURRENT MEDICATIONS:   1.   Metoprolol tartrate 12.5 mg once daily.   2.  Warfarin anticoagulation for chronic atrial fibrillation.   3.  Atorvastatin 20 mg daily.   4.  Furosemide 20 mg daily.   5.  Iron supplements.   6.  Glucosamine.      ALLERGIES:  No known allergies.      PHYSICAL EXAMINATION:   VITAL SIGNS:  /70, pulse 80 per minute, height 1.88 m, weight 90 kg (198 pounds), respiratory rate 15 per minute, sats 96% on room air, BMI 25.5 kg/m2.   CONSTITUTIONAL:  Comfortable at rest, tall, slim, good muscle mass, looks younger than his stated age.   EYES:  Normal.   ENT:  Satisfactory dentition.  No cyanosis or pallor.   NECK:  Normal carotid pulse.  Normal JVP, no thyromegaly, no carotid bruit.   RESPIRATORY:  Bilateral normal breath sounds.  No rales or wheeze.   CARDIOVASCULAR:  Apical impulse is undisplaced and normal to palpation.  No RV heave.  Heart sounds are irregularly irregular due to atrial fibrillation with an apical rate of 70-80 BPM.  I cannot auscultate a murmur of tricuspid regurgitation or an ASD.  No friction rub.   ABDOMEN:  Soft and nontender without hepatosplenomegaly.  Active bowel sounds.   EXTREMITIES:  Bilateral 2+ pitting ankle edema up to the mid shins.      DIAGNOSES:   1.  Right heart enlargement. NYHA functional class I.  2.  Pulmonary hypertension based on echo-estimated RVSP.   3.  Chronic atrial fibrillation (rate control and warfarin anticoagulation strategy).      ASSESSMENT/PLAN:   1.  I agree with Dr. Brennan.  The patient has unexplained right heart enlargement with only mild tricuspid regurgitation. The RV was mildly dilated with mildly reduced systolic function in 2015, and it has now progressed to severely dilated with moderately-severely decreased function.  An atrial septal defect should be ruled out.  Apart from mild lower extremity edema, he has no evidence of overt right heart failure and is clinically very well compensated. No changes in medications.  2.  Additional testing:  Labs, chest  x-ray, V/Q scan (clinical probability of chronic PE is low, as he is warfarin anticoagulated), repeat limited transthoracic echocardiogram with bubble study to rule out atrial septal defect, cardiac MRI with contrast.   3.  Right and left heart catheterization with full saturation run to rule out atrial septal defect.  Also, diagnostic coronary angiogram, as he had an abnormal stress test in .  Note, even if a significant lesion is found, we should aim for medical management first because he has no symptoms of angina.  Warfarin will be stopped 5 days prior to the procedure.  No indication for heparin bridging.   4.  Follow up in Pulmonary Hypertension Clinic after test completion.      It was my pleasure to visit with this patient.  I spent 50 minutes with him; greater than 50% of the time was spent in counseling and coordination of care.      cc:   Vance Brennan MD   [facility] [place] [id number]       Gabriel Tan MD   [address] CRISTOBAL MANNING MD             D: 2018   T: 2018   MT: dillon      Name:     ELIER BUTLER   MRN:      [id number]        Account:      [id number]   :      1932           Service Date: 2018      Document: [id number]           Outpatient Encounter Prescriptions as of 2018   Medication Sig Dispense Refill     metoprolol tartrate (LOPRESSOR) 25 MG tablet TAKE ONE-HALF TABLET BY MOUTH ONCE DAILY 45 tablet 9     warfarin (COUMADIN) 5 MG tablet Take 2.5 mg (1/2 tablet) Tuesday and Saturday and 5 mg other 5 days or as directed by the coumadin clinic 75 tablet 1     [DISCONTINUED] atorvastatin (LIPITOR) 20 MG tablet Take 1 tablet (20 mg) by mouth daily 10 tablet 0     furosemide (LASIX) 20 MG tablet TAKE ONE TABLET BY MOUTH ONCE DAILY 90 tablet 3     order for DME Equipment being ordered: Walker ()  Treatment Diagnosis: s/p joint  replacement 1 Device 0     iron 66 MG TABS Take 1 tablet (66 mg) by mouth daily 1 tablet 0     GLUCOSAMINE 500 MG OR CAPS 2 Tablets every morning       [DISCONTINUED] HYDROcodone-acetaminophen (NORCO) 5-325 MG per tablet Take 1-2 tablets by mouth every 4 hours as needed for moderate to severe pain (Moderate to Severe Pain) (Patient not taking: Reported on 2/26/2018) 15 tablet 0     [DISCONTINUED] Wound Dressings (TRIAD HYDROPHILIC WOUND DRESSI) PSTE Externally apply 1 g topically daily 1 Tube 3     [DISCONTINUED] Wound Dressings (TRIAD HYDROPHILIC WOUND DRESSI) PSTE Externally apply 1 g topically daily 1 Tube 3     No facility-administered encounter medications on file as of 2/26/2018.        Again, thank you for allowing me to participate in the care of your patient.      Sincerely,    Russell John MD     [facility]

## 2018-02-26 NOTE — NURSING NOTE
Post office review of AVS with patient in detail - questions answered. Patient states understanding of plan.  Katelyn Weaver RN 02/26/18 10:18 AM

## 2018-02-26 NOTE — LETTER
2/26/2018    Gabriel Tan MD  [address] [place] [address]    RE: Tomer Weiss       Dear Colleague,    I had the pleasure of seeing Tomer Weiss in the [facility].    Dictation reference number - [id number]      REFERRING PROVIDER:  Vance Brennan MD  [address]  [place] [address]    PRIMARY CARE PROVIDER:  Gabriel Tan  [address] [place] [address]          REVIEW OF SYSTEMS:  A comprehensive 10-point review of systems was completed and the pertinent positives are documented in the history of present illness.    Skin:  Negative     Eyes:  Positive for glasses  ENT:  Positive for hearing loss  Respiratory:  Negative    Cardiovascular:    Positive for;edema  Gastroenterology: Negative    Genitourinary:  Positive for kidney stone  Musculoskeletal:  Negative    Neurologic:  Negative    Psychiatric:  Negative    Heme/Lymph/Imm:  Negative    Endocrine:  Negative      CURRENT MEDICATIONS:  Current Outpatient Prescriptions   Medication Sig Dispense Refill     metoprolol tartrate (LOPRESSOR) 25 MG tablet TAKE ONE-HALF TABLET BY MOUTH ONCE DAILY 45 tablet 9     warfarin (COUMADIN) 5 MG tablet Take 2.5 mg (1/2 tablet) Tuesday and Saturday and 5 mg other 5 days or as directed by the coumadin clinic 75 tablet 1     atorvastatin (LIPITOR) 20 MG tablet Take 1 tablet (20 mg) by mouth daily 10 tablet 0     furosemide (LASIX) 20 MG tablet TAKE ONE TABLET BY MOUTH ONCE DAILY 90 tablet 3     order for DME Equipment being ordered: Walker ()  Treatment Diagnosis: s/p joint replacement 1 Device 0     iron 66 MG TABS Take 1 tablet (66 mg) by mouth daily 1 tablet 0     GLUCOSAMINE 500 MG OR CAPS 2 Tablets every morning           ALLERGIES:  Allergies   Allergen Reactions     No Known Drug Allergies        PAST MEDICAL HISTORY:    Past Medical History:   Diagnosis Date     Atrial fibrillation (H)      paroxysmal     Atrial flutter (H)     atypial, RA, sp ablation 4/8/2015     Bladder stone     removed in 3/2015     Cancer (H)     Renal cancer-right kidney     Contracture of palmar fascia      Hematuria      Hypertrophy (benign) of prostate     MILD     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       PAST SURGICAL HISTORY:    Past Surgical History:   Procedure Laterality Date     ARTHROPLASTY KNEE Left 3/14/2016    Procedure: ARTHROPLASTY KNEE;  Surgeon: Deonte Silver MD;  [facility]     COLONOSCOPY  7/17/2013    Procedure: COMBINED COLONOSCOPY, SINGLE BIOPSY/POLYPECTOMY BY BIOPSY;  Colonoscopy, with polypectomy by biopsy;  Surgeon: Fernando Mario MD;  [facility]     CYSTOSCOPY, LITHOLAPAXY, COMBINED N/A 2/26/2015    Procedure: COMBINED CYSTOSCOPY, LITHOLAPAXY;  Surgeon: Orlando Marr MD;  [facility]     CYSTOSCOPY, LITHOLAPAXY, COMBINED N/A 2/11/2015    Procedure: COMBINED CYSTOSCOPY, LITHOLAPAXY;  Surgeon: Orlando Marr MD;  [facility]     CYSTOSCOPY, RETROGRADES, EXTRACT STONE, COMBINED N/A 2/8/2018    Procedure: COMBINED CYSTOSCOPY, RETROGRADES, EXTRACT STONE;  cystoscopy, bladder stone removal;  Surgeon: Orlando Marr MD;  [facility]     H ABLATION ATRIAL FLUTTER  4/18/15    atypical a flutter ablation & Ablation of PACs from the SVC-RA junction     HC ABLATION RENAL TUMOR PERCUT CRYOTHERAPY UNILATERAL  6/17/10    Right renal mass     LASER HOLMIUM LITHOTRIPSY BLADDER N/A 2/26/2015    Procedure: LASER HOLMIUM LITHOTRIPSY BLADDER;  Surgeon: Orlando Marr MD;  [facility]     LASER KTP GREEN LIGHT PHOTOSELECTIVE VAPORIZATION PROSTATE N/A 2/11/2015    Procedure: LASER KTP GREEN LIGHT PHOTOSELECTIVE VAPORIZATION PROSTATE;  Surgeon: Orlando Marr MD;  [facility]       FAMILY HISTORY:    Family History   Problem Relation Age of Onset     CANCER Mother      breast     Hypertension Mother      Alzheimer Disease Mother       " at age 96.       SOCIAL HISTORY:    Social History     Social History     Marital status:      Spouse name: Shari     Number of children: 4     Years of education: N/A     Social History Main Topics     Smoking status: Never Smoker     Smokeless tobacco: Never Used     Alcohol use No     Drug use: No     Sexual activity: Not Currently     Other Topics Concern     None     Social History Narrative       PHYSICAL EXAM:    Vitals: /70  Pulse 80  Ht 1.88 m (6' 2\")  Wt 90 kg (198 lb 6.4 oz)  SpO2 96%  BMI 25.47 kg/m2  Wt Readings from Last 5 Encounters:   18 90 kg (198 lb 6.4 oz)   18 89.9 kg (198 lb 3.2 oz)   18 84.8 kg (187 lb)   17 84.6 kg (186 lb 8 oz)   17 86.2 kg (190 lb)       Constitutional: Comfortable at rest. Cooperative, alert and oriented, well developed, well nourished.  Eyes: Pupils equal and round, conjunctivae and lids unremarkable, sclera white, no xanthalasma,   ENT: Satisfactory dentition.  No cyanosis or pallor.  Neck: Carotid pulses are full and equal bilaterally, no carotid bruit, no thyromegaly     Respiratory: Normal respiratory effort with symmetrical chest wall movements and no use of accessory muscles. Good air entry with normal breath sounds and no rales or wheeze.  Cardiovascular: JVP is not elevated..  Normal carotid pulse character and volume.  No carotid bruit.  Apical impulse is undisplaced and normal to palpation without parasternal heave or thrill.  Heart sounds are irregularly irregular. No audible murmur. No S3, S4 or friction rub.    GI: Soft, nontender, no hepatosplenomegaly, no masses, active bowel sounds.    Skin: No rash, erythema, ecchymosis.  Musculoskeletal: Normal muscle tone and strength. Normal gait. No spinal deformities.  Neuropsychiatric: Oriented to time place and person.  Affect normal.  No gross motor deficits.  Extremities: Bilateral 2+ edema up to mid shin. No clubbing or deformities.        Encounter " Diagnoses   Name Primary?     Right heart enlargement Yes     Pulmonary hypertension      Chronic atrial fibrillation (H)      Warfarin anticoagulation        Orders Placed This Encounter   Procedures     MR Cardiac with Contrast     XR Chest 2 Views     NM Lung Scan Ventilation and Perfusion     Comprehensive metabolic panel     CBC with platelets differential     Hepatitis B core antibody     HIV Antigen Antibody Combo     TSH with free T4 reflex     Rheumatoid factor     Anti Nuclear Fiorella IgG by IFA with Reflex     Follow-Up with Pulmonary Hypertension Clinic     EKG 12-lead complete w/read - Clinics     Echocardiogram Limited       CC  REFERRING PROVIDER:  Vance Brennan MD  [address]  [place] [address]                  Thank you for allowing me to participate in the care of your patient.      Sincerely,     Russell John MD     [facility]    cc:   Vance Brennan MD  [address]  [place] [address]

## 2018-02-26 NOTE — PROGRESS NOTES
Dictation reference number - [id number]      REFERRING PROVIDER:  Vance Brennan MD  [address]  [place] [address]    PRIMARY CARE PROVIDER:  Gabriel Tan  [facility] [address] [place] [address]          REVIEW OF SYSTEMS:  A comprehensive 10-point review of systems was completed and the pertinent positives are documented in the history of present illness.    Skin:  Negative     Eyes:  Positive for glasses  ENT:  Positive for hearing loss  Respiratory:  Negative    Cardiovascular:    Positive for;edema  Gastroenterology: Negative    Genitourinary:  Positive for kidney stone  Musculoskeletal:  Negative    Neurologic:  Negative    Psychiatric:  Negative    Heme/Lymph/Imm:  Negative    Endocrine:  Negative      CURRENT MEDICATIONS:  Current Outpatient Prescriptions   Medication Sig Dispense Refill     metoprolol tartrate (LOPRESSOR) 25 MG tablet TAKE ONE-HALF TABLET BY MOUTH ONCE DAILY 45 tablet 9     warfarin (COUMADIN) 5 MG tablet Take 2.5 mg (1/2 tablet) Tuesday and Saturday and 5 mg other 5 days or as directed by the coumadin clinic 75 tablet 1     atorvastatin (LIPITOR) 20 MG tablet Take 1 tablet (20 mg) by mouth daily 10 tablet 0     furosemide (LASIX) 20 MG tablet TAKE ONE TABLET BY MOUTH ONCE DAILY 90 tablet 3     order for DME Equipment being ordered: Walker ()  Treatment Diagnosis: s/p joint replacement 1 Device 0     iron 66 MG TABS Take 1 tablet (66 mg) by mouth daily 1 tablet 0     GLUCOSAMINE 500 MG OR CAPS 2 Tablets every morning           ALLERGIES:  Allergies   Allergen Reactions     No Known Drug Allergies        PAST MEDICAL HISTORY:    Past Medical History:   Diagnosis Date     Atrial fibrillation (H)     paroxysmal     Atrial flutter (H)     atypial, RA, sp ablation 4/8/2015     Bladder stone     removed in 3/2015     Cancer (H)     Renal cancer-right kidney     Contracture of palmar fascia      Hematuria      Hypertrophy (benign) of prostate     MILD      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       PAST SURGICAL HISTORY:    Past Surgical History:   Procedure Laterality Date     ARTHROPLASTY KNEE Left 3/14/2016    Procedure: ARTHROPLASTY KNEE;  Surgeon: Deonte Silver MD;  [facility]     COLONOSCOPY  2013    Procedure: COMBINED COLONOSCOPY, SINGLE BIOPSY/POLYPECTOMY BY BIOPSY;  Colonoscopy, with polypectomy by biopsy;  Surgeon: Fernando Mario MD;  [facility]     CYSTOSCOPY, LITHOLAPAXY, COMBINED N/A 2015    Procedure: COMBINED CYSTOSCOPY, LITHOLAPAXY;  Surgeon: Orlando Marr MD;  [facility]     CYSTOSCOPY, LITHOLAPAXY, COMBINED N/A 2015    Procedure: COMBINED CYSTOSCOPY, LITHOLAPAXY;  Surgeon: Orlando Marr MD;  [facility]     CYSTOSCOPY, RETROGRADES, EXTRACT STONE, COMBINED N/A 2018    Procedure: COMBINED CYSTOSCOPY, RETROGRADES, EXTRACT STONE;  cystoscopy, bladder stone removal;  Surgeon: Orlando Marr MD;  [facility]     H ABLATION ATRIAL FLUTTER  4/18/15    atypical a flutter ablation & Ablation of PACs from the SVC-RA junction     HC ABLATION RENAL TUMOR PERCUT CRYOTHERAPY UNILATERAL  6/17/10    Right renal mass     LASER HOLMIUM LITHOTRIPSY BLADDER N/A 2015    Procedure: LASER HOLMIUM LITHOTRIPSY BLADDER;  Surgeon: Orlando Marr MD;  [facility]     LASER KTP GREEN LIGHT PHOTOSELECTIVE VAPORIZATION PROSTATE N/A 2015    Procedure: LASER KTP GREEN LIGHT PHOTOSELECTIVE VAPORIZATION PROSTATE;  Surgeon: Orlando Marr MD;  [facility]       FAMILY HISTORY:    Family History   Problem Relation Age of Onset     CANCER Mother      breast     Hypertension Mother      Alzheimer Disease Mother       at age 96.       SOCIAL HISTORY:    Social History     Social History     Marital status:      Spouse name: Shari     Number of children: 4     Years of education: N/A     Social History Main Topics     Smoking status: Never Smoker      "Smokeless tobacco: Never Used     Alcohol use No     Drug use: No     Sexual activity: Not Currently     Other Topics Concern     None     Social History Narrative       PHYSICAL EXAM:    Vitals: /70  Pulse 80  Ht 1.88 m (6' 2\")  Wt 90 kg (198 lb 6.4 oz)  SpO2 96%  BMI 25.47 kg/m2  Wt Readings from Last 5 Encounters:   02/26/18 90 kg (198 lb 6.4 oz)   02/22/18 89.9 kg (198 lb 3.2 oz)   01/31/18 84.8 kg (187 lb)   12/13/17 84.6 kg (186 lb 8 oz)   12/04/17 86.2 kg (190 lb)       Constitutional: Comfortable at rest. Cooperative, alert and oriented, well developed, well nourished.  Eyes: Pupils equal and round, conjunctivae and lids unremarkable, sclera white, no xanthalasma,   ENT: Satisfactory dentition.  No cyanosis or pallor.  Neck: Carotid pulses are full and equal bilaterally, no carotid bruit, no thyromegaly     Respiratory: Normal respiratory effort with symmetrical chest wall movements and no use of accessory muscles. Good air entry with normal breath sounds and no rales or wheeze.  Cardiovascular: JVP is not elevated..  Normal carotid pulse character and volume.  No carotid bruit.  Apical impulse is undisplaced and normal to palpation without parasternal heave or thrill.  Heart sounds are irregularly irregular. No audible murmur. No S3, S4 or friction rub.    GI: Soft, nontender, no hepatosplenomegaly, no masses, active bowel sounds.    Skin: No rash, erythema, ecchymosis.  Musculoskeletal: Normal muscle tone and strength. Normal gait. No spinal deformities.  Neuropsychiatric: Oriented to time place and person.  Affect normal.  No gross motor deficits.  Extremities: Bilateral 2+ edema up to mid shin. No clubbing or deformities.        Encounter Diagnoses   Name Primary?     Right heart enlargement Yes     Pulmonary hypertension      Chronic atrial fibrillation (H)      Warfarin anticoagulation        Orders Placed This Encounter   Procedures     MR Cardiac with Contrast     XR Chest 2 Views     NM " Lung Scan Ventilation and Perfusion     Comprehensive metabolic panel     CBC with platelets differential     Hepatitis B core antibody     HIV Antigen Antibody Combo     TSH with free T4 reflex     Rheumatoid factor     Anti Nuclear Fiorella IgG by IFA with Reflex     Follow-Up with Pulmonary Hypertension Clinic     EKG 12-lead complete w/read - Clinics     Echocardiogram Limited       CC  REFERRING PROVIDER:  Vance Brennan MD  [address]  [place] [address]

## 2018-02-26 NOTE — PATIENT INSTRUCTIONS
1. Labs - CBC with differential, comprehensive metabolic panel, TSH, auto immune panel, hepatitis B virology, HIV.  2. Limited transthoracic echocardiogram with bubble study.  3. Chest x-ray and VQ scan.  4. Cardiac MRI with contrast.  5. Right and left heart catheterization with full saturation run (to rule out atrial septal defect), diagnostic coronary angiogram. Warfarin should be stopped 5 days before the scheduled test. No need for bridging.  6. Follow up in the pulmonary hypertension clinic with me after the all tests completed.  7. No changes to medications.    If you have any questions or concerns, please call my nurse Katelyn Barrientos at [phone].

## 2018-02-26 NOTE — MR AVS SNAPSHOT
After Visit Summary   2/26/2018    Tomer Weiss    MRN: [id number]           Patient Information     Date Of Birth          4/9/1932        Visit Information        Provider Department      2/26/2018 8:45 AM Russell John MD [facility]        Today's Diagnoses     Right heart enlargement    -  1    Pulmonary hypertension        Chronic atrial fibrillation (H)        Warfarin anticoagulation          Care Instructions    1. Labs - CBC with differential, comprehensive metabolic panel, TSH, auto immune panel, hepatitis B virology, HIV.  2. Limited transthoracic echocardiogram with bubble study.  3. Chest x-ray and VQ scan.  4. Cardiac MRI with contrast.  5. Right and left heart catheterization with full saturation run (to rule out atrial septal defect), diagnostic coronary angiogram. Warfarin should be stopped 5 days before the scheduled test. No need for bridging.  6. Follow up in the pulmonary hypertension clinic with me after the all tests completed.  7. No changes to medications.    If you have any questions or concerns, please call my nurse Katelyn Barrientos at [phone].            Follow-ups after your visit        Additional Services     Follow-Up with Pulmonary Hypertension Clinic                 Your next 10 appointments already scheduled     Mar 28, 2018  3:00 PM CDT   Anticoagulation Visit with [facility] ANTI COAG   [facility]    [address] [address] [phone]              Future tests that were ordered for you today     Open Future Orders        Priority Expected Expires Ordered    Echocardiogram Limited Routine 3/26/2018 2/26/2019 2/26/2018    HIV Antigen Antibody Combo Routine 3/2/2018 2/26/2019 2/26/2018    XR Chest 2 Views Routine 3/2/2018 2/26/2019 2/26/2018    NM Lung Scan Ventilation and Perfusion Routine 3/5/2018 2/26/2019 2/26/2018    TSH with free T4 reflex  "Routine 3/2/2018 2/26/2019 2/26/2018    Rheumatoid factor Routine 3/2/2018 2/26/2019 2/26/2018    Anti Nuclear Fiorella IgG by IFA with Reflex Routine 3/2/2018 2/26/2019 2/26/2018    Outpatient Coronary Angiography Adult Order Routine 3/5/2018 2/26/2019 2/26/2018    Follow-Up with Pulmonary Hypertension Clinic Routine 3/28/2018 2/26/2019 2/26/2018    MR Cardiac with Contrast Routine 3/26/2018 2/26/2019 2/26/2018    Comprehensive metabolic panel Routine 3/2/2018 2/26/2019 2/26/2018    CBC with platelets differential Routine 3/2/2018 2/26/2019 2/26/2018    Hepatitis B core antibody Routine 3/2/2018 2/26/2019 2/26/2018            Who to contact     If you have questions or need follow up information about today's clinic visit or your schedule please contact [facility] directly at [phone].  Normal or non-critical lab and imaging results will be communicated to you by [employer], letter or phone within 4 business days after the clinic has received the results. If you do not hear from us within 7 days, please contact the clinic through [employer] or phone. If you have a critical or abnormal lab result, we will notify you by phone as soon as possible.  Submit refill requests through [employer] or call your pharmacy and they will forward the refill request to us. Please allow 3 business days for your refill to be completed.          Additional Information About Your Visit        [employer] Information     [employer] lets you send messages to your doctor, view your test results, renew your prescriptions, schedule appointments and more. To sign up, go to [email or web address] . Click on \"Log in\" on the left side of the screen, which will take you to the Welcome page. Then click on \"Sign up Now\" on the right side of the page.     You will be asked to enter the access code listed below, as well as some personal information. Please follow the directions to create your username and password.     Your " "access code is: [email or web address]  Expires: 3/13/2018 10:11 AM     Your access code will  in 90 days. If you need help or a new code, please call your [facility] clinic or [phone].        Care EveryWhere ID     This is your Care EveryWhere ID. This could be used by other organizations to access your [facility] medical records  [id number]        Your Vitals Were     Pulse Height Pulse Oximetry BMI (Body Mass Index)          80 1.88 m (6' 2\") 96% 25.47 kg/m2         Blood Pressure from Last 3 Encounters:   18 120/70   18 126/70   18 119/75    Weight from Last 3 Encounters:   18 90 kg (198 lb 6.4 oz)   18 89.9 kg (198 lb 3.2 oz)   18 84.8 kg (187 lb)              We Performed the Following     EKG 12-lead complete w/read - Clinics     Follow-Up with Pulmonary Hypertension Clinic          Today's Medication Changes          These changes are accurate as of 18  9:56 AM.  If you have any questions, ask your nurse or doctor.               Stop taking these medicines if you haven't already. Please contact your care team if you have questions.     HYDROcodone-acetaminophen 5-325 MG per tablet   Commonly known as:  NORCO   Stopped by:  Russell John MD           TRIAD HYDROPHILIC WOUND DRESSI Pste   Stopped by:  Russell John MD                    Primary Care Provider Office Phone # Fax #    Gabriel Tan [phone]       [facility] [address] [place] [address]        Equal Access to Services     : Hadii whitney pelayo Soalexandra, waaxda luqadaha, qaybta kaalmamariposa baez. So [facility] [phone].    ATENCIÓN: Si habla español, tiene a rojas disposición servicios gratuitos de asistencia lingüística. Llame al [phone].    We comply with applicable federal civil rights laws and [place] laws. We do not discriminate on the basis of race, color, national origin, age, " disability, sex, sexual orientation, or gender identity.            Thank you!     Thank you for choosing [facility]  for your care. Our goal is always to provide you with excellent care. Hearing back from our patients is one way we can continue to improve our services. Please take a few minutes to complete the written survey that you may receive in the mail after your visit with us. Thank you!             Your Updated Medication List - Protect others around you: Learn how to safely use, store and throw away your medicines at www.disposemymeds.org.          This list is accurate as of 2/26/18  9:56 AM.  Always use your most recent med list.                   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

## 2018-02-26 NOTE — PROGRESS NOTES
Service Date: 2018      REFERRING PROVIDER AND PRIMARY CARDIOLOGIST:  Dr. Vance Brennan MD, cardiologist [facility].     PRIMARY CARE PROVIDER:  Gabriel Tan MD       REASON FOR VISIT:  Right heart enlargement and pulmonary hypertension noted incidentally on recent preoperative echocardiogram.      HISTORY OF PRESENT ILLNESS:    The patient is new to my practice.  He has established care with my cardiologist colleague, Dr. Vance Brennan, who last saw him on 2018.  He is a delightful, 85 year old man who appears significantly younger than his stated age.  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  Unfortunately, his wife of 55 years  3 years ago, but he has met someone else since and is due to get  for the second time on .  He is understandably quite excited about this.  He is a lifelong nonsmoker, nonalcohol drinker, BMI 25.5 kg/m2.  His medical history is significant for chronic atrial fibrillation (rate controlled on warfarin anticoagulation strategy), treated hyperlipidemia, history of atrial flutter, status post catheter ablation in , and a presumptive diagnosis of CAD based on a mildly abnormal nuclear stress test in 2015 (no coronary angiogram done and asymptomatic).      I am seeing him because of abnormal echocardiogram findings of right heart enlargement and pulmonary hypertension. The echo was done prior to his recent bladder stone surgery.  I personally reviewed the images with the patient.  The right atrium is severely enlarged.  Right ventricle is moderate to severely enlarged with moderately decreased systolic function.  There is only mild tricuspid regurgitation.  The IVC is dilated to 3.3 cm with reduced collapsibility.  There is no obvious interatrial shunt on the color Doppler, but the images were suboptimal.  The left atrium is also significantly dilated (in the context of chronic atrial fibrillation).  Moderate to severe concentric left  ventricular hypertrophy with normal LVEF of 55%-60%.  There is evidence of pulmonary hypertension with systolic flattening of the interventricular septum, and estimated RVSP is about 40-50 mmHg.  No pericardial effusion. In 2015, his right ventricle was mildly dilated with mildly decreased systolic function, and the right atrium was severely dilated.  In 2015, the patient had a BERNARDO attempted, but could not be completed due to inability to pass the probe into the esophagus.      From a symptomatic standpoint, the patient is very well compensated.  Three years ago he started developing mild lower extremity edema, which is most noticeable at the end of the day.  He has no dyspnea (he walks briskly for a mile almost every day), no orthopnea, PND, palpitations, fatigue, change in appetite, abdominal distention, chest pain or syncope.  He has never had a pulmonary embolus, DVT, clinical suspicion of sleep apnea is low, no connective tissue diseases.  No concerning family history.  He is chronically anticoagulated on warfarin with therapeutic INRs and no bleeding issues.     Recent INRs are therapeutic.  His last labs are from 11/2017 and essentially satisfactory with creatinine of 0.98 and normal liver enzymes.  Serum albumin is mildly low at 3.5.      ECG done today shows rate-controlled atrial fibrillation of about 70 BPM.      Nuclear stress test 08/04/2015:  Moderate mixed defect in the inferior wall. He has not had a coronary angiogram.     Please see my attached note in Epic for a comprehensive review of systems, medications, past medical, surgical, social and family history and detailed vital signs and physical examination.       CURRENT MEDICATIONS:   1.  Metoprolol tartrate 12.5 mg once daily.   2.  Warfarin anticoagulation for chronic atrial fibrillation.   3.  Atorvastatin 20 mg daily.   4.  Furosemide 20 mg daily.   5.  Iron supplements.   6.  Glucosamine.      ALLERGIES:  No known allergies.      PHYSICAL  EXAMINATION:   VITAL SIGNS:  /70, pulse 80 per minute, height 1.88 m, weight 90 kg (198 pounds), respiratory rate 15 per minute, sats 96% on room air, BMI 25.5 kg/m2.   CONSTITUTIONAL:  Comfortable at rest, tall, slim, good muscle mass, looks younger than his stated age.   EYES:  Normal.   ENT:  Satisfactory dentition.  No cyanosis or pallor.   NECK:  Normal carotid pulse.  Normal JVP, no thyromegaly, no carotid bruit.   RESPIRATORY:  Bilateral normal breath sounds.  No rales or wheeze.   CARDIOVASCULAR:  Apical impulse is undisplaced and normal to palpation.  No RV heave.  Heart sounds are irregularly irregular due to atrial fibrillation with an apical rate of 70-80 BPM.  I cannot auscultate a murmur of tricuspid regurgitation or an ASD.  No friction rub.   ABDOMEN:  Soft and nontender without hepatosplenomegaly.  Active bowel sounds.   EXTREMITIES:  Bilateral 2+ pitting ankle edema up to the mid shins.      DIAGNOSES:   1.  Right heart enlargement. NYHA functional class I.  2.  Pulmonary hypertension based on echo-estimated RVSP.   3.  Chronic atrial fibrillation (rate control and warfarin anticoagulation strategy).      ASSESSMENT/PLAN:   1.  I agree with Dr. Brennan.  The patient has unexplained right heart enlargement with only mild tricuspid regurgitation. The RV was mildly dilated with mildly reduced systolic function in 2015, and it has now progressed to severely dilated with moderately-severely decreased function.  An atrial septal defect should be ruled out.  Apart from mild lower extremity edema, he has no evidence of overt right heart failure and is clinically very well compensated. No changes in medications.  2.  Additional testing:  Labs, chest x-ray, V/Q scan (clinical probability of chronic PE is low, as he is warfarin anticoagulated), repeat limited transthoracic echocardiogram with bubble study to rule out atrial septal defect, cardiac MRI with contrast.   3.  Right and left heart  catheterization with full saturation run to rule out atrial septal defect.  Also, diagnostic coronary angiogram, as he had an abnormal stress test in 2015.  Note, even if a significant lesion is found, we should aim for medical management first because he has no symptoms of angina.  Warfarin will be stopped 5 days prior to the procedure.  No indication for heparin bridging.   4.  Follow up in Pulmonary Hypertension Clinic after test completion.      It was my pleasure to visit with this patient.  I spent 50 minutes with him; greater than 50% of the time was spent in counseling and coordination of care.      cc:   Vance Brennan MD   [facility] [place] [address]       Gabriel Tan MD   [address] CRISTOBAL MANNING MD             D: 2018   T: 2018   MT: dillon      Name:     ELIER BUTLER   MRN:      [id number]        Account:      [id number]   :      1932           Service Date: 2018      Document: [id number]

## 2018-02-27 ENCOUNTER — CARE COORDINATION (OUTPATIENT)
Dept: LAB | Facility: CLINIC | Age: 83
End: 2018-02-27

## 2018-02-27 LAB
ANA SER QL IF: NEGATIVE
RHEUMATOID FACT SER NEPH-ACNC: <20 IU/ML (ref 0–20)

## 2018-02-27 NOTE — PROGRESS NOTES
Please let patient know that his VQ scan was normal. Labs satisfactory. Thank you. Dr. John.     Called to patient with Results and Dr John's comments - patient states understanding.  Katelyn Weaver RN 02/27/18 11:25 AM

## 2018-02-28 DIAGNOSIS — E78.5 HYPERLIPIDEMIA LDL GOAL <130: ICD-10-CM

## 2018-02-28 RX ORDER — ATORVASTATIN CALCIUM 20 MG/1
20 TABLET, FILM COATED ORAL DAILY
Qty: 90 TABLET | Refills: 3 | Status: SHIPPED | OUTPATIENT
Start: 2018-02-28 | End: 2019-01-01

## 2018-03-01 ENCOUNTER — HOSPITAL ENCOUNTER (INPATIENT)
Facility: CLINIC | Age: 83
LOS: 5 days | Discharge: NURSING, REHAB OR LONG-TERM CARE | DRG: 481 | End: 2018-03-06
Attending: FAMILY MEDICINE | Admitting: FAMILY MEDICINE
Payer: MEDICARE

## 2018-03-01 ENCOUNTER — APPOINTMENT (OUTPATIENT)
Dept: GENERAL RADIOLOGY | Facility: CLINIC | Age: 83
DRG: 481 | End: 2018-03-01
Attending: FAMILY MEDICINE
Payer: MEDICARE

## 2018-03-01 DIAGNOSIS — I48.92 ATRIAL FLUTTER, UNSPECIFIED TYPE (H): ICD-10-CM

## 2018-03-01 DIAGNOSIS — D64.9 ANEMIA, UNSPECIFIED TYPE: Primary | ICD-10-CM

## 2018-03-01 DIAGNOSIS — I48.91 ATRIAL FIBRILLATION, UNSPECIFIED TYPE (H): ICD-10-CM

## 2018-03-01 DIAGNOSIS — S72.452A: ICD-10-CM
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
## 2018-03-01 DIAGNOSIS — Z79.01 LONG-TERM (CURRENT) USE OF ANTICOAGULANTS: ICD-10-CM

## 2018-03-01 PROBLEM — I27.20 PULMONARY HYPERTENSION (H): Status: ACTIVE | Noted: 2018-03-01

## 2018-03-01 PROBLEM — S72.402A FRACTURE OF FEMUR, DISTAL, LEFT, CLOSED (H): Status: RESOLVED | Noted: 2018-03-01 | Resolved: 2018-03-01

## 2018-03-01 PROBLEM — S72.92XA FEMUR FRACTURE, LEFT (H): Status: ACTIVE | Noted: 2018-03-01

## 2018-03-01 PROBLEM — S72.402A FRACTURE OF FEMUR, DISTAL, LEFT, CLOSED (H): Status: ACTIVE | Noted: 2018-03-01

## 2018-03-01 PROBLEM — M97.12XA PERIPROSTHETIC FRACTURE AROUND INTERNAL PROSTHETIC LEFT KNEE JOINT: Status: ACTIVE | Noted: 2018-03-01

## 2018-03-01 LAB
ANION GAP SERPL CALCULATED.3IONS-SCNC: 10 MMOL/L (ref 3–14)
APTT PPP: 37 SEC (ref 22–37)
BASOPHILS # BLD AUTO: 0 10E9/L (ref 0–0.2)
BASOPHILS NFR BLD AUTO: 0.5 %
BUN SERPL-MCNC: 26 MG/DL (ref 7–30)
CALCIUM SERPL-MCNC: 8.3 MG/DL (ref 8.5–10.1)
CHLORIDE SERPL-SCNC: 104 MMOL/L (ref 94–109)
CO2 SERPL-SCNC: 28 MMOL/L (ref 20–32)
CREAT SERPL-MCNC: 0.88 MG/DL (ref 0.66–1.25)
DIFFERENTIAL METHOD BLD: ABNORMAL
EOSINOPHIL # BLD AUTO: 0.2 10E9/L (ref 0–0.7)
EOSINOPHIL NFR BLD AUTO: 1.7 %
ERYTHROCYTE [DISTWIDTH] IN BLOOD BY AUTOMATED COUNT: 14.6 % (ref 10–15)
GFR SERPL CREATININE-BSD FRML MDRD: 82 ML/MIN/1.7M2
GLUCOSE SERPL-MCNC: 106 MG/DL (ref 70–99)
HCT VFR BLD AUTO: 42.1 % (ref 40–53)
HGB BLD-MCNC: 13.2 G/DL (ref 13.3–17.7)
IMM GRANULOCYTES # BLD: 0 10E9/L (ref 0–0.4)
IMM GRANULOCYTES NFR BLD: 0.2 %
INR PPP: 2.1 (ref 0.86–1.14)
LYMPHOCYTES # BLD AUTO: 1.3 10E9/L (ref 0.8–5.3)
LYMPHOCYTES NFR BLD AUTO: 14.9 %
MCH RBC QN AUTO: 30.6 PG (ref 26.5–33)
MCHC RBC AUTO-ENTMCNC: 31.4 G/DL (ref 31.5–36.5)
MCV RBC AUTO: 98 FL (ref 78–100)
MONOCYTES # BLD AUTO: 0.5 10E9/L (ref 0–1.3)
MONOCYTES NFR BLD AUTO: 5.3 %
NEUTROPHILS # BLD AUTO: 6.8 10E9/L (ref 1.6–8.3)
NEUTROPHILS NFR BLD AUTO: 77.4 %
PLATELET # BLD AUTO: 281 10E9/L (ref 150–450)
POTASSIUM SERPL-SCNC: 4.4 MMOL/L (ref 3.4–5.3)
RBC # BLD AUTO: 4.32 10E12/L (ref 4.4–5.9)
SODIUM SERPL-SCNC: 142 MMOL/L (ref 133–144)
WBC # BLD AUTO: 8.7 10E9/L (ref 4–11)

## 2018-03-01 PROCEDURE — 85730 THROMBOPLASTIN TIME PARTIAL: CPT | Performed by: FAMILY MEDICINE

## 2018-03-01 PROCEDURE — 29505 APPLICATION LONG LEG SPLINT: CPT | Performed by: FAMILY MEDICINE

## 2018-03-01 PROCEDURE — 80048 BASIC METABOLIC PNL TOTAL CA: CPT | Performed by: FAMILY MEDICINE

## 2018-03-01 PROCEDURE — 99222 1ST HOSP IP/OBS MODERATE 55: CPT | Mod: AI | Performed by: FAMILY MEDICINE

## 2018-03-01 PROCEDURE — 85610 PROTHROMBIN TIME: CPT | Performed by: FAMILY MEDICINE

## 2018-03-01 PROCEDURE — 25000132 ZZH RX MED GY IP 250 OP 250 PS 637: Mod: GY | Performed by: FAMILY MEDICINE

## 2018-03-01 PROCEDURE — 25000128 H RX IP 250 OP 636: Performed by: FAMILY MEDICINE

## 2018-03-01 PROCEDURE — 12000000 ZZH R&B MED SURG/OB

## 2018-03-01 PROCEDURE — 96376 TX/PRO/DX INJ SAME DRUG ADON: CPT | Performed by: FAMILY MEDICINE

## 2018-03-01 PROCEDURE — 99285 EMERGENCY DEPT VISIT HI MDM: CPT | Mod: 25 | Performed by: FAMILY MEDICINE

## 2018-03-01 PROCEDURE — 73562 X-RAY EXAM OF KNEE 3: CPT | Mod: TC,LT

## 2018-03-01 PROCEDURE — 29505 APPLICATION LONG LEG SPLINT: CPT | Mod: Z6 | Performed by: FAMILY MEDICINE

## 2018-03-01 PROCEDURE — 85025 COMPLETE CBC W/AUTO DIFF WBC: CPT | Performed by: FAMILY MEDICINE

## 2018-03-01 PROCEDURE — 96374 THER/PROPH/DIAG INJ IV PUSH: CPT | Performed by: FAMILY MEDICINE

## 2018-03-01 RX ORDER — ONDANSETRON 2 MG/ML
4 INJECTION INTRAMUSCULAR; INTRAVENOUS EVERY 6 HOURS PRN
Status: DISCONTINUED | OUTPATIENT
Start: 2018-03-01 | End: 2018-03-06 | Stop reason: HOSPADM

## 2018-03-01 RX ORDER — MORPHINE SULFATE 4 MG/ML
4 INJECTION, SOLUTION INTRAMUSCULAR; INTRAVENOUS
Status: DISCONTINUED | OUTPATIENT
Start: 2018-03-01 | End: 2018-03-01

## 2018-03-01 RX ORDER — SODIUM CHLORIDE 9 MG/ML
INJECTION, SOLUTION INTRAVENOUS CONTINUOUS
Status: DISCONTINUED | OUTPATIENT
Start: 2018-03-01 | End: 2018-03-05

## 2018-03-01 RX ORDER — OXYCODONE HYDROCHLORIDE 5 MG/1
5-10 TABLET ORAL
Status: DISCONTINUED | OUTPATIENT
Start: 2018-03-01 | End: 2018-03-03

## 2018-03-01 RX ORDER — ACETAMINOPHEN 325 MG/1
650 TABLET ORAL EVERY 4 HOURS PRN
Status: DISCONTINUED | OUTPATIENT
Start: 2018-03-01 | End: 2018-03-03

## 2018-03-01 RX ORDER — HYDROMORPHONE HYDROCHLORIDE 1 MG/ML
.3-.5 INJECTION, SOLUTION INTRAMUSCULAR; INTRAVENOUS; SUBCUTANEOUS
Status: DISCONTINUED | OUTPATIENT
Start: 2018-03-01 | End: 2018-03-06 | Stop reason: HOSPADM

## 2018-03-01 RX ORDER — LIDOCAINE 40 MG/G
CREAM TOPICAL
Status: DISCONTINUED | OUTPATIENT
Start: 2018-03-01 | End: 2018-03-06 | Stop reason: HOSPADM

## 2018-03-01 RX ORDER — NALOXONE HYDROCHLORIDE 0.4 MG/ML
.1-.4 INJECTION, SOLUTION INTRAMUSCULAR; INTRAVENOUS; SUBCUTANEOUS
Status: DISCONTINUED | OUTPATIENT
Start: 2018-03-01 | End: 2018-03-06 | Stop reason: HOSPADM

## 2018-03-01 RX ORDER — ONDANSETRON 4 MG/1
4 TABLET, ORALLY DISINTEGRATING ORAL EVERY 6 HOURS PRN
Status: DISCONTINUED | OUTPATIENT
Start: 2018-03-01 | End: 2018-03-06 | Stop reason: HOSPADM

## 2018-03-01 RX ADMIN — SODIUM CHLORIDE 1000 ML: 9 INJECTION, SOLUTION INTRAVENOUS at 23:30

## 2018-03-01 RX ADMIN — Medication 5 MG: at 21:53

## 2018-03-01 RX ADMIN — MORPHINE SULFATE 4 MG: 4 INJECTION, SOLUTION INTRAMUSCULAR; INTRAVENOUS at 20:19

## 2018-03-01 RX ADMIN — MORPHINE SULFATE 4 MG: 4 INJECTION, SOLUTION INTRAMUSCULAR; INTRAVENOUS at 21:22

## 2018-03-01 ASSESSMENT — ACTIVITIES OF DAILY LIVING (ADL)
RETIRED_COMMUNICATION: 0-->UNDERSTANDS/COMMUNICATES WITHOUT DIFFICULTY
SWALLOWING: 0-->SWALLOWS FOODS/LIQUIDS WITHOUT DIFFICULTY
AMBULATION: 0-->INDEPENDENT
TOILETING: 0-->INDEPENDENT
COGNITION: 0 - NO COGNITION ISSUES REPORTED
BATHING: 0-->INDEPENDENT
DRESS: 0-->INDEPENDENT
TRANSFERRING: 0-->INDEPENDENT
WHICH_OF_THE_ABOVE_FUNCTIONAL_RISKS_HAD_A_RECENT_ONSET_OR_CHANGE?: AMBULATION
RETIRED_EATING: 0-->INDEPENDENT
FALL_HISTORY_WITHIN_LAST_SIX_MONTHS: NO

## 2018-03-01 NOTE — IP AVS SNAPSHOT
[address] SURGICAL: [phone]                                              INTERAGENCY TRANSFER FORM - LAB / IMAGING / EKG / EMG RESULTS   3/1/2018                    Hospital Admission Date: 3/1/2018  ELIER BUTLER   : 1932  Sex: Male        Attending Provider: Mason Parra MD     Allergies:  No Known Drug Allergies    Infection:  None   Service:  HOSPITALIST    Ht:  1.829 m (6')   Wt:  88.1 kg (194 lb 3.6 oz)   Admission Wt:  88.5 kg (195 lb)    BMI:  26.34 kg/m 2   BSA:  2.12 m 2            Patient PCP Information     Provider PCP Type    Gabriel Tan MD General         Lab Results - 3 Days      Magnesium [[id number]]  Resulted: 18 0704, Result status: Final result    Ordering provider: Angel Lucio MD  18 0000 Resulting lab: [facility]    Specimen Information    Type Source Collected On   Blood  18 0636          Components       Value Reference Range Flag Lab   Magnesium 2.3 1.6 - 2.3 mg/dL  [facility]            Phosphorus [[id number]] (Abnormal)  Resulted: 18 0703, Result status: Final result    Ordering provider: Angel Lucio MD  18 0000 Resulting lab: [facility]    Specimen Information    Type Source Collected On   Blood  18 0636          Components       Value Reference Range Flag Lab   Phosphorus 2.1 2.5 - 4.5 mg/dL L [facility]            INR [[id number]] (Abnormal)  Resulted: 18 0656, Result status: Final result    Ordering provider: Sadie Ley MD  18 0000 Resulting lab: [facility]    Specimen Information    Type Source Collected On   Blood  18 0636          Components       Value Reference Range Flag Lab   INR 1.93 0.86 - 1.14 H [facility]            Hemoglobin [[id number]] (Abnormal)  Resulted: 18 0644, Result status: Final result    Ordering provider: Mason Parra MD  18 0000  Resulting lab: [facility]    Specimen Information    Type Source Collected On   Blood  03/06/18 0636          Components       Value Reference Range Flag Lab   Hemoglobin 7.5 13.3 - 17.7 g/dL L [facility]            Hemoglobin [[id number]] (Abnormal)  Resulted: 03/05/18 1701, Result status: Final result    Ordering provider: Angel Lucio MD  03/05/18 1358 Resulting lab: [facility]    Specimen Information    Type Source Collected On   Blood  03/05/18 1654          Components       Value Reference Range Flag Lab   Hemoglobin 7.6 13.3 - 17.7 g/dL L [facility]            Magnesium [[id number]]  Resulted: 03/05/18 0820, Result status: Final result    Ordering provider: Angel Lucio MD  03/05/18 0804 Resulting lab: [facility]    Specimen Information    Type Source Collected On   Blood  03/05/18 0527          Components       Value Reference Range Flag Lab   Magnesium 2.3 1.6 - 2.3 mg/dL  [facility]            Phosphorus [[id number]] (Abnormal)  Resulted: 03/05/18 0820, Result status: Final result    Ordering provider: Angel Lucio MD  03/05/18 0804 Resulting lab: [facility]    Specimen Information    Type Source Collected On   Blood  03/05/18 0527          Components       Value Reference Range Flag Lab   Phosphorus 2.3 2.5 - 4.5 mg/dL L [facility]            Glucose by meter [[id number]] (Abnormal)  Resulted: 03/05/18 0748, Result status: Final result    Ordering provider: Angel Lucio MD  03/05/18 0736 Resulting lab: POINT OF CARE TEST, GLUCOSE    Specimen Information    Type Source Collected On     03/05/18 0736          Components       Value Reference Range Flag Lab   Glucose 113 70 - 99 mg/dL H 170            Basic metabolic panel [[id number]] (Abnormal)  Resulted: 03/05/18 0607, Result status: Final result    Ordering provider: Sadie Ley MD  03/05/18 0000 Resulting lab: [facility]    Specimen Information    Type Source Collected On   Blood  03/05/18 0527          Components       Value Reference Range Flag Lab   Sodium 138 133 - 144 mmol/L  [facility]   Potassium 4.3 3.4 - 5.3 mmol/L  [facility]   Chloride 107 94 - 109 mmol/L  [facility]   Carbon Dioxide 24 20 - 32 mmol/L  [facility]   Anion Gap 7 3 - 14 mmol/L  [facility]   Glucose 116 70 - 99 mg/dL H [facility]   Urea Nitrogen 27 7 - 30 mg/dL  [facility]   Creatinine 1.00 0.66 - 1.25 mg/dL  [facility]   GFR Estimate 71 >60 mL/min/1.7m2  [facility]   Comment:  Non  GFR Calc   GFR Estimate If Black 86 >60 mL/min/1.7m2  [facility]   Comment:  African American GFR Calc   Calcium 7.1 8.5 - 10.1 mg/dL L [facility]            INR [[id number]] (Abnormal)  Resulted: 03/05/18 0600, Result status: Final result    Ordering provider: Sadie Ley MD  03/05/18 0000 Resulting lab: [facility]    Specimen Information    Type Source Collected On   Blood  03/05/18 0527          Components       Value Reference Range Flag Lab   INR 1.26 0.86 - 1.14 H [facility]            Hemoglobin [[id number]] (Abnormal)  Resulted: 03/05/18 0552, Result status: Final result    Ordering provider: Sadie Ley MD  03/05/18 0000 Resulting lab: [facility]    Specimen Information    Type Source Collected On   Blood  03/05/18 0527          Components       Value Reference Range Flag Lab   Hemoglobin 8.0 13.3 - 17.7 g/dL L [facility]            Phosphorus [[id number]] (Abnormal)  Resulted: 03/04/18 1610, Result status: Final result    Ordering provider: Sadie Ley MD  03/04/18 1534 Resulting lab: [facility]    Specimen Information    Type Source Collected On   Blood  03/04/18 1547          Components       Value Reference Range Flag Lab   Phosphorus 2.0 2.5 - 4.5 mg/dL L [facility]            Magnesium [[id number]]  Resulted: 03/04/18 1610, Result status: Final result    Ordering  provider: Sadie Ley MD  03/04/18 1534 Resulting lab: [facility]    Specimen Information    Type Source Collected On   Blood  03/04/18 1547          Components       Value Reference Range Flag Lab   Magnesium 1.9 1.6 - 2.3 mg/dL  [facility]            Potassium [[id number]]  Resulted: 03/04/18 1610, Result status: Final result    Ordering provider: Sadie Ley MD  03/04/18 1534 Resulting lab: [facility]    Specimen Information    Type Source Collected On   Blood  03/04/18 1547          Components       Value Reference Range Flag Lab   Potassium 4.2 3.4 - 5.3 mmol/L  [facility]            Glucose [[id number]] (Abnormal)  Resulted: 03/04/18 0547, Result status: Final result    Ordering provider: Angel Lucio MD  03/04/18 0154 Resulting lab: [facility]    Specimen Information    Type Source Collected On   Blood  03/04/18 0526          Components       Value Reference Range Flag Lab   Glucose 130 70 - 99 mg/dL H [facility]            INR [[id number]]  Resulted: 03/04/18 0546, Result status: Final result    Ordering provider: Sadie Ley MD  03/04/18 0000 Resulting lab: [facility]    Specimen Information    Type Source Collected On   Blood  03/04/18 0526          Components       Value Reference Range Flag Lab   INR 1.09 0.86 - 1.14  [facility]            Hemoglobin [[id number]] (Abnormal)  Resulted: 03/04/18 0537, Result status: Final result    Ordering provider: Sadie Ley MD  03/04/18 0000 Resulting lab: [facility]    Specimen Information    Type Source Collected On   Blood  03/04/18 0526          Components       Value Reference Range Flag Lab   Hemoglobin 8.2 13.3 - 17.7 g/dL L [facility]            Hemoglobin [[id number]] (Abnormal)  Resulted: 03/03/18 1021, Result status: Final result    Ordering provider: Sadie Ley MD  03/03/18  0822 Resulting lab: [facility]    Specimen Information    Type Source Collected On   Blood  03/03/18 1015          Components       Value Reference Range Flag Lab   Hemoglobin 10.1 13.3 - 17.7 g/dL L [facility]            Methicillin resistant staph aureus cult [[id number]]  Resulted: 03/03/18 0751, Result status: Final result    Ordering provider: Mason Parra MD  03/02/18 0620 Resulting lab: [facility]    Specimen Information    Type Source Collected On   Nares  03/02/18 0625          Components       Value Reference Range Flag Lab   Specimen Description Nares      Culture Micro No MRSA isolated   [facility]            INR [[id number]]  Resulted: 03/03/18 0552, Result status: Final result    Ordering provider: Sadie Ley MD  03/03/18 0000 Resulting lab: [facility]    Specimen Information    Type Source Collected On   Blood  03/03/18 0535          Components       Value Reference Range Flag Lab   INR 1.11 0.86 - 1.14  [facility]            Hemoglobin [[id number]] (Abnormal)  Resulted: 03/03/18 0548, Result status: Final result    Ordering provider: Sadie Ley MD  03/03/18 0000 Resulting lab: [facility]    Specimen Information    Type Source Collected On   Blood  03/03/18 0535          Components       Value Reference Range Flag Lab   Hemoglobin 9.8 13.3 - 17.7 g/dL L [facility]            Testing Performed By     Lab - Abbreviation Name Director Address Valid Date Range    22 - [facility] Unknown [address] [place] [address] 05/08/15 1057 - Present    170 - Unknown POINT OF CARE TEST, GLUCOSE Unknown Unknown 10/31/11 1114 - Present            Unresulted Labs (24h ago through future)    Start       Ordered    03/04/18 0600  INR  DAILY,   Routine      03/03/18 1043    03/04/18 0600  INR  (warfarin (COUMADIN) Pharmacy Consult  )  DAILY,   Routine      03/03/18  "1801    Unscheduled  Hemoglobin  CONDITIONAL X 2,   Routine     Comments:  IF morning Hemoglobin result is LESS than 8.0 on POD 1 and/or POD 2, release order and recheck Hemoglobin in the evening at 1700.  Notify Provider if second Hemoglobin result is LESS than 7.5.    03/03/18 1801    Unscheduled  Magnesium  (Magnesium Replacement - Adult - \"High\" - Replacement for all levels less than or equal to 2 mg/dL)  CONDITIONAL (SPECIFY),   Routine     Comments:  Obtain Magnesium Level for these conditions:  *IF no magnesium result within 24 hrs before initiation of order set, draw magnesium level with next lab collect.    *2 HOURS AFTER last magnesium replacement dose when magnesium replacement given for level less than 1.6  *Next morning after magnesium dose.     Repeat Magnesium Replacement if necessary.    03/04/18 1637    Unscheduled  Phosphorus  (POTASSIUM Phosphate - \"High\" - Replacement for all levels less than 2.8 mg/dL )  CONDITIONAL (SPECIFY),   Routine     Comments:  Obtain Phosphorus Level for these conditions:  *IF no phosphorus result within 24 hrs before initiation of order set, draw phosphorus level with next lab collect.    *2 HOURS AFTER last phosphorus replacement dose when phosphorus replacement given for level less than 2.0  *Next morning after phosphorus dose.     Repeat Phosphorus Replacement if necessary.    03/04/18 1637         Imaging Results - 3 Days      XR Surgery GLO L/T 5 Min Fluoro w Stills [[id number]]  Resulted: 03/03/18 1554, Result status: Final result    Ordering provider: Mason Parra MD  03/02/18 1524 Resulted by: Josr Reyes MD    Performed: 03/03/18 1227 - 03/03/18 1537 Resulting lab: RADIOLOGY RESULTS    Narrative:       XR SURGERY GLO FLUORO LESS THAN 5 MIN W STILLS 3/3/2018 3:37 PM     COMPARISON:    HISTORY: Internal fixation of femur fracture.    NUMBER OF IMAGES ACQUIRED: 5    VIEWS: Multiple    FLUOROSCOPY TIME: .6      Impression:       IMPRESSION: " C-arm fluoroscopy was utilized during the open reduction  and internal fixation of the distal femur fracture.    BERENICE BOOKER MD      Testing Performed By     Lab - Abbreviation Name Director Address Valid Date Range    104 - Rad [facility] RADIOLOGY RESULTS Unknown Unknown 02/16/05 1553 - Present            Encounter-Level Documents:     There are no encounter-level documents.      Order-Level Documents:     There are no order-level documents.

## 2018-03-01 NOTE — IP AVS SNAPSHOT
[address] Surgical    [address]     [place] [address]    Phone:  [phone]                                       After Visit Summary   3/1/2018    Tomer Weiss    MRN: [id number]           After Visit Summary Signature Page     I have received my discharge instructions, and my questions have been answered. I have discussed any challenges I see with this plan with the nurse or doctor.    ..........................................................................................................................................  Patient/Patient Representative Signature      ..........................................................................................................................................  Patient Representative Print Name and Relationship to Patient    ..................................................               ................................................  Date                                            Time    ..........................................................................................................................................  Reviewed by Signature/Title    ...................................................              ..............................................  Date                                                            Time

## 2018-03-01 NOTE — IP AVS SNAPSHOT
` [address] MEDICAL SURGICAL: [phone]                                              INTERAGENCY TRANSFER FORM - NURSING   3/1/2018                    Hospital Admission Date: 3/1/2018  ELIER BUTLER   : 1932  Sex: Male        Attending Provider: Mason Parra MD     Allergies:  No Known Drug Allergies    Infection:  None   Service:  HOSPITALIST    Ht:  1.829 m (6')   Wt:  88.1 kg (194 lb 3.6 oz)   Admission Wt:  88.5 kg (195 lb)    BMI:  26.34 kg/m 2   BSA:  2.12 m 2            Patient PCP Information     Provider PCP Type    Gabriel Tan MD General      Current Code Status     Date Active Code Status Order ID Comments User Context       Prior      Code Status History     Date Active Date Inactive Code Status Order ID Comments User Context    3/6/2018  9:41 AM  Full Code [id number]  Kylah Bose PA-C Outpatient    3/3/2018  6:01 PM 3/6/2018  9:41 AM Full Code [id number]  Mason Parra MD Inpatient    3/1/2018 10:59 PM 3/3/2018  6:01 PM Full Code [id number]  Angel Lucio MD Inpatient    3/16/2016 10:58 AM 3/1/2018 10:59 PM Full Code [id number]  Kylah Bose PA-C Outpatient    3/14/2016  1:10 PM 3/16/2016 10:58 AM Full Code [id number]  Deonte Silver MD Inpatient      Advance Directives        Scanned docmt in ACP Activity?           Yes, scanned ACP docmt        Hospital Problems as of 3/6/2018              Priority Class Noted POA    Malignant neoplasm of kidney excluding renal pelvis (H) Medium  2010 Yes    Hyperlipidemia LDL goal <130 Medium  10/31/2010 Yes    Total knee replacement status Medium  3/14/2016 Yes    Anemia Medium  3/17/2016 Yes    Long-term (current) use of anticoagulants [Z79.01] Medium  3/18/2016 Yes    Chronic atrial fibrillation (H) Medium  3/18/2016 Yes    * (Principal)Closed displaced supracondylar fracture of distal end of left femur without intracondylar extension (H) Medium  3/1/2018 Yes    Periprosthetic  fracture around internal prosthetic left knee joint Medium  3/1/2018 Yes    Pulmonary hypertension Medium  3/1/2018 Yes    Dysrhythmia 4 beat run narrow complex Medium  3/4/2018 No    Hypotension, unspecified hypotension type Medium  3/4/2018 No    Hypophosphatemia Medium  3/5/2018 Yes      Non-Hospital Problems as of 3/6/2018              Priority Class Noted    Contracture of palmar fascia Medium  Unknown    Alopecia Medium  5/7/2002    Polymyalgia rheumatica (H) Medium  6/28/2005    Cervicalgia Medium  9/18/2006    Unspecified hyperplasia of prostate with urinary obstruction and other lower urinary tract symptoms (LUTS) Medium  8/10/2007    Dupuytren's contracture of hand Medium  3/15/2011    DJD (degenerative joint disease) of knee Medium  3/15/2011    BPH (benign prostatic hyperplasia) Medium  12/20/2011    Advanced directives, counseling/discussion Medium  12/20/2011    Atrial flutter Medium  3/16/2015    Post-traumatic osteoarthritis of left knee Medium  12/3/2015      Immunizations     Name Date      Pneumo Conj 13-V (2010&after) 01/13/17     Pneumococcal 23 valent 09/16/09     TD (ADULT, 7+) 09/16/09     TD (ADULT, 7+) 02/22/99     TDAP Vaccine (Boostrix) 07/19/13          END      ASSESSMENT     Discharge Profile Flowsheet     EXPECTED DISCHARGE     All Quadrants Bowel Sounds  audible and active in all quadrants 03/06/18 0120    Expected Discharge Date  03/06/18 03/05/18 1033   Last Bowel Movement  03/01/18 03/05/18 0209    DISCHARGE NEEDS ASSESSMENT     GI Signs/Symptoms  no gastrointestinal signs/symptoms 03/06/18 0120    Patient/family verbalizes understanding of discharge plan recommendations?  Yes 03/05/18 1033   Passing flatus  yes 03/06/18 0120    Medical Team notified of plan?  yes 03/05/18 1033   COMMUNICATION ASSESSMENT      Readmission Within The Last 30 Days  current reason for admission unrelated to previous admission 03/01/18 2331   Patient's communication style  spoken language (English or  "Bilingual) 03/01/18 1930    Equipment Currently Used at Home  none (reports access to walker, crutches, wheelchair as needed ) 03/05/18 0937   FINAL RESOURCES      Transportation Available  van, wheelchair accessible 03/05/18 0937   Resources List  Skilled Nursing Facility 03/06/18 1056    # of Referrals Placed by [facility]  Internal Clinic Care Coordination;Post Acute Facilities 03/05/18 1033   Skilled Nursing [facility] [phone], Fax: [phone] 03/06/18 1056    FUNCTIONAL LEVEL CURRENT     Referrals Placed  Home Care 03/17/16 1120    Ambulation  3 - assistive equipment and person 03/06/18 1038   SKIN      Transferring  3 - assistive equipment and person 03/06/18 1038   Inspection of bony prominences  Full 03/06/18 1036    Toileting  3 - assistive equipment and person 03/06/18 1038   Procedural focused assessment (identify areas inspected)   -- (Left lateral thigh) 03/03/18 1658    Bathing  2 - assistive person 03/06/18 1038   Inspection under devices  Full 03/06/18 1036    Dressing  0 - independent 03/06/18 1038   Not Inspected under devices  Knee immobilizer;SCD/Pneumo boots;TEDs /Jobst stocking 03/06/18 1036    Eating  0 - independent 03/06/18 1038   Skin WDL  ex 03/06/18 1036    Communication  0 - understands/communicates without difficulty 03/06/18 1038   Skin Integrity  incision(s) 03/06/18 1036    Swallowing  0 - swallows foods/liquids without difficulty 03/06/18 1038   SAFETY      GASTROINTESTINAL (ADULT,PEDIATRIC,OB)     Safety WDL  WDL 03/06/18 1036    GI WDL  WDL 03/06/18 1036   All Alarms  alarm(s) activated and audible 03/06/18 1036                 Assessment WDL (Within Defined Limits) Definitions           Safety WDL     Effective: 09/28/15    Row Information: <b>WDL Definition:</b> Bed in low position, wheels locked; call light in reach; upper side rails up x 2; ID band on<br> <font color=\"gray\"><i>Item=AS safety wdl>>List=AS safety wdl>>Version=F14</i></font>      Skin " "WDL     Effective: 09/28/15    Row Information: <b>WDL Definition:</b> Warm; dry; intact; elastic; without discoloration; pressure points without redness<br> <font color=\"gray\"><i>Item=AS skin wdl>>List=AS skin wdl>>Version=F14</i></font>      Vitals     Vital Signs Flowsheet     VITAL SIGNS     Pain Control  partially effective 03/05/18 0243    Temp  97.4  F (36.3  C) 03/06/18 0642   Functioning  can do most things, but pain gets in the way of some 03/05/18 0242    Temp src  Oral 03/06/18 0642   Sleep  awake with occasional pain 03/05/18 0242    Resp  20 03/06/18 0642   PAIN ASSESSMENT/FLACC      Pulse  94 03/06/18 0642   Pain Rating: FLACC (rest) - Face  0 03/03/18 1704    Heart Rate  94 03/06/18 0642   Pain Rating: FLACC (rest) - Legs  0 03/03/18 1704    Pulse/Heart Rate Source  Monitor 03/06/18 0642   Pain Rating: FLACC (rest) - Activity  0 03/03/18 1704    BP  115/69 03/06/18 0642   Pain Rating: FLACC (rest) - Cry  1 03/03/18 1704    BP Location  Right arm 03/06/18 0642   Pain Rating: FLACC (rest) - Consolability  1 03/03/18 1704    OXYGEN THERAPY     Score: FLACC (rest)  2 03/03/18 1704    SpO2  96 % 03/06/18 0642   ANALGESIA SIDE EFFECTS MONITORING      O2 Device  None (Room air) 03/06/18 0642   Side Effects Monitoring: Respiratory Quality  R 03/05/18 0243    Oxygen Delivery  2 LPM 03/04/18 0744   Side Effects Monitoring: Respiratory Depth  N 03/05/18 0243    RESPIRATORY MONITORING     Side Effects Monitoring: Sedation Level  S 03/05/18 0243    Respiratory Monitoring (EtCO2)  35 mmHg 03/04/18 0914   HEIGHT AND WEIGHT      Integrated Pulmonary Index (IPI)  10 03/04/18 0914   Height  1.829 m (6') 03/01/18 2303    PAIN/COMFORT     Weight  88.1 kg (194 lb 3.6 oz) 03/01/18 2303    Patient Currently in Pain  sleeping: patient not able to self report 03/06/18 0539   BSA (Calculated - sq m)  2.12 03/01/18 2303    Preferred Pain Scale  number (Numeric Rating Pain Scale) 03/04/18 0330   BMI (Calculated)  26.4 03/01/18 " 2303    Patient's Stated Pain Goal  1 03/04/18 0330   POSITIONING      0-10 Pain Scale  0 03/06/18 0539   Body Position  turned 03/05/18 0504    Pain Location  Foot 03/05/18 0242   Head of Bed (HOB)  HOB at 20-30 degrees 03/05/18 1637    Pain Orientation  Left 03/05/18 0242   Positioning/Transfer Devices  pillows 03/05/18 0203    Pain Descriptors  Aching 03/05/18 0242   DAILY CARE      Pain Management Interventions  analgesia administered 03/05/18 0242   Activity Management  activity adjusted per tolerance;activity encouraged 03/06/18 1036    Pain Intervention(s)  Medication (See eMAR) 03/05/18 0242   Activity Assistance Provided  assistance, 2 people;other (see comments) (and jose Kaur) 03/06/18 1036    Response to Interventions  Decrease in pain 03/05/18 0243   ECG      CLINICALLY ALIGNED PAIN ASSESSMENT (CAPA) ([facility], [facility] AND [facility] ADULTS ONLY)     ECG Rhythm  Atrial fibrillation 03/04/18 1647    Comfort  comfortably manageable 03/05/18 0243   Lead Monitored  Lead II 03/03/18 1654    Change in Pain  getting better 03/05/18 0243                 Patient Lines/Drains/Airways Status    Active LINES/DRAINS/AIRWAYS     Name: Placement date: Placement time: Site: Days: Last dressing change:    Peripheral IV 03/04/18 Left Lower forearm 03/04/18   0757   Lower forearm   2     Incision/Surgical Site 03/03/18 Left Knee 03/03/18   1657    2             Patient Lines/Drains/Airways Status    Active PICC/CVC     None            Intake/Output Detail Report     Date Intake     Output Net    Shift P.O. I.V. IV Piggyback Total Urine Total       Noc 03/04/18 2300 - 03/05/18 0659 -- 1441.67 -- 1441.67 .67    Day 03/05/18 0700 - 03/05/18 1459 680 -- -- 680 400 400 280    Chandrika 03/05/18 1500 - 03/05/18 2259 680 -- -- 680 450 450 230    Noc 03/05/18 2300 - 03/06/18 0659 400 -- -- 400 425 425 -25    Day 03/06/18 0700 - 03/06/18 1459 -- -- -- -- -- -- 0      Last Void/BM       Most Recent Value    Urine Occurrence      Stool Occurrence 1 at 03/05/2018 1857      Case Management/Discharge Planning     Case Management/Discharge Planning Flowsheet     REFERRAL INFORMATION     EXPECTED DISCHARGE      Admission Type  inpatient 03/05/18 1030   Expected Discharge Date  03/06/18 03/05/18 1033    Arrived From  home or self-care 03/05/18 1030   DISCHARGE PLANNING      Referral Source  interdisciplinary rounds 03/05/18 1030   Patient/family verbalizes understanding of discharge plan recommendations?  Yes 03/05/18 1033    # of Referrals Placed by CTS  Internal Clinic Care Coordination;Post Acute Facilities 03/05/18 1033   Medical Team notified of plan?  yes 03/05/18 1033    Reason For Consult  discharge planning 03/05/18 1030   Readmission Within The Last 30 Days  current reason for admission unrelated to previous admission 03/01/18 2331    CTS Assigned to Case  Aliza 03/05/18 1030   Transportation Available  van, wheelchair accessible 03/05/18 0937    Primary Care Clinic Name  [facility] 03/05/18 1030   Discharge Plan Concerns  concerns to be addressed (potential for TCU) 03/01/18 2331    Primary Care MD Name  Dr. Gabriel Tan 03/05/18 1030   FINAL NOTE      LIVING ENVIRONMENT     Final Note  Discharge to [facility] via [employer] 03/06/18 1056    Lives With  child(benedict), adult;grandchild(benedict) 03/05/18 1030   FINAL RESOURCES      Living Arrangements  house 03/05/18 1030   Equipment Currently Used at Home  none (reports access to walker, crutches, wheelchair as needed ) 03/05/18 0937    Provides Primary Care For  no one 03/05/18 1030   Resources List  Skilled Nursing Facility 03/06/18 1056    Quality Of Family Relationships  supportive 03/05/18 1030   Skilled Nursing Facility  [facility] [phone], Fax: [phone] 03/06/18 1056    ASSESSMENT OF FAMILY/SOCIAL SUPPORT     Referrals Placed  Home Care 03/17/16 1120    Marital Status   03/05/18 1033   ABUSE RISK SCREEN      Who is your support system?   Children 03/05/18 1033   QUESTION TO PATIENT:  Has a member of your family or a partner(now or in the past) intimidated, hurt, manipulated, or controlled you in any way?  no 03/01/18 2325    Description of Support System  Involved;Supportive 03/05/18 1033   QUESTION TO PATIENT: Do you feel safe going back to the place where you are living?  yes 03/01/18 2325    COPING/STRESS     OBSERVATION: Is there reason to believe there has been maltreatment of a vulnerable adult (ie. Physical/Sexual/Emotional abuse, self neglect, lack of adequate food, shelter, medical care, or financial exploitation)?  no 03/01/18 2325    Major Change/Loss/Stressor  none 03/01/18 2350   OTHER      Sources Of Support  adult child(benedict) 03/05/18 1033   Are you depressed or being treated for depression?  No 03/01/18 2326    Reaction To Health Status  accepting 03/05/18 1033   HOMICIDE RISK      Understanding Of Condition And Treatment  adequate understanding of medical condition;adequate understanding of treatment 03/05/18 1033   Feels Like Hurting Others  no 03/01/18 1933

## 2018-03-01 NOTE — IP AVS SNAPSHOT
[address] MEDICAL SURGICAL: [phone]                                              INTERAGENCY TRANSFER FORM - PHYSICIAN ORDERS   3/1/2018                    Hospital Admission Date: 3/1/2018  ELIER BUTLER   : 1932  Sex: Male        Attending Provider: Mason Parra MD     Allergies:  No Known Drug Allergies    Infection:  None   Service:  HOSPITALIST    Ht:  1.829 m (6')   Wt:  88.1 kg (194 lb 3.6 oz)   Admission Wt:  88.5 kg (195 lb)    BMI:  26.34 kg/m 2   BSA:  2.12 m 2            Patient PCP Information     Provider PCP Type    Gabriel Tan MD General      ED Clinical Impression     Diagnosis Description Comment Added By Time Added    Closed displaced supracondylar fracture of distal end of left femur without intracondylar extension, initial encounter (H) [S72.022A] Closed displaced supracondylar fracture of distal end of left femur without intracondylar extension, initial encounter (H) [S72.452A]  Miguel Cifuentes MD 3/1/2018  9:43 PM      Hospital Problems as of 3/6/2018              Priority Class Noted POA    Malignant neoplasm of kidney excluding renal pelvis (H) Medium  2010 Yes    Hyperlipidemia LDL goal <130 Medium  10/31/2010 Yes    Total knee replacement status Medium  3/14/2016 Yes    Anemia Medium  3/17/2016 Yes    Long-term (current) use of anticoagulants [Z79.01] Medium  3/18/2016 Yes    Chronic atrial fibrillation (H) Medium  3/18/2016 Yes    * (Principal)Closed displaced supracondylar fracture of distal end of left femur without intracondylar extension (H) Medium  3/1/2018 Yes    Periprosthetic fracture around internal prosthetic left knee joint Medium  3/1/2018 Yes    Pulmonary hypertension Medium  3/1/2018 Yes    Dysrhythmia 4 beat run narrow complex Medium  3/4/2018 No    Hypotension, unspecified hypotension type Medium  3/4/2018 No    Hypophosphatemia Medium  3/5/2018 Yes      Non-Hospital Problems as of 3/6/2018               Priority Class Noted    Contracture of palmar fascia Medium  Unknown    Alopecia Medium  5/7/2002    Polymyalgia rheumatica (H) Medium  6/28/2005    Cervicalgia Medium  9/18/2006    Unspecified hyperplasia of prostate with urinary obstruction and other lower urinary tract symptoms (LUTS) Medium  8/10/2007    Dupuytren's contracture of hand Medium  3/15/2011    DJD (degenerative joint disease) of knee Medium  3/15/2011    BPH (benign prostatic hyperplasia) Medium  12/20/2011    Advanced directives, counseling/discussion Medium  12/20/2011    Atrial flutter Medium  3/16/2015    Post-traumatic osteoarthritis of left knee Medium  12/3/2015      Code Status History     Date Active Date Inactive Code Status Order ID Comments User Context    3/6/2018  9:41 AM  Full Code [id number]  Kylah Bose PA-C Outpatient    3/3/2018  6:01 PM 3/6/2018  9:41 AM Full Code [id number]  Mason Parra MD Inpatient    3/1/2018 10:59 PM 3/3/2018  6:01 PM Full Code [id number]  Angel Lucio MD Inpatient    3/16/2016 10:58 AM 3/1/2018 10:59 PM Full Code [id number]  Kylah Bose PA-C Outpatient    3/14/2016  1:10 PM 3/16/2016 10:58 AM Full Code [id number]  Deonte Silver MD Inpatient         Medication Review      START taking        Dose / Directions Comments    acetaminophen 325 MG tablet   Commonly known as:  TYLENOL        Dose:  650 mg   Take 2 tablets (650 mg) by mouth every 4 hours as needed for other (multimodal surgical pain management along with NSAIDS and opioid medication as indicated based on pain control and physical function.)   Quantity:  100 tablet   Refills:  0        ferrous sulfate 325 (65 FE) MG tablet   Commonly known as:  IRON        Dose:  325 mg   Take 1 tablet (325 mg) by mouth 2 times daily (with meals)   Quantity:  100 tablet   Refills:  0        melatonin 1 MG Tabs tablet        Dose:  1 mg   Take 1 tablet (1 mg) by mouth nightly as needed for sleep   Refills:  0        oxyCODONE IR 5 MG  tablet   Commonly known as:  ROXICODONE        Dose:  5-10 mg   Take 1-2 tablets (5-10 mg) by mouth every 4 hours as needed for other (pain control or improvement in physical function. Hold dose for analgesic side effects.)   Quantity:  40 tablet   Refills:  0        polyethylene glycol Packet   Commonly known as:  MIRALAX/GLYCOLAX        Dose:  17 g   Take 17 g by mouth daily as needed for constipation   Quantity:  7 packet   Refills:  0        senna-docusate 8.6-50 MG per tablet   Commonly known as:  SENOKOT-S;PERICOLACE        Dose:  1 tablet   Take 1 tablet by mouth 2 times daily as needed for constipation   Quantity:  100 tablet   Refills:  0          CONTINUE these medications which have NOT CHANGED        Dose / Directions Comments    atorvastatin 20 MG tablet   Commonly known as:  LIPITOR   Used for:  Hyperlipidemia LDL goal <130        Dose:  20 mg   Take 1 tablet (20 mg) by mouth daily   Quantity:  90 tablet   Refills:  3        glucosamine 500 MG Caps   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        2 Tablets every morning   Refills:  0        metoprolol tartrate 25 MG tablet   Commonly known as:  LOPRESSOR   Used for:  Chronic atrial fibrillation (H)        TAKE ONE-HALF TABLET BY MOUTH ONCE DAILY   Quantity:  45 tablet   Refills:  9        order for DME   Used for:  Status post total left knee replacement        Equipment being ordered: Walker () Treatment Diagnosis: s/p joint replacement   Quantity:  1 Device   Refills:  0        warfarin 5 MG tablet   Commonly known as:  COUMADIN   Used for:  Long-term (current) use of anticoagulants        Take 2.5 mg (1/2 tablet) Tuesday and Saturday and 5 mg other 5 days or as directed by the coumadin clinic   Quantity:  75 tablet   Refills:  1          STOP taking     furosemide 20 MG tablet   Commonly known as:  LASIX           iron 66 MG Tabs                   Summary of Visit     Reason for your hospital stay       S/p distal femur  periprosthetic fracture.             After Care     Activity - Up ad brody           Activity - Up with assistive device           Activity - Up with nursing assistance           Advance Diet as Tolerated       Follow this diet upon discharge: Orders Placed This Encounter      Room Service      Advance Diet as Tolerated: Regular Diet Adult       General info for SNF       Length of Stay Estimate: Short Term Care: Estimated # of Days 31-90  Condition at Discharge: Improving  Level of care:skilled   Rehabilitation Potential: Good  Admission H&P remains valid and up-to-date: Yes  Utilize standing orders       Mantoux instructions       Give two-step Mantoux (PPD) Per Facility Policy Yes       Weight bearing status       Non-weight bear LLE       Wound care       Site:   Left distal thigh  Instructions:  Keep aquacell dressing in place till follow up.  At that time any staples present will be removed.   Place thigh high TEDs for compression.  Place brace.  OK to unlock brace in bed, up in chair etc.  Lock at 20-30 degrees for transfers/ambulation.             Referrals     Occupational Therapy Adult Consult       Evaluate and treat as clinically indicated.    Reason:  S/p left distal femur fracture       Physical Therapy Adult Consult       Evaluate and treat as clinically indicated.    Reason:  Left distal femur fracture  Brace in unlock position most of time.    Please lock brace at 20-30 degrees for transfers or ambulation.             Supplies     AntiEmbolism Stockings       Bilateral Thigh high length. On in the morning, off at night       Pneumatic Compression Device        Bilateral calf. Remove 30 mins BID.             Your next 10 appointments already scheduled     Mar 20, 2018 10:00 AM CDT   Ech Limited with [id number]   [facility] CV Echocardiography (Cardiovascular Imaging at [facility])    [address] [place] [id number] [phone]           1.  Please bring or  wear a comfortable two-piece outfit. 2.  You may eat, drink and take your normal medicines. 3.  For any questions that cannot be answered, please contact the ordering physician            Mar 20, 2018 11:00 AM CDT   MR MYOCARDIUM W CONTRAST with SCIMR1   [facility] (Cardiovascular Imaging at [facility])    [address] [place] [address]  [address]  [place] [address]   [phone]           Take your medicines as usual, unless your doctor tells you not to. Bring a list of your current medicines to your exam (including vitamins, minerals and over-the-counter drugs).  You may or may not receive intravenous (IV) contrast for this exam pending the discretion of the Radiologist.  You do not need to do anything special to prepare.  The MRI machine uses a strong magnet. Please wear clothes without metal (snaps, zippers). A sweatsuit works well, or we may give you a hospital gown.  Please remove any body piercings and hair extensions before you arrive. You will also remove watches, jewelry, hairpins, wallets, dentures, partial dental plates and hearing aids. You may wear contact lenses, and you may be able to wear your rings. We have a safe place to keep your personal items, but it is safer to leave them at home.  **IMPORTANT** THE INSTRUCTIONS BELOW ARE ONLY FOR THOSE PATIENTS WHO HAVE BEEN PRESCRIBED SEDATION OR GENERAL ANESTHESIA DURING THEIR MRI PROCEDURE:  IF YOUR DOCTOR PRESCRIBED ORAL SEDATION (take medicine to help you relax during your exam):   You must get the medicine from your doctor (oral medication) before you arrive. Bring the medicine to the exam. Do not take it at home. You ll be told when to take it upon arriving for your exam.   Arrive one hour early. Bring someone who can take you home after the test. Your medicine will make you sleepy. After the exam, you may not drive, take a bus or take a taxi by yourself.  IF YOUR DOCTOR PRESCRIBED IV SEDATION:   Arrive one hour early.  Bring someone who can take you home after the test. Your medicine will make you sleepy. After the exam, you may not drive, take a bus or take a taxi by yourself.   No eating 6 hours before your exam. You may have clear liquids up until 4 hours before your exam. (Clear liquids include water, clear tea, black coffee and fruit juice without pulp.)  IF YOUR DOCTOR PRESCRIBED ANESTHESIA (be asleep for your exam):   Arrive 1 1/2 hours early. Bring someone who can take you home after the test. You may not drive, take a bus or take a taxi by yourself.   No eating 8 hours before your exam. You may have clear liquids up until 4 hours before your exam. (Clear liquids include water, clear tea, black coffee and fruit juice without pulp.)   You will spend four to five hours in the recovery room.  Please call the Imaging Department at your exam site with any questions.            Mar 28, 2018  3:00 PM CDT   Anticoagulation Visit with ZM ANTI COAG   [facility] ([facility])    [address] [facility] [address]   [phone]            Apr 02, 2018 11:00 AM CDT   Cath 90 Minute with SHCVR1   [facility] ([facility])    [address] [facility]  [place] [address] [phone]            Apr 16, 2018  7:45 AM CDT   Pulmonary Hypertension Return with Russell John MD   [facility]   [place] ([facility])    [address]  [facility] [address] [phone]              Follow-Up Appointment Instructions     Future Labs/Procedures    Follow Up and recommended labs and tests     Comments:    Follow up with Dr. Silver in 10-14 days post op  Will need xrays on follow up      Follow-Up Appointment Instructions     Follow Up and recommended labs and tests       Follow up with Dr. Silver in 10-14 days post op  Will need xrays on follow up             Statement of Approval     Ordered           03/06/18 1050  I have reviewed and agree with all the recommendations and orders detailed in this document.  EFFECTIVE NOW     Approved and electronically signed by:  Deonte Silver MD

## 2018-03-01 NOTE — IP AVS SNAPSHOT
` `     [address] SURGICAL: [phone]            Medication Administration Report for Tomer Weiss as of 03/06/18 1108   Legend:    Given Hold Not Given Due Canceled Entry Other Actions    Time Time (Time) Time  Time-Action       Inactive    Active    Linked        Medications 02/28/18 03/01/18 03/02/18 03/03/18 03/04/18 03/05/18 03/06/18    acetaminophen (TYLENOL) tablet 975 mg  Dose: 975 mg  Freq: EVERY 8 HOURS Route: PO  Start: 03/03/18 1815   End: 03/06/18 2059   Admin Instructions: Do not use if patient has an active opioid/acetaminophen combined analgesic product ordered for pain.  Maximum acetaminophen dose from all sources = 75 mg/kg/day not to exceed 4 grams/day.        2105 (975 mg)-Given        0559 (975 mg)-Given       1316 (975 mg)-Given       2041 (975 mg)-Given        0434 (975 mg)-Given       1340 (975 mg)-Given       2113 (975 mg)-Given        0431 (975 mg)-Given       [ ] 1300       2059-Med Discontinued       atorvastatin (LIPITOR) tablet 20 mg  Dose: 20 mg  Freq: DAILY Route: PO  Start: 03/05/18 0900         0942 (20 mg)-Given        0901 (20 mg)-Given           ferrous sulfate (IRON) tablet 325 mg  Dose: 325 mg  Freq: 2 TIMES DAILY WITH MEALS Route: PO  Start: 03/05/18 1800         1809 (325 mg)-Given        0900 (325 mg)-Given       [ ] 1800           HYDROmorphone (PF) (DILAUDID) injection 0.3-0.5 mg  Dose: 0.3-0.5 mg  Freq: EVERY 2 HOURS PRN Route: IV  PRN Reason: other  PRN Comment: for pain control or improvement in physical function.  Hold dose for analgesic side effects.  Start: 03/01/18 2259   Admin Instructions: Start at the lowest dose.  May adjust dose by 0.1 mg every 2 hours as needed.  Notify provider to assess for uncontrolled pain or analgesic side effects. Hold while on PCA or with regular IV opioid dosing  For ordered doses up to 4 mg give IV Push undiluted. Administer each 2mg over 2-5 minutes.        1215-Auto Hold       1807-Unhold            "   lidocaine (LMX4) kit  Freq: EVERY 1 HOUR PRN Route: Top  PRN Reason: pain  PRN Comment: with VAD insertion or accessing implanted port.  Start: 03/01/18 2259   Admin Instructions: Do NOT give if patient has a history of allergy to any local anesthetic or any \"benjamín\" product.   Apply 30 minutes prior to VAD insertion or port access.  MAX Dose:  2.5 g (  of 5 g tube)        1215-Auto Hold       1807-Unhold              lidocaine 1 % 1 mL  Dose: 1 mL  Freq: EVERY 1 HOUR PRN Route: OTHER  PRN Comment: mild pain with VAD insertion or accessing implanted port  Start: 03/01/18 2259   Admin Instructions: Do NOT give if patient has a history of allergy to any local anesthetic or any \"benjamín\" product. MAX dose 1 mL subcutaneous OR intradermal in divided doses.        1215-Auto Hold       1807-Unhold              magnesium sulfate 2 g in water intermittent infusion  Dose: 2 g  Freq: DAILY PRN Route: IV  PRN Reason: magnesium supplementation  Start: 03/04/18 1637   Admin Instructions: For Serum Mg++ 1.6 - 2 mg/dL  Give 2 g and recheck magnesium level next AM.         1723 (2 g)-New Bag             magnesium sulfate 4 g in 100 mL sterile water (premade)  Dose: 4 g  Freq: EVERY 4 HOURS PRN Route: IV  PRN Reason: magnesium supplementation  Start: 03/04/18 1637   Admin Instructions: For serum Mg++ less than 1.6 mg/dL  Give 4 g and recheck magnesium level 2 hours after dose, and next AM.               melatonin tablet 1 mg  Dose: 1 mg  Freq: AT BEDTIME PRN Route: PO  PRN Reason: sleep  Start: 03/01/18 2259   Admin Instructions: Do not give unless at least 6 hours of uninterrupted sleep is expected.       2158 (1 mg)-Given        1215-Auto Hold       1807-Unhold        0204 (1 mg)-Given        0135 (1 mg)-Given       2349 (1 mg)-Given            metoprolol tartrate (LOPRESSOR) half-tab 12.5 mg  Dose: 12.5 mg  Freq: EVERY MORNING Route: PO  Start: 03/02/18 0900   Admin Instructions: HOLD if systolic blood pressures is less than 95 " systolic.       0812 (12.5 mg)-Given        (0831)-Not Given       1215-Auto Hold       1807-Unhold        1011 (12.5 mg)-Given        0942 (12.5 mg)-Given        0901 (12.5 mg)-Given           naloxone (NARCAN) injection 0.1-0.4 mg  Dose: 0.1-0.4 mg  Freq: EVERY 2 MIN PRN Route: IV  PRN Reason: opioid reversal  Start: 03/01/18 2259   Admin Instructions: For respiratory rate LESS than or EQUAL to 8.  Partial reversal dose:  0.1 mg titrated q 2 minutes for Analgesia Side Effects Monitoring Sedation Level of 3 (frequently drowsy, arousable, drifts to sleep during conversation).Full reversal dose:  0.4 mg bolus for Analgesia Side Effects Monitoring Sedation Level of 4 (somnolent, minimal or no response to stimulation).  For ordered doses up to 2mg give IVP. Give each 0.4mg over 15 seconds in emergency situations. For non-emergent situations further dilute in 9mL of NS to facilitate titration of response.        1215-Auto Hold       1807-Unhold              ondansetron (ZOFRAN-ODT) ODT tab 4 mg  Dose: 4 mg  Freq: EVERY 6 HOURS PRN Route: PO  PRN Reasons: nausea,vomiting  Start: 03/01/18 2259   Admin Instructions: This is Step 1 of nausea and vomiting management.  If nausea not resolved in 15 minutes, go to Step 2 prochlorperazine (COMPAZINE). Do not push through foil backing. Peel back foil and gently remove. Place on tongue immediately. Administration with liquid unnecessary  With dry hands, peel back foil backing and gently remove tablet; do not push oral disintegrating tablet through foil backing; administer immediately on tongue and oral disintegrating tablet dissolves in seconds; then swallow with saliva; liquid not required.        1215-Auto Hold              1807-Unhold             Or  ondansetron (ZOFRAN) injection 4 mg  Dose: 4 mg  Freq: EVERY 6 HOURS PRN Route: IV  PRN Reasons: nausea,vomiting  Start: 03/01/18 2259   Admin Instructions: This is Step 1 of nausea and vomiting management.  If nausea not resolved  in 15 minutes, go to Step 2 prochlorperazine (COMPAZINE).  Irritant. For ordered doses up to 4 mg, give IV Push undiluted over 2-5 minutes.        1215-Auto Hold       1315 (4 mg)-Given       1807-Unhold              oxyCODONE IR (ROXICODONE) tablet 5-10 mg  Dose: 5-10 mg  Freq: EVERY 4 HOURS PRN Route: PO  PRN Reason: other  PRN Comment: pain control or improvement in physical function. Hold dose for analgesic side effects.  Start: 03/03/18 1801   Admin Instructions: Start with the lowest dose. May adjust dose by 5 mg every 4 hours as needed. Notify provider to assess for uncontrolled pain or analgesic side effects. Hold while on PCA or with regular IV opioid dosing. Maximum total is 60 mg in 24 hours.        2105 (5 mg)-Given         0134 (10 mg)-Given        0431 (5 mg)-Given           polyethylene glycol (MIRALAX/GLYCOLAX) Packet 17 g  Dose: 17 g  Freq: DAILY PRN Route: PO  PRN Reason: constipation  Start: 03/05/18 1824   Admin Instructions: 1 Packet = 17 grams. Mixed prescribed dose in 8 ounces of water. Follow with 8 oz. of water.          2113 (17 g)-Given            potassium phosphate 10 mmol in D5W 250 mL intermittent infusion  Dose: 10 mmol  Freq: DAILY PRN Route: IV  PRN Reason: phosphorous supplementation  Start: 03/04/18 1637   Admin Instructions: For serum phosphorus level 2.5-2.7  Do not infuse Phosphorus in the same line as TPN.   Give 10 mmol and recheck phosphorus level the next AM.               potassium phosphate 15 mmol in D5W 250 mL intermittent infusion  Dose: 15 mmol  Freq: DAILY PRN Route: IV  PRN Reason: phosphorous supplementation  Start: 03/04/18 1637   Admin Instructions: For serum phosphorus level 2.0-2.4  Do not infuse Phosphorus in the same line as TPN.   Give 15 mmol and recheck phosphorus level next AM.         1826 (15 mmol)-New Bag        0946 (15 mmol)-New Bag        0901 (15 mmol)-New Bag           potassium phosphate 20 mmol in D5W 500 mL intermittent infusion  Dose: 20  mmol  Freq: EVERY 6 HOURS PRN Route: IV  PRN Reason: phosphorous supplementation  Start: 03/04/18 1637   Admin Instructions: For serum phosphorus level 1.1-1.9  For peripheral line  Do not infuse Phosphorus in the same line as TPN.   Give 20 mmol and recheck phosphorus level 2 hours after dose and next AM. Repeat if necessary.               potassium phosphate 25 mmol in D5W 500 mL intermittent infusion  Dose: 25 mmol  Freq: EVERY 8 HOURS PRN Route: IV  PRN Reason: phosphorous supplementation  Start: 03/04/18 1637   Admin Instructions: For serum phosphorus level less than 1.1  Do not infuse Phosphorus in the same line as TPN.   Give 25 mmol and recheck phosphorus level 2 hours after dose and next AM.               senna-docusate (SENOKOT-S;PERICOLACE) 8.6-50 MG per tablet 1 tablet  Dose: 1 tablet  Freq: 2 TIMES DAILY PRN Route: PO  PRN Reason: constipation  Start: 03/03/18 1801   Admin Instructions: If no bowel movement in 24 hours, increase to 2 tablets PO.  Hold for loose stools.          1010 (1 tablet)-Given                  Or  senna-docusate (SENOKOT-S;PERICOLACE) 8.6-50 MG per tablet 2 tablet  Dose: 2 tablet  Freq: 2 TIMES DAILY PRN Route: PO  PRN Reason: constipation  Start: 03/03/18 1801   Admin Instructions: Hold for loose stools.                 1809 (2 tablet)-Given            sodium chloride (PF) 0.9% PF flush 3 mL  Dose: 3 mL  Freq: EVERY 8 HOURS Route: IK  Start: 03/01/18 2300   Admin Instructions: And Q1H PRN, to lock peripheral IV dormant line.      2325 (3 mL)-Given        (0806)-Not Given       (1506)-Not Given       (2201)-Not Given        (0611)-Not Given       1215-Auto Hold       1500-Automatically Held       1807-Unhold                      (1518)-Not Given       (2204)-Not Given        (0942)-Not Given       1614 (3 mL)-Given       2113 (3 mL)-Given        0431 (3 mL)-Given       [ ] 1200       [ ] 2000           sodium chloride (PF) 0.9% PF flush 3 mL  Dose: 3 mL  Freq: EVERY 1 HOUR PRN  Route: IK  PRN Reason: line flush  PRN Comment: for peripheral IV flush post IV meds  Start: 03/01/18 2259       1215-Auto Hold       1807-Unhold         1341 (3 mL)-Given            temazepam (RESTORIL) capsule 7.5 mg  Dose: 7.5 mg  Freq: AT BEDTIME PRN Route: PO  PRN Reason: sleep  Start: 03/04/18 2000   Admin Instructions: POD 1.  May repeat x 1 if initial dose was 7.5 mg. Do not repeat 15 mg dose.   Do not give unless at least 6 hours of uninterrupted sleep is expected.               Warfarin Therapy Reminder (Check START DATE - warfarin may be starting in the FUTURE)  Dose: 1 each  Freq: CONTINUOUS PRN Route: XX  Start: 03/03/18 1801   Admin Instructions: Reorder warfarin (COUMADIN) daily  Patient is on Warfarin Therapy - check for daily order              Future Medications  Medications 02/28/18 03/01/18 03/02/18 03/03/18 03/04/18 03/05/18 03/06/18       acetaminophen (TYLENOL) tablet 650 mg  Dose: 650 mg  Freq: EVERY 4 HOURS PRN Route: PO  PRN Reason: other  PRN Comment: multimodal surgical pain management along with NSAIDS and opioid medication as indicated based on pain control and physical function.  Start: 03/06/18 2200   Admin Instructions: May give first dose 4 hours after last scheduled dose of acetaminophen.  Maximum acetaminophen dose from all sources = 75 mg/kg/day not to exceed 4 grams/day.               warfarin (COUMADIN) tablet 2.5 mg  Dose: 2.5 mg  Freq: ONCE AT 6PM Route: PO  Start: 03/06/18 1800          [ ] 1800          Completed Medications  Medications 02/28/18 03/01/18 03/02/18 03/03/18 03/04/18 03/05/18 03/06/18         Dose: 1,000 mL  Freq: ONCE Route: IV  Last Dose: 1,000 mL (03/04/18 1544)  Start: 03/04/18 1545   End: 03/04/18 1744        1544 (1,000 mL)-New Bag               Dose: 2 g  Freq: EVERY 8 HOURS Route: IV  Indications of Use: PERIOPERATIVE PHARMACOPROPHYLAXIS  Start: 03/03/18 2200   End: 03/04/18 0629   Admin Instructions: First post-op dose due 8 hours after intra-op  dose, see eMAR.          2321 (2 g)-Given        0559 (2 g)-Given               Dose: 2 g  Freq: PRE-OP/PRE-PROCEDURE Route: IV  Indications of Use: PERIOPERATIVE PHARMACOPROPHYLAXIS  Start: 03/03/18 0000   End: 03/03/18 1431       1215-Auto Hold       1226 (2 g)-Given       1431 (1 g)-Given       1807-Unhold                Dose: 5 mg  Freq: ONCE AT 6PM Route: PO  Start: 03/05/18 1800   End: 03/05/18 1809         1809 (5 mg)-Given              Dose: 5 mg  Freq: ONCE AT 6PM Route: PO  Start: 03/03/18 1815   End: 03/03/18 2105       2105 (5 mg)-Given                Dose: 7.5 mg  Freq: ONCE AT 6PM Route: PO  Start: 03/04/18 1800   End: 03/04/18 1826        1826 (7.5 mg)-Given            Discontinued Medications  Medications 02/28/18 03/01/18 03/02/18 03/03/18 03/04/18 03/05/18 03/06/18         Dose: 650 mg  Freq: EVERY 4 HOURS PRN Route: PO  PRN Reason: mild pain  Start: 03/01/18 2259   End: 03/03/18 1815   Admin Instructions: Alternate ibuprofen (if ordered) with acetaminophen.  Maximum acetaminophen dose from all sources = 75 mg/kg/day not to exceed 4 grams/day.        1215-Auto Hold       1807-Unhold       1815-Med Discontinued            Dose: 12.5 mg  Freq: ONCE PRN Route: IV  PRN Reason: sleep  PRN Comment: nausea  Start: 03/03/18 1649   End: 03/03/18 1807   Admin Instructions: Dilute in 10 ml 0.9% Sodium chloride.        1807-Med Discontinued            Dose: 25-50 mcg  Freq: EVERY 2 MIN PRN Route: IV  PRN Reason: other  PRN Comment: acute pain  Start: 03/03/18 1649   End: 03/03/18 1807   Admin Instructions: MAX cumulative dose = 250 mcg.   Use fentaNYL (SUBLIMAZE) initially, as a short acting agent for acute pain control. If insufficient, or a longer acting agent is needed, begin morphine or HYDROmorphone (DILAUDID) if ordered.  For ordered doses up to 100 mcg give IV Push undiluted over a minimum of 3-5 minutes.        1807-Med Discontinued            Dose: 0.3-0.5 mg  Freq: EVERY 5 MIN PRN Route: IV  PRN  "Reason: other  PRN Comment: acute pain.  May administer if Respiratory Rate is greater than 10  Start: 03/03/18 1649   End: 03/03/18 1807   Admin Instructions: Max cumulative dose = 2 mg  If fentaNYL (SUBLIMAZE) is also ordered, use HYDROmorphone (DILAUDID) if pain control insufficient with fentaNYL (SUBLIMAZE) or a longer acting agent is needed.  For ordered doses up to 4 mg give IV Push undiluted. Administer each 2mg over 2-5 minutes.        1654 (0.5 mg)-Given       1807-Med Discontinued            Dose: 15 mg  Freq: EVERY 6 HOURS Route: IV  Start: 03/03/18 1815   End: 03/04/18 2159   Admin Instructions: Age greater than or equal to 65 years AND CrCl greater than 50 mL/minute.  May continue use for up to 5 days MAX if order renewed.  IF celecoxib (CELEBREX) was given pre-operatively, start ketorolac (TORADOL) 12 hours after celecoxib (CELEBREX) given.  Can cause pain on injection. Administer through a running maintenance fluid over 1 minute followed by a flush. If patient complains of pain on injection, may dilute 15-30 mg in 5 mL and push over 1 to 2 minutes.        2104 (15 mg)-Given        (0300)-Not Given [C]       1011 (15 mg)-Given       1552 (15 mg)-Given       2159-Med Discontinued           Rate: 100 mL/hr   Freq: CONTINUOUS Route: IV  Start: 03/03/18 1700   End: 03/03/18 1807   Admin Instructions: Continue until IV catheter is weaned               1807-Med Discontinued            Freq: EVERY 1 HOUR PRN Route: Top  PRN Reason: pain  PRN Comment: with VAD insertion or accessing implanted port.  Start: 03/03/18 1801   End: 03/03/18 1813   Admin Instructions: Do NOT give if patient has a history of allergy to any local anesthetic or any \"benjamín\" product.   Apply 30 minutes prior to VAD insertion or port access.  MAX Dose:  2.5 g (  of 5 g tube)        1813-Med Discontinued            Dose: 1 mL  Freq: EVERY 1 HOUR PRN Route: OTHER  PRN Comment: mild pain with VAD insertion or accessing implanted port  Start: " "03/03/18 1801   End: 03/03/18 1813   Admin Instructions: Do NOT give if patient has a history of allergy to any local anesthetic or any \"benjamín\" product. MAX dose 1 mL subcutaneous OR intradermal in divided doses.        1813-Med Discontinued            Dose: 0.1-0.4 mg  Freq: EVERY 2 MIN PRN Route: IV  PRN Reason: opioid reversal  Start: 03/03/18 1801   End: 03/03/18 1813   Admin Instructions: For respiratory rate LESS than or EQUAL to 8.  Partial reversal dose:  0.1 mg titrated q 2 minutes for Analgesia Side Effects Monitoring Sedation Level of 3 (frequently drowsy, arousable, drifts to sleep during conversation).Full reversal dose:  0.4 mg bolus for Analgesia Side Effects Monitoring Sedation Level of 4 (somnolent, minimal or no response to stimulation).  For ordered doses up to 2mg give IVP. Give each 0.4mg over 15 seconds in emergency situations. For non-emergent situations further dilute in 9mL of NS to facilitate titration of response.        1813-Med Discontinued            Dose: 0.1-0.4 mg  Freq: EVERY 2 MIN PRN Route: IV  PRN Reason: opioid reversal  Start: 03/03/18 1801   End: 03/03/18 1813   Admin Instructions: For apnea or imminent respiratory arrest: give 0.4 mg IV undiluted Q 2 minutes PRN until desired degree of reversal is obtained, stop opioid and notify provider. Continue monitoring until discharge criteria are met for a minimum of 2 hours.  For severe sedation, decrease in respiratory depth, quality or respiratory rate less than 8: give 0.1 mg IV Q 2 minutes x 3 doses, stop opioid and notify provider.  Try to minimize reversal of analgesia especially in end-of-life patients  For ordered doses up to 2mg give IVP. Give each 0.4mg over 15 seconds in emergency situations. For non-emergent situations further dilute in 9mL of NS to facilitate titration of response.        1813-Med Discontinued            Dose: 4 mg  Freq: EVERY 6 HOURS PRN Route: PO  PRN Reasons: nausea,vomiting  Start: 03/03/18 1801   " End: 03/03/18 1813   Admin Instructions: This is Step 1 of nausea and vomiting management.  If nausea not resolved in 15 minutes, go to Step 2 prochlorperazine (COMPAZINE). Do not push through foil backing. Peel back foil and gently remove. Place on tongue immediately. Administration with liquid unnecessary  With dry hands, peel back foil backing and gently remove tablet; do not push oral disintegrating tablet through foil backing; administer immediately on tongue and oral disintegrating tablet dissolves in seconds; then swallow with saliva; liquid not required.        1813-Med Discontinued         Or    Dose: 4 mg  Freq: EVERY 6 HOURS PRN Route: IV  PRN Reasons: nausea,vomiting  Start: 03/03/18 1801   End: 03/03/18 1813   Admin Instructions: This is Step 1 of nausea and vomiting management.  If nausea not resolved in 15 minutes, go to Step 2 prochlorperazine (COMPAZINE).  Irritant. For ordered doses up to 4 mg, give IV Push undiluted over 2-5 minutes.        1813-Med Discontinued            Dose: 4 mg  Freq: EVERY 30 MIN PRN Route: PO  PRN Reason: nausea  Start: 03/03/18 1649   End: 03/03/18 1807   Admin Instructions: MAX total dose = 8 mg, including OR dosing. If not resolved in 15 minutes, then go to step 2 [prochlorperazine (COMPAZINE), if ordered].  With dry hands, peel back foil backing and gently remove tablet; do not push oral disintegrating tablet through foil backing; administer immediately on tongue and oral disintegrating tablet dissolves in seconds; then swallow with saliva; liquid not required.        1807-Med Discontinued         Or    Dose: 4 mg  Freq: EVERY 30 MIN PRN Route: IV  PRN Reason: nausea  Start: 03/03/18 1649   End: 03/03/18 1807   Admin Instructions: MAX total dose = 8 mg, including OR dosing. If not resolved in 15 minutes, then go to step 2 [prochlorperazine (COMPAZINE), if ordered].  Irritant. For ordered doses up to 4 mg, give IV Push undiluted over 2-5 minutes.        1807-Med  Discontinued            Dose: 5-10 mg  Freq: EVERY 3 HOURS PRN Route: PO  PRN Reason: other  PRN Comment: pain control or improvement in physical function. Hold dose for analgesic side effects.  Start: 03/01/18 2259   End: 03/03/18 1813   Admin Instructions: Start with the lowest dose.  May adjust dose by 5 mg every 3 hours as needed. Notify provider to assess for uncontrolled pain or analgesic side effects. Hold while on PCA or with regular IV opioid dosing.       0812 (5 mg)-Given       1522 (5 mg)-Given       1901 (5 mg)-Given       2158 (5 mg)-Given        0428 (5 mg)-Given       1215-Auto Hold       1807-Unhold       1813-Med Discontinued            Dose: 20 mmol  Freq: EVERY 6 HOURS PRN Route: IV  PRN Reason: phosphorous supplementation  Start: 03/04/18 1637   End: 03/04/18 1700   Admin Instructions: For serum phosphorus level 1.1-1.9  For CENTRAL Line ONLY  Do not infuse Phosphorus in the same line as TPN.   Give 20 mmol and recheck phosphorus level 2 hours after dose and next AM.         1700-Med Discontinued           Dose: 5 mg  Freq: EVERY 6 HOURS PRN Route: IV  PRN Reasons: nausea,vomiting  Start: 03/03/18 1649   End: 03/03/18 1807   Admin Instructions: This is Step 2 of the nausea and vomiting protocol.   If nausea not resolved in 15 minutes, give metoclopramide (REGLAN) if ordered (step 3 of nausea and vomiting protocol)  For ordered doses up to 10 mg, give IV Push undiluted. Each 5mg over 1 minute.        1807-Med Discontinued            Dose: 3 mL  Freq: EVERY 8 HOURS Route: IK  Start: 03/03/18 1815   End: 03/03/18 1813   Admin Instructions: And Q1H PRN, to lock peripheral IV dormant line.        1813-Med Discontinued            Dose: 3 mL  Freq: EVERY 1 HOUR PRN Route: IK  PRN Reason: line flush  PRN Comment: for peripheral IV flush post IV meds  Start: 03/03/18 1801   End: 03/03/18 1813       1813-Med Discontinued            Rate: 125 mL/hr   Freq: CONTINUOUS Route: IV  Start: 03/01/18 2300   End:  03/05/18 1057      0032 ( )-New Bag       0949 ( )-New Bag       1954 ( )-New Bag        0611 ( )-New Bag       1210 ( )-New Bag       1310-Stopped       2104 ( )-New Bag        0741-Stopped [C]       0758 ( )-Restarted       0913 ( )-New Bag       1557 ( )-Rate/Dose Change       2248 ( )-New Bag        0602 ( )-New Bag       1057-Med Discontinued     Medications 02/28/18 03/01/18 03/02/18 03/03/18 03/04/18 03/05/18 03/06/18

## 2018-03-01 NOTE — IP AVS SNAPSHOT
MRN:[id number]                      After Visit Summary   3/1/2018    Tomer Weiss    MRN: [id number]           Thank you!     Thank you for choosing [facility] for your care. Our goal is always to provide you with excellent care. Hearing back from our patients is one way we can continue to improve our services. Please take a few minutes to complete the written survey that you may receive in the mail after you visit with us. Thank you!        Patient Information     Date Of Birth          4/9/1932        Designated Caregiver       Most Recent Value    Caregiver    Will someone help with your care after discharge? yes    Name of designated caregiver Nallely Weiss    Phone number of caregiver [phone]    Caregiver address [id number] [place], [place] [id number]      About your hospital stay     You were admitted on:  March 1, 2018 You last received care in the:  [address] [facility]    You were discharged on:  March 6, 2018        Reason for your hospital stay       S/p distal femur periprosthetic fracture.                  Who to Call     For medical emergencies, please call 911.  For non-urgent questions about your medical care, please call your primary care provider or clinic, [phone]  For questions related to your surgery, please call your surgery clinic        Attending Provider     Provider Specialty    Miguel Cifuentes MD Emergency Medicine    [place], Angel Horne MD [facility], Mason Turpin MD Orthopedics       Primary Care Provider Office Phone # Fax #    Gabriel Tan [phone]      After Care Instructions     Activity - Up ad brody           Activity - Up with assistive device           Activity - Up with nursing assistance           Advance Diet as Tolerated       Follow this diet upon discharge: Orders Placed This Encounter      Room Service      Advance Diet as Tolerated: Regular Diet Adult            General  info for SNF       Length of Stay Estimate: Short Term Care: Estimated # of Days 31-90  Condition at Discharge: Improving  Level of care:skilled   Rehabilitation Potential: Good  Admission H&P remains valid and up-to-date: Yes  Utilize standing orders            Mantoux instructions       Give two-step Mantoux (PPD) Per Facility Policy Yes            Weight bearing status       Non-weight bear LLE            Wound care       Site:   Left distal thigh  Instructions:  Keep aquacell dressing in place till follow up.  At that time any staples present will be removed.   Place thigh high TEDs for compression.  Place brace.  OK to unlock brace in bed, up in chair etc.  Lock at 20-30 degrees for transfers/ambulation.                  Follow-up Appointments     Follow Up and recommended labs and tests       Follow up with Dr. Silver in 10-14 days post op  Will need xrays on follow up                  Your next 10 appointments already scheduled     Mar 20, 2018 10:00 AM CDT   Ech Limited with [id number]   [facility] CV Echocardiography (Cardiovascular Imaging at [facility])    [address] [place] [address]  [address] [place] [id number] [phone]           1.  Please bring or wear a comfortable two-piece outfit. 2.  You may eat, drink and take your normal medicines. 3.  For any questions that cannot be answered, please contact the ordering physician            Mar 20, 2018 11:00 AM CDT   MR MYOCARDIUM W CONTRAST with SCIMR1   [facility] (Cardiovascular Imaging at [facility])    [address] [place] [address] [address]  [facility] [id number] [phone]           Take your medicines as usual, unless your doctor tells you not to. Bring a list of your current medicines to your exam (including vitamins, minerals and over-the-counter drugs).  You may or may not receive intravenous (IV) contrast for this exam pending the discretion of the Radiologist.  You do not need to do anything  special to prepare.  The MRI machine uses a strong magnet. Please wear clothes without metal (snaps, zippers). A sweatsuit works well, or we may give you a hospital gown.  Please remove any body piercings and hair extensions before you arrive. You will also remove watches, jewelry, hairpins, wallets, dentures, partial dental plates and hearing aids. You may wear contact lenses, and you may be able to wear your rings. We have a safe place to keep your personal items, but it is safer to leave them at home.  **IMPORTANT** THE INSTRUCTIONS BELOW ARE ONLY FOR THOSE PATIENTS WHO HAVE BEEN PRESCRIBED SEDATION OR GENERAL ANESTHESIA DURING THEIR MRI PROCEDURE:  IF YOUR DOCTOR PRESCRIBED ORAL SEDATION (take medicine to help you relax during your exam):   You must get the medicine from your doctor (oral medication) before you arrive. Bring the medicine to the exam. Do not take it at home. You ll be told when to take it upon arriving for your exam.   Arrive one hour early. Bring someone who can take you home after the test. Your medicine will make you sleepy. After the exam, you may not drive, take a bus or take a taxi by yourself.  IF YOUR DOCTOR PRESCRIBED IV SEDATION:   Arrive one hour early. Bring someone who can take you home after the test. Your medicine will make you sleepy. After the exam, you may not drive, take a bus or take a taxi by yourself.   No eating 6 hours before your exam. You may have clear liquids up until 4 hours before your exam. (Clear liquids include water, clear tea, black coffee and fruit juice without pulp.)  IF YOUR DOCTOR PRESCRIBED ANESTHESIA (be asleep for your exam):   Arrive 1 1/2 hours early. Bring someone who can take you home after the test. You may not drive, take a bus or take a taxi by yourself.   No eating 8 hours before your exam. You may have clear liquids up until 4 hours before your exam. (Clear liquids include water, clear tea, black coffee and fruit juice without pulp.)   You will  spend four to five hours in the recovery room.  Please call the Imaging Department at your exam site with any questions.            Mar 28, 2018  3:00 PM CDT   Anticoagulation Visit with ZM ANTI COAG   [facility] ([facility])    [address]  [place] [address] [phone]            Apr 02, 2018 11:00 AM CDT   Cath 90 Minute with SHCVR1   [facility] Cardiac Catheterization Lab ([facility])    [address]  [place] [id number] [phone]            Apr 16, 2018  7:45 AM CDT   Pulmonary Hypertension Return with Russell John MD   [facility] ([facility])    [address]  [place] [id number] [phone]              Additional Services     Occupational Therapy Adult Consult       Evaluate and treat as clinically indicated.    Reason:  S/p left distal femur fracture            Physical Therapy Adult Consult       Evaluate and treat as clinically indicated.    Reason:  Left distal femur fracture  Brace in unlock position most of time.    Please lock brace at 20-30 degrees for transfers or ambulation.                  Future tests that were ordered for you     AntiEmbolism Stockings       Bilateral Thigh high length. On in the morning, off at night            Pneumatic Compression Device        Bilateral calf. Remove 30 mins BID.                  Pending Results     No orders found from 2/27/2018 to 3/2/2018.            Statement of Approval     Ordered          03/06/18 1050  I have reviewed and agree with all the recommendations and orders detailed in this document.  EFFECTIVE NOW     Approved and electronically signed by:  Deonte Silver MD             Admission Information     Date & Time Provider Department Dept. Phone    3/1/2018 Mason Parra MD [address] Medical Surgical [phone]      Your Vitals Were     Blood Pressure Pulse  Temperature Respirations Height Weight    115/69 (BP Location: Right arm) 94 97.4  F (36.3  C) (Oral) 20 1.829 m (6') 88.1 kg (194 lb 3.6 oz)    Pulse Oximetry BMI (Body Mass Index)                96% 26.34 kg/m2          Care EveryWhere ID     This is your Care EveryWhere ID. This could be used by other organizations to access your [facility] medical records  [id number]        Equal Access to Services     NANCY MILAN : Hadcara drapero Soalexandra, waaxda luqadaha, qaybta kaalmada adeegyada, mariposa johnson. So [facility] [phone].    ATENCIÓN: Si habla español, tiene a rojas disposición servicios gratuitos de asistencia lingüística. Llame al [phone].    We comply with applicable federal civil rights laws and [place] laws. We do not discriminate on the basis of race, color, national origin, age, disability, sex, sexual orientation, or gender identity.               Review of your medicines      START taking        Dose / Directions    acetaminophen 325 MG tablet   Commonly known as:  TYLENOL        Dose:  650 mg   Take 2 tablets (650 mg) by mouth every 4 hours as needed for other (multimodal surgical pain management along with NSAIDS and opioid medication as indicated based on pain control and physical function.)   Quantity:  100 tablet   Refills:  0       ferrous sulfate 325 (65 FE) MG tablet   Commonly known as:  IRON        Dose:  325 mg   Take 1 tablet (325 mg) by mouth 2 times daily (with meals)   Quantity:  100 tablet   Refills:  0       melatonin 1 MG Tabs tablet        Dose:  1 mg   Take 1 tablet (1 mg) by mouth nightly as needed for sleep   Refills:  0       oxyCODONE IR 5 MG tablet   Commonly known as:  ROXICODONE        Dose:  5-10 mg   Take 1-2 tablets (5-10 mg) by mouth every 4 hours as needed for other (pain control or improvement in physical function. Hold dose for analgesic side effects.)   Quantity:  40 tablet   Refills:  0       polyethylene glycol Packet   Commonly  known as:  MIRALAX/GLYCOLAX        Dose:  17 g   Take 17 g by mouth daily as needed for constipation   Quantity:  7 packet   Refills:  0       senna-docusate 8.6-50 MG per tablet   Commonly known as:  SENOKOT-S;PERICOLACE        Dose:  1 tablet   Take 1 tablet by mouth 2 times daily as needed for constipation   Quantity:  100 tablet   Refills:  0         CONTINUE these medicines which have NOT CHANGED        Dose / Directions    atorvastatin 20 MG tablet   Commonly known as:  LIPITOR   Used for:  Hyperlipidemia LDL goal <130        Dose:  20 mg   Take 1 tablet (20 mg) by mouth daily   Quantity:  90 tablet   Refills:  3       glucosamine 500 MG Caps   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        2 Tablets every morning   Refills:  0       metoprolol tartrate 25 MG tablet   Commonly known as:  LOPRESSOR   Used for:  Chronic atrial fibrillation (H)        TAKE ONE-HALF TABLET BY MOUTH ONCE DAILY   Quantity:  45 tablet   Refills:  9       order for DME   Used for:  Status post total left knee replacement        Equipment being ordered: Walker () Treatment Diagnosis: s/p joint replacement   Quantity:  1 Device   Refills:  0       warfarin 5 MG tablet   Commonly known as:  COUMADIN   Used for:  Long-term (current) use of anticoagulants        Take 2.5 mg (1/2 tablet) Tuesday and Saturday and 5 mg other 5 days or as directed by the coumadin clinic   Quantity:  75 tablet   Refills:  1         STOP taking     furosemide 20 MG tablet   Commonly known as:  LASIX           iron 66 MG Tabs                Where to get your medicines      Information about where to get these medications is not yet available     ! Ask your nurse or doctor about these medications     oxyCODONE IR 5 MG tablet                Protect others around you: Learn how to safely use, store and throw away your medicines at www.disposemymeds.org.        Information about OPIOIDS     PRESCRIPTION OPIOIDS: WHAT YOU NEED TO  KNOW    Prescription opioids can be used to help relieve moderate to severe pain and are often prescribed following a surgery or injury, or for certain health conditions. These medications can be an important part of treatment but also come with serious risks. It is important to work with your health care provider to make sure you are getting the safest, most effective care.    WHAT ARE THE RISKS AND SIDE EFFECTS OF OPIOID USE?  Prescription opioids carry serious risks of addiction and overdose, especially with prolonged use. An opioid overdose, often marked by slowed breathing can cause sudden death. The use of prescription opioids can have a number of side effects as well, even when taken as directed:      Tolerance - meaning you might need to take more of a medication for the same pain relief    Physical dependence - meaning you have symptoms of withdrawal when a medication is stopped    Increased sensitivity to pain    Constipation    Nausea, vomiting, and dry mouth    Sleepiness and dizziness    Confusion    Depression    Low levels of testosterone that can result in lower sex drive, energy, and strength    Itching and sweating    RISKS ARE GREATER WITH:    History of drug misuse, substance use disorder, or overdose    Mental health conditions (such as depression or anxiety)    Sleep apnea    Older age (65 years or older)    Pregnancy    Avoid alcohol while taking prescription opioids.   Also, unless specifically advised by your health care provider, medications to avoid include:    Benzodiazepines (such as Xanax or Valium)    Muscle relaxants (such as Soma or Flexeril)    Hypnotics (such as Ambien or Lunesta)    Other prescription opioids    KNOW YOUR OPTIONS:  Talk to your health care provider about ways to manage your pain that do not involve prescription opioids. Some of these options may actually work better and have fewer risks and side effects:    Pain relievers such as acetaminophen, ibuprofen, and  naproxen    Some medications that are also used for depression or seizures    Physical therapy and exercise    Cognitive behavioral therapy, a psychological, goal-directed approach, in which patients learn how to modify physical, behavioral, and emotional triggers of pain and stress    IF YOU ARE PRESCRIBED OPIOIDS FOR PAIN:    Never take opioids in greater amounts or more often than prescribed    Follow up with your primary health care provider and work together to create a plan on how to manage your pain.    Talk about ways to help manage your pain that do not involve prescription opioids    Talk about all concerns and side effects    Help prevent misuse and abuse    Never sell or share prescription opioids    Never use another person's prescription opioids    Store prescription opioids in a secure place and out of reach of others (this may include visitors, children, friends, and family)    Visit www.cdc.gov/drugoverdose to learn about risks of opioid abuse and overdose    If you believe you may be struggling with addiction, tell your health care provider and ask for guidance or call [facility]'s National Helpline at 0-452-078-HELP    LEARN MORE / www.cdc.gov/drugoverdose/prescribing/guideline.html    Safely dispose of unused prescription opioids: Find your local drug take-back programs and more information about the importance of safe disposal at www.doseofreality.[place].gov             Medication List: This is a list of all your medications and when to take them. Check marks below indicate your daily home schedule. Keep this list as a reference.      Medications           Morning Afternoon Evening Bedtime As Needed    acetaminophen 325 MG tablet   Commonly known as:  TYLENOL   Take 2 tablets (650 mg) by mouth every 4 hours as needed for other (multimodal surgical pain management along with NSAIDS and opioid medication as indicated based on pain control and physical function.)   Last time this was given:  975 mg on  3/6/2018  4:31 AM                                atorvastatin 20 MG tablet   Commonly known as:  LIPITOR   Take 1 tablet (20 mg) by mouth daily   Last time this was given:  20 mg on 3/6/2018  9:01 AM                                ferrous sulfate 325 (65 FE) MG tablet   Commonly known as:  IRON   Take 1 tablet (325 mg) by mouth 2 times daily (with meals)   Last time this was given:  325 mg on 3/6/2018  9:00 AM                                glucosamine 500 MG Caps   2 Tablets every morning                                melatonin 1 MG Tabs tablet   Take 1 tablet (1 mg) by mouth nightly as needed for sleep   Last time this was given:  1 mg on 3/5/2018 11:49 PM                                metoprolol tartrate 25 MG tablet   Commonly known as:  LOPRESSOR   TAKE ONE-HALF TABLET BY MOUTH ONCE DAILY   Last time this was given:  12.5 mg on 3/6/2018  9:01 AM                                order for DME   Equipment being ordered: Walker () Treatment Diagnosis: s/p joint replacement                                oxyCODONE IR 5 MG tablet   Commonly known as:  ROXICODONE   Take 1-2 tablets (5-10 mg) by mouth every 4 hours as needed for other (pain control or improvement in physical function. Hold dose for analgesic side effects.)   Last time this was given:  5 mg on 3/6/2018  4:31 AM                                polyethylene glycol Packet   Commonly known as:  MIRALAX/GLYCOLAX   Take 17 g by mouth daily as needed for constipation   Last time this was given:  17 g on 3/5/2018  9:13 PM                                senna-docusate 8.6-50 MG per tablet   Commonly known as:  SENOKOT-S;PERICOLACE   Take 1 tablet by mouth 2 times daily as needed for constipation   Last time this was given:  2 tablets on 3/5/2018  6:09 PM                                warfarin 5 MG tablet   Commonly known as:  COUMADIN   Take 2.5 mg (1/2 tablet) Tuesday and Saturday and 5 mg other 5 days or as directed by the coumadin clinic   Last time  this was given:  5 mg on 3/5/2018  6:09 PM

## 2018-03-01 NOTE — LETTER
[address] MEDICAL SURGICAL  [address] [place] [address]  [phone]          March 5, 2018    RE:  Tomer Weiss                                                                                                                                                       [address] [address] [place] [address]            To whom it may concern/[employer] representative    Tomer Weiss is under my professional care for unfortunate hospitalization for fractured femur.  We do not recommend him flying at this time due to high risk for blood clots. Any accommodations you can allow patient for refund or voucher would be appreciated.      Sincerely,      Deonte Silver MD  orthopedics

## 2018-03-01 NOTE — IP AVS SNAPSHOT
` ` Patient Information     Patient Name Sex     Tomer Weiss ([id number]) Male 1932       Room Bed    250 250-01      Patient Demographics     Address Phone    [id number] [place] [address] [phone] (Home)  [phone] (Mobile)      Patient Ethnicity & Race     Ethnic Group Patient Race    American White      Emergency Contact(s)     Name Relation Home Work Mobile    Terrence Weiss Son   [phone]    Adams Weiss Daughter [phone]        Documents on File        Status Date Received Description       Documents for the Patient    Privacy Notice - [place] Received 11     Face Sheet  () 09/10/08     Insurance Card  () 06     Face Sheet Received () 09     External Medication Information Consent Accepted () 09     Insurance Card Received () 09 HP- [employer]: [id number]  (Copay=20)  and a Bad scan of the Medicare card    Patient ID Received () 02/11/15     Consent for Services - Hospital/Clinic Received () 11/05/10     Other Received 11/05/10     Insurance Card Received () 02/10/11     External Medication Information Consent Accepted () 03/15/11     Consent for Services - Hospital/Clinic Received () 11     External Medication Information Consent Accepted () 12     Insurance Card Received () 10/05/12     Consent for Services - Hospital/Clinic Received () 10/05/12     Consent for EHR Access  13 Copied from existing Consent for services - C/HOD collected on 10/05/2012    [facility] Specified Other       External Medication Information Consent Accepted 13     Insurance Card Received () 13 hp    Consent for Services - Hospital/Clinic Received () 10/21/13     Insurance Card Received () 14 AARP    Consent for Services - Hospital/Clinic Received () 12/10/14     Insurance Card Received () 01/16/15 RX     Insurance Card Received () 01/16/15 AAR    Physical Therapy Certification Received 07/31/15 sent 7/31/15    Consent for Services - Hospital/Clinic Received () 12/30/15     Consent for Services/Privacy Notice - Hospital/Clinic Received () 16     Insurance Card Received 16 Medicare    Patient ID Received 17 exp 2019 MNDL     Consent for Services/Privacy Notice - Hospital/Clinic Received () 17     Care Everywhere Prospective Auth Received 10/23/17     Insurance Card Received 18 aarp    Consent for Services/Privacy Notice - Hospital/Clinic Received 18     Other Received (Deleted) 11/12/10        Documents for the Encounter    CMS IM for Patient Signature Received 18 inpt medicare form      Admission Information     Attending Provider Admitting Provider Admission Type Admission Date/Time    Mason Parra MD Gould, Wilfred Edwin, MD Emergency 18  1930    Discharge Date Hospital Service Auth/Cert Status Service Area     Hospitalist [facility] SERVICES    Unit Room/Bed Admission Status       PH 2A MEDICAL SURGICAL 250/250-01 Admission (Confirmed)       Admission     Complaint    Femur fracture, left (H), left femur fracture, Fracture, femur, supracondylar (H)      Hospital Account     Name Acct ID Class Status Primary Coverage    Tomer Weiss [id number] Inpatient Open MEDICARE - MEDICARE            Guarantor Account (for Hospital Account #[id number])     Name Relation to Pt Service Area Active? Acct Type    Tomer Weiss  FCS Yes Personal/Family    Address Phone          [address] [address] [place] [address] [phone](H)              Coverage Information (for Hospital Account #[id number])     1. MEDICARE/MEDICARE     F/O Payor/Plan Precert #    MEDICARE/MEDICARE     Subscriber Subscriber #    Tomer Weiss [id number]    Address Phone    [employer] CLAIM[employer]  [address]  [place]  [address] [phone]          2. COMMERCIAL/AARP     F/O Payor/Plan Precert #    COMMERCIAL/AARP     Subscriber Subscriber #    GilsonsofiTomer [id number]    Address Phone    [facility]  [address]  [place] [address] [phone]

## 2018-03-01 NOTE — IP AVS SNAPSHOT
` `     [address] SURGICAL: [phone]                 INTERAGENCY TRANSFER FORM - NOTES (H&P, Discharge Summary, Consults, Procedures, Therapies)   3/1/2018                    Hospital Admission Date: 3/1/2018  ELIER BUTLER   : 1932  Sex: Male        Patient PCP Information     Provider PCP Type    Gabriel Tan MD General         History & Physicals      H&P by Angel Lucio MD at 3/1/2018 10:10 PM     Author:  Angel Lucio MD Service:  Hospitalist Author Type:  Physician    Filed:  3/1/2018 10:35 PM Date of Service:  3/1/2018 10:10 PM Creation Time:  3/1/2018 10:22 PM    Status:  Signed :  Angel Lucio MD (Physician)         [facility]    History and Physical  Hospitalist       Date of Admission:  3/1/2018[WG1.1]    Assessment & Plan[WG1.2]   Elier Butler is a 85 year old male who presents with a fall at home sustaining a left leg injury.  In the emergency room imaging is showing a distal left femur fracture with angulation just above a total knee arthroplasty.  Since history significant for chronic atrial fibrillation on Coumadin therapy.  He is recently been evaluated by cardiology with no history of pulmonary hypertension for unclear causes, currently in the midst of a workup.  Importantly he does not have any signs of congestive heart failure.  His orthopedic case was discussed with the on-call orthopedist, Dr. Parra.  Patient will be admitted to inpatient status.  Orthopedics will see him tomorrow to determine whether he can have surgery tomorrow or the next day depending on availability of certain tools.  Patient's INR is therapeutic at 2.10, and he will be given vitamin K orally in the emergency department with the plan to recheck an INR tomorrow morning.  Patient will be kept n.p.o. after midnight with IV fluids, receiving IV narcotics for pain.  He is currently in a posterior splint.   Assuming we can get the INR in a reasonable range, he is medically cleared for surgery.[WG1.1]    Principal Problem:    Closed displaced supracondylar fracture of distal end of left femur without intracondylar extension (H)    Assessment:[WG1.3] Result of a fall.[WG1.1]    Plan:[WG1.3] As above, surgery planned in the next 1-2 days[WG1.1]  Active Problems:    Total knee replacement status    Assessment:[WG1.3] Performed 2 years ago by Dr. Silver[WG1.1]    Plan:[WG1.3] As above[WG1.1]    Periprosthetic fracture around internal prosthetic left knee joint    Assessment:[WG1.3] As above[WG1.1]    Plan:[WG1.3] Surgery as planned[WG1.1]    Long-term (current) use of anticoagulants [Z79.01]    Assessment:[WG1.3] Taking Coumadin for history of atrial fibrillation[WG1.1]    Plan:[WG1.3] Vitamin K has been given, will recheck INR in the a.m.[WG1.1]    Atrial fibrillation (HCC) [I48.91]    Assessment:[WG1.3] Chronic.  History of catheter ablation done in 2015.  Currently on rate control with Lopressor and taking daily Coumadin.[WG1.1]    Plan:[WG1.3] Continue Lopressor.  Coumadin on hold[WG1.1]    Pulmonary hypertension    Assessment:[WG1.3] Recently noted with an abnormal echocardiogram showing dilated right atrium.  Being seen by pulmonary hypertension clinic with plan angiogram sometime in the next month.  Fortunately is not have any signs of congestive heart failure with no peripheral edema, difficulty breathing.  Recent chest x-ray and nuclear medicine study were normal.[WG1.1]    Plan:[WG1.3] Continue outpatient workup.  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 - - -   Pain score (0-10) 2 5 4   Pain location - - -   Pain descriptors - - -[WG1.2]   - Tomer is experiencing pain due to left femur fracture. Pain management was discussed with Tomer and his daughter and the plan was created in a collaborative fashion.  Tomer's response to the current recommendations: engaged  - Opioid regimen: Has received IV morphine and will continue IV hydromorphone upstairs.  Also have ordered oral oxycodone when able to take oral meds  - Response to opioid medications: Reduction of symptoms   - Bowel regimen: not needed  - Pharmacologic adjuvants: Acetaminophen  - Non-pharmacologic adjuvants: Splinting      DVT Prophylaxis: Warfarin  Code Status: Full Code    Disposition: Expected discharge in 2-3 days once surgery completed, cleared by surgery.[WG1.1]    Angel Lucio MD    Primary Care Physician   Gabriel Tan    Chief Complaint[WG1.2]   85-year-old male who injured his left leg after a fall    History is obtained from the patient, electronic health record, emergency department physician and patient's daughter[WG1.1]    History of Present Illness[WG1.2]   Tomer Weiss is a 85 year old male who presents with an injury to his left leg.  He was at home, bringing in some trash cans down his driveway, when he slipped on the ice and fell onto his left leg.  Had immediate pain just above his left knee, and was unable to walk.  He laid in the driveway for about an hour until his family found him.  He denied other injuries including head injury neck or back injury.  He has a history of a total knee arthroplasty of his knee done 2 years ago.  Has a history of a distal femur fracture 49 years ago with no sequelae.  Prior to his fall, he felt well.  He does have chronic atrial fibrillation taking Lopressor and Coumadin.  He is recently been diagnosed with pulmonary hypertension for unclear causes, being worked up by cardiology.  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[WG1.4]       Past Surgical History[WG1.2]   I have reviewed this patient's surgical history and updated it with pertinent information if needed.[WG1.1]  Past Surgical History:   Procedure Laterality Date     ARTHROPLASTY KNEE Left 3/14/2016    Procedure: ARTHROPLASTY KNEE;  Surgeon: Deonte Silver MD;  Location: [facility]     COLONOSCOPY  7/17/2013    Procedure: COMBINED COLONOSCOPY, SINGLE BIOPSY/POLYPECTOMY BY BIOPSY;  Colonoscopy, with polypectomy by biopsy;  Surgeon: Fernando Mario MD;  Location: [facility]     CYSTOSCOPY, LITHOLAPAXY, COMBINED N/A 2/26/2015    Procedure: COMBINED CYSTOSCOPY, LITHOLAPAXY;  Surgeon: Orlando Marr MD;  Location: [facility]     CYSTOSCOPY, LITHOLAPAXY, COMBINED N/A 2/11/2015    Procedure: COMBINED CYSTOSCOPY, LITHOLAPAXY;  Surgeon: Orlando Marr MD;  Location: [facility]     CYSTOSCOPY, RETROGRADES, EXTRACT STONE, COMBINED N/A 2/8/2018    Procedure: COMBINED CYSTOSCOPY, RETROGRADES, EXTRACT STONE;  cystoscopy, bladder stone removal;  Surgeon: Orlando Marr MD;  Location: [facility]     H ABLATION ATRIAL FLUTTER  4/18/15    atypical a flutter ablation & Ablation of PACs from the SVC-RA junction     HC ABLATION RENAL TUMOR PERCUT CRYOTHERAPY UNILATERAL  6/17/10    Right renal mass     LASER HOLMIUM LITHOTRIPSY BLADDER N/A 2/26/2015    Procedure: LASER HOLMIUM  LITHOTRIPSY BLADDER;  Surgeon: Orlando Marr MD;  Location: [facility]     LASER KTP GREEN LIGHT PHOTOSELECTIVE VAPORIZATION PROSTATE N/A 2015    Procedure: LASER KTP GREEN LIGHT PHOTOSELECTIVE VAPORIZATION PROSTATE;  Surgeon: Orlando Marr MD;  Location: [facility][WG1.4]       Prior to Admission Medications   Prior to Admission Medications   Prescriptions Last Dose Informant Patient Reported? Taking?   GLUCOSAMINE 500 MG OR CAPS   Yes No   Si Tablets every morning   atorvastatin (LIPITOR) 20 MG tablet Past Week at Unknown time  No Yes   Sig: Take 1 tablet (20 mg) by mouth daily   furosemide (LASIX) 20 MG tablet 3/1/2018 at Unknown time  No Yes   Sig: TAKE ONE TABLET BY MOUTH ONCE DAILY   iron 66 MG TABS 3/1/2018 at 0800  Yes Yes   Sig: Take 1 tablet (66 mg) by mouth daily   metoprolol tartrate (LOPRESSOR) 25 MG tablet 3/1/2018 at 0800  No Yes   Sig: TAKE ONE-HALF TABLET BY MOUTH ONCE DAILY   order for DME   No No   Sig: Equipment being ordered: Walker ()  Treatment Diagnosis: s/p joint replacement   warfarin (COUMADIN) 5 MG tablet 3/1/2018 at 0800  No Yes   Sig: Take 2.5 mg (1/2 tablet) Tuesday and Saturday and 5 mg other 5 days or as directed by the coumadin clinic      Facility-Administered Medications: None     Allergies   Allergies   Allergen Reactions     No Known Drug Allergies        Social History[WG1.2]   I have reviewed this patient's social history and updated it with pertinent information if needed. Tomer Weiss[WG1.1]  reports that he has never smoked. He has never used smokeless tobacco. He reports that he does not drink alcohol or use illicit drugs.[WG1.4]    Family History[WG1.2]   I have reviewed this patient's family history and updated it with pertinent information if needed.[WG1.1]   Family History   Problem Relation Age of Onset     CANCER Mother      breast     Hypertension Mother      Alzheimer Disease Mother       at age 96.[WG1.4]       Review of  Systems[WG1.2]   The 10 point Review of Systems is negative other than noted in the HPI or here.[WG1.1]     Physical Exam   Temp: 98  F (36.7  C) Temp src: Oral BP: (!) 141/93 Pulse: 95 Heart Rate: 95 Resp: 16 SpO2: 99 % O2 Device: None (Room air)[WG1.2]    Vital Signs with Ranges[WG1.1]  Temp:  [98  F (36.7  C)] 98  F (36.7  C)  Pulse:  [95] 95  Heart Rate:  [95-97] 95  Resp:  [14-16] 16  BP: (141-167)/() 141/93  SpO2:  [99 %-100 %] 99 %  195 lbs 0 oz[WG1.2]    Constitutional: Younger than stated age male lying in bed in no obvious distress.  Eyes: Pupils equal reactive.  HEENT: Mouth and throat normal  Respiratory: Lungs are clear  Cardiovascular: Irregular, irregular rate and rhythm.  No murmur heard.  No peripheral edema  GI: Soft, nontender  Lymph/Hematologic: cervicall nodes negative  Genitourinary: Not examined  Skin: No lesions or rashes  Musculoskeletal: Splint behind the left leg  Neurologic: Cranial nerves normal.  No signs of any focal losses  Psychiatric: Patient is alert and oriented[WG1.1]    Data[WG1.2]   Data reviewed today:  I personally reviewed the Leg x-ray image(s) showing Angulated fracture just above the left total knee arthroplasty.[WG1.1]    Recent Labs  Lab 03/01/18  2000 02/26/18  1053   WBC 8.7 6.5   HGB 13.2* 14.2   MCV 98 96    272   INR 2.10*  --     139   POTASSIUM 4.4 4.4   CHLORIDE 104 106   CO2 28 28   BUN 26 26   CR 0.88 0.94   ANIONGAP 10 5   KARTHIK 8.3* 8.3*   * 89   ALBUMIN  --  3.8   PROTTOTAL  --  7.6   BILITOTAL  --  1.5*   ALKPHOS  --  128   ALT  --  31   AST  --  27       Recent Results (from the past 24 hour(s))   Knee XR, 3 views, left    Narrative    LEFT KNEE THREE VIEWS   3/1/2018 8:49 PM     HISTORY: Left knee pain, fall, previous knee surgery.    COMPARISON: 4/26/2016.      Impression    IMPRESSION: Acute comminuted and impacted fracture at the distal  femoral metadiaphysis immediately superior to the femoral arthroplasty  component. There is  prominent posterior angulation of the distal  fracture fragment. The arthroplasty appears intact.[WG1.2]          Revision History        User Key Date/Time User Provider Type Action    > WG1.4 3/1/2018 10:35 PM Angel Lucio MD Physician Sign     WG1.3 3/1/2018 10:26 PM Angel Lucio MD Physician      WG1.2 3/1/2018 10:23 PM Angel Lucio MD Physician      WG1.1 3/1/2018 10:22 PM Angel Lucio MD Physician                      Discharge Summaries      Discharge Summaries by Kylah Bose PA-C at 3/6/2018  9:48 AM     Author:  Kylah Bose PA-C Service:  Orthopedics Author Type:  Physician Assistant    Filed:  3/6/2018  9:48 AM Date of Service:  3/6/2018  9:48 AM Creation Time:  3/6/2018  9:43 AM    Status:  Cosign Needed :  Kylah Bose PA-C (Physician Assistant)    Cosign Required:  Yes             [facility] Discharge Summary    Tomer Weiss MRN# [id number]   Age: 85 year old YOB: 1932     Date of Admission:  3/1/2018  Date of Discharge::  3/6/2018  Admitting Physician:  Angel Lucio MD  Discharge Physician:  Kylah Bose PA-C     Home clinic: [facility]          Admission Diagnoses:   Closed displaced supracondylar fracture of distal end of left femur without intracondylar extension, initial encounter (H) [S72.112A]          Discharge Diagnosis:   Closed displaced supracondylar fracture of distal end of left femur without intracondylar extension, initial encounter (H) [S72.592A]  Acute blood loss anemia though patient with long term use of iron supplementation for chronic anemia          Procedures:   Procedure(s): Procedure(s):  OPEN REDUCTION INTERNAL FIXATION FEMUR DISTAL left - periprosthetic fracture.        No other procedures performed during this admission           Medications Prior to Admission:     Prescriptions Prior to Admission   Medication Sig Dispense Refill Last Dose      atorvastatin (LIPITOR) 20 MG tablet Take 1 tablet (20 mg) by mouth daily 90 tablet 3 Past Week at Unknown time     metoprolol tartrate (LOPRESSOR) 25 MG tablet TAKE ONE-HALF TABLET BY MOUTH ONCE DAILY 45 tablet 9 3/1/2018 at 0800     warfarin (COUMADIN) 5 MG tablet Take 2.5 mg (1/2 tablet) Tuesday and Saturday and 5 mg other 5 days or as directed by the coumadin clinic 75 tablet 1 3/1/2018 at 0800     [DISCONTINUED] furosemide (LASIX) 20 MG tablet TAKE ONE TABLET BY MOUTH ONCE DAILY 90 tablet 3 3/1/2018 at Unknown time     order for DME Equipment being ordered: Walker ()  Treatment Diagnosis: s/p joint replacement 1 Device 0 Taking     [DISCONTINUED] iron 66 MG TABS Take 1 tablet (66 mg) by mouth daily 1 tablet 0 3/1/2018 at 0800     GLUCOSAMINE 500 MG OR CAPS 2 Tablets every morning   Taking             Discharge Medications:     Current Discharge Medication List      START taking these medications    Details   oxyCODONE IR (ROXICODONE) 5 MG tablet Take 1-2 tablets (5-10 mg) by mouth every 4 hours as needed for other (pain control or improvement in physical function. Hold dose for analgesic side effects.)  Qty: 40 tablet, Refills: 0    Associated Diagnoses: Closed displaced supracondylar fracture of distal end of left femur without intracondylar extension, initial encounter (H)      acetaminophen (TYLENOL) 325 MG tablet Take 2 tablets (650 mg) by mouth every 4 hours as needed for other (multimodal surgical pain management along with NSAIDS and opioid medication as indicated based on pain control and physical function.)  Qty: 100 tablet      polyethylene glycol (MIRALAX/GLYCOLAX) Packet Take 17 g by mouth daily as needed for constipation  Qty: 7 packet      melatonin 1 MG TABS tablet Take 1 tablet (1 mg) by mouth nightly as needed for sleep    Associated Diagnoses: Closed displaced supracondylar fracture of distal end of left femur without intracondylar extension, initial encounter (H)      ferrous sulfate  (IRON) 325 (65 FE) MG tablet Take 1 tablet (325 mg) by mouth 2 times daily (with meals)  Qty: 100 tablet    Associated Diagnoses: Anemia, unspecified type      senna-docusate (SENOKOT-S;PERICOLACE) 8.6-50 MG per tablet Take 1 tablet by mouth 2 times daily as needed for constipation  Qty: 100 tablet    Associated Diagnoses: Closed displaced supracondylar fracture of distal end of left femur without intracondylar extension, initial encounter (H)         CONTINUE these medications which have NOT CHANGED    Details   atorvastatin (LIPITOR) 20 MG tablet Take 1 tablet (20 mg) by mouth daily  Qty: 90 tablet, Refills: 3    Associated Diagnoses: Hyperlipidemia LDL goal <130      metoprolol tartrate (LOPRESSOR) 25 MG tablet TAKE ONE-HALF TABLET BY MOUTH ONCE DAILY  Qty: 45 tablet, Refills: 9    Associated Diagnoses: Chronic atrial fibrillation (H)      warfarin (COUMADIN) 5 MG tablet Take 2.5 mg (1/2 tablet) Tuesday and Saturday and 5 mg other 5 days or as directed by the coumadin clinic  Qty: 75 tablet, Refills: 1    Associated Diagnoses: Long-term (current) use of anticoagulants      order for DME Equipment being ordered: Walker ()  Treatment Diagnosis: s/p joint replacement  Qty: 1 Device, Refills: 0    Associated Diagnoses: Status post total left knee replacement      GLUCOSAMINE 500 MG OR CAPS 2 Tablets every morning    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         STOP taking these medications       furosemide (LASIX) 20 MG tablet Comments:   Reason for Stopping:         iron 66 MG TABS Comments:   Reason for Stopping:                     Consultations:   Consultation during this admission received from internal medicine          Brief History of Illness:   Reason for admission requiring a surgical or invasive procedure:   Closed displaced supracondylar fracture of distal end of left femur without intracondylar extension, initial encounter (H) [R50.515E]   The patient underwent  the following procedure(s):   Procedure(s):  OPEN REDUCTION INTERNAL FIXATION FEMUR DISTAL   There were no immediate complications during this procedure.    Please refer to the full operative summary for details.           Hospital Course:   The patient's hospital course was unremarkable.  He recovered as anticipated and experienced no post-operative complications. Hgb remained stable.  Lowest Hgb was 7.5.  Patient denying symptoms however rehabilitation in the room states he has difficulty tolerating sitting upright due to lightheadedness.  Patient will be reevaluated this morning by rehabilitation and a call will be placed if it is felt his dizziness is more substantial.  If this is the case then we would recommend patient obtain 1 unit PRBC prior to discharge to rehab facility.  Patient tolerating oral pain medications.  Patient now has a hinged knee brace.  This is set at 0-90  to allow for flexion and extension of the knee.  Patient should have this brace in the locked position at approximately 20  for transfers and ambulation.  Tolerating therapy.      # Discharge Pain Plan:   - During his hospitalization, Tomer experienced pain due to left distal femur fracture.  The pain plan for discharge was discussed with Tomer and the plan was created in a collaborative fashion.    - Opioids prescribed on discharge: Oxycodone.  Patient is primarily utilizing Tylenol however has utilized approximately 1-2 oxycodone per day  - Duration of opioids after discharge: This condition will require longer than a 3 day dosing of opioids.  #40 prescribed  - Bowel regimen: senna and miralax           Discharge Instructions and Follow-Up:   Discharge diet: Pre-procedure diet or regular   Discharge activity: Activity as tolerated  Driving restrictions: no driving while taking narcotic analgesics  Weight bearing status: Weight bearing as tolerated   Discharge follow-up: Follow up with Dr. Silver in 10-14 days.  Patient should already  have an appointment scheduled   Wound care: Aquacell dressing in place - OK to shower over this  Aquacell and staples to be removed at follow up           Discharge Disposition:   Discharged to short-term care facility      Attestation:  I have reviewed today's vital signs, notes, medications, labs and imaging.    Kylah Bose PA-C[LM1.1]        Revision History        User Key Date/Time User Provider Type Action    > LM1.1 3/6/2018  9:48 AM Kylah Bose PA-C Physician Assistant Sign                     Consult Notes      Consults by Aliza Dietrich at 3/5/2018 10:36 AM     Author:  Aliza Dietrich Service:  (none) Author Type:      Filed:  3/5/2018 10:36 AM Date of Service:  3/5/2018 10:36 AM Creation Time:  3/5/2018 10:33 AM    Status:  Signed :  Aliza Dietrich ()     Consult Orders:    1. Care Transition RN/SW IP Consult [[id number]] ordered by Mason Parra MD at 03/02/18 0351                CARE TRANSITION SOCIAL WORK INITIAL ASSESSMENT:  Reason For Consult: discharge planning   Met with: Patient.    DATA  Principal Problem:    Closed displaced supracondylar fracture of distal end of left femur without intracondylar extension (H)  Active Problems:    Malignant neoplasm of kidney excluding renal pelvis (H)    Hyperlipidemia LDL goal <130    Total knee replacement status    Long-term (current) use of anticoagulants [Z79.01]    Atrial fibrillation (HCC) [I48.91]    Periprosthetic fracture around internal prosthetic left knee joint    Pulmonary hypertension    Femur fracture, left (H)    Fracture, femur, supracondylar (H)    Paroxysmal ventricular tachycardia - 4 beat run    Hypotension, unspecified hypotension type       Primary Care Clinic [facility]: [facility]  Primary Care MD Name: Dr. Gabriel Tan    ASSESSMENT  Cognitive Status: awake, alert and oriented.       Resources List: Skilled Nursing Facility     Lives With: child(benedict), adult, grandchild(benedict)  Living Arrangements:  house  Quality Of Family Relationships: supportive  Description of Support System: Involved, Supportive   Who is your support system?: Children       Insurance Concerns: No Insurance issues identified        This writer met with pt introduced self and role. Discussed discharge planning and medicare guidelines in regards to home care and SNF benefits.  Discussed options for discharge.  Patient thinks with his non-weight bearing status that he will need TCU at discharge.  Provided list of TCU options.  Patient requests that referrals be sent to [facility] (Admissions: [phone] Main Phone: [phone] Fax: [phone]) and [facility] (Main Phone: [phone] Admissions Phone: [phone] Fax: [phone]).  Talked with Sabrina at [facility].  They are assessing for admission tomorrow.      PLAN    TCU    Discharge Planner   Discharge Plans in progress: TCU  Barriers to discharge plan: None  Follow up plan: Ortho    Aliza Dietirch [facility] [phone]/ [facility] [phone]         Entered by: Aliza Dietrich 03/05/2018 10:33 AM[JK1.1]            Revision History        User Key Date/Time User Provider Type Action    > JK1.1 3/5/2018 10:36 AM Aliza Dietrich  Sign            Consults by Mason Parra MD at 3/3/2018 12:11 PM     Author:  Mason Parra MD Service:  Orthopedics Author Type:  Physician    Filed:  3/3/2018 12:23 PM Date of Service:  3/3/2018 12:11 PM Creation Time:  3/3/2018 12:11 PM    Status:  Signed :  Mason Parra MD (Physician)     Consult Orders:    1. Orthopedic Surgery IP Consult: Patient to be seen: Routine - within 24 hours; femur fracture; Consultant may enter orders: Yes [[id number]] ordered by Miguel Cifuentes MD at 03/01/18 2144                [facility] Orthopedic Consultation    Tomer Weiss MRN# [id number]   Age: 85 year old YOB: 1932     Date of  Admission:  3/1/2018    Reason for consult: Left supracondylar femur fracture above total knee arthroplasty.       Requesting physician: Miguel Cifuentes       Level of consult: Consult, follow and place orders           Assessment and Plan:   Assessment:   Left comminuted displaced supracondylar femur fracture above total knee arthroplasty from a fall at home.  Total knee arthroplasty placed 3/14/16.  Total knee arthroplasty has been doing well.        Plan:   Open-reduction, internal fixation left femoral fracture with periarticular plate.  Risks, benefits, potential complications and alternatives were discussed.            Chief Complaint:   Fall sustaining left femur fracture.       History is obtained from the patient         History of Present Illness:   This patient is a 85 year old male who presents with the following condition requiring a hospital admission:      Fall at home onto left knee.  He had left total knee arthroplasty placed 3/14/16 and had been doing well. Unable to walk after fall.  X-ray in emergency room shows comminuted displaced supracondylar femur fracture. He is on coumadin and required reversal before surgery.           Past Medical History:[DL1.1]     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[DL1.2]             Past Surgical History:[DL1.1]     Past Surgical History:   Procedure Laterality Date     ARTHROPLASTY KNEE Left 3/14/2016    Procedure: ARTHROPLASTY KNEE;  Surgeon: Deonte Silver MD;  Location: [facility]     COLONOSCOPY  7/17/2013    Procedure: COMBINED COLONOSCOPY, SINGLE BIOPSY/POLYPECTOMY BY BIOPSY;  Colonoscopy, with polypectomy by biopsy;   Surgeon: Fernando Mario MD;  Location: [facility]     CYSTOSCOPY, LITHOLAPAXY, COMBINED N/A 2015    Procedure: COMBINED CYSTOSCOPY, LITHOLAPAXY;  Surgeon: Orlando Marr MD;  Location[facility]     CYSTOSCOPY, LITHOLAPAXY, COMBINED N/A 2015    Procedure: COMBINED CYSTOSCOPY, LITHOLAPAXY;  Surgeon: Orlando Marr MD;  Location[facility]     CYSTOSCOPY, RETROGRADES, EXTRACT STONE, COMBINED N/A 2018    Procedure: COMBINED CYSTOSCOPY, RETROGRADES, EXTRACT STONE;  cystoscopy, bladder stone removal;  Surgeon: Orlando Marr MD;  Location: [facility]     H ABLATION ATRIAL FLUTTER  4/18/15    atypical a flutter ablation & Ablation of PACs from the SVC-RA junction     HC ABLATION RENAL TUMOR PERCUT CRYOTHERAPY UNILATERAL  6/17/10    Right renal mass     LASER HOLMIUM LITHOTRIPSY BLADDER N/A 2015    Procedure: LASER HOLMIUM LITHOTRIPSY BLADDER;  Surgeon: Orlanod Marr MD;  Location[facility]     LASER KTP GREEN LIGHT PHOTOSELECTIVE VAPORIZATION PROSTATE N/A 2015    Procedure: LASER KTP GREEN LIGHT PHOTOSELECTIVE VAPORIZATION PROSTATE;  Surgeon: Orlando Marr MD;  Location: [facility][DL1.2]             Social History:[DL1.1]     Social History     Social History     Marital status:      Spouse name: Shari     Number of children: 4     Years of education: N/A     Occupational History     Not on file.     Social History Main Topics     Smoking status: Never Smoker     Smokeless tobacco: Never Used     Alcohol use No     Drug use: No     Sexual activity: Not Currently     Other Topics Concern     Not on file     Social History Narrative[DL1.2]             Family History:[DL1.1]     Family History   Problem Relation Age of Onset     CANCER Mother      breast     Hypertension Mother      Alzheimer Disease Mother       at age 96.[DL1.2]     Family history reviewed          Immunizations:[DL1.1]     Immunization History   Administered Date(s) Administered     Pneumo Conj 13-V  (2010&after) 01/13/2017     Pneumococcal 23 valent 09/16/2009     TD (ADULT, 7+) 02/22/1999, 09/16/2009     TDAP Vaccine (Boostrix) 07/19/2013[DL1.3]             Allergies:[DL1.1]     Allergies   Allergen Reactions     No Known Drug Allergies[DL1.3]              Medications:[DL1.1]     Prescriptions Prior to Admission   Medication Sig Dispense Refill Last Dose     atorvastatin (LIPITOR) 20 MG tablet Take 1 tablet (20 mg) by mouth daily 90 tablet 3 Past Week at Unknown time     metoprolol tartrate (LOPRESSOR) 25 MG tablet TAKE ONE-HALF TABLET BY MOUTH ONCE DAILY 45 tablet 9 3/1/2018 at 0800     warfarin (COUMADIN) 5 MG tablet Take 2.5 mg (1/2 tablet) Tuesday and Saturday and 5 mg other 5 days or as directed by the coumadin clinic 75 tablet 1 3/1/2018 at 0800     furosemide (LASIX) 20 MG tablet TAKE ONE TABLET BY MOUTH ONCE DAILY 90 tablet 3 3/1/2018 at Unknown time     iron 66 MG TABS Take 1 tablet (66 mg) by mouth daily 1 tablet 0 3/1/2018 at 0800     order for DME Equipment being ordered: Walker ()  Treatment Diagnosis: s/p joint replacement 1 Device 0 Taking     GLUCOSAMINE 500 MG OR CAPS 2 Tablets every morning   Taking[DL1.3]             Review of Systems:   A comprehensive review of systems was performed and found to be negative except as described in this note.  He is under workup by cardiology.  He has atrial fibrillation.          Physical Exam:[DL1.1]   Temp: 97.9  F (36.6  C) Temp src: Oral BP: 113/59 Pulse: 101 Heart Rate: 73 Resp: 16 SpO2: 94 % O2 Device: None (Room air)[DL1.4]     No labor of breathing.  No acute distress.  Alert, oriented.  Musculoskeletal:   right hand has flexion contractures likely Dupuytren's contracture.  LEFT HIP:  redness absent  swelling absent  tenderness absent  LEFT KNEE and distal thigh:  swelling present  tenderness present  Flexion of knee through fracture.  Long leg splint in place.  Sensation, motor and circulation are intact.             Data:[DL1.1]     Lab  Results   Component Value Date    WBC 8.7 03/01/2018    HGB 10.1 (L) 03/03/2018    HCT 42.1 03/01/2018     03/01/2018     03/01/2018    POTASSIUM 4.4 03/01/2018    CHLORIDE 104 03/01/2018    CO2 28 03/01/2018    BUN 26 03/01/2018    CR 0.88 03/01/2018     (H) 03/01/2018    SED 23 (H) 09/07/2006    AST 27 02/26/2018    ALT 31 02/26/2018    ALKPHOS 128 02/26/2018    BILITOTAL 1.5 (H) 02/26/2018    INR 1.11 03/03/2018[DL1.5]     Extremity x-ray of the left upper leg / tibia::   Fracture seen above total knee arthroplasty with comminution, flexion.        Attestation:  Amount of time performed on this consult: 30 minutes.    Mason Parra MD[DL1.1]     Revision History        User Key Date/Time User Provider Type Action    > DL1.5 3/3/2018 12:23 PM Mason Parra MD Physician Sign     DL1.4 3/3/2018 12:19 PM Mason Parra MD Physician      DL1.3 3/3/2018 12:18 PM Mason Parra MD Physician      DL1.2 3/3/2018 12:17 PM Mason Parra MD Physician      DL1.1 3/3/2018 12:11 PM Mason Parra MD Physician                      Progress Notes - Physician (Notes from 03/03/18 through 03/06/18)      Progress Notes by Aliza Dietrich at 3/6/2018 10:54 AM     Author:  Aliza Dietrich Service:  (none) Author Type:      Filed:  3/6/2018 10:55 AM Date of Service:  3/6/2018 10:54 AM Creation Time:  3/6/2018 10:54 AM    Status:  Signed :  Aliza Dietrich ()         Name: Tomer Weiss    MRN#: [id number]    Reason for Hospitalization: Closed displaced supracondylar fracture of distal end of left femur without intracondylar extension, initial encounter (H) [S72.456K]    Discharge Date: 3/6/2018    Patient / Family response to discharge plan: in agreement    Follow-Up Appt: Future Appointments  Date Time Provider Department Center   3/6/2018 2:30 PM Viridiana Mario, PT PHPT [facility]   3/20/2018 10:00 AM [id number] SHCVEC [facility]   3/20/2018  11:00 AM SCIMR1 SHCVMR [facility]   3/28/2018 3:00 PM ZM ANTI COAG ZM[facility]   4/2/2018 11:00 AM SHCVR1 [facility]CVO [facility]   4/16/2018 7:45 AM Russell John MD [facility]       Other Providers (Care Coordinator, County Services, PCA services etc): No    Discharge Disposition: transitional care unit - [facility] (Admissions: [phone] Main Phone: [phone] Fax: [phone]) via [employer] at 1300.    Aliza Dietrich [facility] [phone]/ [facility] [phone][JK1.1]           Revision History        User Key Date/Time User Provider Type Action    > JK1.1 3/6/2018 10:55 AM Aliza Dietrich  Sign            Progress Notes by Aliza Strong RN at 3/6/2018  8:05 AM     Author:  Aliza Strong RN Service:  (none) Author Type:  Registered Nurse    Filed:  3/6/2018  8:06 AM Date of Service:  3/6/2018  8:05 AM Creation Time:  3/6/2018  8:05 AM    Status:  Signed :  Aliza Strong RN (Registered Nurse)         Notified MD at 8:00 AM regarding lab results.      Spoke with: Krystina    Orders were not obtained.    Comments: hbg 7.5 - will not transfuse since patient is asymptomatic and VSS.[JG1.1]             Revision History        User Key Date/Time User Provider Type Action    > JG1.1 3/6/2018  8:06 AM Aliza Strong RN Registered Nurse Sign            Progress Notes by Ashely Brown RN at 3/6/2018  6:47 AM     Author:  Ashely Brown RN Service:  (none) Author Type:  Registered Nurse    Filed:  3/6/2018  6:57 AM Date of Service:  3/6/2018  6:47 AM Creation Time:  3/6/2018  6:47 AM    Status:  Addendum :  Ashely Brown RN (Registered Nurse)         Hgb 7.5 this am. Charge nurse notified.[SP1.1]  MD notified via page. No response/new orders at this time.[SP1.2]     Revision History        User Key Date/Time User Provider Type Action    > SP1.2 3/6/2018  6:57 AM Ashely Brown, RN Registered Nurse Addend     SP1.1 3/6/2018  6:48 AM Ashely Brown, RN Registered Nurse  Sign            Progress Notes by Magdalena Singer RN at 3/5/2018 12:15 PM     Author:  Magdalena Singer RN Service:  (none) Author Type:  Registered Nurse    Filed:  3/5/2018  3:32 PM Date of Service:  3/5/2018 12:15 PM Creation Time:  3/5/2018  3:29 PM    Status:  Signed :  Magdalena Singer RN (Registered Nurse)         S-(situation): Pt.complained of not feeling good after being transferred to chair    B-(background): ORIF Left femur    A-(assessment): Pt.transferred per Tricia Steady with physical therapist and me and he complained of not feeling good and felt funny in the head.Pt.assisted back to bed and blood pressure was 124/78 and heart rate was 98.    R-(recommendations): Monitor pt.for any more symptoms with activity.[DK1.1]     Revision History        User Key Date/Time User Provider Type Action    > DK1.1 3/5/2018  3:32 PM Magdalena Singer RN Registered Nurse Sign            Progress Notes by Aliza Dietrich at 3/5/2018  1:39 PM     Author:  Aliza Dietrich Service:  (none) Author Type:      Filed:  3/5/2018  1:41 PM Date of Service:  3/5/2018  1:39 PM Creation Time:  3/5/2018  1:39 PM    Status:  Signed :  Aliza Dietrich ()         Patient accepted at [facility] (Admissions: [phone] Main Phone: [phone] Fax: [phone]) when ready for discharge.  Patient is aware of the $14/day private room fee at [facility].  Patient will likely need van transport.      PAS-RR    D: Per DHS regulation, SW completed and submitted PAS-RR to [place] [employer] on Aging Direct Connect via the Senior LinkAge Line.  PAS-RR confirmation # is : [id number]    P: Further questions may be directed to [facility] LinkAge Line at #[phone], option #4 for PAS-RR staff.    RICHARD Maguire  [facility] [phone]/ [facility] [phone][JK1.1]         Revision History        User Key Date/Time User Provider Type Action    > JK1.1 3/5/2018  1:41 PM Aliza Dietrich   Sign            Progress Notes by Deonte Silver MD at 3/5/2018  1:15 PM     Author:  Deonte Silver MD Service:  Orthopedics Author Type:  Physician    Filed:  3/5/2018  1:21 PM Date of Service:  3/5/2018  1:15 PM Creation Time:  3/5/2018  1:15 PM    Status:  Signed :  Deonte Silver MD (Physician)         [facility] Orthopedic Post-Op Note         Assessment and Plan:    Assessment:   Post-operative day #2  Open reduction and internal fixation of the distal femur ronald-prosthetic fracture  (Left)  Procedure(s):  OPEN REDUCTION INTERNAL FIXATION FEMUR DISTAL  Doing well.  No immediate surgical complications identified.  No excessive bleeding  Pain well-controlled.  Tolerating physical therapy and rehabilitation well.       Plan:   We will switch him from a knee immobilizer to a hinged knee brace.  We will allow him to begin range of motion more routinely 0-90 .  The hinged knee brace can be locked for attempts at ambulation.  He must continue to maintain nonweightbearing status.    As he becomes more alert he may remove the immobilizer while in bed only and preferably for hygiene only.    Anticipate transferring to a extended care facility within the next 24-48 hours.    {# Pain Assessment:     Current Pain Score 3/5/2018 3/4/2018 3/4/2018   Patient currently in pain? yes denies denies   Pain score (0-10) - - -   Pain location Foot - -   Pain descriptors Aching - -   - Tomer is experiencing pain due to previous fracture and surgery. Pain management was discussed and the plan was created in a collaborative fashion.  Tomer's response to the current recommendations: engaged                   Interval History:   Doing well.  Continues to improve.  Pain is well-controlled.  No fevers.              Physical Exam:   All vitals have been reviewed  Patient Vitals for the past 8 hrs:   BP Temp Temp src Pulse Heart Rate Resp SpO2   03/05/18 1142 99/58 96  F (35.6  C) Oral 89 - 20 94 %    03/05/18 0942 117/65 - - 98 98 - -   03/05/18 0718 111/58 96.1  F (35.6  C) Oral 101 - 18 92 %     I/O last 3 completed shifts:  In: 6391.67 [P.O.:1950; I.V.:3441.67; IV Piggyback:1000]  Out: 450 [Urine:450]    Wound clean and dry with minimal or no drainage.  Surrounding skin with minimal erythema.  Dressing was changed today.  SHANELL stockings was used instead of a bulky dressing.  I was able to begin extension and flexion of the knee which was tolerated well.           Data:   All laboratory/imaging data related to this surgery reviewed  Results for orders placed or performed during the hospital encounter of 03/01/18 (from the past 24 hour(s))   Magnesium   Result Value Ref Range    Magnesium 1.9 1.6 - 2.3 mg/dL   Phosphorus   Result Value Ref Range    Phosphorus 2.0 (L) 2.5 - 4.5 mg/dL   Potassium   Result Value Ref Range    Potassium 4.2 3.4 - 5.3 mmol/L   INR   Result Value Ref Range    INR 1.26 (H) 0.86 - 1.14   Hemoglobin   Result Value Ref Range    Hemoglobin 8.0 (L) 13.3 - 17.7 g/dL   Basic metabolic panel   Result Value Ref Range    Sodium 138 133 - 144 mmol/L    Potassium 4.3 3.4 - 5.3 mmol/L    Chloride 107 94 - 109 mmol/L    Carbon Dioxide 24 20 - 32 mmol/L    Anion Gap 7 3 - 14 mmol/L    Glucose 116 (H) 70 - 99 mg/dL    Urea Nitrogen 27 7 - 30 mg/dL    Creatinine 1.00 0.66 - 1.25 mg/dL    GFR Estimate 71 >60 mL/min/1.7m2    GFR Estimate If Black 86 >60 mL/min/1.7m2    Calcium 7.1 (L) 8.5 - 10.1 mg/dL   Magnesium   Result Value Ref Range    Magnesium 2.3 1.6 - 2.3 mg/dL   Phosphorus   Result Value Ref Range    Phosphorus 2.3 (L) 2.5 - 4.5 mg/dL   Glucose by meter   Result Value Ref Range    Glucose 113 (H) 70 - 99 mg/dL        Deonte Silver MD[JL1.1]     Revision History        User Key Date/Time User Provider Type Action    > JL1.1 3/5/2018  1:21 PM Deonte Silver MD Physician Sign            Progress Notes by Emmett Walton at 3/5/2018  1:20 PM     Author:  Emmett Walton Service:   Spiritual Health Author Type:      Filed:  3/5/2018  1:20 PM Date of Service:  3/5/2018  1:20 PM Creation Time:  3/5/2018  1:20 PM    Status:  Signed :  Emmett Walton ()         SPIRITUAL HEALTH SERVICES  SPIRITUAL ASSESSMENT Progress Note  [facility]      (Follow up)   provided a prayer.   is available for pt/family needs.    Emmett Walton M.Div., [facility]  Staff   Office tel: [phone][JV1.1]       Revision History        User Key Date/Time User Provider Type Action    > JV1.1 3/5/2018  1:20 PM Emmett Walton  Sign            Progress Notes by Rai Evans MD at 3/5/2018 10:55 AM     Author:  Rai Evans MD Service:  Hospitalist Author Type:  Physician    Filed:  3/5/2018  1:20 PM Date of Service:  3/5/2018 10:55 AM Creation Time:  3/5/2018 10:55 AM    Status:  Signed :  Rai Evans MD (Physician)         [facility]    Hospitalist Progress Note    Date of Service (when I saw the patient): 03/05/2018    Assessment & Plan   Tomer Weiss is a 85 year old male who was admitted on 3/1/2018[JK1.1] with traumatic left supracondylar femur fracture adjacent to his total knee prosthesis for which he underwent operative repair 2 days ago.  He has had a 2 g drop in hemoglobin due to fracture and surgery, but transient hypotension yesterday resolved and has not recurred.  Rate of chronic atrial fibrillation remains adequately controlled.  He remains on warfarin chronically with subtherapeutic INR today.  Although there was question of a possible brief episode of wide-complex tachycardia yesterday, those telemetry strips are reviewed again today and do not appear to demonstrate a wide complex tachycardia but rather are consistent with narrow complex tachycardia with transient axis change.  He was found to have hypophosphatemia yesterday which has improved with replacement.  Overall,  his acute medical problems are improving and his chronic medical problems appear to be stable.    Principal Problem:    Closed displaced supracondylar fracture of distal end of left femur without intracondylar extension (H)  Active Problems:    Periprosthetic fracture around internal prosthetic left knee joint    Anemia    Chronic atrial fibrillation (H)    Pulmonary hypertension    Hypophosphatemia    Malignant neoplasm of kidney excluding renal pelvis (H)    Hyperlipidemia LDL goal <130    Total knee replacement status    Long-term (current) use of anticoagulants [Z79.01]    Dysrhythmia 4 beat run narrow complex    Hypotension, unspecified hypotension type    May discontinue IV fluids today from a medical standpoint  May discontinue telemetry  Continue present dose of beta-blocker  Continue warfarin with dosing adjusted depending upon INR  Replace phosphorus according to protocol  Medically appears appropriate for discharge once he is ready from an orthopedic standpoint, discussed with Dr. Silver of orthopedic surgery today[JK1.2]    Pain Plan: # Pain Assessment:   Current Pain Score 3/5/2018 3/4/2018 3/4/2018   Patient currently in pain? yes denies denies   Pain score (0-10) - - -   Pain location Foot - -   Pain descriptors Aching - -[JK1.1]   Postoperative pain management deferred to orthopedic surgery[JK1.2]    DVT Prophylaxis:[JK1.1] Warfarin and Pneumatic Compression Devices[JK1.2]  Code Status: Full Code    Disposition:[JK1.1] Deferred to orthopedic surgery[JK1.2].    Rai Evans    Interval History[JK1.1]   Left leg pain has generally been controlled with oral medications.  He has been afebrile with stable vital signs.  Oxygenation is normal.  Urine output has been adequate although low.  He is tolerating advancing diet.  He offers no new complaints.[JK1.2]    -Data reviewed today: I reviewed all new labs and imaging results over the last 24 hours. I personally reviewed Telemetry strips including strip  from yesterday that demonstrated a 4 beat run of narrow complex tachycardia with abrupt change in axis during that run.  No episodes of wide-complex tachycardia were seen on the available telemetry strips..    Physical Exam   Temp: 96.1  F (35.6  C) Temp src: Oral BP: 117/65 Pulse: 98 Heart Rate: 98 Resp: 18 SpO2: 92 % O2 Device: None (Room air)    Vitals:    03/01/18 1930 03/01/18 2302   Weight: 88.5 kg (195 lb) 88.1 kg (194 lb 3.6 oz)     Vital Signs with Ranges  Temp:  [96.1  F (35.6  C)-97.8  F (36.6  C)] 96.1  F (35.6  C)  Pulse:  [] 98  Heart Rate:  [91-98] 98  Resp:  [18] 18  BP: ()/(35-65) 117/65  SpO2:  [92 %-100 %] 92 %  I/O last 3 completed shifts:  In: 6391.67 [P.O.:1950; I.V.:3441.67; IV Piggyback:1000]  Out: 450 [Urine:450]    Constitutional:[JK1.1] No acute distress resting in bed[JK1.2]  Cardiovascular:[JK1.1] Irregularly irregular heart rate and rhythm[JK1.2]    Medications     Warfarin Therapy Reminder       sodium chloride 125 mL/hr at 03/05/18 0602       warfarin  5 mg Oral ONCE at 18:00     atorvastatin  20 mg Oral Daily     acetaminophen  975 mg Oral Q8H     metoprolol tartrate  12.5 mg Oral QAM     sodium chloride (PF)  3 mL Intracatheter Q8H       Data   Data reviewed today:  I personally reviewed[JK1.1] telemetry strips[JK1.2].    Recent Labs  Lab 03/05/18  0527 03/04/18  1547 03/04/18  0526 03/03/18  1015 03/03/18  0535  03/01/18 2000   WBC  --   --   --   --   --   --  8.7   HGB 8.0*  --  8.2* 10.1* 9.8*  --  13.2*   MCV  --   --   --   --   --   --  98   PLT  --   --   --   --   --   --  281   INR 1.26*  --  1.09  --  1.11  < > 2.10*     --   --   --   --   --  142   POTASSIUM 4.3 4.2  --   --   --   --  4.4   CHLORIDE 107  --   --   --   --   --  104   CO2 24  --   --   --   --   --  28   BUN 27  --   --   --   --   --  26   CR 1.00  --   --   --   --   --  0.88   ANIONGAP 7  --   --   --   --   --  10   KARTHIK 7.1*  --   --   --   --   --  8.3*   *  --  130*  --    --   --  106*   < > = values in this interval not displayed.[JK1.1]    Phosphorus 2.3 today[JK1.2]       Revision History        User Key Date/Time User Provider Type Action    > JK1.2 3/5/2018  1:20 PM Rai Evans MD Physician Sign     JK1.1 3/5/2018 10:55 AM Rai Evans MD Physician             Progress Notes by Taya Jeong OT at 3/5/2018  9:44 AM     Author:  Taya Jeong OT Service:  (none) Author Type:  Occupational Therapist    Filed:  3/5/2018  9:44 AM Date of Service:  3/5/2018  9:44 AM Creation Time:  3/5/2018  9:44 AM    Status:  Signed :  Taya Jeong OT (Occupational Therapist)          03/05/18 0900   Quick Adds   Type of Visit Initial Occupational Therapy Evaluation   Living Environment   Lives With child(benedict), adult;grandchild(benedict)   Living Arrangements house   Home Accessibility bed and bath on same level;ramps present at home;grab bars present (toilet);grab bars present (bathtub)   Number of Stairs to Enter Home 0   Number of Stairs Within Home 0   Stair Railings at Home outside, present at both sides   Transportation Available van, wheelchair accessible   Living Environment Comment Patient lives with his adult daughter and granddaughter, however they are gone during the day, has a son that is available for assistance as needed as well as a fiancee    Self-Care   Dominant Hand right   Usual Activity Tolerance good   Current Activity Tolerance moderate   Regular Exercise no   Equipment Currently Used at Home none  (reports access to walker, crutches, wheelchair as needed )   Activity/Exercise/Self-Care Comment Independent at baseline with ADL's, has access to equipment listed above    Functional Level Prior   Ambulation 0-->independent   Transferring 0-->independent   Toileting 0-->independent   Bathing 0-->independent   Dressing 0-->independent   Eating 0-->independent   Communication 0-->understands/communicates without difficulty   Swallowing 0-->swallows foods/liquids without  difficulty   Cognition 0 - no cognition issues reported   Fall history within last six months yes   Number of times patient has fallen within last six months 1   Which of the above functional risks had a recent onset or change? ambulation;transferring   Prior Functional Level Comment Independent and active at baseline with ADL's and functional tranfers    General Information   Onset of Illness/Injury or Date of Surgery - Date 03/03/18   Referring Physician Dr. Parra    Patient/Family Goals Statement Whatever is necessary for recovery    Additional Occupational Profile Info/Pertinent History of Current Problem Open reduction internal fixation of distal R femur    Precautions/Limitations fall precautions   Weight-Bearing Status - LUE full weight-bearing   Weight-Bearing Status - RUE full weight-bearing   Weight-Bearing Status - LLE full weight-bearing   Weight-Bearing Status - RLE nonweight-bearing   Cognitive Status Examination   Orientation orientation to person, place and time   Level of Consciousness alert   Able to Follow Commands success, 1-step commands   Personal Safety (Cognitive) WNL/WFL   Memory intact   Attention No deficits were identified   Organization/Problem Solving No deficits were identified   Executive Function No deficits were identified   Cognitive Comment With motor planning challenge, needs one step verbal directives    Visual Perception   Visual Perception Wears glasses   Sensory Examination   Sensory Comments Reports baseline numbness through RLE.     Pain Assessment   Patient Currently in Pain Yes, see Vital Sign flowsheet   Range of Motion (ROM)   ROM Comment UE ROM WNL    Strength   Strength Comments generalized weakness consistent with post-op status and surgical intervention    Coordination   Upper Extremity Coordination No deficits were identified   Mobility   Bed Mobility Bed mobility skill: Sit to supine;Bed mobility skill: Supine to sit   Bed Mobility Skill: Sit to Supine   Level  of [place]: Sit/Supine minimum assist (75% patients effort)   Physical Assist/Nonphysical Assist: Sit/Supine 1 person assist;verbal cues;supervision   Assistive Device: Sit/Supine bedrail   Bed Mobility Skill: Supine to Sit   Level of [place]: Supine/Sit stand-by assist   Physical Assist/Nonphysical Assist: Supine/Sit supervision   Transfer Skill: Bed to Chair/Chair to Bed   Level of [place]: Bed to Chair maximum assist (25% patients effort)   Physical Assist/Nonphysical Assist: Bed to Chair 1 person + 1 person to manage equipment;verbal cues;supervision   Weight-Bearing Restrictions nonweight-bearing   Assistive Device - Transfer Skill Bed to Chair Chair to Bed Rehab Eval (Tricia Steady utilized )   Transfer Skill: Sit to Stand   Level of [place]: Sit/Stand maximum assist (25% patients effort)   Physical Assist/Nonphysical Assist: Sit/Stand 1 person assist;verbal cues;supervision   Transfer Skill: Sit to Stand nonweight-bearing   Assistive Device for Transfer: Sit/Stand rolling walker   Transfer Skill: Toilet Transfer   Toilet Transfer Skill Comments Not attempted this date due to level of assist needed in transfer    Upper Body Dressing   Level of [place]: Dress Upper Body stand-by assist   Physical Assist/Nonphysical Assist: Dress Upper Body set-up required   Lower Body Dressing   Level of [place]: Dress Lower Body moderate assist (50% patients effort)   Physical Assist/Nonphysical Assist: Dress Lower Body 1 person assist;verbal cues;supervision   Toileting   Level of [place]: Toilet dependent (less than 25% patients effort)  (bed pan and urinal use )   Grooming   Level of [place]: Grooming stand-by assist   Physical Assist/Nonphysical Assist: Grooming supervision  (in sitting )   Eating/Self Feeding   Level of [place]: Eating independent   Instrumental Activities of Daily Living (IADL)   Previous Responsibilities meal  "prep;housekeeping;shopping;laundry;yardwork;medication management;finances;driving;work   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    General Therapy Interventions   Planned Therapy Interventions ADL retraining;transfer training;progressive activity/exercise   Clinical Impression   Criteria for Skilled Therapeutic Interventions Met yes, treatment indicated   OT Diagnosis Decreased independence in ADL's and functional transfers    Influenced by the following impairments pain, post surgical precautions on weightbearing    Assessment of Occupational Performance 3-5 Performance Deficits   Identified Performance Deficits dressing, bathing, functional transfers    Clinical Decision Making (Complexity) Low complexity   Therapy Frequency daily   Predicted Duration of Therapy Intervention (days/wks) 2 days    Anticipated Equipment Needs at Discharge (Defer to TCU )   Anticipated Discharge Disposition Transitional Care Facility   Risks and Benefits of Treatment have been explained. Yes   Patient, Family & other staff in agreement with plan of care Yes   [facility] TM \"6 Clicks\"   2016, Trustees of [employer], under license to [employer].  All rights reserved.   6 Clicks Short Forms Daily Activity Inpatient Short Form   [facility]  \"6 Clicks\" Daily Activity Inpatient Short Form   1. Putting on and taking off regular lower body clothing? 2 - A Lot   2. Bathing (including washing, rinsing, drying)? 1 - Total   3. Toileting, which includes using toilet, bedpan or urinal? 2 - A Lot   4. Putting on and taking off regular upper body clothing? 3 - A Little   5. Taking care of personal grooming such as brushing teeth? 3 - A Little   6. Eating meals? 4 - None   Daily Activity Raw Score (Score out of 24.Lower scores equate to lower levels of function) 15   Total Evaluation Time   Total Evaluation Time (Minutes) 10        Taya FRASER[RW1.1]       Revision History        User Key Date/Time " User Provider Type Action    > RW1.1 3/5/2018  9:44 AM Taya Jeong OT Occupational Therapist Sign            Progress Notes by Leela Ibanez RN at 3/4/2018  5:36 PM     Author:  Leela Ibanez RN Service:  (none) Author Type:  Registered Nurse    Filed:  3/4/2018  5:38 PM Date of Service:  3/4/2018  5:36 PM Creation Time:  3/4/2018  5:36 PM    Status:  Signed :  Leela Ibanez RN (Registered Nurse)         Pt had a 4 beat run of vtach at 1515. Provider notified. BP 80's/40's. Liter bolus given per order. Maintenance fluids increased. Pt K, Mg, Phos checked. Mg and phos protocol added. Pt will have both Mg and Phos replaced per protocol. Family is here and updated.[ES1.1] Blood pressure (!) 84/48, pulse 84, temperature 97  F (36.1  C), temperature source Oral, resp. rate 16, height 1.829 m (6'), weight 88.1 kg (194 lb 3.6 oz), SpO2 100 %.[ES1.2]         Revision History        User Key Date/Time User Provider Type Action    > ES1.2 3/4/2018  5:38 PM Leela Ibanez RN Registered Nurse Sign     ES1.1 3/4/2018  5:36 PM Leela Ibanez RN Registered Nurse             Progress Notes by Sadie Ley MD at 3/4/2018  3:41 PM     Author:  Sadie Ley MD Service:  Family Medicine Author Type:  Physician    Filed:  3/4/2018  4:46 PM Date of Service:  3/4/2018  3:41 PM Creation Time:  3/4/2018  7:11 AM    Status:  Signed :  Sadie Ley MD (Physician)         [facility] Progress Note          Assessment and Plan:   Assessment:   Principal Problem:    Closed displaced supracondylar fracture of distal end of left femur without intracondylar extension;   Periprosthetic fracture around internal prosthetic left knee joint    Assessment:[EP1.1] s/p surgical repair, POD #1 with patient having done well until the last hour when he developed a 4 beat run of V tach and hypotension as below.  Having no pain in his leg.[EP1.2]     Plan:[EP1.1] ongoing post-surgical management per  orthopedic surgery team.  Continue work up of other issues as below.[EP1.2]     Active Problems:[EP1.1]    V tach -[EP1.3] 4[EP1.4] beat run    Assessment:[EP1.3] Patient experienced a 4 beat run of what appears to be ventricular tachycardia, during that time patient was completely asymptomatic and he has not had recurrence since that time.  Blood pressure was checked following and patient is mildly hypertensive as below but asymptomatic from this as well[EP1.4]    Plan:[EP1.3] We will continue with telemetry.  Will check magnesium, phosphorus, and potassium level now and replace if they are low.  We will give a fluid bolus to improve blood pressures and continue to monitor patient closely.[EP1.4]      Hypotension    Assessment:[EP1.3] Blood pressures have been normal following surgical intervention in the 90-120s systolic however have been trending down and patient reports, although he is drinking well, he has not voided more than once today since his Roman was removed early this morning[EP1.4]    Plan:[EP1.3]   We will give 1 L fluid bolus now and closely monitor blood pressure[EP1.4] with consideration of repeat bolusing if needed.  Will place hold parameters on atenolol if needed going forward.[EP1.5]        Long-term (current) use of anticoagulants [Z79.01]    Assessment:[EP1.1] INR is reversed at 1.09 following surgical intervention, first dose of PO coumadin given last night following surgery[EP1.5]    Plan:[EP1.1] ongoing management per pharmacy[EP1.5]      Atrial fibrillation (HCC) [I48.91]    Assessment:[EP1.1]  HR has been in the 80-90s on current atenolol dosing[EP1.5]    Plan:[EP1.1] continue to monitor via telemetry, hold parameters placed on atenolol given hypotension as above[EP1.5]      Pulmonary hypertension    Assessment:[EP1.1] being worked up as an outpatient by cardiology[EP1.5]    Plan:[EP1.1] continue with work up following this hospital completion[EP1.5]      Malignant neoplasm of kidney  excluding renal pelvis (H)    Assessment:[EP1.1] previous history without known recurrence[EP1.5]     Plan:[EP1.1] no intervention needed[EP1.2]      Hyperlipidemia LDL goal <130    Assessment:[EP1.1] on lipitor[EP1.2]    Plan:[EP1.1] restart tomorrow[EP1.2]      Total knee replacement status    Assessment:[EP1.1] now with fracture as above[EP1.2]     Plan:[EP1.1] proceed with plan as above[EP1.2]        # Pain Assessment:   Current Pain Score 3/4/2018 3/4/2018 3/3/2018   Patient currently in pain? denies denies yes   Pain score (0-10) 0 - 1   Pain location - - Leg   Pain descriptors - - Aching[EP1.1]   Tomer taylor pain level was assessed and he currently denies pain.  Ongoing pain management per ortho team.[EP1.3]      VTE:[EP1.1]  SCDs, starting coumadin[EP1.6]  Code Status:[EP1.1]  Full code[EP1.6]        Interval History:[EP1.1]   Patient recovering overall well from surgery but did have a 5 beat run of V tach this afternoon during which he was asymptomatic but blood pressures checked right after are mildly hypotensive in the 80s systolic.  HR has been in the[EP1.3] 80-90[EP1.2] after atenolol given this morning.  Eating and drinking fairly well and has voided only once today but without difficulty.  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 rate 18, height 1.829 m (6'), weight 88.1 kg (194 lb 3.6 oz), SpO2 98 %.[EP1.1]  Constitutional:   awake,  alert, cooperative, no apparent distress, and appears stated age     Lungs:   No increased work of breathing, good air exchange, clear to auscultation bilaterally, no crackles or wheezing     Cardiovascular:   Irregularly irregular rhythm without murmur     Abdomen:   Bowel sounds present, abdomen soft     Musculoskeletal:   Left leg in ACE wrap, no swelling noted on the right lower extremity      Neurologic:   Awake, alert, oriented to name, place and time.       Skin:   normal skin color, texture, turgor[EP1.2]             Data:[EP1.1]   All laboratory data reviewed[EP1.6]    Attestation:  I have reviewed today's vital signs, notes, medications, labs and imaging.     Electronically Signed:  Sadie Ley MD    Note: Chart documentation done in part with Dragon Voice Recognition software. Although reviewed after completion, some word and grammatical errors may remain.[EP1.1]          Revision History        User Key Date/Time User Provider Type Action    > EP1.2 3/4/2018  4:46 PM Sadie Ley MD Physician Sign     EP1.5 3/4/2018  4:37 PM Sadie Ley MD Physician      EP1.4 3/4/2018  4:33 PM Sadie Ley MD Physician      EP1.3 3/4/2018  3:53 PM Sadie Ley MD Physician      EP1.7 3/4/2018  7:18 AM Sadie Ley MD Physician      EP1.6 3/4/2018  7:15 AM Sadie Ley MD Physician      EP1.1 3/4/2018  7:11 AM Sadie Ley MD Physician             Progress Notes by Aliza Strong RN at 3/4/2018  3:34 PM     Author:  Aliza Strong RN Service:  (none) Author Type:  Registered Nurse    Filed:  3/4/2018  3:38 PM Date of Service:  3/4/2018  3:34 PM Creation Time:  3/4/2018  3:34 PM    Status:  Signed :  Aliza Strong RN (Registered Nurse)         Notified MD at 330 PM regarding changes in vital signs.      Spoke with: USMAN Ley MD    Orders were obtained.  Bolus and labs    Comments: Per telemetry 5 beat run of  wide complex tachycardia per telemetry, hypotensive 80s/40s, asymptomatic and denies any chest pain or discomfort.[JG1.1]           Revision History        User Key Date/Time User Provider Type Action    > JG1.1 3/4/2018  3:38 PM Aliza Strong RN Registered Nurse Sign            Progress Notes by Leela Ibanez RN at 3/4/2018 12:30 PM     Author:  Leela Ibanez RN Service:  (none) Author Type:  Registered Nurse    Filed:  3/4/2018  3:19 PM Date of Service:  3/4/2018 12:30 PM Creation Time:  3/4/2018 12:30 PM    Status:  Addendum :  Leela Ibanez RN (Registered Nurse)         Pt admitted with left femur fracture. Open reduction internal fixation 3/3/2018. Pt continues to deny pain. Scheduled Toradol and tylenol given as ordered. Pt educated on importance of being proactive regarding pain management. Working with PT today. Education provided on incentive spirometer. Per pt he remembers how to do this from last time.[ES1.1] Pt did very well- >2500 on first attempt.[ES1.2] Blood pressure 99/47, pulse 84, temperature 97  F (36.1  C), temperature source Oral, resp. rate 16, height 1.829 m (6'), weight 88.1 kg (194 lb 3.6 oz), SpO2 97 %.[ES1.3]    Pt fiance here and updated on plan of care.[ES1.1]       Revision History        User Key Date/Time User Provider Type Action    > ES1.2 3/4/2018  3:19 PM Leela Ibanez RN Registered Nurse Addend     ES1.3 3/4/2018 12:36 PM Leela Ibanez RN Registered Nurse Sign     ES1.1 3/4/2018 12:30 PM Leela Ibanez RN Registered Nurse             Progress Notes by Emilee Ford PT at 3/4/2018  1:51 PM     Author:  Emilee Ford PT Service:  (none) Author Type:  Physical Therapist    Filed:  3/4/2018  1:51 PM Date of Service:  3/4/2018  1:51 PM Creation Time:  3/4/2018  1:51 PM    Status:  Signed :  Logan, Emilee, PT (Physical Therapist)          03/04/18 1145   Quick Adds   Type of Visit Initial PT Evaluation   Living Environment   Lives With child(benedict), adult  (daughter  downstairs apt right now, son available too)   Living Arrangements house   Home Accessibility ramps present at home;grab bars present (toilet)  (shoulder high bar in shower with bench)   Number of Stairs Within Home 0   Stair Railings at Home outside, present at both sides   Transportation Available van, wheelchair accessible;family or friend will provide  (to be determined as he begins rehab, son can help)   Self-Care   Dominant Hand right   Usual Activity Tolerance good   Equipment Currently Used at Home none   Activity/Exercise/Self-Care Comment Has a walker wt 4 wheels and cruthces   Functional Level Prior   Ambulation 0-->independent   Transferring 0-->independent   Toileting 0-->independent   Bathing 0-->independent   Dressing 0-->independent   Eating 0-->independent   Communication 0-->understands/communicates without difficulty   Swallowing 0-->swallows foods/liquids without difficulty   Cognition 0 - no cognition issues reported   Fall history within last six months yes   Number of times patient has fallen within last six months 1   Which of the above functional risks had a recent onset or change? ambulation  (walking and slipped on hill pulling dumpsters)   Prior Functional Level Comment active gentleman   General Information   Onset of Illness/Injury or Date of Surgery - Date 03/01/18   Referring Physician Dr Parra   Patient/Family Goals Statement Whatever is recommended.   Pertinent History of Current Problem (include personal factors and/or comorbidities that impact the POC) Fell--per above notes---fracture in left distal femur with surgery.  NWB Left leg.   Precautions/Limitations fall precautions;other (see comments)  (NWB L LE)   Weight-Bearing Status - LLE nonweight-bearing   General Observations IV fluids in use, CO2 monitor in use--all within normal limits.   Cognitive Status Examination   Orientation orientation to person, place and time   Level of Consciousness alert   Follows Commands and  "Answers Questions able to follow single-step instructions   Personal Safety and Judgment intact   Memory intact   Cognitive Comment needs single step instructions once motor contorl/movement became invovled.   Pain Assessment   Patient Currently in Pain No   Bed Mobility   Bed Mobility Comments moves very well in bed while maintaining NWB left LE--uses rails on bed--can scoot to sides, roll and scoot up and down in bed   Transfer Skills   Transfer Comments sit to and from stand tested today--no SPT due to amount of assist on sit-stand and off midline awareness.  Min A with walker to stand and to sit.   Gait   Gait Comments not tested today due to poor upright standing with walker.   Balance   Balance Comments Seated with CGA half the time due to right lean.  Standing mod assist at walker due to same severe right lean with no awareness to off center posture.   General Therapy Interventions   Planned Therapy Interventions balance training;gait training;transfer training   Clinical Impression   Criteria for Skilled Therapeutic Intervention yes, treatment indicated   PT Diagnosis gait disturbance, balance distrubance, recent surgery   Influenced by the following impairments poor mobility, sense of balance   Functional limitations due to impairments transfers and gait   Clinical Presentation Stable/Uncomplicated   Clinical Decision Making (Complexity) Low complexity   Therapy Frequency` 2 times/day   Predicted Duration of Therapy Intervention (days/wks) 3 days   Anticipated Equipment Needs at Discharge other (see comments)  (defer to TCU)   Anticipated Discharge Disposition Transitional Care Facility   Risk & Benefits of therapy have been explained Yes   Patient, Family & other staff in agreement with plan of care Yes   [facility] AM-PAC TM \"6 Clicks\"   2016, Trustees of [facility], under license to [employer].  All rights reserved.   6 Clicks Short Forms Basic Mobility Inpatient Short Form   [place] [facility] [facility]  \"6 Clicks\" V.2 Basic Mobility Inpatient Short Form   1. Turning from your back to your side while in a flat bed without using bedrails? 4 - None   2. Moving from lying on your back to sitting on the side of a flat bed without using bedrails? 3 - A Little   3. Moving to and from a bed to a chair (including a wheelchair)? 2 - A Lot   4. Standing up from a chair using your arms (e.g., wheelchair, or bedside chair)? 3 - A Little   5. To walk in hospital room? 1 - Total   6. Climbing 3-5 steps with a railing? 1 - Total   Basic Mobility Raw Score (Score out of 24.Lower scores equate to lower levels of function) 14   Total Evaluation Time   Total Evaluation Time (Minutes) 25[KD1.1]        Revision History        User Key Date/Time User Provider Type Action    > KD1.1 3/4/2018  1:51 PM Emilee Ford, PT Physical Therapist Sign            Progress Notes by Mason Parra MD at 3/4/2018 11:28 AM     Author:  Mason Parra MD Service:  Orthopedics Author Type:  Physician    Filed:  3/4/2018 11:30 AM Date of Service:  3/4/2018 11:28 AM Creation Time:  3/4/2018 11:28 AM    Status:  Signed :  Mason Parra MD (Physician)         ORTHO PROGRESS NOTE    POD # 1  Procedure(s):  OPEN REDUCTION INTERNAL FIXATION FEMUR DISTAL - left on 3/3/2018    Pain:  mild    BP 99/47 (BP Location: Right arm)  Pulse 84  Temp 97  F (36.1  C) (Oral)  Resp 16  Ht 1.829 m (6')  Wt 88.1 kg (194 lb 3.6 oz)  SpO2 97%  BMI 26.34 kg/m2    Temp (24hrs), Av.2  F (36.2  C), Min:96.4  F (35.8  C), Max:98.6  F (37  C)      Recent Labs   Lab Test  18   0526  18   1015  18   0535  18   2105   HGB  8.2*  10.1*  9.8*   --    INR  1.09   --   1.11  1.36*               Circulation intact.   Sensation intact.   Calves soft and nontender.   Incision is covered.      PT  Start today. Non weight bearing left leg.  May do gentle range of motion to knee.         ASSESSMENT:  Open-reduction, " internal fixation left supracondylar femur fracture doing well.  Minimal pain.    PLAN:  Physical Therapy/ Occupational Therapy.  Dressing change tomorrow.    May rewrap ace wrap onto foot if swelling is too pronounced.    Mason Parra M.D.  Department of Orthopaedic Surgery  [facility]  Pager: [phone][DL1.1]         Revision History        User Key Date/Time User Provider Type Action    > DL1.1 3/4/2018 11:30 AM Mason Parra MD Physician Sign            Progress Notes by Leela Ibanez RN at 3/3/2018 12:10 PM     Author:  Leela Ibanez RN Service:  (none) Author Type:  Registered Nurse    Filed:  3/3/2018 12:20 PM Date of Service:  3/3/2018 12:10 PM Creation Time:  3/3/2018 12:10 PM    Status:  Addendum :  Leela Ibanez RN (Registered Nurse)         Pt admitted with left femur fracture. Going to OR now. Denies pain for this writer. Pre-op drew wipes completed. Family updated.[ES1.1] Pt Hgb this am 9.8. Pt Hgb on admission 13.2. Recheck 10.1. Per MD decreased value likely due to hemodilution. Will continue to monitor. ABO also completed prior to pt going to OR.[ES1.2] Blood pressure 113/59, pulse 101, temperature 97.9  F (36.6  C), temperature source Oral, resp. rate 16, height 1.829 m (6'), weight 88.1 kg (194 lb 3.6 oz), SpO2 94 %.[ES1.3]         Revision History        User Key Date/Time User Provider Type Action    > ES1.2 3/3/2018 12:20 PM Leela Ibanez RN Registered Nurse Addend     ES1.3 3/3/2018 12:11 PM Leela Ibanez RN Registered Nurse Sign     ES1.1 3/3/2018 12:10 PM Leela Ibanez RN Registered Nurse             Progress Notes by Sadie Ley MD at 3/3/2018  9:50 AM     Author:  Sadie Ley MD Service:  Family Medicine Author Type:  Physician    Filed:  3/3/2018 10:42 AM Date of Service:  3/3/2018  9:50 AM Creation Time:  3/3/2018  9:50 AM    Status:  Signed :  Sadie Ley MD (Physician)         [facility]  Progress Note          Assessment and Plan:   Assessment:   Principal Problem:    Closed displaced supracondylar fracture of distal end of left femur without intracondylar extension (H); Periprosthetic fracture around internal prosthetic left knee joint    Assessment:[EP1.1] Pain well controlled with current regimen.  INR this morning is 1.11[EP1.2]    Plan:[EP1.1] ongoing management per orthopedic surgery, however plan is to proceed with surgical intervention at noon today now that INR is less than 1.5.[EP1.2]      Active Problems:    Anemia     Assessment:[EP1.1]  Patient does have known iron deficiency anemia with hemoglobin 13.1 on initial presentation.  With fluid resuscitation and ongoing IV fluids secondary to NPO status, hemoglobin has decreased down to 9.8, however recheck is 10.1, indicating stability and no active bleeding.[EP1.2]     Plan:[EP1.1]   We will continue to monitor hemoglobin following surgical intervention.  Type and screen has been sent in case transfusion would be indicated if hemoglobin becomes less than 8.0[EP1.2]      Atrial fibrillation (HCC) [I48.91]    Assessment:[EP1.1] Chronic and stable, status post ab performed in 2013lation with current rate control on Lopressor and INR 1.11 following 2 doses of vitamin K for Coumadin reversal[EP1.2]    Plan:[EP1.1] Continue with Lopressor, anticipate we will restart Coumadin this evening following surgical intervention and continue to monitor INR closely[EP1.2]      Long-term (current) use of anticoagulants [Z79.01]    Assessment:[EP1.1] INR this morning is 1.11[EP1.2]    Plan:[EP1.1] Proceed with plan as above[EP1.2]      Pulmonary hypertension    Assessment:[EP1.1]  Currently being worked up by cardiology  as an outpatient[EP1.2]    Plan:[EP1.1] No acute intervention is needed[EP1.2]      Malignant neoplasm of kidney excluding renal pelvis (H)    Assessment:[EP1.1] Previous history without known reoccurrence[EP1.2]    Plan:[EP1.1] No acute  intervention is needed[EP1.2]      Hyperlipidemia LDL goal <130    Assessment:[EP1.1] On Lipitor[EP1.2]    Plan:[EP1.1] Would restart postoperatively[EP1.2]      Total knee replacement status    Assessment: now with acute fracture as above    Plan: continue with plan as above      # Pain Assessment:   Current Pain Score 3/3/2018 3/2/2018 3/2/2018   Patient currently in pain? denies sleeping: patient not able to self report yes   Pain score (0-10) - - 2   Pain location - - Leg   Pain descriptors - - -   - Tomer is experiencing pain due to femur fracture.  Pain management as per orthopedic surgery team.        VTE:  SCDs  Code Status:  Full Code        Interval History:   About the same today clinically and vitals signs stable.  Patient's hemoglobin did decrease from 13 yesterday to 9.8 this morning and recheck[EP1.1] 10.1[EP1.2].  INR is down to 1.1.  Tolerating medications and treatment plan without significant side effects or problems.  Has been NPO since midnight in expectation of upcoming surgery.         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 rate 16, height 1.829 m (6'), weight 88.1 kg (194 lb 3.6 oz), SpO2 94 %.  Constitutional:[EP1.1]   awake, alert, cooperative, no apparent distress, and appears stated age[EP1.2]     Lungs:[EP1.1]   No increased work of breathing, good air exchange, clear to auscultation bilaterally, no crackles or wheezing[EP1.2]     Cardiovascular:[EP1.1]   Irregularly irregular rhythm, no  murmur[EP1.2]     Abdomen:[EP1.1]   Bowel sounds soft, abdomen soft and nontender[EP1.2]     Musculoskeletal:[EP1.1]   Left leg is splinted and wrapped with Ace wrap no edema in the right lower extremity,[EP1.2]      Neurologic:[EP1.1]   Awake, alert, oriented to name, place and time.[EP1.2]      Skin:[EP1.1]   No pallor[EP1.2]             Data:   All laboratory data reviewed    Attestation:  I have reviewed today's vital signs, notes, medications, labs and imaging.     Electronically Signed:  Sadie Ley MD    Note: Chart documentation done in part with Dragon Voice Recognition software. Although reviewed after completion, some word and grammatical errors may remain.[EP1.1]          Revision History        User Key Date/Time User Provider Type Action    > EP1.2 3/3/2018 10:42 AM Sadie Ley MD Physician Sign     EP1.3 3/3/2018  9:51 AM Sadie Ley MD Physician      EP1.1 3/3/2018  9:50 AM Sadie Ley MD Physician                   Procedure Notes     No notes of this type exist for this encounter.         Progress Notes - Therapies (Notes from 03/03/18 through 03/06/18)      Progress Notes by Taya Jeong OT at 3/5/2018  9:44 AM     Author:  Taya Jeong OT Service:  (none) Author Type:  Occupational Therapist    Filed:  3/5/2018  9:44 AM Date of Service:  3/5/2018  9:44 AM Creation Time:  3/5/2018  9:44 AM    Status:  Signed :  Taya Jeong OT (Occupational Therapist)          03/05/18 0900   Quick Adds   Type of Visit Initial Occupational Therapy Evaluation   Living Environment   Lives With child(benedict), adult;grandchild(benedict)   Living Arrangements house   Home Accessibility bed and bath on same level;ramps present at home;grab bars present (toilet);grab bars present (bathtub)   Number of Stairs to Enter Home 0   Number of Stairs Within Home 0   Stair Railings at Home outside, present at both sides   Transportation Available van, wheelchair  accessible   Living Environment Comment Patient lives with his adult daughter and granddaughter, however they are gone during the day, has a son that is available for assistance as needed as well as a fiancee    Self-Care   Dominant Hand right   Usual Activity Tolerance good   Current Activity Tolerance moderate   Regular Exercise no   Equipment Currently Used at Home none  (reports access to walker, crutches, wheelchair as needed )   Activity/Exercise/Self-Care Comment Independent at baseline with ADL's, has access to equipment listed above    Functional Level Prior   Ambulation 0-->independent   Transferring 0-->independent   Toileting 0-->independent   Bathing 0-->independent   Dressing 0-->independent   Eating 0-->independent   Communication 0-->understands/communicates without difficulty   Swallowing 0-->swallows foods/liquids without difficulty   Cognition 0 - no cognition issues reported   Fall history within last six months yes   Number of times patient has fallen within last six months 1   Which of the above functional risks had a recent onset or change? ambulation;transferring   Prior Functional Level Comment Independent and active at baseline with ADL's and functional tranfers    General Information   Onset of Illness/Injury or Date of Surgery - Date 03/03/18   Referring Physician Dr. Parra    Patient/Family Goals Statement Whatever is necessary for recovery    Additional Occupational Profile Info/Pertinent History of Current Problem Open reduction internal fixation of distal R femur    Precautions/Limitations fall precautions   Weight-Bearing Status - LUE full weight-bearing   Weight-Bearing Status - RUE full weight-bearing   Weight-Bearing Status - LLE full weight-bearing   Weight-Bearing Status - RLE nonweight-bearing   Cognitive Status Examination   Orientation orientation to person, place and time   Level of Consciousness alert   Able to Follow Commands success, 1-step commands   Personal Safety  (Cognitive) WNL/WFL   Memory intact   Attention No deficits were identified   Organization/Problem Solving No deficits were identified   Executive Function No deficits were identified   Cognitive Comment With motor planning challenge, needs one step verbal directives    Visual Perception   Visual Perception Wears glasses   Sensory Examination   Sensory Comments Reports baseline numbness through RLE.     Pain Assessment   Patient Currently in Pain Yes, see Vital Sign flowsheet   Range of Motion (ROM)   ROM Comment UE ROM WNL    Strength   Strength Comments generalized weakness consistent with post-op status and surgical intervention    Coordination   Upper Extremity Coordination No deficits were identified   Mobility   Bed Mobility Bed mobility skill: Sit to supine;Bed mobility skill: Supine to sit   Bed Mobility Skill: Sit to Supine   Level of [place]: Sit/Supine minimum assist (75% patients effort)   Physical Assist/Nonphysical Assist: Sit/Supine 1 person assist;verbal cues;supervision   Assistive Device: Sit/Supine bedrail   Bed Mobility Skill: Supine to Sit   Level of [place]: Supine/Sit stand-by assist   Physical Assist/Nonphysical Assist: Supine/Sit supervision   Transfer Skill: Bed to Chair/Chair to Bed   Level of [place]: Bed to Chair maximum assist (25% patients effort)   Physical Assist/Nonphysical Assist: Bed to Chair 1 person + 1 person to manage equipment;verbal cues;supervision   Weight-Bearing Restrictions nonweight-bearing   Assistive Device - Transfer Skill Bed to Chair Chair to Bed Rehab Eval (Tricia elias )   Transfer Skill: Sit to Stand   Level of [place]: Sit/Stand maximum assist (25% patients effort)   Physical Assist/Nonphysical Assist: Sit/Stand 1 person assist;verbal cues;supervision   Transfer Skill: Sit to Stand nonweight-bearing   Assistive Device for Transfer: Sit/Stand rolling walker   Transfer Skill: Toilet Transfer   Toilet Transfer Skill Comments Not  "attempted this date due to level of assist needed in transfer    Upper Body Dressing   Level of [place]: Dress Upper Body stand-by assist   Physical Assist/Nonphysical Assist: Dress Upper Body set-up required   Lower Body Dressing   Level of [place]: Dress Lower Body moderate assist (50% patients effort)   Physical Assist/Nonphysical Assist: Dress Lower Body 1 person assist;verbal cues;supervision   Toileting   Level of [place]: Toilet dependent (less than 25% patients effort)  (bed pan and urinal use )   Grooming   Level of [place]: Grooming stand-by assist   Physical Assist/Nonphysical Assist: Grooming supervision  (in sitting )   Eating/Self Feeding   Level of [place]: Eating independent   Instrumental Activities of Daily Living (IADL)   Previous Responsibilities meal prep;housekeeping;shopping;laundry;yardwork;medication management;finances;driving;work   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 [employer]    General Therapy Interventions   Planned Therapy Interventions ADL retraining;transfer training;progressive activity/exercise   Clinical Impression   Criteria for Skilled Therapeutic Interventions Met yes, treatment indicated   OT Diagnosis Decreased independence in ADL's and functional transfers    Influenced by the following impairments pain, post surgical precautions on weightbearing    Assessment of Occupational Performance 3-5 Performance Deficits   Identified Performance Deficits dressing, bathing, functional transfers    Clinical Decision Making (Complexity) Low complexity   Therapy Frequency daily   Predicted Duration of Therapy Intervention (days/wks) 2 days    Anticipated Equipment Needs at Discharge (Defer to TCU )   Anticipated Discharge Disposition Transitional Care Facility   Risks and Benefits of Treatment have been explained. Yes   Patient, Family & other staff in agreement with plan of care Yes   [facility] AM-PAC TM \"6 Clicks\"   2016, Trustees of [facility], " "under license to [employer].  All rights reserved.   6 Clicks Short Forms Daily Activity Inpatient Short Form   [facility] AM-PAC  \"6 Clicks\" Daily Activity Inpatient Short Form   1. Putting on and taking off regular lower body clothing? 2 - A Lot   2. Bathing (including washing, rinsing, drying)? 1 - Total   3. Toileting, which includes using toilet, bedpan or urinal? 2 - A Lot   4. Putting on and taking off regular upper body clothing? 3 - A Little   5. Taking care of personal grooming such as brushing teeth? 3 - A Little   6. Eating meals? 4 - None   Daily Activity Raw Score (Score out of 24.Lower scores equate to lower levels of function) 15   Total Evaluation Time   Total Evaluation Time (Minutes) 10        Taya TREVIZO/L[RW1.1]       Revision History        User Key Date/Time User Provider Type Action    > RW1.1 3/5/2018  9:44 AM Taya Jeong OT Occupational Therapist Sign            Progress Notes by Emilee Ford PT at 3/4/2018  1:51 PM     Author:  Emilee Ford PT Service:  (none) Author Type:  Physical Therapist    Filed:  3/4/2018  1:51 PM Date of Service:  3/4/2018  1:51 PM Creation Time:  3/4/2018  1:51 PM    Status:  Signed :  Emilee Ford PT (Physical Therapist)          03/04/18 1145   Quick Adds   Type of Visit Initial PT Evaluation   Living Environment   Lives With child(benedict), adult  (daughter downstairs apt right now, son available too)   Living Arrangements house   Home Accessibility ramps present at home;grab bars present (toilet)  (shoulder high bar in shower with bench)   Number of Stairs Within Home 0   Stair Railings at Home outside, present at both sides   Transportation Available van, wheelchair accessible;family or friend will provide  (to be determined as he begins rehab, son can help)   Self-Care   Dominant Hand right   Usual Activity Tolerance good   Equipment Currently Used at Home none   Activity/Exercise/Self-Care Comment Has a walker wtih 4 wheels " and cruthces   Functional Level Prior   Ambulation 0-->independent   Transferring 0-->independent   Toileting 0-->independent   Bathing 0-->independent   Dressing 0-->independent   Eating 0-->independent   Communication 0-->understands/communicates without difficulty   Swallowing 0-->swallows foods/liquids without difficulty   Cognition 0 - no cognition issues reported   Fall history within last six months yes   Number of times patient has fallen within last six months 1   Which of the above functional risks had a recent onset or change? ambulation  (walking and slipped on hill pulling dumpsters)   Prior Functional Level Comment active gentleman   General Information   Onset of Illness/Injury or Date of Surgery - Date 03/01/18   Referring Physician Dr Parra   Patient/Family Goals Statement Whatever is recommended.   Pertinent History of Current Problem (include personal factors and/or comorbidities that impact the POC) Fell--per above notes---fracture in left distal femur with surgery.  NWB Left leg.   Precautions/Limitations fall precautions;other (see comments)  (NWB L LE)   Weight-Bearing Status - LLE nonweight-bearing   General Observations IV fluids in use, CO2 monitor in use--all within normal limits.   Cognitive Status Examination   Orientation orientation to person, place and time   Level of Consciousness alert   Follows Commands and Answers Questions able to follow single-step instructions   Personal Safety and Judgment intact   Memory intact   Cognitive Comment needs single step instructions once motor contorl/movement became invovled.   Pain Assessment   Patient Currently in Pain No   Bed Mobility   Bed Mobility Comments moves very well in bed while maintaining NWB left LE--uses rails on bed--can scoot to sides, roll and scoot up and down in bed   Transfer Skills   Transfer Comments sit to and from stand tested today--no SPT due to amount of assist on sit-stand and off midline awareness.  Min A with  "walker to stand and to sit.   Gait   Gait Comments not tested today due to poor upright standing with walker.   Balance   Balance Comments Seated with CGA half the time due to right lean.  Standing mod assist at walker due to same severe right lean with no awareness to off center posture.   General Therapy Interventions   Planned Therapy Interventions balance training;gait training;transfer training   Clinical Impression   Criteria for Skilled Therapeutic Intervention yes, treatment indicated   PT Diagnosis gait disturbance, balance distrubance, recent surgery   Influenced by the following impairments poor mobility, sense of balance   Functional limitations due to impairments transfers and gait   Clinical Presentation Stable/Uncomplicated   Clinical Decision Making (Complexity) Low complexity   Therapy Frequency` 2 times/day   Predicted Duration of Therapy Intervention (days/wks) 3 days   Anticipated Equipment Needs at Discharge other (see comments)  (defer to TCU)   Anticipated Discharge Disposition Transitional Care Facility   Risk & Benefits of therapy have been explained Yes   Patient, Family & other staff in agreement with plan of care Yes   [facility] TM \"6 Clicks\"   2016, Trustees of [facility], under license to [employer].  All rights reserved.   6 Clicks Short Forms Basic Mobility Inpatient Short Form   [facility]  \"6 Clicks\" V.2 Basic Mobility Inpatient Short Form   1. Turning from your back to your side while in a flat bed without using bedrails? 4 - None   2. Moving from lying on your back to sitting on the side of a flat bed without using bedrails? 3 - A Little   3. Moving to and from a bed to a chair (including a wheelchair)? 2 - A Lot   4. Standing up from a chair using your arms (e.g., wheelchair, or bedside chair)? 3 - A Little   5. To walk in hospital room? 1 - Total   6. Climbing 3-5 steps with a railing? 1 - Total   Basic Mobility Raw Score (Score out of " 24.Lower scores equate to lower levels of function) 14   Total Evaluation Time   Total Evaluation Time (Minutes) 25[KD1.1]        Revision History        User Key Date/Time User Provider Type Action    > KD1.1 3/4/2018  1:51 PM Emilee Ford, PT Physical Therapist Sign

## 2018-03-01 NOTE — LETTER
Transition Communication Hand-off for Care Transitions to Next Level of Care Provider    Name: oTmer Weiss  MRN #: [id number]  Primary Care Provider: Gabriel Tan  Primary Care MD Name: Dr. Gabriel Tan  Primary Clinic: [facility] [address] [place] [address]  Primary Care Clinic Name: [facility]  Reason for Hospitalization:  Closed displaced supracondylar fracture of distal end of left femur without intracondylar extension, initial encounter (H) [S72.456H]  Admit Date/Time: 3/1/2018  7:30 PM  Discharge Date: 3/6/18  Payor Source: Payor: MEDICARE / Plan: MEDICARE / Product Type: Medicare /     Readmission Assessment Measure (ELZA) Risk Score/category: Average    Reason for Communication Hand-off Referral: Other TCU    Discharge Plan: [facility] (Admissions: [phone] Main Phone: [phone] Fax: [phone])    Concern for non-adherence with plan of care:   Y/N : No    Discharge Needs Assessment:  Needs       Most Recent Value    Equipment Currently Used at Home none [reports access to walker, crutches, wheelchair as needed ]    Transportation Available van, wheelchair accessible    # of Referrals Placed by [facility] Care Coordination, Post Acute Facilities    Skilled Nursing Facility [facility] [phone], Fax: [phone]        Follow-up plan:  Future Appointments  Date Time Provider Department Center   3/6/2018 2:30 PM [facility]   3/20/2018 10:00 AM [id number] [facility]EC [facility]   3/20/2018 11:00 AM SCIMR1 SHCVMR [facility]   3/28/2018 3:00 PM ZM ANTI COAG [facility]   4/2/2018 11:00 [facility]   4/16/2018 7:45 AM Russell John MD [facility]       Any outstanding tests or procedures:        Referrals     Future Labs/Procedures    Occupational Therapy Adult Consult     Comments:    Evaluate and treat as clinically indicated.    Reason:  S/p left distal femur fracture     Physical Therapy Adult Consult     Comments:    Evaluate and treat as clinically indicated.    Reason:  Left distal femur fracture  Brace in unlock position most of time.    Please lock brace at 20-30 degrees for transfers or ambulation.        Supplies     Future Labs/Procedures    AntiEmbolism Stockings     Comments:    Bilateral Thigh high length. On in the morning, off at night    Pneumatic Compression Device      Comments:    Bilateral calf. Remove 30 mins BID.          RICHARD Maguire  [facility] [phone]/ [facility] [phone]    AVS/Discharge Summary is the source of truth; this is a helpful guide for improved communication of patient story

## 2018-03-02 ENCOUNTER — ANESTHESIA EVENT (OUTPATIENT)
Dept: SURGERY | Facility: CLINIC | Age: 83
DRG: 481 | End: 2018-03-02
Payer: MEDICARE

## 2018-03-02 LAB
INR PPP: 1.36 (ref 0.86–1.14)
INR PPP: 1.96 (ref 0.86–1.14)
INR PPP: 2.08 (ref 0.86–1.14)

## 2018-03-02 PROCEDURE — 87081 CULTURE SCREEN ONLY: CPT | Performed by: ORTHOPAEDIC SURGERY

## 2018-03-02 PROCEDURE — A9270 NON-COVERED ITEM OR SERVICE: HCPCS | Mod: GY | Performed by: FAMILY MEDICINE

## 2018-03-02 PROCEDURE — 85610 PROTHROMBIN TIME: CPT | Performed by: FAMILY MEDICINE

## 2018-03-02 PROCEDURE — 25000132 ZZH RX MED GY IP 250 OP 250 PS 637: Mod: GY | Performed by: FAMILY MEDICINE

## 2018-03-02 PROCEDURE — 36415 COLL VENOUS BLD VENIPUNCTURE: CPT | Performed by: FAMILY MEDICINE

## 2018-03-02 PROCEDURE — 25000128 H RX IP 250 OP 636: Performed by: FAMILY MEDICINE

## 2018-03-02 PROCEDURE — 99232 SBSQ HOSP IP/OBS MODERATE 35: CPT | Performed by: FAMILY MEDICINE

## 2018-03-02 PROCEDURE — 12000000 ZZH R&B MED SURG/OB

## 2018-03-02 RX ORDER — CEFAZOLIN SODIUM 2 G/100ML
2 INJECTION, SOLUTION INTRAVENOUS
Status: COMPLETED | OUTPATIENT
Start: 2018-03-03 | End: 2018-03-03

## 2018-03-02 RX ADMIN — Medication 1 MG: at 21:58

## 2018-03-02 RX ADMIN — OXYCODONE HYDROCHLORIDE 5 MG: 5 TABLET ORAL at 08:12

## 2018-03-02 RX ADMIN — OXYCODONE HYDROCHLORIDE 5 MG: 5 TABLET ORAL at 19:01

## 2018-03-02 RX ADMIN — PHYTONADIONE 10 MG: 10 INJECTION, EMULSION INTRAMUSCULAR; INTRAVENOUS; SUBCUTANEOUS at 10:08

## 2018-03-02 RX ADMIN — SODIUM CHLORIDE: 9 INJECTION, SOLUTION INTRAVENOUS at 19:54

## 2018-03-02 RX ADMIN — SODIUM CHLORIDE: 9 INJECTION, SOLUTION INTRAVENOUS at 09:49

## 2018-03-02 RX ADMIN — OXYCODONE HYDROCHLORIDE 5 MG: 5 TABLET ORAL at 15:22

## 2018-03-02 RX ADMIN — OXYCODONE HYDROCHLORIDE 5 MG: 5 TABLET ORAL at 21:58

## 2018-03-02 RX ADMIN — Medication 12.5 MG: at 08:12

## 2018-03-02 RX ADMIN — SODIUM CHLORIDE: 9 INJECTION, SOLUTION INTRAVENOUS at 00:32

## 2018-03-02 NOTE — PROGRESS NOTES
Pt admitted after a fall at home. Per notes pt has left distal femur fracture. Plan for surgery 3/3. INR goal 1.5. Vitamin K given today for INR 2.08. Recheck after vitamin K 1.96. Pt diet to be advanced until midnight when pt will again be NPO for surgery. Pt updated on plan of care. PRN oxycodone given per order. Blood pressure 99/60, pulse 97, temperature 97.7  F (36.5  C), temperature source Oral, resp. rate 18, height 1.829 m (6'), weight 88.1 kg (194 lb 3.6 oz), SpO2 97 %.    Pt encouraged to reposition q 2.

## 2018-03-02 NOTE — ED NOTES
ED Nursing criteria listed below was addressed during verbal handoff:     Abnormal vitals: No  Abnormal results: Yes  Med Reconciliation completed: Yes  Meds given in ED: Yes  Any Overdue Meds: No  Core Measures: Yes  Isolation: N/A  Special needs: Yes  Skin assessment: Yes    Observation Patient  Education provided: N/A

## 2018-03-02 NOTE — H&P
[facility]    History and Physical  Hospitalist       Date of Admission:  3/1/2018    Assessment & Plan   Tomer Weiss is a 85 year old male who presents with a fall at home sustaining a left leg injury.  In the emergency room imaging is showing a distal left femur fracture with angulation just above a total knee arthroplasty.  Since history significant for chronic atrial fibrillation on Coumadin therapy.  He is recently been evaluated by cardiology with no history of pulmonary hypertension for unclear causes, currently in the midst of a workup.  Importantly he does not have any signs of congestive heart failure.  His orthopedic case was discussed with the on-call orthopedist, Dr. Parra.  Patient will be admitted to inpatient status.  Orthopedics will see him tomorrow to determine whether he can have surgery tomorrow or the next day depending on availability of certain tools.  Patient's INR is therapeutic at 2.10, and he will be given vitamin K orally in the emergency department with the plan to recheck an INR tomorrow morning.  Patient will be kept n.p.o. after midnight with IV fluids, receiving IV narcotics for pain.  He is currently in a posterior splint.  Assuming we can get the INR in a reasonable range, he is medically cleared for surgery.    Principal Problem:    Closed displaced supracondylar fracture of distal end of left femur without intracondylar extension (H)    Assessment: Result of a fall.    Plan: As above, surgery planned in the next 1-2 days  Active Problems:    Total knee replacement status    Assessment: Performed 2 years ago by Dr. Silver    Plan: As above    Periprosthetic fracture around internal prosthetic left knee joint    Assessment: As above    Plan: Surgery as planned    Long-term (current) use of anticoagulants [Z79.01]    Assessment: Taking Coumadin for history of atrial fibrillation    Plan: Vitamin K has been given, will recheck INR in the  a.m.    Atrial fibrillation (HCC) [I48.91]    Assessment: Chronic.  History of catheter ablation done in 2015.  Currently on rate control with Lopressor and taking daily Coumadin.    Plan: Continue Lopressor.  Coumadin on hold    Pulmonary hypertension    Assessment: Recently noted with an abnormal echocardiogram showing dilated right atrium.  Being seen by pulmonary hypertension clinic with plan angiogram sometime in the next month.  Fortunately is not have any signs of congestive heart failure with no peripheral edema, difficulty breathing.  Recent chest x-ray and nuclear medicine study were normal.    Plan: Continue outpatient workup.  This should not inhibit having surgery here.    Malignant neoplasm of kidney excluding renal pelvis (H)    Assessment: Treated years ago with no recurrence    Plan: Outpatient follow-up    Hyperlipidemia LDL goal <130    Assessment: Taking Lipitor    Plan: Restart postsurgery  # Pain Assessment:   Current Pain Score 3/1/2018 3/1/2018 3/1/2018   Patient currently in pain? - - -   Pain score (0-10) 2 5 4   Pain location - - -   Pain descriptors - - -   - Tomer is experiencing pain due to left femur fracture. Pain management was discussed with Tomer and his daughter and the plan was created in a collaborative fashion.  Tomer's response to the current recommendations: engaged  - Opioid regimen: Has received IV morphine and will continue IV hydromorphone upstairs.  Also have ordered oral oxycodone when able to take oral meds  - Response to opioid medications: Reduction of symptoms   - Bowel regimen: not needed  - Pharmacologic adjuvants: Acetaminophen  - Non-pharmacologic adjuvants: Splinting      DVT Prophylaxis: Warfarin  Code Status: Full Code    Disposition: Expected discharge in 2-3 days once surgery completed, cleared by surgery.    Angel Lucio MD    Primary Care Physician   Gabriel Tan    Chief Complaint   85-year-old male who injured his left leg after a  fall    History is obtained from the patient, electronic health record, emergency department physician and patient's daughter    History of Present Illness   Tomer Weiss is a 85 year old male who presents with an injury to his left leg.  He was at home, bringing in some trash cans down his driveway, when he slipped on the ice and fell onto his left leg.  Had immediate pain just above his left knee, and was unable to walk.  He laid in the driveway for about an hour until his family found him.  He denied other injuries including head injury neck or back injury.  He has a history of a total knee arthroplasty of his knee done 2 years ago.  Has a history of a distal femur fracture 49 years ago with no sequelae.  Prior to his fall, he felt well.  He does have chronic atrial fibrillation taking Lopressor and Coumadin.  He is recently been diagnosed with pulmonary hypertension for unclear causes, being worked up by cardiology.  Echocardiogram and studies have not shown any signs of CHF and he typically walks 1 mile per day without problems.    Past Medical History    I have reviewed this patient's medical history and updated it with pertinent information if needed.   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  Past Surgical History:   Procedure Laterality Date     ARTHROPLASTY KNEE Left 3/14/2016    Procedure: ARTHROPLASTY KNEE;  Surgeon: Deonte Silver MD;  Location: [facility] OR     COLONOSCOPY  7/17/2013    Procedure: COMBINED COLONOSCOPY, SINGLE  BIOPSY/POLYPECTOMY BY BIOPSY;  Colonoscopy, with polypectomy by biopsy;  Surgeon: Fernando Mario MD;  Location: [facility]     CYSTOSCOPY, LITHOLAPAXY, COMBINED N/A 2015    Procedure: COMBINED CYSTOSCOPY, LITHOLAPAXY;  Surgeon: Orlando Marr MD;  Location: [facility]     CYSTOSCOPY, LITHOLAPAXY, COMBINED N/A 2015    Procedure: COMBINED CYSTOSCOPY, LITHOLAPAXY;  Surgeon: Orlando Marr MD;  Location: [facility]     CYSTOSCOPY, RETROGRADES, EXTRACT STONE, COMBINED N/A 2018    Procedure: COMBINED CYSTOSCOPY, RETROGRADES, EXTRACT STONE;  cystoscopy, bladder stone removal;  Surgeon: Orlando Marr MD;  Location: [facility]     H ABLATION ATRIAL FLUTTER  4/18/15    atypical a flutter ablation & Ablation of PACs from the SVC-RA junction     HC ABLATION RENAL TUMOR PERCUT CRYOTHERAPY UNILATERAL  6/17/10    Right renal mass     LASER HOLMIUM LITHOTRIPSY BLADDER N/A 2015    Procedure: LASER HOLMIUM LITHOTRIPSY BLADDER;  Surgeon: Orlando Marr MD;  Location: [facility]     LASER KTP GREEN LIGHT PHOTOSELECTIVE VAPORIZATION PROSTATE N/A 2015    Procedure: LASER KTP GREEN LIGHT PHOTOSELECTIVE VAPORIZATION PROSTATE;  Surgeon: Orlando Marr MD;  Location: [facility]       Prior to Admission Medications   Prior to Admission Medications   Prescriptions Last Dose Informant Patient Reported? Taking?   GLUCOSAMINE 500 MG OR CAPS   Yes No   Si Tablets every morning   atorvastatin (LIPITOR) 20 MG tablet Past Week at Unknown time  No Yes   Sig: Take 1 tablet (20 mg) by mouth daily   furosemide (LASIX) 20 MG tablet 3/1/2018 at Unknown time  No Yes   Sig: TAKE ONE TABLET BY MOUTH ONCE DAILY   iron 66 MG TABS 3/1/2018 at 0800  Yes Yes   Sig: Take 1 tablet (66 mg) by mouth daily   metoprolol tartrate (LOPRESSOR) 25 MG tablet 3/1/2018 at 0800  No Yes   Sig: TAKE ONE-HALF TABLET BY MOUTH ONCE DAILY   order for DME   No No   Sig: Equipment being ordered: Walker ()  Treatment Diagnosis: s/p joint replacement    warfarin (COUMADIN) 5 MG tablet 3/1/2018 at 0800  No Yes   Sig: Take 2.5 mg (1/2 tablet) Tuesday and Saturday and 5 mg other 5 days or as directed by the coumadin clinic      Facility-Administered Medications: None     Allergies   Allergies   Allergen Reactions     No Known Drug Allergies        Social History   I have reviewed this patient's social history and updated it with pertinent information if needed. Tomer Weiss  reports that he has never smoked. He has never used smokeless tobacco. He reports that he does not drink alcohol or use illicit drugs.    Family History   I have reviewed this patient's family history and updated it with pertinent information if needed.   Family History   Problem Relation Age of Onset     CANCER Mother      breast     Hypertension Mother      Alzheimer Disease Mother       at age 96.       Review of Systems   The 10 point Review of Systems is negative other than noted in the HPI or here.     Physical Exam   Temp: 98  F (36.7  C) Temp src: Oral BP: (!) 141/93 Pulse: 95 Heart Rate: 95 Resp: 16 SpO2: 99 % O2 Device: None (Room air)    Vital Signs with Ranges  Temp:  [98  F (36.7  C)] 98  F (36.7  C)  Pulse:  [95] 95  Heart Rate:  [95-97] 95  Resp:  [14-16] 16  BP: (141-167)/() 141/93  SpO2:  [99 %-100 %] 99 %  195 lbs 0 oz    Constitutional: Younger than stated age male lying in bed in no obvious distress.  Eyes: Pupils equal reactive.  HEENT: Mouth and throat normal  Respiratory: Lungs are clear  Cardiovascular: Irregular, irregular rate and rhythm.  No murmur heard.  No peripheral edema  GI: Soft, nontender  Lymph/Hematologic: cervicall nodes negative  Genitourinary: Not examined  Skin: No lesions or rashes  Musculoskeletal: Splint behind the left leg  Neurologic: Cranial nerves normal.  No signs of any focal losses  Psychiatric: Patient is alert and oriented    Data   Data reviewed today:  I personally reviewed the Leg x-ray image(s) showing Angulated  fracture just above the left total knee arthroplasty.    Recent Labs  Lab 03/01/18 2000 02/26/18  1053   WBC 8.7 6.5   HGB 13.2* 14.2   MCV 98 96    272   INR 2.10*  --     139   POTASSIUM 4.4 4.4   CHLORIDE 104 106   CO2 28 28   BUN 26 26   CR 0.88 0.94   ANIONGAP 10 5   KARTHIK 8.3* 8.3*   * 89   ALBUMIN  --  3.8   PROTTOTAL  --  7.6   BILITOTAL  --  1.5*   ALKPHOS  --  128   ALT  --  31   AST  --  27       Recent Results (from the past 24 hour(s))   Knee XR, 3 views, left    Narrative    LEFT KNEE THREE VIEWS   3/1/2018 8:49 PM     HISTORY: Left knee pain, fall, previous knee surgery.    COMPARISON: 4/26/2016.      Impression    IMPRESSION: Acute comminuted and impacted fracture at the distal  femoral metadiaphysis immediately superior to the femoral arthroplasty  component. There is prominent posterior angulation of the distal  fracture fragment. The arthroplasty appears intact.

## 2018-03-02 NOTE — PROGRESS NOTES
SPIRITUAL HEALTH SERVICES  SPIRITUAL ASSESSMENT Progress Note  [facility]      Pt request - (Pt known to  from previous hospitalization.)  Pt voiced disappointment that he would not be unable to make his 26th mission trip to [place] on Tuesday.  His fiancee and son were present.   provided supportive listening, prayer, and a prayer shawl.   is available for pt/family needs.    Emmett Walton M.Div., [facility]  Staff   Office tel: [phone]

## 2018-03-02 NOTE — ED NOTES
Bed: ED07  Expected date: 3/1/18  Expected time: 7:30 PM  Means of arrival: Ambulance  Comments:  EMS

## 2018-03-02 NOTE — PROGRESS NOTES
[facility] Progress Note          Assessment and Plan:   Assessment:   Principal Problem:    Closed displaced supracondylar fracture of distal end of left femur without intracondylar extension (H); Periprosthetic fracture around internal prosthetic left knee joint    Assessment: requiring surgical repair, pain well controlled with current regimen    Plan: ongoing management per orthopedic surgery however given ongoing elevated of INR despite Vitamin K as below, plan is for surgery tomorrow afternoon once goal INR of 1.5 or less is obtained.      Active Problems:    Total knee replacement status    Assessment: previous history with fracture above prosthetic joint as above    Plan: proceed with surgical correction tomorrow as above      Atrial fibrillation (HCC) [I48.91]    Assessment: chronic and stable - s/p catheter ablation performed in 2015, currently rate controlled with lopressor and on Coumadin with INR still 1.96 despite 2 doses of Vitamin K    Plan: Will continue with lopressor, hold Coumadin, recheck INR at 2100 this evening to monitor for effectiveness of second Vitamin K dose and consider repeat Vitamin K vs FFP if still largely above goal of 1.5      Long-term (current) use of anticoagulants [Z79.01]    Assessment: as above with INR still elevated at 1.96 this afternoon    Plan: proceed with plan as above      Malignant neoplasm of kidney excluding renal pelvis (H)    Assessment: previous history without known recurrence    Plan: no acute intervention is needed      Hyperlipidemia LDL goal <130    Assessment: on Lipitor    Plan: resume pos-operatively       Pulmonary hypertension    Assessment: currently being worked up by cardiology as an outpatient    Plan: no acute intervention needed       # Pain Assessment:   Current Pain Score 3/2/2018 3/2/2018 3/2/2018   Patient currently in pain? denies yes sleeping: patient not able to self report   Pain score (0-10) - 3 -   Pain location - Leg -   Pain  descriptors - Aching;Sore -   - Tomer is experiencing pain due to leg fracture.  Pain management being performed by orthopedic surgery team.    VTE:  SCDs (Coumadin as well but being reversed)  Code Status:  Full code        Interval History:   About the same today.  Vitals signs stable.  INR continues to be elevated at 1200 at 1.96.  Tolerating medications and treatment plan without significant side effects or problems.  Eating and voiding well.  No new concerns today.           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 rate 18, height 1.829 m (6'), weight 88.1 kg (194 lb 3.6 oz), SpO2 97 %.  Constitutional:   awake, alert, cooperative, no apparent distress, and appears stated age     Lungs:   No increased work of breathing, good air exchange, clear to auscultation bilaterally, no crackles or wheezing     Cardiovascular:   Normal apical impulse, regular rate and rhythm, normal S1 and S2, no S3 or S4, and no murmur noted     Abdomen:   Bowel sounds present, abdomen soft     Musculoskeletal:   no lower extremity pitting edema present     Neurologic:   Awake, alert, oriented to name, place and time.            Data:   All laboratory data reviewed    Attestation:  I have reviewed today's vital signs, notes, medications, labs and imaging.     Electronically Signed:  Sadie Ley MD    Note: Chart documentation done in part with Dragon Voice Recognition software. Although reviewed after completion, some word and grammatical  errors may remain.

## 2018-03-02 NOTE — ED PROVIDER NOTES
History     Chief Complaint   Patient presents with     Fall     HPI  Tomer Weiss is a 85 year old male who presents via EMS after a fall.  Patient was taken his trash out when he slipped on the ice he felt his knee buckle and then he went down to the ground.  He was unable to get up or bear any weight on his leg.  Took about an hour before he was able to get help and EMS arrived and transported the patient here.  Patient denies any other injuries.  He is adamant that he did not hit his head.  He denies any neck pain.  Patient denies any nausea or vomiting.  Patient denies any chest pain or abdominal pain.  Patient did have a previous knee replacement.    Problem List:    Patient Active Problem List    Diagnosis Date Noted     Closed displaced supracondylar fracture of distal end of left femur without intracondylar extension (H) 03/01/2018     Priority: Medium     Periprosthetic fracture around internal prosthetic left knee joint 03/01/2018     Priority: Medium     Long-term (current) use of anticoagulants [Z79.01] 03/18/2016     Priority: Medium     Atrial fibrillation (HCC) [I48.91] 03/18/2016     Priority: Medium     Anemia 03/17/2016     Priority: Medium     Total knee replacement status 03/14/2016     Priority: Medium     Post-traumatic osteoarthritis of left knee 12/03/2015     Priority: Medium     Atrial flutter 03/16/2015     Priority: Medium     BPH (benign prostatic hyperplasia) 12/20/2011     Priority: Medium     Advanced directives, counseling/discussion 12/20/2011     Priority: Medium     Has a living will and will bring it in  MP/MA       Dupuytren's contracture of hand 03/15/2011     Priority: Medium     DJD (degenerative joint disease) of knee 03/15/2011     Priority: Medium     HYPERLIPIDEMIA LDL GOAL <130 10/31/2010     Priority: Medium     Malignant neoplasm of kidney excluding renal pelvis (H) 01/22/2010     Priority: Medium     SURGERY, PATHOLOGY, AND TREATMENT HISTORY FOR KIDNEY  CANCER  6/17/10 Right Laparoscopic Cryoablation.  Dr Irwin Rey, [facility].  Pathology not done as no biopsy was able to be taken.      Problem list name updated by automated process. Provider to review       Unspecified hyperplasia of prostate with urinary obstruction and other lower urinary tract symptoms (LUTS) 08/10/2007     Priority: Medium     Cervicalgia 09/18/2006     Priority: Medium     Polymyalgia rheumatica (H) 06/28/2005     Priority: Medium     Alopecia 05/07/2002     Priority: Medium     Problem list name updated by automated process. Provider to review       Contracture of palmar fascia      Priority: Medium        Past Medical History:    Past Medical History:   Diagnosis Date     Atrial fibrillation (H)      Atrial flutter (H)      Bladder stone      Cancer (H)      Contracture of palmar fascia      Hematuria      Hypertrophy (benign) of prostate      Neoplasm of uncertain behavior 2010       Past Surgical History:    Past Surgical History:   Procedure Laterality Date     ARTHROPLASTY KNEE Left 3/14/2016    Procedure: ARTHROPLASTY KNEE;  Surgeon: Deonte Silver MD;  [facility]     COLONOSCOPY  7/17/2013    Procedure: COMBINED COLONOSCOPY, SINGLE BIOPSY/POLYPECTOMY BY BIOPSY;  Colonoscopy, with polypectomy by biopsy;  Surgeon: Fernando Mario MD;  [facility]     CYSTOSCOPY, LITHOLAPAXY, COMBINED N/A 2/26/2015    Procedure: COMBINED CYSTOSCOPY, LITHOLAPAXY;  Surgeon: Orlando Marr MD;  [facility]     CYSTOSCOPY, LITHOLAPAXY, COMBINED N/A 2/11/2015    Procedure: COMBINED CYSTOSCOPY, LITHOLAPAXY;  Surgeon: Orlando Marr MD;  [facility]     CYSTOSCOPY, RETROGRADES, EXTRACT STONE, COMBINED N/A 2/8/2018    Procedure: COMBINED CYSTOSCOPY, RETROGRADES, EXTRACT STONE;  cystoscopy, bladder stone removal;  Surgeon: Orlando Marr MD;  [facility]     H ABLATION ATRIAL FLUTTER  4/18/15    atypical a flutter ablation & Ablation  of PACs from the SVC-RA junction     HC ABLATION RENAL TUMOR PERCUT CRYOTHERAPY UNILATERAL  6/17/10    Right renal mass     LASER HOLMIUM LITHOTRIPSY BLADDER N/A 2015    Procedure: LASER HOLMIUM LITHOTRIPSY BLADDER;  Surgeon: Orlando Marr MD;  [facility]     LASER KTP GREEN LIGHT PHOTOSELECTIVE VAPORIZATION PROSTATE N/A 2015    Procedure: LASER KTP GREEN LIGHT PHOTOSELECTIVE VAPORIZATION PROSTATE;  Surgeon: Orlando Marr MD;  [facility]       Family History:    Family History   Problem Relation Age of Onset     CANCER Mother      breast     Hypertension Mother      Alzheimer Disease Mother       at age 96.       Social History:  Marital Status:   [2]  Social History   Substance Use Topics     Smoking status: Never Smoker     Smokeless tobacco: Never Used     Alcohol use No        Medications:      atorvastatin (LIPITOR) 20 MG tablet   metoprolol tartrate (LOPRESSOR) 25 MG tablet   warfarin (COUMADIN) 5 MG tablet   furosemide (LASIX) 20 MG tablet   iron 66 MG TABS   order for DME   GLUCOSAMINE 500 MG OR CAPS         Review of Systems   All other systems reviewed and are negative.      Physical Exam   BP: (!) 167/102  Pulse: 95  Heart Rate: 97  Temp: 98  F (36.7  C)  Resp: 14  Weight: 88.5 kg (195 lb)  SpO2: 100 %      Physical Exam   Constitutional: He is oriented to person, place, and time. He appears well-developed and well-nourished. No distress.   HENT:   Head: Normocephalic and atraumatic.   Mouth/Throat: Oropharynx is clear and moist. No oropharyngeal exudate.   Neck: Normal range of motion.   Cardiovascular: Normal rate, regular rhythm and normal heart sounds.  Exam reveals no friction rub.    No murmur heard.  Pulmonary/Chest: Effort normal and breath sounds normal. No respiratory distress. He has no wheezes. He has no rales.   Abdominal: Soft. Bowel sounds are normal. He exhibits no distension. There is no tenderness. There is no rebound.   Musculoskeletal:         Left knee: He exhibits decreased range of motion, swelling, effusion and deformity. He exhibits no ecchymosis. Tenderness found.        Legs:  Neurological: He is alert and oriented to person, place, and time.   Skin: He is not diaphoretic.   Nursing note and vitals reviewed.      ED Course            Trauma Summary Disposition     Patient is trauma admission:  Standard Emergency Evaluation    Spine  Backboard removal time: Backboard not applied   C-collar and immobilization: not indicated, cleared.  CSpine Clearance: by Nexus Criteria  Full Primary and Secondary survey with appropriate immobilization of spine completed in exam section.     Neuro  GCS at arrival:  Motor 6=Obeys commands   Verbal 5=Oriented   Eye Opening 4=Spontaneous   Total: 15     GCS at disposition: unchanged    ED Procedures completed  Splinting of post leg, CMS intact post procedure    Admitting Consultants:  Ortho consult with Aida KEARNEY at 915. Plan: admit, plan on surgery tomorrow  Consult order placed into Epic:   Yes  Imaging reviewed by consultant:  Yes       ED Course     Orthopedic injury tx  Date/Time: 3/1/2018 9:39 PM  Performed by: GAGANDEEP PRECIADO  Authorized by: GAGANDEEP PRECIADO   Consent: Verbal consent obtained.  Patient identity confirmed: verbally with patient  Injury location: upper leg  Location details: left upper leg  Injury type: fracture  Fracture type: femoral shaft  Pre-procedure neurovascular assessment: neurovascularly intact  Pre-procedure distal perfusion: normal  Pre-procedure neurological function: normal  Immobilization: splint  Splint type: long leg  Supplies used: Ortho-Glass  Post-procedure neurovascular assessment: post-procedure neurovascularly intact  Post-procedure distal perfusion: normal  Post-procedure neurological function: normal  Post-procedure range of motion: normal  Patient tolerance: Patient tolerated the procedure well with no immediate complications                 Results for  orders placed or performed during the hospital encounter of 03/01/18   Knee XR, 3 views, left    Narrative    LEFT KNEE THREE VIEWS   3/1/2018 8:49 PM     HISTORY: Left knee pain, fall, previous knee surgery.    COMPARISON: 4/26/2016.      Impression    IMPRESSION: Acute comminuted and impacted fracture at the distal  femoral metadiaphysis immediately superior to the femoral arthroplasty  component. There is prominent posterior angulation of the distal  fracture fragment. The arthroplasty appears intact.   CBC with platelets differential   Result Value Ref Range    WBC 8.7 4.0 - 11.0 10e9/L    RBC Count 4.32 (L) 4.4 - 5.9 10e12/L    Hemoglobin 13.2 (L) 13.3 - 17.7 g/dL    Hematocrit 42.1 40.0 - 53.0 %    MCV 98 78 - 100 fl    MCH 30.6 26.5 - 33.0 pg    MCHC 31.4 (L) 31.5 - 36.5 g/dL    RDW 14.6 10.0 - 15.0 %    Platelet Count 281 150 - 450 10e9/L    Diff Method Automated Method     % Neutrophils 77.4 %    % Lymphocytes 14.9 %    % Monocytes 5.3 %    % Eosinophils 1.7 %    % Basophils 0.5 %    % Immature Granulocytes 0.2 %    Absolute Neutrophil 6.8 1.6 - 8.3 10e9/L    Absolute Lymphocytes 1.3 0.8 - 5.3 10e9/L    Absolute Monocytes 0.5 0.0 - 1.3 10e9/L    Absolute Eosinophils 0.2 0.0 - 0.7 10e9/L    Absolute Basophils 0.0 0.0 - 0.2 10e9/L    Abs Immature Granulocytes 0.0 0 - 0.4 10e9/L   INR   Result Value Ref Range    INR 2.10 (H) 0.86 - 1.14   Partial thromboplastin time   Result Value Ref Range    PTT 37 22 - 37 sec   Basic metabolic panel   Result Value Ref Range    Sodium 142 133 - 144 mmol/L    Potassium 4.4 3.4 - 5.3 mmol/L    Chloride 104 94 - 109 mmol/L    Carbon Dioxide 28 20 - 32 mmol/L    Anion Gap 10 3 - 14 mmol/L    Glucose 106 (H) 70 - 99 mg/dL    Urea Nitrogen 26 7 - 30 mg/dL    Creatinine 0.88 0.66 - 1.25 mg/dL    GFR Estimate 82 >60 mL/min/1.7m2    GFR Estimate If Black >90 >60 mL/min/1.7m2    Calcium 8.3 (L) 8.5 - 10.1 mg/dL     Medications   morphine (PF) injection 4 mg (4 mg Intravenous Given  3/1/18 2122)     Labs are reviewed and were unremarkable, patient's INR was 2.10, therapeutic.  X-ray did show a distal femur fracture, just above the prosthetic.  I discussed the case with orthopedics, Dr. Parra, he recommended placing the patient in a posterior leg splint, start patient on vitamin K to reverse the Coumadin and will plan on surgery either tomorrow or Saturday depending on if they have the special equipment here.  I did discuss the case with our hospitalist, Dr. Lucio, and they will accept the patient.    Assessments & Plan (with Medical Decision Making)  Left femur fracture     I have reviewed the nursing notes.    I have reviewed the findings, diagnosis, plan and need for follow up with the patient.        3/1/2018   [facility] EMERGENCY DEPARTMENT     Miguel Cifuentes MD  03/01/18 1373

## 2018-03-02 NOTE — PROGRESS NOTES
S-(situation): symptomatic hypotention    B-(background): left femur fracture    A-(assessment): patient arrived to the floor initial vital signs stable with systolic blood pressure 135/63. After sitting patient up right in bed to eat some food prior to being placed NPO for potential repair to his fracture on 3/2, he became hypotensive and complained of feeling dizzy and very light headed. He was diaphoretic and after lying him flat in the bed his blood pressure had dropped to 84/43 with maps < 60. His heart rate was 80-90 at the time and it was irregular as the patient has a base rhythm of atrial fibrillation this was expected. Dr Lucio called and notified of the event. He came to the bedside and evaluated the patient. The patient remained alert but slow to respond to questions and oriented x 4. His oxygenation had dropped to 89-91% on room air so oxygen was applied.  An IV fluid bolus was ordered and started. After a few minutes he became less dizzy and no longer diaphoretic. Blood pressure started to come back up so I let the patient eat and continued with the admitting questions.     R-(recommendations): continue to monitor blood pressures after fluid bolus start ordered IV fluids. Notify MD of any other problems.
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
## 2018-03-02 NOTE — PROGRESS NOTES
S-(situation): Patient arrives to room 250 via cart from ED    B-(background): fall with fracture to the left femur    A-(assessment): Patient arrived to the floor. Vitals stable after fluid bolus. Lungs clear on 2 liters nasal cannula, abdomen soft non tender. Left leg in a splint toes cool but pink with good CMS with quick capillary return. Patent NPO after having some food just prior to midnight for potential repair of his leg.     R-(recommendations): Orders reviewed with patient. Will monitor patient per MD orders. yes    Inpatient nursing criteria listed below were met:    Health care directives status obtained and documented: Yes, patient stated that he has one but no form on file. Asked patient to ask family to provide staff with a copy.   Core Measures assessed (SSI): Yes  SCD's Documented: Yes  Influenza Vaccine assessment done and vaccine ordered if needed: Yes, patient refused influenza vaccination.   Skin issues/needs documented:Yes  Isolation needs addressed, if appropriate: Yes  Fall Prevention: Care plan updated, Education given and documented Yes  MRSA swab completed for patient 55 years and older (exclude CAN and TKA): Yes  My Chart patient sign up addressed and documented: Patient refused to sign up for My Chart  Care Plan initiated: Yes  Education Assessment documented:Yes  Education Documented (Pre-existing chronic infection such as, MRSA/VRE need education on admission): Yes  New medication patient education completed and documented (Possible Side Effects of Common Medications handout): Yes  Home medications if not able to send immediately home with family stored here: NA   Reminder note placed in discharge instructions: NA  Discharge planning review completed (admission navigator) Yes, potential need for TCU, consult placed for care transitions.

## 2018-03-03 ENCOUNTER — ANESTHESIA (OUTPATIENT)
Dept: SURGERY | Facility: CLINIC | Age: 83
DRG: 481 | End: 2018-03-03
Payer: MEDICARE

## 2018-03-03 ENCOUNTER — APPOINTMENT (OUTPATIENT)
Dept: GENERAL RADIOLOGY | Facility: CLINIC | Age: 83
DRG: 481 | End: 2018-03-03
Attending: ORTHOPAEDIC SURGERY
Payer: MEDICARE

## 2018-03-03 PROBLEM — S72.453A: Status: ACTIVE | Noted: 2018-03-03

## 2018-03-03 LAB
ABO + RH BLD: NORMAL
ABO + RH BLD: NORMAL
BACTERIA SPEC CULT: NORMAL
BLD GP AB SCN SERPL QL: NORMAL
BLOOD BANK CMNT PATIENT-IMP: NORMAL
HGB BLD-MCNC: 10.1 G/DL (ref 13.3–17.7)
HGB BLD-MCNC: 9.8 G/DL (ref 13.3–17.7)
INR PPP: 1.11 (ref 0.86–1.14)
SPECIMEN EXP DATE BLD: NORMAL
SPECIMEN SOURCE: NORMAL

## 2018-03-03 PROCEDURE — 71000015 ZZH RECOVERY PHASE 1 LEVEL 2 EA ADDTL HR: Performed by: ORTHOPAEDIC SURGERY

## 2018-03-03 PROCEDURE — 27513 TREATMENT OF THIGH FRACTURE: CPT | Mod: AS | Performed by: PHYSICIAN ASSISTANT

## 2018-03-03 PROCEDURE — 25000128 H RX IP 250 OP 636: Performed by: FAMILY MEDICINE

## 2018-03-03 PROCEDURE — 0QSC04Z REPOSITION LEFT LOWER FEMUR WITH INTERNAL FIXATION DEVICE, OPEN APPROACH: ICD-10-PCS | Performed by: ORTHOPAEDIC SURGERY

## 2018-03-03 PROCEDURE — C1769 GUIDE WIRE: HCPCS | Performed by: ORTHOPAEDIC SURGERY

## 2018-03-03 PROCEDURE — 71000014 ZZH RECOVERY PHASE 1 LEVEL 2 FIRST HR: Performed by: ORTHOPAEDIC SURGERY

## 2018-03-03 PROCEDURE — 86900 BLOOD TYPING SEROLOGIC ABO: CPT | Performed by: FAMILY MEDICINE

## 2018-03-03 PROCEDURE — 99232 SBSQ HOSP IP/OBS MODERATE 35: CPT | Performed by: FAMILY MEDICINE

## 2018-03-03 PROCEDURE — 25000132 ZZH RX MED GY IP 250 OP 250 PS 637: Mod: GY | Performed by: ORTHOPAEDIC SURGERY

## 2018-03-03 PROCEDURE — 27210794 ZZH OR GENERAL SUPPLY STERILE: Performed by: ORTHOPAEDIC SURGERY

## 2018-03-03 PROCEDURE — 85018 HEMOGLOBIN: CPT | Performed by: FAMILY MEDICINE

## 2018-03-03 PROCEDURE — 86850 RBC ANTIBODY SCREEN: CPT | Performed by: FAMILY MEDICINE

## 2018-03-03 PROCEDURE — 36415 COLL VENOUS BLD VENIPUNCTURE: CPT | Performed by: FAMILY MEDICINE

## 2018-03-03 PROCEDURE — 12000007 ZZH R&B INTERMEDIATE

## 2018-03-03 PROCEDURE — C1762 CONN TISS, HUMAN(INC FASCIA): HCPCS | Performed by: ORTHOPAEDIC SURGERY

## 2018-03-03 PROCEDURE — 25000128 H RX IP 250 OP 636: Performed by: NURSE ANESTHETIST, CERTIFIED REGISTERED

## 2018-03-03 PROCEDURE — 40000277 XR SURGERY CARM FLUORO LESS THAN 5 MIN W STILLS

## 2018-03-03 PROCEDURE — 37000009 ZZH ANESTHESIA TECHNICAL FEE, EACH ADDTL 15 MIN: Performed by: ORTHOPAEDIC SURGERY

## 2018-03-03 PROCEDURE — 25000128 H RX IP 250 OP 636: Performed by: ORTHOPAEDIC SURGERY

## 2018-03-03 PROCEDURE — 99232 SBSQ HOSP IP/OBS MODERATE 35: CPT | Mod: 57 | Performed by: ORTHOPAEDIC SURGERY

## 2018-03-03 PROCEDURE — 36000093 ZZH SURGERY LEVEL 4 1ST 30 MIN: Performed by: ORTHOPAEDIC SURGERY

## 2018-03-03 PROCEDURE — 86901 BLOOD TYPING SEROLOGIC RH(D): CPT | Performed by: FAMILY MEDICINE

## 2018-03-03 PROCEDURE — 25000125 ZZHC RX 250: Performed by: NURSE ANESTHETIST, CERTIFIED REGISTERED

## 2018-03-03 PROCEDURE — A9270 NON-COVERED ITEM OR SERVICE: HCPCS | Mod: GY | Performed by: ORTHOPAEDIC SURGERY

## 2018-03-03 PROCEDURE — 85610 PROTHROMBIN TIME: CPT | Performed by: FAMILY MEDICINE

## 2018-03-03 PROCEDURE — 27513 TREATMENT OF THIGH FRACTURE: CPT | Mod: LT | Performed by: ORTHOPAEDIC SURGERY

## 2018-03-03 PROCEDURE — C1713 ANCHOR/SCREW BN/BN,TIS/BN: HCPCS | Performed by: ORTHOPAEDIC SURGERY

## 2018-03-03 PROCEDURE — A9270 NON-COVERED ITEM OR SERVICE: HCPCS | Mod: GY | Performed by: FAMILY MEDICINE

## 2018-03-03 PROCEDURE — 37000008 ZZH ANESTHESIA TECHNICAL FEE, 1ST 30 MIN: Performed by: ORTHOPAEDIC SURGERY

## 2018-03-03 PROCEDURE — 25000566 ZZH SEVOFLURANE, EA 15 MIN: Performed by: ORTHOPAEDIC SURGERY

## 2018-03-03 PROCEDURE — 25000132 ZZH RX MED GY IP 250 OP 250 PS 637: Mod: GY | Performed by: FAMILY MEDICINE

## 2018-03-03 PROCEDURE — 36000063 ZZH SURGERY LEVEL 4 EA 15 ADDTL MIN: Performed by: ORTHOPAEDIC SURGERY

## 2018-03-03 DEVICE — IMP SCR SYN CORTEX 4.5X44MM SELF TAP SS 214.844: Type: IMPLANTABLE DEVICE | Site: FEMUR | Status: FUNCTIONAL

## 2018-03-03 DEVICE — IMP SCR SYN CORTEX 4.5X46MM SELF TAP SS 214.846: Type: IMPLANTABLE DEVICE | Site: FEMUR | Status: FUNCTIONAL

## 2018-03-03 DEVICE — IMPLANTABLE DEVICE: Type: IMPLANTABLE DEVICE | Site: FEMUR | Status: FUNCTIONAL

## 2018-03-03 DEVICE — IMP SCR SYN CORTEX 4.5X42MM SELF TAP SS 214.842: Type: IMPLANTABLE DEVICE | Site: FEMUR | Status: FUNCTIONAL

## 2018-03-03 DEVICE — IMP SCR SYN CORTEX 4.5X52MM SELF TAP SS 214.852: Type: IMPLANTABLE DEVICE | Site: FEMUR | Status: FUNCTIONAL

## 2018-03-03 RX ORDER — ONDANSETRON 4 MG/1
4 TABLET, ORALLY DISINTEGRATING ORAL EVERY 6 HOURS PRN
Status: DISCONTINUED | OUTPATIENT
Start: 2018-03-03 | End: 2018-03-03

## 2018-03-03 RX ORDER — ONDANSETRON 2 MG/ML
4 INJECTION INTRAMUSCULAR; INTRAVENOUS EVERY 30 MIN PRN
Status: DISCONTINUED | OUTPATIENT
Start: 2018-03-03 | End: 2018-03-03 | Stop reason: HOSPADM

## 2018-03-03 RX ORDER — KETOROLAC TROMETHAMINE 15 MG/ML
15 INJECTION, SOLUTION INTRAMUSCULAR; INTRAVENOUS EVERY 6 HOURS
Status: DISPENSED | OUTPATIENT
Start: 2018-03-03 | End: 2018-03-04

## 2018-03-03 RX ORDER — DEXAMETHASONE SODIUM PHOSPHATE 10 MG/ML
INJECTION, SOLUTION INTRAMUSCULAR; INTRAVENOUS PRN
Status: DISCONTINUED | OUTPATIENT
Start: 2018-03-03 | End: 2018-03-03

## 2018-03-03 RX ORDER — FENTANYL CITRATE 50 UG/ML
INJECTION, SOLUTION INTRAMUSCULAR; INTRAVENOUS PRN
Status: DISCONTINUED | OUTPATIENT
Start: 2018-03-03 | End: 2018-03-03

## 2018-03-03 RX ORDER — NALOXONE HYDROCHLORIDE 0.4 MG/ML
.1-.4 INJECTION, SOLUTION INTRAMUSCULAR; INTRAVENOUS; SUBCUTANEOUS
Status: DISCONTINUED | OUTPATIENT
Start: 2018-03-03 | End: 2018-03-03

## 2018-03-03 RX ORDER — DIMENHYDRINATE 50 MG/ML
12.5 INJECTION, SOLUTION INTRAMUSCULAR; INTRAVENOUS
Status: DISCONTINUED | OUTPATIENT
Start: 2018-03-03 | End: 2018-03-03 | Stop reason: HOSPADM

## 2018-03-03 RX ORDER — ACETAMINOPHEN 325 MG/1
975 TABLET ORAL EVERY 8 HOURS
Status: COMPLETED | OUTPATIENT
Start: 2018-03-03 | End: 2018-03-06

## 2018-03-03 RX ORDER — OXYCODONE HYDROCHLORIDE 5 MG/1
5-10 TABLET ORAL EVERY 4 HOURS PRN
Status: DISCONTINUED | OUTPATIENT
Start: 2018-03-03 | End: 2018-03-06 | Stop reason: HOSPADM

## 2018-03-03 RX ORDER — PROPOFOL 10 MG/ML
INJECTION, EMULSION INTRAVENOUS PRN
Status: DISCONTINUED | OUTPATIENT
Start: 2018-03-03 | End: 2018-03-03

## 2018-03-03 RX ORDER — FENTANYL CITRATE 50 UG/ML
25-50 INJECTION, SOLUTION INTRAMUSCULAR; INTRAVENOUS
Status: DISCONTINUED | OUTPATIENT
Start: 2018-03-03 | End: 2018-03-03 | Stop reason: HOSPADM

## 2018-03-03 RX ORDER — CEFAZOLIN SODIUM 2 G/100ML
2 INJECTION, SOLUTION INTRAVENOUS EVERY 8 HOURS
Status: COMPLETED | OUTPATIENT
Start: 2018-03-03 | End: 2018-03-04

## 2018-03-03 RX ORDER — SODIUM CHLORIDE, SODIUM LACTATE, POTASSIUM CHLORIDE, CALCIUM CHLORIDE 600; 310; 30; 20 MG/100ML; MG/100ML; MG/100ML; MG/100ML
INJECTION, SOLUTION INTRAVENOUS CONTINUOUS PRN
Status: DISCONTINUED | OUTPATIENT
Start: 2018-03-03 | End: 2018-03-03

## 2018-03-03 RX ORDER — TEMAZEPAM 7.5 MG/1
7.5 CAPSULE ORAL
Status: DISCONTINUED | OUTPATIENT
Start: 2018-03-04 | End: 2018-03-06 | Stop reason: HOSPADM

## 2018-03-03 RX ORDER — ONDANSETRON 2 MG/ML
4 INJECTION INTRAMUSCULAR; INTRAVENOUS EVERY 6 HOURS PRN
Status: DISCONTINUED | OUTPATIENT
Start: 2018-03-03 | End: 2018-03-03

## 2018-03-03 RX ORDER — ONDANSETRON 4 MG/1
4 TABLET, ORALLY DISINTEGRATING ORAL EVERY 30 MIN PRN
Status: DISCONTINUED | OUTPATIENT
Start: 2018-03-03 | End: 2018-03-03 | Stop reason: HOSPADM

## 2018-03-03 RX ORDER — LIDOCAINE 40 MG/G
CREAM TOPICAL
Status: DISCONTINUED | OUTPATIENT
Start: 2018-03-03 | End: 2018-03-03

## 2018-03-03 RX ORDER — WARFARIN SODIUM 5 MG/1
5 TABLET ORAL
Status: COMPLETED | OUTPATIENT
Start: 2018-03-03 | End: 2018-03-03

## 2018-03-03 RX ORDER — ESMOLOL HYDROCHLORIDE 10 MG/ML
INJECTION INTRAVENOUS PRN
Status: DISCONTINUED | OUTPATIENT
Start: 2018-03-03 | End: 2018-03-03

## 2018-03-03 RX ORDER — HYDROMORPHONE HYDROCHLORIDE 1 MG/ML
.3-.5 INJECTION, SOLUTION INTRAMUSCULAR; INTRAVENOUS; SUBCUTANEOUS EVERY 5 MIN PRN
Status: DISCONTINUED | OUTPATIENT
Start: 2018-03-03 | End: 2018-03-03 | Stop reason: HOSPADM

## 2018-03-03 RX ORDER — AMOXICILLIN 250 MG
1 CAPSULE ORAL 2 TIMES DAILY PRN
Status: DISCONTINUED | OUTPATIENT
Start: 2018-03-03 | End: 2018-03-06 | Stop reason: HOSPADM

## 2018-03-03 RX ORDER — LIDOCAINE HYDROCHLORIDE 20 MG/ML
INJECTION, SOLUTION INFILTRATION; PERINEURAL PRN
Status: DISCONTINUED | OUTPATIENT
Start: 2018-03-03 | End: 2018-03-03

## 2018-03-03 RX ORDER — SODIUM CHLORIDE, SODIUM LACTATE, POTASSIUM CHLORIDE, CALCIUM CHLORIDE 600; 310; 30; 20 MG/100ML; MG/100ML; MG/100ML; MG/100ML
INJECTION, SOLUTION INTRAVENOUS CONTINUOUS
Status: DISCONTINUED | OUTPATIENT
Start: 2018-03-03 | End: 2018-03-03 | Stop reason: HOSPADM

## 2018-03-03 RX ORDER — AMOXICILLIN 250 MG
2 CAPSULE ORAL 2 TIMES DAILY PRN
Status: DISCONTINUED | OUTPATIENT
Start: 2018-03-03 | End: 2018-03-06 | Stop reason: HOSPADM

## 2018-03-03 RX ORDER — ACETAMINOPHEN 325 MG/1
650 TABLET ORAL EVERY 4 HOURS PRN
Status: DISCONTINUED | OUTPATIENT
Start: 2018-03-06 | End: 2018-03-06 | Stop reason: HOSPADM

## 2018-03-03 RX ADMIN — PHENYLEPHRINE HYDROCHLORIDE 200 MCG: 10 INJECTION, SOLUTION INTRAMUSCULAR; INTRAVENOUS; SUBCUTANEOUS at 12:24

## 2018-03-03 RX ADMIN — PHENYLEPHRINE HYDROCHLORIDE 200 MCG: 10 INJECTION, SOLUTION INTRAMUSCULAR; INTRAVENOUS; SUBCUTANEOUS at 12:48

## 2018-03-03 RX ADMIN — ONDANSETRON 4 MG: 2 INJECTION INTRAMUSCULAR; INTRAVENOUS at 13:15

## 2018-03-03 RX ADMIN — DEXAMETHASONE SODIUM PHOSPHATE 4 MG: 10 INJECTION, SOLUTION INTRAMUSCULAR; INTRAVENOUS at 13:25

## 2018-03-03 RX ADMIN — WARFARIN SODIUM 5 MG: 5 TABLET ORAL at 21:05

## 2018-03-03 RX ADMIN — OXYCODONE HYDROCHLORIDE 5 MG: 5 TABLET ORAL at 21:05

## 2018-03-03 RX ADMIN — PHENYLEPHRINE HYDROCHLORIDE 100 MCG: 10 INJECTION, SOLUTION INTRAMUSCULAR; INTRAVENOUS; SUBCUTANEOUS at 14:45

## 2018-03-03 RX ADMIN — SODIUM CHLORIDE: 9 INJECTION, SOLUTION INTRAVENOUS at 12:10

## 2018-03-03 RX ADMIN — SODIUM CHLORIDE: 9 INJECTION, SOLUTION INTRAVENOUS at 21:04

## 2018-03-03 RX ADMIN — FENTANYL CITRATE 50 MCG: 50 INJECTION, SOLUTION INTRAMUSCULAR; INTRAVENOUS at 12:10

## 2018-03-03 RX ADMIN — ACETAMINOPHEN 975 MG: 325 TABLET ORAL at 21:05

## 2018-03-03 RX ADMIN — FENTANYL CITRATE 25 MCG: 50 INJECTION, SOLUTION INTRAMUSCULAR; INTRAVENOUS at 13:01

## 2018-03-03 RX ADMIN — SODIUM CHLORIDE, POTASSIUM CHLORIDE, SODIUM LACTATE AND CALCIUM CHLORIDE: 600; 310; 30; 20 INJECTION, SOLUTION INTRAVENOUS at 13:10

## 2018-03-03 RX ADMIN — SODIUM CHLORIDE: 9 INJECTION, SOLUTION INTRAVENOUS at 06:11

## 2018-03-03 RX ADMIN — PROPOFOL 50 MG: 10 INJECTION, EMULSION INTRAVENOUS at 15:58

## 2018-03-03 RX ADMIN — PHENYLEPHRINE HYDROCHLORIDE 100 MCG: 10 INJECTION, SOLUTION INTRAMUSCULAR; INTRAVENOUS; SUBCUTANEOUS at 15:14

## 2018-03-03 RX ADMIN — HYDROMORPHONE HYDROCHLORIDE 0.5 MG: 1 INJECTION, SOLUTION INTRAMUSCULAR; INTRAVENOUS; SUBCUTANEOUS at 16:54

## 2018-03-03 RX ADMIN — CEFAZOLIN SODIUM 2 G: 2 INJECTION, SOLUTION INTRAVENOUS at 12:26

## 2018-03-03 RX ADMIN — PHENYLEPHRINE HYDROCHLORIDE 0.2 MCG/KG/MIN: 10 INJECTION, SOLUTION INTRAMUSCULAR; INTRAVENOUS; SUBCUTANEOUS at 12:57

## 2018-03-03 RX ADMIN — PHENYLEPHRINE HYDROCHLORIDE 200 MCG: 10 INJECTION, SOLUTION INTRAMUSCULAR; INTRAVENOUS; SUBCUTANEOUS at 12:33

## 2018-03-03 RX ADMIN — ESMOLOL HYDROCHLORIDE 10 MG: 10 INJECTION, SOLUTION INTRAVENOUS at 16:09

## 2018-03-03 RX ADMIN — PHENYLEPHRINE HYDROCHLORIDE 100 MCG: 10 INJECTION, SOLUTION INTRAMUSCULAR; INTRAVENOUS; SUBCUTANEOUS at 14:30

## 2018-03-03 RX ADMIN — OXYCODONE HYDROCHLORIDE 5 MG: 5 TABLET ORAL at 04:28

## 2018-03-03 RX ADMIN — ESMOLOL HYDROCHLORIDE 10 MG: 10 INJECTION, SOLUTION INTRAVENOUS at 15:34

## 2018-03-03 RX ADMIN — FENTANYL CITRATE 25 MCG: 50 INJECTION, SOLUTION INTRAMUSCULAR; INTRAVENOUS at 14:45

## 2018-03-03 RX ADMIN — FENTANYL CITRATE 50 MCG: 50 INJECTION, SOLUTION INTRAMUSCULAR; INTRAVENOUS at 12:21

## 2018-03-03 RX ADMIN — FENTANYL CITRATE 25 MCG: 50 INJECTION, SOLUTION INTRAMUSCULAR; INTRAVENOUS at 16:39

## 2018-03-03 RX ADMIN — PHENYLEPHRINE HYDROCHLORIDE 100 MCG: 10 INJECTION, SOLUTION INTRAMUSCULAR; INTRAVENOUS; SUBCUTANEOUS at 13:48

## 2018-03-03 RX ADMIN — PHENYLEPHRINE HYDROCHLORIDE 100 MCG: 10 INJECTION, SOLUTION INTRAMUSCULAR; INTRAVENOUS; SUBCUTANEOUS at 15:00

## 2018-03-03 RX ADMIN — KETOROLAC TROMETHAMINE 15 MG: 15 INJECTION, SOLUTION INTRAMUSCULAR; INTRAVENOUS at 21:04

## 2018-03-03 RX ADMIN — ESMOLOL HYDROCHLORIDE 10 MG: 10 INJECTION, SOLUTION INTRAVENOUS at 15:43

## 2018-03-03 RX ADMIN — LIDOCAINE HYDROCHLORIDE 40 MG: 20 INJECTION, SOLUTION INFILTRATION; PERINEURAL at 12:21

## 2018-03-03 RX ADMIN — CEFAZOLIN SODIUM 2 G: 2 INJECTION, SOLUTION INTRAVENOUS at 23:21

## 2018-03-03 RX ADMIN — PHENYLEPHRINE HYDROCHLORIDE 200 MCG: 10 INJECTION, SOLUTION INTRAMUSCULAR; INTRAVENOUS; SUBCUTANEOUS at 12:54

## 2018-03-03 RX ADMIN — ROCURONIUM BROMIDE 30 MG: 10 INJECTION INTRAVENOUS at 12:21

## 2018-03-03 RX ADMIN — FENTANYL CITRATE 25 MCG: 50 INJECTION, SOLUTION INTRAMUSCULAR; INTRAVENOUS at 15:28

## 2018-03-03 RX ADMIN — SODIUM CHLORIDE, POTASSIUM CHLORIDE, SODIUM LACTATE AND CALCIUM CHLORIDE: 600; 310; 30; 20 INJECTION, SOLUTION INTRAVENOUS at 15:56

## 2018-03-03 RX ADMIN — PROPOFOL 150 MG: 10 INJECTION, EMULSION INTRAVENOUS at 12:21

## 2018-03-03 RX ADMIN — PHENYLEPHRINE HYDROCHLORIDE 200 MCG: 10 INJECTION, SOLUTION INTRAMUSCULAR; INTRAVENOUS; SUBCUTANEOUS at 12:27

## 2018-03-03 RX ADMIN — FENTANYL CITRATE 25 MCG: 50 INJECTION, SOLUTION INTRAMUSCULAR; INTRAVENOUS at 13:26

## 2018-03-03 RX ADMIN — CEFAZOLIN SODIUM 1 G: 2 INJECTION, SOLUTION INTRAVENOUS at 14:31

## 2018-03-03 ASSESSMENT — ENCOUNTER SYMPTOMS: DYSRHYTHMIAS: 1

## 2018-03-03 NOTE — OR NURSING
Transfer from  PACU recovery in 215 to Room 250  Transferred room via hospital bed (Glyder Mat,Transfer Boar,Slider Sheet)    S: 86 y/o male  S/P ORIF left femur fracture       Anesthesia Type:  general       Surgeon:  Dr. 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      (CHF; Heart Disease; Lung Disease; Chronic Pain; Diabetes; Other (Comment)          Surgical History:    Past Surgical History:   Procedure Laterality Date     ARTHROPLASTY KNEE Left 3/14/2016    Procedure: ARTHROPLASTY KNEE;  Surgeon: Deonte Silver MD;  [facility]     COLONOSCOPY  7/17/2013    Procedure: COMBINED COLONOSCOPY, SINGLE BIOPSY/POLYPECTOMY BY BIOPSY;  Colonoscopy, with polypectomy by biopsy;  Surgeon: Fernando Mario MD;  [facility]     CYSTOSCOPY, LITHOLAPAXY, COMBINED N/A 2/26/2015    Procedure: COMBINED CYSTOSCOPY, LITHOLAPAXY;  Surgeon: Orlando Marr MD;  [facility]     CYSTOSCOPY, LITHOLAPAXY, COMBINED N/A 2/11/2015    Procedure: COMBINED CYSTOSCOPY, LITHOLAPAXY;  Surgeon: Orlando Marr MD;  [facility]     CYSTOSCOPY, RETROGRADES, EXTRACT STONE, COMBINED N/A 2/8/2018    Procedure: COMBINED CYSTOSCOPY, RETROGRADES, EXTRACT STONE;  cystoscopy, bladder stone removal;  Surgeon: Orlando Marr MD;  [facility]     H ABLATION ATRIAL FLUTTER  4/18/15    atypical a flutter ablation & Ablation of PACs from the SVC-RA junction     HC ABLATION RENAL TUMOR PERCUT CRYOTHERAPY UNILATERAL  6/17/10    Right renal mass     LASER HOLMIUM  LITHOTRIPSY BLADDER N/A 2/26/2015    Procedure: LASER HOLMIUM LITHOTRIPSY BLADDER;  Surgeon: Orlando Marr MD;  [facility]     LASER KTP GREEN LIGHT PHOTOSELECTIVE VAPORIZATION PROSTATE N/A 2/11/2015    Procedure: LASER KTP GREEN LIGHT PHOTOSELECTIVE VAPORIZATION PROSTATE;  Surgeon: Orlando Marr MD;  Location: [facility]         A:  EBL: 350        IVF:  400 of 0.9% normal saline plus, lactated ringers of 1700        UOP:  400 straight cath in OR        NPO:  ___Yes __x_No tolerating ice chips        Vomiting:  ___Yes _x__No         Drainage: none        Skin Integrity: long surgical incision to lateral left leg (Normal; Pressure Ulcer (Location)        RFO: ___Yes__x_No (identify item if present)        SSI Patient?  ___Yes__x_No (if yes, see checklist for actions)        Brace/sling/equipment:  ___Yes_ x_No (identify item if present)  Knee immobilizer to stabilize leg         See PACU record for ongoing assessment, vital signs and pain assessment.  Patient has had a-fib with RVR throughout surgery and PACU.  Pain management with IV dilaudid in PACU.      R: Post-Op vitals and assessments as ordered/indicated per patient's condition.       Follow Post-Op orders and notify Physician prn.       Continue to involve patient/family in plan of care and discharge planning.       Reinforce Pre-Operative education.       Implement skin safety interventions as appropriate.    Name:   Gisela Wang RN

## 2018-03-03 NOTE — PROGRESS NOTES
[facility] Progress Note          Assessment and Plan:   Assessment:   Principal Problem:    Closed displaced supracondylar fracture of distal end of left femur without intracondylar extension (H); Periprosthetic fracture around internal prosthetic left knee joint    Assessment: Pain well controlled with current regimen.  INR this morning is 1.11    Plan: ongoing management per orthopedic surgery, however plan is to proceed with surgical intervention at noon today now that INR is less than 1.5.      Active Problems:    Anemia     Assessment:  Patient does have known iron deficiency anemia with hemoglobin 13.1 on initial presentation.  With fluid resuscitation and ongoing IV fluids secondary to NPO status, hemoglobin has decreased down to 9.8, however recheck is 10.1, indicating stability and no active bleeding.     Plan:   We will continue to monitor hemoglobin following surgical intervention.  Type and screen has been sent in case transfusion would be indicated if hemoglobin becomes less than 8.0      Atrial fibrillation (HCC) [I48.91]    Assessment: Chronic and stable, status post ab performed in 2013lation with current rate control on Lopressor and INR 1.11 following 2 doses of vitamin K for Coumadin reversal    Plan: Continue with Lopressor, anticipate we will restart Coumadin this evening following surgical intervention and continue to monitor INR closely      Long-term (current) use of anticoagulants [Z79.01]    Assessment: INR this morning is 1.11    Plan: Proceed with plan as above      Pulmonary hypertension    Assessment:  Currently being worked up by cardiology  as an outpatient    Plan: No acute intervention is needed      Malignant neoplasm of kidney excluding renal pelvis (H)    Assessment: Previous history without known reoccurrence    Plan: No acute intervention is needed      Hyperlipidemia LDL goal <130    Assessment: On Lipitor    Plan: Would restart postoperatively      Total knee  replacement status    Assessment: now with acute fracture as above    Plan: continue with plan as above      # Pain Assessment:   Current Pain Score 3/3/2018 3/2/2018 3/2/2018   Patient currently in pain? denies sleeping: patient not able to self report yes   Pain score (0-10) - - 2   Pain location - - Leg   Pain descriptors - - -   - Tomer is experiencing pain due to femur fracture.  Pain management as per orthopedic surgery team.        VTE:  SCDs  Code Status:  Full Code        Interval History:   About the same today clinically and vitals signs stable.  Patient's hemoglobin did decrease from 13 yesterday to 9.8 this morning and recheck 10.1.  INR is down to 1.1.  Tolerating medications and treatment plan without significant side effects or problems.  Has been NPO since midnight in expectation of upcoming surgery.         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 rate 16, height 1.829 m (6'), weight 88.1 kg (194 lb 3.6 oz), SpO2 94 %.  Constitutional:   awake, alert, cooperative, no apparent distress, and appears stated age     Lungs:   No increased work of breathing, good air exchange, clear to auscultation bilaterally, no crackles or wheezing     Cardiovascular:   Irregularly irregular rhythm, no murmur     Abdomen:   Bowel sounds soft, abdomen soft and nontender     Musculoskeletal:   Left leg is splinted and wrapped with Ace wrap no edema in the right lower extremity,      Neurologic:   Awake, alert, oriented to name, place  and time.      Skin:   No pallor             Data:   All laboratory data reviewed    Attestation:  I have reviewed today's vital signs, notes, medications, labs and imaging.     Electronically Signed:  Sadie Ley MD    Note: Chart documentation done in part with Dragon Voice Recognition software. Although reviewed after completion, some word and grammatical errors may remain.

## 2018-03-03 NOTE — BRIEF OP NOTE
PROCEDURE NOTE:    Pre-operative diagnosis: Left femur fracture   Post-operative diagnosis: Left comminuted displaced distal femur fracture   Procedure: Procedure(s):  Open reduction internal fixation left distal femur fracture   Surgeon: Mason Parra MD   Assistant(s): Marko Rodriges PA-C   Estimated blood loss: 300 ml   Specimens: None   Findings: Left comminuted displaced distal femur fracture   Anesthesia: General endotracheal anesthesia   Drains: None   Complications: None   Weight bearing status: Non-weight bearing on the left leg   Activity: Patient is to be non-weightbearing on the left leg and should wear the immobilizer at all times.      Marko Rodriges PA-C  Supervising physician: Mason Parra MD  Dept. of Orthopedics  [facility]

## 2018-03-03 NOTE — PROGRESS NOTES
Pt admitted with left femur fracture. Going to OR now. Denies pain for this writer. Pre-op drew wipes completed. Family updated. Pt Hgb this am 9.8. Pt Hgb on admission 13.2. Recheck 10.1. Per MD decreased value likely due to hemodilution. Will continue to monitor. ABO also completed prior to pt going to OR. Blood pressure 113/59, pulse 101, temperature 97.9  F (36.6  C), temperature source Oral, resp. rate 16, height 1.829 m (6'), weight 88.1 kg (194 lb 3.6 oz), SpO2 94 %.

## 2018-03-03 NOTE — CONSULTS
[facility] Orthopedic Consultation    Tomer Weiss MRN# [id number]   Age: 85 year old YOB: 1932     Date of Admission:  3/1/2018    Reason for consult: Left supracondylar femur fracture above total knee arthroplasty.       Requesting physician: Miguel Cifuentes       Level of consult: Consult, follow and place orders           Assessment and Plan:   Assessment:   Left comminuted displaced supracondylar femur fracture above total knee arthroplasty from a fall at home.  Total knee arthroplasty placed 3/14/16.  Total knee arthroplasty has been doing well.        Plan:   Open-reduction, internal fixation left femoral fracture with periarticular plate.  Risks, benefits, potential complications and alternatives were discussed.            Chief Complaint:   Fall sustaining left femur fracture.       History is obtained from the patient         History of Present Illness:   This patient is a 85 year old male who presents with the following condition requiring a hospital admission:      Fall at home onto left knee.  He had left total knee arthroplasty placed 3/14/16 and had been doing well. Unable to walk after fall.  X-ray in emergency room shows comminuted displaced supracondylar femur fracture. He is on coumadin and required reversal before surgery.           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             Past Surgical History:     Past Surgical History:   Procedure Laterality Date     ARTHROPLASTY KNEE Left 3/14/2016    Procedure: ARTHROPLASTY KNEE;  Surgeon: Deonte Silver MD;  Location: [facility]     COLONOSCOPY   2013    Procedure: COMBINED COLONOSCOPY, SINGLE BIOPSY/POLYPECTOMY BY BIOPSY;  Colonoscopy, with polypectomy by biopsy;  Surgeon: Fernando Mario MD;  Location: [facility] GI     CYSTOSCOPY, LITHOLAPAXY, COMBINED N/A 2015    Procedure: COMBINED CYSTOSCOPY, LITHOLAPAXY;  Surgeon: Orlando Marr MD;  Location: [facility]     CYSTOSCOPY, LITHOLAPAXY, COMBINED N/A 2015    Procedure: COMBINED CYSTOSCOPY, LITHOLAPAXY;  Surgeon: Orlando Marr MD;  Location: [facility]     CYSTOSCOPY, RETROGRADES, EXTRACT STONE, COMBINED N/A 2018    Procedure: COMBINED CYSTOSCOPY, RETROGRADES, EXTRACT STONE;  cystoscopy, bladder stone removal;  Surgeon: Orlando Marr MD;  Location: [facility]     H ABLATION ATRIAL FLUTTER  4/18/15    atypical a flutter ablation & Ablation of PACs from the SVC-RA junction     HC ABLATION RENAL TUMOR PERCUT CRYOTHERAPY UNILATERAL  6/17/10    Right renal mass     LASER HOLMIUM LITHOTRIPSY BLADDER N/A 2015    Procedure: LASER HOLMIUM LITHOTRIPSY BLADDER;  Surgeon: Orlando Marr MD;  Location: [facility]     LASER KTP GREEN LIGHT PHOTOSELECTIVE VAPORIZATION PROSTATE N/A 2015    Procedure: LASER KTP GREEN LIGHT PHOTOSELECTIVE VAPORIZATION PROSTATE;  Surgeon: Orlando Marr MD;  Location: [facility]             Social History:     Social History     Social History     Marital status:      Spouse name: Shari     Number of children: 4     Years of education: N/A     Occupational History     Not on file.     Social History Main Topics     Smoking status: Never Smoker     Smokeless tobacco: Never Used     Alcohol use No     Drug use: No     Sexual activity: Not Currently     Other Topics Concern     Not on file     Social History Narrative             Family History:     Family History   Problem Relation Age of Onset     CANCER Mother      breast     Hypertension Mother      Alzheimer Disease Mother       at age 96.     Family history reviewed          Immunizations:      Immunization History   Administered Date(s) Administered     Pneumo Conj 13-V (2010&after) 01/13/2017     Pneumococcal 23 valent 09/16/2009     TD (ADULT, 7+) 02/22/1999, 09/16/2009     TDAP Vaccine (Boostrix) 07/19/2013             Allergies:     Allergies   Allergen Reactions     No Known Drug Allergies              Medications:     Prescriptions Prior to Admission   Medication Sig Dispense Refill Last Dose     atorvastatin (LIPITOR) 20 MG tablet Take 1 tablet (20 mg) by mouth daily 90 tablet 3 Past Week at Unknown time     metoprolol tartrate (LOPRESSOR) 25 MG tablet TAKE ONE-HALF TABLET BY MOUTH ONCE DAILY 45 tablet 9 3/1/2018 at 0800     warfarin (COUMADIN) 5 MG tablet Take 2.5 mg (1/2 tablet) Tuesday and Saturday and 5 mg other 5 days or as directed by the coumadin clinic 75 tablet 1 3/1/2018 at 0800     furosemide (LASIX) 20 MG tablet TAKE ONE TABLET BY MOUTH ONCE DAILY 90 tablet 3 3/1/2018 at Unknown time     iron 66 MG TABS Take 1 tablet (66 mg) by mouth daily 1 tablet 0 3/1/2018 at 0800     order for DME Equipment being ordered: Walker ()  Treatment Diagnosis: s/p joint replacement 1 Device 0 Taking     GLUCOSAMINE 500 MG OR CAPS 2 Tablets every morning   Taking             Review of Systems:   A comprehensive review of systems was performed and found to be negative except as described in this note.  He is under workup by cardiology.  He has atrial fibrillation.          Physical Exam:   Temp: 97.9  F (36.6  C) Temp src: Oral BP: 113/59 Pulse: 101 Heart Rate: 73 Resp: 16 SpO2: 94 % O2 Device: None (Room air)     No labor of breathing.  No acute distress.  Alert, oriented.  Musculoskeletal:   right hand has flexion contractures likely Dupuytren's contracture.  LEFT HIP:  redness absent  swelling absent  tenderness absent  LEFT KNEE and distal thigh:  swelling present  tenderness present  Flexion of knee through fracture.  Long leg splint in place.  Sensation, motor and circulation are intact.              Data:     Lab Results   Component Value Date    WBC 8.7 03/01/2018    HGB 10.1 (L) 03/03/2018    HCT 42.1 03/01/2018     03/01/2018     03/01/2018    POTASSIUM 4.4 03/01/2018    CHLORIDE 104 03/01/2018    CO2 28 03/01/2018    BUN 26 03/01/2018    CR 0.88 03/01/2018     (H) 03/01/2018    SED 23 (H) 09/07/2006    AST 27 02/26/2018    ALT 31 02/26/2018    ALKPHOS 128 02/26/2018    BILITOTAL 1.5 (H) 02/26/2018    INR 1.11 03/03/2018     Extremity x-ray of the left upper leg / tibia::   Fracture seen above total knee arthroplasty with comminution, flexion.        Attestation:  Amount of time performed on this consult: 30 minutes.    Mason Parra MD

## 2018-03-03 NOTE — ANESTHESIA PREPROCEDURE EVALUATION
Anesthesia Evaluation     . Pt has had prior anesthetic. Type: General and MAC    No history of anesthetic complications          ROS/MED HX    ENT/Pulmonary:  - neg pulmonary ROS     Neurologic:  - neg neurologic ROS     Cardiovascular: Comment: Has had ablation for A-flutter 2015, pt currently in A-FIB    (+) Dyslipidemia, ----. Taking blood thinners Pt has received instructions: . . . :. dysrhythmias a-flutter and a-fib, Irregular Heartbeat/Palpitations, . pulmonary hypertension, Previous cardiac testing Echodate:, 17results:EF=45%-    Echocardiogram Complete   Order: [id number]   Status: Edited Result - FINAL   Visible to patient: No (Inaccessible in MyChart) Next appt: Today at 01:15 PM in Radiology. ([facility] GLO 2) Dx: Cardiomegaly   Notes Recorded by Gabriel Tan MD on 2017 at 12:57 PM  His echo shows changes affecting the right side of his heart same as seen last January.  Continue his water pill, if he has new symptoms of sob or syncope let me know.    Details     Reading Physician Reading Date Result Priority  Charlene Bhatt MD 2017   Narrative        [id number]  [id number]  [id number]  [id number]^AMADA^GABRIEL^ANGELA           [facility]  Echocardiography Laboratory  [address] [place] [address]        Name: ELIER BUTLER  MRN: [id number]  : 1932  Study Date: 2017 09:05 AM  Age: 85 yrs  Gender: Male  Patient Location: [facility]  Reason For Study: Cardiomegaly  History: Afib, Hyperlipid  Ordering Physician: GABRIEL TAN  Referring Physician: GABRIEL TAN  Performed By: Larisa De Luna     BSA: 2.1 m2  Height: 74 in  Weight: 192 lb  HR: 84  BP: 110/66 mmHg  _____________________________________________________________________________  __        Procedure  Complete Echo Adult.  _____________________________________________________________________________  __        Interpretation Summary     Mild aortic root  dilatation.  The ascending aorta is Mildly dilated.  The right atrium is severely dilated.  The right ventricle is severely dilated.  Right ventricular systolic pressure is elevated, consistent with moderate to  severe pulmonary hypertension.  This is overall similar to prior study of 1/13/2017 on [id number]  comparison.  Left ventricular systolic function is normal.  _____________________________________________________________________________  __        Left Ventricle  The left ventricle is normal in size. There is moderate concentric left  ventricular hypertrophy. Left ventricular systolic function is normal. The  visual ejection fraction is estimated at 55-60%. Left ventricular diastolic  function is indeterminate. E by E prime ratio is less than 8, that likely  suggests normal left ventricular filling pressures. No regional wall motion  abnormalities noted.     Right Ventricle  The right ventricle is severely dilated. The right ventricular systolic  function is moderate to severely reduced.     Atria  The left atrium is severely dilated. The chamber is not well visualized and  appears underestimated by volumetrics. The right atrium is severely dilated.  There is no atrial shunt seen.     Mitral Valve  The mitral valve leaflets appear thickened, but open well. There is mild to  moderate mitral annular calcification. There is mild (1+) mitral  regurgitation.        Tricuspid Valve  Normal tricuspid valve. There is trace tricuspid regurgitation. The right  ventricular systolic pressure is approximated at 31.0 mmHg plus the right  atrial pressure. Dilated IVC (>2.5cm) with no respiratory collapse; right  atrial pressure is estimated at >20mmHg. Right ventricular systolic pressure  is elevated, consistent with moderate to severe pulmonary hypertension.     Aortic Valve  The aortic valve is trileaflet. There is moderate trileaflet aortic sclerosis.  No aortic regurgitation is present. No hemodynamically significant  valvular  aortic stenosis. The peak AoV pressure gradient is 6.0 mmHg.     Pulmonic Valve  The pulmonic valve is not well visualized. There is no pulmonic valvular  regurgitation. Normal pulmonic valve velocity.     Vessels  Mild aortic root dilatation. The ascending aorta is Mildly dilated. The IVC is  dilated and fails to change with respiration, suggesting elevated central  venous pressure.     Pericardium  There is no pericardial effusion.        Rhythm  The rhythm was atrial fibrillation.  _____________________________________________________________________________  __  MMode/2D Measurements & Calculations  IVSd: 1.7 cm     LVIDd: 3.9 cm  LVIDs: 2.9 cm  LVPWd: 1.8 cm  FS: 25.9 %  EDV(Teich): 64.0 ml  ESV(Teich): 31.0 ml  LV mass(C)d: 281.3 grams  LV mass(C)dI: 131.7 grams/m2  Ao root diam: 3.9 cm  LA dimension: 5.7 cm  asc Aorta Diam: 4.1 cm  LA/Ao: 1.5  LA Volume (BP): 75.5 ml  LA Volume Index (BP): 35.3 ml/m2  RWT: 0.94           Doppler Measurements & Calculations  MV dec time: 0.16 sec  Ao V2 max: 122.1 cm/sec  Ao max P.0 mmHg  LV V1 max PG: 3.3 mmHg  LV V1 max: 90.1 cm/sec  PA acc time: 0.14 sec  TR max za: 278.3 cm/sec  TR max P.0 mmHg           _____________________________________________________________________________  __           Report approved by: Harsha Kimbrough 2017 12:45 PM               date: results:ECG reviewed date:17 results:A-fib, occ PVCs,RVH date: results:          METS/Exercise Tolerance:  3 - Able to walk 1-2 blocks without stopping   Hematologic:  - neg hematologic  ROS       Musculoskeletal:   (+) arthritis, , fracture lower extremity: Femoral, -       GI/Hepatic:  - neg GI/hepatic ROS       Renal/Genitourinary:     (+) chronic renal disease, Nephrolithiasis , Other Renal/ Genitourinary, Kidney/bladder CA      Endo:  - neg endo ROS       Psychiatric:  - neg psychiatric ROS       Infectious Disease:  - neg infectious disease ROS       Malignancy:    (+) Malignancy History of Other  Other CA Kidney/bladder CA Active status post         Other:    (+) No chance of pregnancy C-spine cleared: N/A, no H/O Chronic Pain,no other significant disability                    Physical Exam  Normal systems: pulmonary    Airway   Mallampati: II  TM distance: >3 FB  Neck ROM: full    Dental   (+) missing    Cardiovascular   Rhythm and rate: irregular and normal      Pulmonary    breath sounds clear to auscultation                    Anesthesia Plan      History & Physical Review  History and physical reviewed and following examination; no interval change.    ASA Status:  3 .    NPO Status:  > 8 hours    Plan for General and ETT with Intravenous and Propofol induction. Maintenance will be Balanced.    PONV prophylaxis:  Ondansetron (or other 5HT-3)       Postoperative Care  Postoperative pain management:  IV analgesics and Oral pain medications.      Consents  Anesthetic plan, risks, benefits and alternatives discussed with:  Patient or representative and Patient.  Use of blood products discussed: No .   .                          .

## 2018-03-03 NOTE — PLAN OF CARE
Problem: Patient Care Overview  Goal: Plan of Care/Patient Progress Review  Outcome: Improving  Vital signs stable. /58 (BP Location: Right arm)  Pulse 101  Temp 98  F (36.7  C) (Oral)  Resp 12  Ht 1.829 m (6')  Wt 88.1 kg (194 lb 3.6 oz)  SpO2 94%  BMI 26.34 kg/m2.  Afebrile. Lung sounds CTA B/L, on RA. A&O x4. Pain controlled with oxycodone 5 mg per MAR. CMS intact.  Bowel sounds normoactive. Voiding adequately with urinal. Skin is intact, frequent shift changes made in body position. Able to make needs known with call light. Will continue to monitor per pt care plan.

## 2018-03-03 NOTE — ANESTHESIA CARE TRANSFER NOTE
Patient: Tomer Weiss    Procedure(s):  Open Reduction Internal Fixation Left Femur - Wound Class: I-Clean    Diagnosis: left femur fracture  Diagnosis Additional Information: No value filed.    Anesthesia Type:   General, ETT     Note:  Airway :Face Mask  Patient transferred to:ICU  ICU Handoff: Call for PAUSE to initiate/utilize ICU HANDOFF, Identified Patient, Identified Responsible Provider, Reviewed the Pertinent Medical History, Discussed Surgical Course, Reviewed Intra-OP Anesthesia Management and Issues during Anesthesia, Set Expectations for Post Procedure Period and Allowed Opportunity for Questions and Acknowledgement of Understanding      Vitals: (Last set prior to Anesthesia Care Transfer)    CRNA VITALS  3/3/2018 1605 - 3/3/2018 1659      3/3/2018             Pulse: 129    SpO2: 99 %                Electronically Signed By: ECHO Smyth CRNA  March 3, 2018  4:59 PM

## 2018-03-04 ENCOUNTER — APPOINTMENT (OUTPATIENT)
Dept: PHYSICAL THERAPY | Facility: CLINIC | Age: 83
DRG: 481 | End: 2018-03-04
Payer: MEDICARE

## 2018-03-04 PROBLEM — I47.29 PAROXYSMAL VENTRICULAR TACHYCARDIA (H): Status: ACTIVE | Noted: 2018-03-04

## 2018-03-04 PROBLEM — I95.9 HYPOTENSION, UNSPECIFIED HYPOTENSION TYPE: Status: ACTIVE | Noted: 2018-03-04

## 2018-03-04 LAB
GLUCOSE SERPL-MCNC: 130 MG/DL (ref 70–99)
HGB BLD-MCNC: 8.2 G/DL (ref 13.3–17.7)
INR PPP: 1.09 (ref 0.86–1.14)
MAGNESIUM SERPL-MCNC: 1.9 MG/DL (ref 1.6–2.3)
PHOSPHATE SERPL-MCNC: 2 MG/DL (ref 2.5–4.5)
POTASSIUM SERPL-SCNC: 4.2 MMOL/L (ref 3.4–5.3)

## 2018-03-04 PROCEDURE — 84100 ASSAY OF PHOSPHORUS: CPT | Performed by: FAMILY MEDICINE

## 2018-03-04 PROCEDURE — 85018 HEMOGLOBIN: CPT | Performed by: FAMILY MEDICINE

## 2018-03-04 PROCEDURE — 12000007 ZZH R&B INTERMEDIATE

## 2018-03-04 PROCEDURE — A9270 NON-COVERED ITEM OR SERVICE: HCPCS | Mod: GY | Performed by: FAMILY MEDICINE

## 2018-03-04 PROCEDURE — 97530 THERAPEUTIC ACTIVITIES: CPT | Mod: GP | Performed by: PHYSICAL THERAPIST

## 2018-03-04 PROCEDURE — A9270 NON-COVERED ITEM OR SERVICE: HCPCS | Mod: GY | Performed by: ORTHOPAEDIC SURGERY

## 2018-03-04 PROCEDURE — 84132 ASSAY OF SERUM POTASSIUM: CPT | Performed by: FAMILY MEDICINE

## 2018-03-04 PROCEDURE — 36415 COLL VENOUS BLD VENIPUNCTURE: CPT | Performed by: FAMILY MEDICINE

## 2018-03-04 PROCEDURE — 99233 SBSQ HOSP IP/OBS HIGH 50: CPT | Performed by: FAMILY MEDICINE

## 2018-03-04 PROCEDURE — 25000128 H RX IP 250 OP 636: Performed by: FAMILY MEDICINE

## 2018-03-04 PROCEDURE — 25000128 H RX IP 250 OP 636: Performed by: ORTHOPAEDIC SURGERY

## 2018-03-04 PROCEDURE — 25000125 ZZHC RX 250: Performed by: FAMILY MEDICINE

## 2018-03-04 PROCEDURE — 82947 ASSAY GLUCOSE BLOOD QUANT: CPT | Performed by: FAMILY MEDICINE

## 2018-03-04 PROCEDURE — 25000132 ZZH RX MED GY IP 250 OP 250 PS 637: Mod: GY | Performed by: ORTHOPAEDIC SURGERY

## 2018-03-04 PROCEDURE — 25000132 ZZH RX MED GY IP 250 OP 250 PS 637: Mod: GY | Performed by: FAMILY MEDICINE

## 2018-03-04 PROCEDURE — 40000193 ZZH STATISTIC PT WARD VISIT: Performed by: PHYSICAL THERAPIST

## 2018-03-04 PROCEDURE — 97161 PT EVAL LOW COMPLEX 20 MIN: CPT | Mod: GP | Performed by: PHYSICAL THERAPIST

## 2018-03-04 PROCEDURE — 83735 ASSAY OF MAGNESIUM: CPT | Performed by: FAMILY MEDICINE

## 2018-03-04 PROCEDURE — 85610 PROTHROMBIN TIME: CPT | Performed by: FAMILY MEDICINE

## 2018-03-04 PROCEDURE — 97116 GAIT TRAINING THERAPY: CPT | Mod: GP | Performed by: PHYSICAL THERAPIST

## 2018-03-04 RX ORDER — MAGNESIUM SULFATE HEPTAHYDRATE 40 MG/ML
2 INJECTION, SOLUTION INTRAVENOUS DAILY PRN
Status: DISCONTINUED | OUTPATIENT
Start: 2018-03-04 | End: 2018-03-06 | Stop reason: HOSPADM

## 2018-03-04 RX ORDER — MAGNESIUM SULFATE HEPTAHYDRATE 40 MG/ML
4 INJECTION, SOLUTION INTRAVENOUS EVERY 4 HOURS PRN
Status: DISCONTINUED | OUTPATIENT
Start: 2018-03-04 | End: 2018-03-06 | Stop reason: HOSPADM

## 2018-03-04 RX ORDER — ATORVASTATIN CALCIUM 10 MG/1
20 TABLET, FILM COATED ORAL DAILY
Status: DISCONTINUED | OUTPATIENT
Start: 2018-03-05 | End: 2018-03-06 | Stop reason: HOSPADM

## 2018-03-04 RX ADMIN — KETOROLAC TROMETHAMINE 15 MG: 15 INJECTION, SOLUTION INTRAMUSCULAR; INTRAVENOUS at 10:11

## 2018-03-04 RX ADMIN — ACETAMINOPHEN 975 MG: 325 TABLET ORAL at 20:41

## 2018-03-04 RX ADMIN — POTASSIUM PHOSPHATE, MONOBASIC AND POTASSIUM PHOSPHATE, DIBASIC 15 MMOL: 224; 236 INJECTION, SOLUTION INTRAVENOUS at 18:26

## 2018-03-04 RX ADMIN — SODIUM CHLORIDE: 9 INJECTION, SOLUTION INTRAVENOUS at 22:48

## 2018-03-04 RX ADMIN — ACETAMINOPHEN 975 MG: 325 TABLET ORAL at 05:59

## 2018-03-04 RX ADMIN — Medication 12.5 MG: at 10:11

## 2018-03-04 RX ADMIN — KETOROLAC TROMETHAMINE 15 MG: 15 INJECTION, SOLUTION INTRAMUSCULAR; INTRAVENOUS at 15:52

## 2018-03-04 RX ADMIN — Medication 1 MG: at 02:04

## 2018-03-04 RX ADMIN — ACETAMINOPHEN 975 MG: 325 TABLET ORAL at 13:16

## 2018-03-04 RX ADMIN — SODIUM CHLORIDE 1000 ML: 9 INJECTION, SOLUTION INTRAVENOUS at 15:44

## 2018-03-04 RX ADMIN — MAGNESIUM SULFATE IN WATER 2 G: 40 INJECTION, SOLUTION INTRAVENOUS at 17:23

## 2018-03-04 RX ADMIN — SODIUM CHLORIDE: 9 INJECTION, SOLUTION INTRAVENOUS at 09:13

## 2018-03-04 RX ADMIN — WARFARIN SODIUM 7.5 MG: 5 TABLET ORAL at 18:26

## 2018-03-04 RX ADMIN — CEFAZOLIN SODIUM 2 G: 2 INJECTION, SOLUTION INTRAVENOUS at 05:59

## 2018-03-04 NOTE — ANESTHESIA POSTPROCEDURE EVALUATION
Patient: Tomer Weiss    Procedure(s):  Open Reduction Internal Fixation Left Femur - Wound Class: I-Clean    Diagnosis:left femur fracture  Diagnosis Additional Information: No value filed.    Anesthesia Type:  General, ETT    Note:  Anesthesia Post Evaluation    Patient location during evaluation: Bedside  Patient participation: Able to fully participate in evaluation  Level of consciousness: awake and alert  Pain management: adequate  Airway patency: patent  Cardiovascular status: acceptable  Respiratory status: acceptable  Hydration status: acceptable  PONV: none     Anesthetic complications: None    Comments: Pt was pleased with his care yesterday. Pt states his pain is tolerable. Taking NSAIDS with minimal narcotics. Chronic A-fib with intermittent rapid HR otherwise  VSS.        Last vitals:  Vitals:    03/04/18 0733 03/04/18 0744 03/04/18 0913   BP: 99/47     Pulse: 84     Resp: 16     Temp: 97  F (36.1  C)     SpO2: 98% 99% 97%         Electronically Signed By: ECHO Smyth CRNA  March 4, 2018  9:52 AM

## 2018-03-04 NOTE — PROGRESS NOTES
Pt admitted with left femur fracture. Open reduction internal fixation 3/3/2018. Pt continues to deny pain. Scheduled Toradol and tylenol given as ordered. Pt educated on importance of being proactive regarding pain management. Working with PT today. Education provided on incentive spirometer. Per pt he remembers how to do this from last time. Pt did very well- >2500 on first attempt. Blood pressure 99/47, pulse 84, temperature 97  F (36.1  C), temperature source Oral, resp. rate 16, height 1.829 m (6'), weight 88.1 kg (194 lb 3.6 oz), SpO2 97 %.    Pt fiance here and updated on plan of care.

## 2018-03-04 NOTE — PHARMACY-ANTICOAGULATION SERVICE
Clinical Pharmacy - Warfarin Dosing Consult     Pharmacy has been consulted to manage this patient s warfarin therapy.  Indication: Atrial Fibrillation  Therapy Goal: Other - see comments  Warfarin Prior to Admission: Yes  Warfarin PTA Regimen: 2.5mg tuesday and saturday and 5 mg the rest of the week  Recent documented change in oral intake/nutrition: No  Dose Comments: INR goal of 1.8-2.5    INR   Date Value Ref Range Status   03/03/2018 1.11 0.86 - 1.14 Final   03/02/2018 1.36 (H) 0.86 - 1.14 Final     Chromogenic Factor 10   Date Value Ref Range Status   04/24/2015 21 (L) 70 - 130 % Final     Comment:     Therapeutic Range:  A Chromogenic Factor 10 level of approximately 20-40%   inversely correlates with an INR of 2-3 for patients receiving Warfarin.   Chromogenic Factor 10 levels below 20% indicate an INR greater than 3 and   levels above 40% indicate an INR less than 2.         Recommend warfarin 5 mg today.  Pharmacy will monitor Tomer Weiss daily and order warfarin doses to achieve specified goal.      Please contact pharmacy as soon as possible if the warfarin needs to be held for a procedure or if the warfarin goals change.

## 2018-03-04 NOTE — PROGRESS NOTES
Pt had a 4 beat run of [employer] at 1515. Provider notified. BP 80's/40's. Liter bolus given per order. Maintenance fluids increased. Pt K, Mg, Phos checked. Mg and phos protocol added. Pt will have both Mg and Phos replaced per protocol. Family is here and updated. Blood pressure (!) 84/48, pulse 84, temperature 97  F (36.1  C), temperature source Oral, resp. rate 16, height 1.829 m (6'), weight 88.1 kg (194 lb 3.6 oz), SpO2 100 %.

## 2018-03-04 NOTE — PROGRESS NOTES
Notified MD at 330 PM regarding changes in vital signs.      Spoke with: USMAN Ley MD    Orders were obtained.  Bolus and labs    Comments: Per telemetry 5 beat run of wide complex tachycardia per telemetry, hypotensive 80s/40s, asymptomatic and denies any chest pain or discomfort.

## 2018-03-04 NOTE — PROGRESS NOTES
[facility] Progress Note          Assessment and Plan:   Assessment:   Principal Problem:    Closed displaced supracondylar fracture of distal end of left femur without intracondylar extension;   Periprosthetic fracture around internal prosthetic left knee joint    Assessment: s/p surgical repair, POD #1 with patient having done well until the last hour when he developed a 4 beat run of V tach and hypotension as below.  Having no pain in his leg.     Plan: ongoing post-surgical management per orthopedic surgery team.  Continue work up of other issues as below.     Active Problems:    V tach - 4 beat run    Assessment: Patient experienced a 4 beat run of what appears to be ventricular tachycardia, during that time patient was completely asymptomatic and he has not had recurrence since that time.  Blood pressure was checked following and patient is mildly hypertensive as below but asymptomatic from this as well    Plan: We will continue with telemetry.  Will check magnesium, phosphorus, and potassium level now and replace if they are low.  We will give a fluid bolus to improve blood pressures and continue to monitor patient closely.      Hypotension    Assessment: Blood pressures have been normal following surgical intervention in the 90-120s systolic however have been trending down and patient reports, although he is drinking well, he has not voided more than once today since his Roman was removed early this morning    Plan:   We will give 1 L fluid bolus now and closely monitor blood pressure with consideration of repeat bolusing if needed.  Will place hold parameters on atenolol if needed going forward.        Long-term (current) use of anticoagulants [Z79.01]    Assessment: INR is reversed at 1.09 following surgical intervention, first dose of PO coumadin given last night following surgery    Plan: ongoing management per pharmacy      Atrial fibrillation (HCC) [I48.91]    Assessment:  HR has been in the  80-90s on current atenolol dosing    Plan: continue to monitor via telemetry, hold parameters placed on atenolol given hypotension as above      Pulmonary hypertension    Assessment: being worked up as an outpatient by cardiology    Plan: continue with work up following this hospital completion      Malignant neoplasm of kidney excluding renal pelvis (H)    Assessment: previous history without known recurrence     Plan: no intervention needed      Hyperlipidemia LDL goal <130    Assessment: on lipitor    Plan: restart tomorrow      Total knee replacement status    Assessment: now with fracture as above     Plan: proceed with plan as above        # Pain Assessment:   Current Pain Score 3/4/2018 3/4/2018 3/3/2018   Patient currently in pain? denies denies yes   Pain score (0-10) 0 - 1   Pain location - - Leg   Pain descriptors - - Alexia Jeff s pain level was assessed and he currently denies pain.  Ongoing pain management per ortho team.      VTE:  SCDs, starting coumadin  Code Status:  Full code        Interval History:   Patient recovering overall well from surgery but did have a 5 beat run of V tach this afternoon during which he was asymptomatic but blood pressures checked right after are mildly hypotensive in the 80s systolic.  HR has been in the 80-90 after atenolol given this morning.  Eating and drinking fairly well and has voided only once today but without difficulty.  Tolerating medications without significant side effects.          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 rate 18, height 1.829 m (6'), weight 88.1 kg (194 lb 3.6 oz), SpO2 98 %.  Constitutional:   awake, alert, cooperative, no apparent distress, and appears stated age     Lungs:   No increased work of breathing, good air exchange, clear to auscultation bilaterally, no crackles or wheezing     Cardiovascular:   Irregularly irregular rhythm without murmur     Abdomen:   Bowel sounds present, abdomen soft     Musculoskeletal:   Left leg in ACE wrap, no swelling noted on the right lower extremity      Neurologic:   Awake, alert, oriented to name, place and time.       Skin:   normal skin color, texture, turgor             Data:   All laboratory data reviewed    Attestation:  I have reviewed today's vital signs, notes, medications, labs and imaging.     Electronically Signed:  Sadie Ley MD    Note: Chart documentation done in part with Dragon Voice Recognition software. Although reviewed after completion, some word and grammatical errors may remain.

## 2018-03-04 NOTE — PHARMACY-ANTICOAGULATION SERVICE
Clinical Pharmacy - Warfarin Dosing Consult     Pharmacy has been consulted to manage this patient s warfarin therapy.  Indication: Atrial Fibrillation  Therapy Goal: Other - see comments  Warfarin Prior to Admission: Yes  Warfarin PTA Regimen: 2.5mg tuesday and saturday and 5 mg the rest of the week  Recent documented change in oral intake/nutrition: No  Dose Comments: INR goal of 1.8-2.5    INR   Date Value Ref Range Status   03/04/2018 1.09 0.86 - 1.14 Final   03/03/2018 1.11 0.86 - 1.14 Final     Chromogenic Factor 10   Date Value Ref Range Status   04/24/2015 21 (L) 70 - 130 % Final     Comment:     Therapeutic Range:  A Chromogenic Factor 10 level of approximately 20-40%   inversely correlates with an INR of 2-3 for patients receiving Warfarin.   Chromogenic Factor 10 levels below 20% indicate an INR greater than 3 and   levels above 40% indicate an INR less than 2.         Recommend warfarin 7.5 mg today.  Pharmacy will monitor Tomer Weiss daily and order warfarin doses to achieve specified goal.      Please contact pharmacy as soon as possible if the warfarin needs to be held for a procedure or if the warfarin goals change.

## 2018-03-04 NOTE — PLAN OF CARE
Problem: Fracture Orthopaedic (Adult)  Goal: Signs and Symptoms of Listed Potential Problems Will be Absent, Minimized or Managed (Fracture Orthopaedic)  Signs and symptoms of listed potential problems will be absent, minimized or managed by discharge/transition of care (reference Fracture Orthopaedic (Adult) CPG).   Outcome: Improving    Cared for patient from [id number]    VSS- intermittent tachycardia noted, Tele monitored by Charge RN- afib noted (baseline). AO x 4. Able to verbalize needs. Uses call light appropriately. Calm and cooperative. Turn with assist x 2, not OOB since OR. Patient intermittently reporting mild pain in LLE. Medicated with scheduled acetaminophen and Toradol, and PRN oxycodone x 1. Patient refused 0400 dose of Toradol stating that he would like to speak to MD about safety of medication while taking Coumadin prior resuming medication. Patient reports pain as tolerable with current pain interventions. LLE CMS intact. Lung sounds are clear and equal bilaterally. Sating in high 90's on 3 L of O2 via NC. Voiding in urinal without complication. Tolerating clear liquid diet without complication. Denies flatus. Slept in between cares. No acute events.

## 2018-03-04 NOTE — PLAN OF CARE
Problem: Patient Care Overview  Goal: Plan of Care/Patient Progress Review  85 y.o. Gentleman carlos albertoaled today s/p fall with distal femur fracture and s/p surgery.  NWB L LE.    Discharge Planner PT   Patient plan for discharge: TCU  Current status: mod  A for sit-stand with very poor midline awareness  Barriers to return to prior living situation: amount of assist with transfers and mobility  Recommendations for discharge: TCU  Rationale for recommendations: needs time to learn new mobility techniques with NWB L LE and balance back to midline for sitting and standing.       Entered by: Emilee Ford 03/04/2018 2:08 PM

## 2018-03-04 NOTE — PROGRESS NOTES
03/04/18 1145   Quick Adds   Type of Visit Initial PT Evaluation   Living Environment   Lives With child(benedict), adult  (daughter downstairs apt right now, son available too)   Living Arrangements house   Home Accessibility ramps present at home;grab bars present (toilet)  (shoulder high bar in shower with bench)   Number of Stairs Within Home 0   Stair Railings at Home outside, present at both sides   Transportation Available van, wheelchair accessible;family or friend will provide  (to be determined as he begins rehab, son can help)   Self-Care   Dominant Hand right   Usual Activity Tolerance good   Equipment Currently Used at Home none   Activity/Exercise/Self-Care Comment Has a walker wtih 4 wheels and cruthces   Functional Level Prior   Ambulation 0-->independent   Transferring 0-->independent   Toileting 0-->independent   Bathing 0-->independent   Dressing 0-->independent   Eating 0-->independent   Communication 0-->understands/communicates without difficulty   Swallowing 0-->swallows foods/liquids without difficulty   Cognition 0 - no cognition issues reported   Fall history within last six months yes   Number of times patient has fallen within last six months 1   Which of the above functional risks had a recent onset or change? ambulation  (walking and slipped on hill pulling dumpsters)   Prior Functional Level Comment active gentleman   General Information   Onset of Illness/Injury or Date of Surgery - Date 03/01/18   Referring Physician Dr Parra   Patient/Family Goals Statement Whatever is recommended.   Pertinent History of Current Problem (include personal factors and/or comorbidities that impact the POC) Fell--per above notes---fracture in left distal femur with surgery.  NWB Left leg.   Precautions/Limitations fall precautions;other (see comments)  (NWB L LE)   Weight-Bearing Status - LLE nonweight-bearing   General Observations IV fluids in use, CO2 monitor in use--all within normal limits.  "  Cognitive Status Examination   Orientation orientation to person, place and time   Level of Consciousness alert   Follows Commands and Answers Questions able to follow single-step instructions   Personal Safety and Judgment intact   Memory intact   Cognitive Comment needs single step instructions once motor contorl/movement became invovled.   Pain Assessment   Patient Currently in Pain No   Bed Mobility   Bed Mobility Comments moves very well in bed while maintaining NWB left LE--uses rails on bed--can scoot to sides, roll and scoot up and down in bed   Transfer Skills   Transfer Comments sit to and from stand tested today--no SPT due to amount of assist on sit-stand and off midline awareness.  Min A with walker to stand and to sit.   Gait   Gait Comments not tested today due to poor upright standing with walker.   Balance   Balance Comments Seated with CGA half the time due to right lean.  Standing mod assist at walker due to same severe right lean with no awareness to off center posture.   General Therapy Interventions   Planned Therapy Interventions balance training;gait training;transfer training   Clinical Impression   Criteria for Skilled Therapeutic Intervention yes, treatment indicated   PT Diagnosis gait disturbance, balance distrubance, recent surgery   Influenced by the following impairments poor mobility, sense of balance   Functional limitations due to impairments transfers and gait   Clinical Presentation Stable/Uncomplicated   Clinical Decision Making (Complexity) Low complexity   Therapy Frequency` 2 times/day   Predicted Duration of Therapy Intervention (days/wks) 3 days   Anticipated Equipment Needs at Discharge other (see comments)  (defer to TCU)   Anticipated Discharge Disposition Transitional Care Facility   Risk & Benefits of therapy have been explained Yes   Patient, Family & other staff in agreement with plan of care Yes   [facility] AM-PAC TM \"6 Clicks\"   2016, Trustees of [employer] " "[place], under license to [employer].  All rights reserved.   6 Clicks Short Forms Basic Mobility Inpatient Short Form   [facility] AM-PAC  \"6 Clicks\" V.2 Basic Mobility Inpatient Short Form   1. Turning from your back to your side while in a flat bed without using bedrails? 4 - None   2. Moving from lying on your back to sitting on the side of a flat bed without using bedrails? 3 - A Little   3. Moving to and from a bed to a chair (including a wheelchair)? 2 - A Lot   4. Standing up from a chair using your arms (e.g., wheelchair, or bedside chair)? 3 - A Little   5. To walk in hospital room? 1 - Total   6. Climbing 3-5 steps with a railing? 1 - Total   Basic Mobility Raw Score (Score out of 24.Lower scores equate to lower levels of function) 14   Total Evaluation Time   Total Evaluation Time (Minutes) 25     "

## 2018-03-05 ENCOUNTER — APPOINTMENT (OUTPATIENT)
Dept: OCCUPATIONAL THERAPY | Facility: CLINIC | Age: 83
DRG: 481 | End: 2018-03-05
Payer: MEDICARE

## 2018-03-05 ENCOUNTER — APPOINTMENT (OUTPATIENT)
Dept: PHYSICAL THERAPY | Facility: CLINIC | Age: 83
DRG: 481 | End: 2018-03-05
Payer: MEDICARE

## 2018-03-05 PROBLEM — E83.39 HYPOPHOSPHATEMIA: Status: ACTIVE | Noted: 2018-03-05

## 2018-03-05 PROBLEM — I49.9 DYSRHYTHMIA: Status: ACTIVE | Noted: 2018-03-04

## 2018-03-05 LAB
ANION GAP SERPL CALCULATED.3IONS-SCNC: 7 MMOL/L (ref 3–14)
BUN SERPL-MCNC: 27 MG/DL (ref 7–30)
CALCIUM SERPL-MCNC: 7.1 MG/DL (ref 8.5–10.1)
CHLORIDE SERPL-SCNC: 107 MMOL/L (ref 94–109)
CO2 SERPL-SCNC: 24 MMOL/L (ref 20–32)
CREAT SERPL-MCNC: 1 MG/DL (ref 0.66–1.25)
GFR SERPL CREATININE-BSD FRML MDRD: 71 ML/MIN/1.7M2
GLUCOSE BLDC GLUCOMTR-MCNC: 113 MG/DL (ref 70–99)
GLUCOSE SERPL-MCNC: 116 MG/DL (ref 70–99)
HGB BLD-MCNC: 7.6 G/DL (ref 13.3–17.7)
HGB BLD-MCNC: 8 G/DL (ref 13.3–17.7)
INR PPP: 1.26 (ref 0.86–1.14)
MAGNESIUM SERPL-MCNC: 2.3 MG/DL (ref 1.6–2.3)
PHOSPHATE SERPL-MCNC: 2.3 MG/DL (ref 2.5–4.5)
POTASSIUM SERPL-SCNC: 4.3 MMOL/L (ref 3.4–5.3)
SODIUM SERPL-SCNC: 138 MMOL/L (ref 133–144)

## 2018-03-05 PROCEDURE — 36415 COLL VENOUS BLD VENIPUNCTURE: CPT | Performed by: FAMILY MEDICINE

## 2018-03-05 PROCEDURE — 25000132 ZZH RX MED GY IP 250 OP 250 PS 637: Mod: GY | Performed by: PEDIATRICS

## 2018-03-05 PROCEDURE — 40000193 ZZH STATISTIC PT WARD VISIT: Performed by: PHYSICAL THERAPIST

## 2018-03-05 PROCEDURE — 97165 OT EVAL LOW COMPLEX 30 MIN: CPT | Mod: GO | Performed by: OCCUPATIONAL THERAPIST

## 2018-03-05 PROCEDURE — 97110 THERAPEUTIC EXERCISES: CPT | Mod: GP | Performed by: PHYSICAL THERAPIST

## 2018-03-05 PROCEDURE — 40000133 ZZH STATISTIC OT WARD VISIT: Performed by: OCCUPATIONAL THERAPIST

## 2018-03-05 PROCEDURE — 25000132 ZZH RX MED GY IP 250 OP 250 PS 637: Mod: GY | Performed by: FAMILY MEDICINE

## 2018-03-05 PROCEDURE — 80048 BASIC METABOLIC PNL TOTAL CA: CPT | Performed by: FAMILY MEDICINE

## 2018-03-05 PROCEDURE — 97530 THERAPEUTIC ACTIVITIES: CPT | Mod: GO | Performed by: OCCUPATIONAL THERAPIST

## 2018-03-05 PROCEDURE — 25000128 H RX IP 250 OP 636: Performed by: FAMILY MEDICINE

## 2018-03-05 PROCEDURE — 85610 PROTHROMBIN TIME: CPT | Performed by: FAMILY MEDICINE

## 2018-03-05 PROCEDURE — 85018 HEMOGLOBIN: CPT | Performed by: FAMILY MEDICINE

## 2018-03-05 PROCEDURE — A9270 NON-COVERED ITEM OR SERVICE: HCPCS | Mod: GY | Performed by: ORTHOPAEDIC SURGERY

## 2018-03-05 PROCEDURE — 00000146 ZZHCL STATISTIC GLUCOSE BY METER IP

## 2018-03-05 PROCEDURE — 83735 ASSAY OF MAGNESIUM: CPT | Performed by: FAMILY MEDICINE

## 2018-03-05 PROCEDURE — 99207 ZZC CDG-MDM COMPONENT: MEETS LOW - DOWN CODED: CPT | Performed by: PEDIATRICS

## 2018-03-05 PROCEDURE — 97530 THERAPEUTIC ACTIVITIES: CPT | Mod: GP | Performed by: PHYSICAL THERAPIST

## 2018-03-05 PROCEDURE — 99232 SBSQ HOSP IP/OBS MODERATE 35: CPT | Performed by: PEDIATRICS

## 2018-03-05 PROCEDURE — 84100 ASSAY OF PHOSPHORUS: CPT | Performed by: FAMILY MEDICINE

## 2018-03-05 PROCEDURE — A9270 NON-COVERED ITEM OR SERVICE: HCPCS | Mod: GY | Performed by: FAMILY MEDICINE

## 2018-03-05 PROCEDURE — 25000125 ZZHC RX 250: Performed by: FAMILY MEDICINE

## 2018-03-05 PROCEDURE — A9270 NON-COVERED ITEM OR SERVICE: HCPCS | Mod: GY | Performed by: INTERNAL MEDICINE

## 2018-03-05 PROCEDURE — 12000007 ZZH R&B INTERMEDIATE

## 2018-03-05 PROCEDURE — 25000132 ZZH RX MED GY IP 250 OP 250 PS 637: Mod: GY | Performed by: INTERNAL MEDICINE

## 2018-03-05 PROCEDURE — 25000132 ZZH RX MED GY IP 250 OP 250 PS 637: Mod: GY | Performed by: ORTHOPAEDIC SURGERY

## 2018-03-05 RX ORDER — POLYETHYLENE GLYCOL 3350 17 G/17G
17 POWDER, FOR SOLUTION ORAL DAILY PRN
Status: DISCONTINUED | OUTPATIENT
Start: 2018-03-05 | End: 2018-03-06 | Stop reason: HOSPADM

## 2018-03-05 RX ORDER — FERROUS SULFATE 325(65) MG
325 TABLET ORAL 2 TIMES DAILY WITH MEALS
Status: DISCONTINUED | OUTPATIENT
Start: 2018-03-05 | End: 2018-03-06 | Stop reason: HOSPADM

## 2018-03-05 RX ORDER — WARFARIN SODIUM 5 MG/1
5 TABLET ORAL
Status: COMPLETED | OUTPATIENT
Start: 2018-03-05 | End: 2018-03-05

## 2018-03-05 RX ADMIN — ACETAMINOPHEN 975 MG: 325 TABLET ORAL at 13:40

## 2018-03-05 RX ADMIN — SENNOSIDES AND DOCUSATE SODIUM 2 TABLET: 8.6; 5 TABLET ORAL at 18:09

## 2018-03-05 RX ADMIN — SODIUM CHLORIDE: 9 INJECTION, SOLUTION INTRAVENOUS at 06:02

## 2018-03-05 RX ADMIN — Medication 1 MG: at 01:35

## 2018-03-05 RX ADMIN — ACETAMINOPHEN 975 MG: 325 TABLET ORAL at 21:13

## 2018-03-05 RX ADMIN — POTASSIUM PHOSPHATE, MONOBASIC AND POTASSIUM PHOSPHATE, DIBASIC 15 MMOL: 224; 236 INJECTION, SOLUTION INTRAVENOUS at 09:46

## 2018-03-05 RX ADMIN — OXYCODONE HYDROCHLORIDE 10 MG: 5 TABLET ORAL at 01:34

## 2018-03-05 RX ADMIN — SENNOSIDES AND DOCUSATE SODIUM 1 TABLET: 8.6; 5 TABLET ORAL at 10:10

## 2018-03-05 RX ADMIN — POLYETHYLENE GLYCOL 3350 17 G: 17 POWDER, FOR SOLUTION ORAL at 21:13

## 2018-03-05 RX ADMIN — ATORVASTATIN CALCIUM 20 MG: 10 TABLET, FILM COATED ORAL at 09:42

## 2018-03-05 RX ADMIN — Medication 1 MG: at 23:49

## 2018-03-05 RX ADMIN — Medication 12.5 MG: at 09:42

## 2018-03-05 RX ADMIN — ACETAMINOPHEN 975 MG: 325 TABLET ORAL at 04:34

## 2018-03-05 RX ADMIN — FERROUS SULFATE TAB 325 MG (65 MG ELEMENTAL FE) 325 MG: 325 (65 FE) TAB at 18:09

## 2018-03-05 RX ADMIN — WARFARIN SODIUM 5 MG: 5 TABLET ORAL at 18:09

## 2018-03-05 ASSESSMENT — ACTIVITIES OF DAILY LIVING (ADL): PREVIOUS_RESPONSIBILITIES: MEAL PREP;HOUSEKEEPING;SHOPPING;LAUNDRY;YARDWORK;MEDICATION MANAGEMENT;FINANCES;DRIVING;WORK

## 2018-03-05 NOTE — PHARMACY-ANTICOAGULATION SERVICE
Clinical Pharmacy - Warfarin Dosing Consult     Pharmacy has been consulted to manage this patient s warfarin therapy.  Indication: Atrial Fibrillation  Therapy Goal: Other - see comments  Warfarin Prior to Admission: Yes  Warfarin PTA Regimen: 2.5mg tuesday and saturday and 5 mg the rest of the week  Recent documented change in oral intake/nutrition: No  Dose Comments: INR goal of 1.8-2.5    INR   Date Value Ref Range Status   03/05/2018 1.26 (H) 0.86 - 1.14 Final   03/04/2018 1.09 0.86 - 1.14 Final     Chromogenic Factor 10   Date Value Ref Range Status   04/24/2015 21 (L) 70 - 130 % Final     Comment:     Therapeutic Range:  A Chromogenic Factor 10 level of approximately 20-40%   inversely correlates with an INR of 2-3 for patients receiving Warfarin.   Chromogenic Factor 10 levels below 20% indicate an INR greater than 3 and   levels above 40% indicate an INR less than 2.         Recommend warfarin 5 mg today.  Pharmacy will monitor Tomer Weiss daily and order warfarin doses to achieve specified goal.      Please contact pharmacy as soon as possible if the warfarin needs to be held for a procedure or if the warfarin goals change.

## 2018-03-05 NOTE — PROGRESS NOTES
S-(situation): Pt.complained of not feeling good after being transferred to chair    B-(background): ORIF Left femur    A-(assessment): Pt.transferred per Tricia Kaur with physical therapist and me and he complained of not feeling good and felt funny in the head.Pt.assisted back to bed and blood pressure was 124/78 and heart rate was 98.    R-(recommendations): Monitor pt.for any more symptoms with activity.

## 2018-03-05 NOTE — PLAN OF CARE
Problem: Patient Care Overview  Goal: Plan of Care/Patient Progress Review  Discharge Planner PT   Patient plan for discharge: TCU  Current status: Mod A x 2 for transfer to chair with Tricia Saba NWB L LE. Supine to sit SBA. Leans to R with sitting and with standing but appears to be in attempt to navigate his L leg with brace on and not related to true lack of trunk control though the standing frame assist of 2 is needed. Pain controlled. MD approved post op ex, use of brace during transfers and will transition him to a hinged brace we can do exercise in. Please refer to Dr Craft note for this interaction.   Barriers to return to prior living situation: Level of assist, NWB status  Recommendations for discharge: TCU  Rationale for recommendations: as above       Entered by: Viridiana Mario 03/05/2018 10:19 AM         Viridiana Mario................... PT, DPT, CLT   3/5/2018, 10:21 AM  [phone]          PM session attempted to assist up to chair for lunchtime. Min A x 2 for transfer NWB L LE in Standing frame, once to chair states doesn't feel well. Assisted with nursing back to bed. Can sit up this PM if doing better. Son present for session and discussed PT goals/plan of care.   Viridiana Mario................... PT, DPT, CLT   3/5/2018, 12:21 PM  [phone]

## 2018-03-05 NOTE — CONSULTS
CARE TRANSITION SOCIAL WORK INITIAL ASSESSMENT:  Reason For Consult: discharge planning   Met with: Patient.    DATA  Principal Problem:    Closed displaced supracondylar fracture of distal end of left femur without intracondylar extension (H)  Active Problems:    Malignant neoplasm of kidney excluding renal pelvis (H)    Hyperlipidemia LDL goal <130    Total knee replacement status    Long-term (current) use of anticoagulants [Z79.01]    Atrial fibrillation (HCC) [I48.91]    Periprosthetic fracture around internal prosthetic left knee joint    Pulmonary hypertension    Femur fracture, left (H)    Fracture, femur, supracondylar (H)    Paroxysmal ventricular tachycardia - 4 beat run    Hypotension, unspecified hypotension type       Primary Care Clinic Name: [facility]  Primary Care MD Name: Dr. Gabriel Tan    ASSESSMENT  Cognitive Status: awake, alert and oriented.       Resources List: Skilled Nursing Facility     Lives With: child(benedict), adult, grandchild(benedict)  Living Arrangements: house  Quality Of Family Relationships: supportive  Description of Support System: Involved, Supportive   Who is your support system?: Children       Insurance Concerns: No Insurance issues identified        This writer met with pt introduced self and role. Discussed discharge planning and medicare guidelines in regards to home care and SNF benefits.  Discussed options for discharge.  Patient thinks with his non-weight bearing status that he will need TCU at discharge.  Provided list of TCU options.  Patient requests that referrals be sent to [facility] (Admissions: [phone] Main Phone: [phone] Fax: [phone]) and [facility] (Main Phone: [phone] Admissions Phone: [phone] Fax: [phone]).  Talked with Sabrina at [facility].  They are assessing for admission tomorrow.      PLAN    TCU    Discharge Planner   Discharge Plans in progress: TCU  Barriers to discharge plan: None  Follow up  plan: Ortho    RICHARD Maguire  [facility] [phone]/ [facility] [phone]         Entered by: Aliza Dietrich 03/05/2018 10:33 AM

## 2018-03-05 NOTE — PLAN OF CARE
Problem: Patient Care Overview  Goal: Plan of Care/Patient Progress Review  Outcome: Improving  Pt.s blood pressure has been more stable this shift.His appetite has been poor.Pt.was given senna this morning for no b.m since the 3/1.He does have bowel sounds.Pt.had aquacel dressing placed this morning.His incision is dry and intact without signs of infection.His CMS is good he has some numbness in his left leg from the knee to the foot at baseline.New brace placed per P.A.

## 2018-03-05 NOTE — PROGRESS NOTES
SPIRITUAL HEALTH SERVICES  SPIRITUAL ASSESSMENT Progress Note  [facility]      (Follow up)   provided a prayer.   is available for pt/family needs.    Emmett Walton M.Div., [facility]  Staff   Office tel: [phone]

## 2018-03-05 NOTE — PROGRESS NOTES
[facility]    Hospitalist Progress Note    Date of Service (when I saw the patient): 03/05/2018    Assessment & Plan   Tomer Weiss is a 85 year old male who was admitted on 3/1/2018 with traumatic left supracondylar femur fracture adjacent to his total knee prosthesis for which he underwent operative repair 2 days ago.  He has had a 2 g drop in hemoglobin due to fracture and surgery, but transient hypotension yesterday resolved and has not recurred.  Rate of chronic atrial fibrillation remains adequately controlled.  He remains on warfarin chronically with subtherapeutic INR today.  Although there was question of a possible brief episode of wide-complex tachycardia yesterday, those telemetry strips are reviewed again today and do not appear to demonstrate a wide complex tachycardia but rather are consistent with narrow complex tachycardia with transient axis change.  He was found to have hypophosphatemia yesterday which has improved with replacement.  Overall, his acute medical problems are improving and his chronic medical problems appear to be stable.    Principal Problem:    Closed displaced supracondylar fracture of distal end of left femur without intracondylar extension (H)  Active Problems:    Periprosthetic fracture around internal prosthetic left knee joint    Anemia    Chronic atrial fibrillation (H)    Pulmonary hypertension    Hypophosphatemia    Malignant neoplasm of kidney excluding renal pelvis (H)    Hyperlipidemia LDL goal <130    Total knee replacement status    Long-term (current) use of anticoagulants [Z79.01]    Dysrhythmia 4 beat run narrow complex    Hypotension, unspecified hypotension type    May discontinue IV fluids today from a medical standpoint  May discontinue telemetry  Continue present dose of beta-blocker  Continue warfarin with dosing adjusted depending upon INR  Replace phosphorus according to protocol  Medically appears appropriate for  discharge once he is ready from an orthopedic standpoint, discussed with Dr. Silver of orthopedic surgery today    Pain Plan: # Pain Assessment:   Current Pain Score 3/5/2018 3/4/2018 3/4/2018   Patient currently in pain? yes denies denies   Pain score (0-10) - - -   Pain location Foot - -   Pain descriptors Aching - -   Postoperative pain management deferred to orthopedic surgery    DVT Prophylaxis: Warfarin and Pneumatic Compression Devices  Code Status: Full Code    Disposition: Deferred to orthopedic surgery.    Rai Evans    Interval History   Left leg pain has generally been controlled with oral medications.  He has been afebrile with stable vital signs.  Oxygenation is normal.  Urine output has been adequate although low.  He is tolerating advancing diet.  He offers no new complaints.    -Data reviewed today: I reviewed all new labs and imaging results over the last 24 hours. I personally reviewed Telemetry strips including strip from yesterday that demonstrated a 4 beat run of narrow complex tachycardia with abrupt change in axis during that run.  No episodes of wide-complex tachycardia were seen on the available telemetry strips..    Physical Exam   Temp: 96.1  F (35.6  C) Temp src: Oral BP: 117/65 Pulse: 98 Heart Rate: 98 Resp: 18 SpO2: 92 % O2 Device: None (Room air)    Vitals:    03/01/18 1930 03/01/18 2302   Weight: 88.5 kg (195 lb) 88.1 kg (194 lb 3.6 oz)     Vital Signs with Ranges  Temp:  [96.1  F (35.6  C)-97.8  F (36.6  C)] 96.1  F (35.6  C)  Pulse:  [] 98  Heart Rate:  [91-98] 98  Resp:  [18] 18  BP: ()/(35-65) 117/65  SpO2:  [92 %-100 %] 92 %  I/O last 3 completed shifts:  In: 6391.67 [P.O.:1950; I.V.:3441.67; IV Piggyback:1000]  Out: 450 [Urine:450]    Constitutional: No acute distress resting in bed  Cardiovascular: Irregularly irregular heart rate and rhythm    Medications     Warfarin Therapy Reminder       sodium chloride 125 mL/hr at 03/05/18 0602       warfarin  5 mg Oral  ONCE at 18:00     atorvastatin  20 mg Oral Daily     acetaminophen  975 mg Oral Q8H     metoprolol tartrate  12.5 mg Oral QAM     sodium chloride (PF)  3 mL Intracatheter Q8H       Data   Data reviewed today:  I personally reviewed telemetry strips.    Recent Labs  Lab 03/05/18  0527 03/04/18  1547 03/04/18  0526 03/03/18  1015 03/03/18  0535  03/01/18 2000   WBC  --   --   --   --   --   --  8.7   HGB 8.0*  --  8.2* 10.1* 9.8*  --  13.2*   MCV  --   --   --   --   --   --  98   PLT  --   --   --   --   --   --  281   INR 1.26*  --  1.09  --  1.11  < > 2.10*     --   --   --   --   --  142   POTASSIUM 4.3 4.2  --   --   --   --  4.4   CHLORIDE 107  --   --   --   --   --  104   CO2 24  --   --   --   --   --  28   BUN 27  --   --   --   --   --  26   CR 1.00  --   --   --   --   --  0.88   ANIONGAP 7  --   --   --   --   --  10   KARTHIK 7.1*  --   --   --   --   --  8.3*   *  --  130*  --   --   --  106*   < > = values in this interval not displayed.    Phosphorus 2.3 today

## 2018-03-05 NOTE — PROGRESS NOTES
03/05/18 0900   Quick Adds   Type of Visit Initial Occupational Therapy Evaluation   Living Environment   Lives With child(benedict), adult;grandchild(benedict)   Living Arrangements house   Home Accessibility bed and bath on same level;ramps present at home;grab bars present (toilet);grab bars present (bathtub)   Number of Stairs to Enter Home 0   Number of Stairs Within Home 0   Stair Railings at Home outside, present at both sides   Transportation Available van, wheelchair accessible   Living Environment Comment Patient lives with his adult daughter and granddaughter, however they are gone during the day, has a son that is available for assistance as needed as well as a fiancee    Self-Care   Dominant Hand right   Usual Activity Tolerance good   Current Activity Tolerance moderate   Regular Exercise no   Equipment Currently Used at Home none  (reports access to walker, crutches, wheelchair as needed )   Activity/Exercise/Self-Care Comment Independent at baseline with ADL's, has access to equipment listed above    Functional Level Prior   Ambulation 0-->independent   Transferring 0-->independent   Toileting 0-->independent   Bathing 0-->independent   Dressing 0-->independent   Eating 0-->independent   Communication 0-->understands/communicates without difficulty   Swallowing 0-->swallows foods/liquids without difficulty   Cognition 0 - no cognition issues reported   Fall history within last six months yes   Number of times patient has fallen within last six months 1   Which of the above functional risks had a recent onset or change? ambulation;transferring   Prior Functional Level Comment Independent and active at baseline with ADL's and functional tranfers    General Information   Onset of Illness/Injury or Date of Surgery - Date 03/03/18   Referring Physician Dr. Parra    Patient/Family Goals Statement Whatever is necessary for recovery    Additional Occupational Profile Info/Pertinent History of Current Problem Open  reduction internal fixation of distal R femur    Precautions/Limitations fall precautions   Weight-Bearing Status - LUE full weight-bearing   Weight-Bearing Status - RUE full weight-bearing   Weight-Bearing Status - LLE full weight-bearing   Weight-Bearing Status - RLE nonweight-bearing   Cognitive Status Examination   Orientation orientation to person, place and time   Level of Consciousness alert   Able to Follow Commands success, 1-step commands   Personal Safety (Cognitive) WNL/WFL   Memory intact   Attention No deficits were identified   Organization/Problem Solving No deficits were identified   Executive Function No deficits were identified   Cognitive Comment With motor planning challenge, needs one step verbal directives    Visual Perception   Visual Perception Wears glasses   Sensory Examination   Sensory Comments Reports baseline numbness through RLE.     Pain Assessment   Patient Currently in Pain Yes, see Vital Sign flowsheet   Range of Motion (ROM)   ROM Comment UE ROM WNL    Strength   Strength Comments generalized weakness consistent with post-op status and surgical intervention    Coordination   Upper Extremity Coordination No deficits were identified   Mobility   Bed Mobility Bed mobility skill: Sit to supine;Bed mobility skill: Supine to sit   Bed Mobility Skill: Sit to Supine   Level of Noble: Sit/Supine minimum assist (75% patients effort)   Physical Assist/Nonphysical Assist: Sit/Supine 1 person assist;verbal cues;supervision   Assistive Device: Sit/Supine bedrail   Bed Mobility Skill: Supine to Sit   Level of Noble: Supine/Sit stand-by assist   Physical Assist/Nonphysical Assist: Supine/Sit supervision   Transfer Skill: Bed to Chair/Chair to Bed   Level of Noble: Bed to Chair maximum assist (25% patients effort)   Physical Assist/Nonphysical Assist: Bed to Chair 1 person + 1 person to manage equipment;verbal cues;supervision   Weight-Bearing Restrictions nonweight-bearing    Assistive Device - Transfer Skill Bed to Chair Chair to Bed Rehab Eval (Tricia Steady utilized )   Transfer Skill: Sit to Stand   Level of Baker: Sit/Stand maximum assist (25% patients effort)   Physical Assist/Nonphysical Assist: Sit/Stand 1 person assist;verbal cues;supervision   Transfer Skill: Sit to Stand nonweight-bearing   Assistive Device for Transfer: Sit/Stand rolling walker   Transfer Skill: Toilet Transfer   Toilet Transfer Skill Comments Not attempted this date due to level of assist needed in transfer    Upper Body Dressing   Level of Baker: Dress Upper Body stand-by assist   Physical Assist/Nonphysical Assist: Dress Upper Body set-up required   Lower Body Dressing   Level of Baker: Dress Lower Body moderate assist (50% patients effort)   Physical Assist/Nonphysical Assist: Dress Lower Body 1 person assist;verbal cues;supervision   Toileting   Level of Baker: Toilet dependent (less than 25% patients effort)  (bed pan and urinal use )   Grooming   Level of Baker: Grooming stand-by assist   Physical Assist/Nonphysical Assist: Grooming supervision  (in sitting )   Eating/Self Feeding   Level of Baker: Eating independent   Instrumental Activities of Daily Living (IADL)   Previous Responsibilities meal prep;housekeeping;shopping;laundry;yardwork;medication management;finances;driving;work   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    General Therapy Interventions   Planned Therapy Interventions ADL retraining;transfer training;progressive activity/exercise   Clinical Impression   Criteria for Skilled Therapeutic Interventions Met yes, treatment indicated   OT Diagnosis Decreased independence in ADL's and functional transfers    Influenced by the following impairments pain, post surgical precautions on weightbearing    Assessment of Occupational Performance 3-5 Performance Deficits   Identified Performance Deficits dressing, bathing, functional transfers   "  Clinical Decision Making (Complexity) Low complexity   Therapy Frequency daily   Predicted Duration of Therapy Intervention (days/wks) 2 days    Anticipated Equipment Needs at Discharge (Defer to TCU )   Anticipated Discharge Disposition Transitional Care Facility   Risks and Benefits of Treatment have been explained. Yes   Patient, Family & other staff in agreement with plan of care Yes   [facility] TM \"6 Clicks\"   2016, Trustees of [facility], under license to [employer].  All rights reserved.   6 Clicks Short Forms Daily Activity Inpatient Short Form   [facility]  \"6 Clicks\" Daily Activity Inpatient Short Form   1. Putting on and taking off regular lower body clothing? 2 - A Lot   2. Bathing (including washing, rinsing, drying)? 1 - Total   3. Toileting, which includes using toilet, bedpan or urinal? 2 - A Lot   4. Putting on and taking off regular upper body clothing? 3 - A Little   5. Taking care of personal grooming such as brushing teeth? 3 - A Little   6. Eating meals? 4 - None   Daily Activity Raw Score (Score out of 24.Lower scores equate to lower levels of function) 15   Total Evaluation Time   Total Evaluation Time (Minutes) 10        Taya TREVIZO/HAYDEN    "

## 2018-03-05 NOTE — PROGRESS NOTES
Patient accepted at [facility] (Admissions: [phone] Main Phone: [phone] Fax: [phone]) when ready for discharge.  Patient is aware of the $14/day private room fee at [facility].  Patient will likely need van transport.      PAS-RR    D: Per DHS regulation, SW completed and submitted PAS-RR to [place] Board on [place] Direct Connect via the Senior LinkAge Line.  PAS-RR confirmation # is : [id number]    P: Further questions may be directed to Senior LinkAge Line at #[phone], option #4 for PAS-RR staff.    RICHARD Maguire  [facility] [phone]/ [facility] [phone]

## 2018-03-05 NOTE — PROVIDER NOTIFICATION
When ace wrap and dressing removed pt.was noted to have approx 6in reddened area which was blanchable which was on the shin bone.Will continue to monitor.

## 2018-03-05 NOTE — PROGRESS NOTES
[facility] Orthopedic Post-Op Note         Assessment and Plan:    Assessment:   Post-operative day #2  Open reduction and internal fixation of the distal femur ronald-prosthetic fracture  (Left)  Procedure(s):  OPEN REDUCTION INTERNAL FIXATION FEMUR DISTAL  Doing well.  No immediate surgical complications identified.  No excessive bleeding  Pain well-controlled.  Tolerating physical therapy and rehabilitation well.       Plan:   We will switch him from a knee immobilizer to a hinged knee brace.  We will allow him to begin range of motion more routinely 0-90 .  The hinged knee brace can be locked for attempts at ambulation.  He must continue to maintain nonweightbearing status.    As he becomes more alert he may remove the immobilizer while in bed only and preferably for hygiene only.    Anticipate transferring to a extended care facility within the next 24-48 hours.    {# Pain Assessment:     Current Pain Score 3/5/2018 3/4/2018 3/4/2018   Patient currently in pain? yes denies denies   Pain score (0-10) - - -   Pain location Foot - -   Pain descriptors Aching - -   - Tomer is experiencing pain due to previous fracture and surgery. Pain management was discussed and the plan was created in a collaborative fashion.  Tomer's response to the current recommendations: engaged                   Interval History:   Doing well.  Continues to improve.  Pain is well-controlled.  No fevers.              Physical Exam:   All vitals have been reviewed  Patient Vitals for the past 8 hrs:   BP Temp Temp src Pulse Heart Rate Resp SpO2   03/05/18 1142 99/58 96  F (35.6  C) Oral 89 - 20 94 %   03/05/18 0942 117/65 - - 98 98 - -   03/05/18 0718 111/58 96.1  F (35.6  C) Oral 101 - 18 92 %     I/O last 3 completed shifts:  In: 6391.67 [P.O.:1950; I.V.:3441.67; IV Piggyback:1000]  Out: 450 [Urine:450]    Wound clean and dry with minimal or no drainage.  Surrounding skin with minimal erythema.  Dressing was changed  today.  SHANELL stockings was used instead of a bulky dressing.  I was able to begin extension and flexion of the knee which was tolerated well.           Data:   All laboratory/imaging data related to this surgery reviewed  Results for orders placed or performed during the hospital encounter of 03/01/18 (from the past 24 hour(s))   Magnesium   Result Value Ref Range    Magnesium 1.9 1.6 - 2.3 mg/dL   Phosphorus   Result Value Ref Range    Phosphorus 2.0 (L) 2.5 - 4.5 mg/dL   Potassium   Result Value Ref Range    Potassium 4.2 3.4 - 5.3 mmol/L   INR   Result Value Ref Range    INR 1.26 (H) 0.86 - 1.14   Hemoglobin   Result Value Ref Range    Hemoglobin 8.0 (L) 13.3 - 17.7 g/dL   Basic metabolic panel   Result Value Ref Range    Sodium 138 133 - 144 mmol/L    Potassium 4.3 3.4 - 5.3 mmol/L    Chloride 107 94 - 109 mmol/L    Carbon Dioxide 24 20 - 32 mmol/L    Anion Gap 7 3 - 14 mmol/L    Glucose 116 (H) 70 - 99 mg/dL    Urea Nitrogen 27 7 - 30 mg/dL    Creatinine 1.00 0.66 - 1.25 mg/dL    GFR Estimate 71 >60 mL/min/1.7m2    GFR Estimate If Black 86 >60 mL/min/1.7m2    Calcium 7.1 (L) 8.5 - 10.1 mg/dL   Magnesium   Result Value Ref Range    Magnesium 2.3 1.6 - 2.3 mg/dL   Phosphorus   Result Value Ref Range    Phosphorus 2.3 (L) 2.5 - 4.5 mg/dL   Glucose by meter   Result Value Ref Range    Glucose 113 (H) 70 - 99 mg/dL        Deonte Silver MD

## 2018-03-05 NOTE — PLAN OF CARE
Problem: Patient Care Overview  Goal: Plan of Care/Patient Progress Review  Outcome: Therapy, progress toward functional goals is gradual  Discharge Planner OT   Patient plan for discharge: TCU   Current status: Evaluation completed, treatment initiated.  Patient lives in a one story (with basement) with his daughter, ramp access available. Prior to incident and subsequent surgical intervention, patient was independent in ADL's and functional transfers without the use of AE.  Currently, patient is SBA from upright supine to sitting at EOB, mod A x2 for sit <> stand with use of Tricia Steady and cues to correct for left lateral lean in both sitting and supported standing within weightbearing precautions.    Barriers to return to prior living situation: Level of assist, non-weightbearing status through LLE.     Recommendations for discharge: TCU   Rationale for recommendations: To safety progress independence in ADL's and functional transfers within weightbearing precautions.         Entered by: Taya Jeong 03/05/2018 9:49 AM

## 2018-03-05 NOTE — PLAN OF CARE
Problem: Patient Care Overview  Goal: Plan of Care/Patient Progress Review  Outcome: Improving  Patient reported having pain in the top of his left foot which is a healed scar from a previous injury. He stated that he hasn't had pain from that injury in a long time and is surprised it is back. Patient reports no pain in his incision site or up his left leg. Patient has some baseline numbness from a previous surgery in his left leg. Knee immobilizer is in place. Initially the pedal pulse was palpated and strong bilaterally, through the night swelling increased slightly to the left foot and a doppler was used to locate the pedal pulse. ACE wrap was rewrapped per MD suggestion in note, in the event of increased swelling.  Patient is tolerating incentive spirometer well and lungs are clear. Patient had low blood pressures during the day yesterday but blood pressures were stable through the night. Last BM was 3/1 but patient is moving gas and bowel sounds are active in all quadrants. Urine output had increased overnight, but urine is kelly/concentrated. Vital signs otherwise stable, afebrile. Will continue to monitor.

## 2018-03-06 ENCOUNTER — APPOINTMENT (OUTPATIENT)
Dept: OCCUPATIONAL THERAPY | Facility: CLINIC | Age: 83
DRG: 481 | End: 2018-03-06
Payer: MEDICARE

## 2018-03-06 ENCOUNTER — APPOINTMENT (OUTPATIENT)
Dept: PHYSICAL THERAPY | Facility: CLINIC | Age: 83
DRG: 481 | End: 2018-03-06
Payer: MEDICARE

## 2018-03-06 VITALS
SYSTOLIC BLOOD PRESSURE: 115 MMHG | BODY MASS INDEX: 26.31 KG/M2 | WEIGHT: 194.22 LBS | HEART RATE: 94 BPM | OXYGEN SATURATION: 96 % | RESPIRATION RATE: 20 BRPM | HEIGHT: 72 IN | DIASTOLIC BLOOD PRESSURE: 69 MMHG | TEMPERATURE: 97.4 F

## 2018-03-06 LAB
HGB BLD-MCNC: 7.5 G/DL (ref 13.3–17.7)
INR PPP: 1.93 (ref 0.86–1.14)
MAGNESIUM SERPL-MCNC: 2.3 MG/DL (ref 1.6–2.3)
PHOSPHATE SERPL-MCNC: 2.1 MG/DL (ref 2.5–4.5)

## 2018-03-06 PROCEDURE — 36415 COLL VENOUS BLD VENIPUNCTURE: CPT | Performed by: FAMILY MEDICINE

## 2018-03-06 PROCEDURE — 97110 THERAPEUTIC EXERCISES: CPT | Mod: GP | Performed by: PHYSICAL THERAPIST

## 2018-03-06 PROCEDURE — A9270 NON-COVERED ITEM OR SERVICE: HCPCS | Mod: GY | Performed by: ORTHOPAEDIC SURGERY

## 2018-03-06 PROCEDURE — 25000132 ZZH RX MED GY IP 250 OP 250 PS 637: Mod: GY | Performed by: FAMILY MEDICINE

## 2018-03-06 PROCEDURE — A9270 NON-COVERED ITEM OR SERVICE: HCPCS | Mod: GY | Performed by: FAMILY MEDICINE

## 2018-03-06 PROCEDURE — 40000193 ZZH STATISTIC PT WARD VISIT: Performed by: PHYSICAL THERAPIST

## 2018-03-06 PROCEDURE — 97535 SELF CARE MNGMENT TRAINING: CPT | Mod: GO

## 2018-03-06 PROCEDURE — 25000132 ZZH RX MED GY IP 250 OP 250 PS 637: Mod: GY | Performed by: PEDIATRICS

## 2018-03-06 PROCEDURE — 99232 SBSQ HOSP IP/OBS MODERATE 35: CPT | Performed by: PEDIATRICS

## 2018-03-06 PROCEDURE — 84100 ASSAY OF PHOSPHORUS: CPT | Performed by: FAMILY MEDICINE

## 2018-03-06 PROCEDURE — 40000133 ZZH STATISTIC OT WARD VISIT

## 2018-03-06 PROCEDURE — 25000132 ZZH RX MED GY IP 250 OP 250 PS 637: Mod: GY | Performed by: ORTHOPAEDIC SURGERY

## 2018-03-06 PROCEDURE — 97530 THERAPEUTIC ACTIVITIES: CPT | Mod: GP | Performed by: PHYSICAL THERAPIST

## 2018-03-06 PROCEDURE — 85610 PROTHROMBIN TIME: CPT | Performed by: FAMILY MEDICINE

## 2018-03-06 PROCEDURE — 83735 ASSAY OF MAGNESIUM: CPT | Performed by: FAMILY MEDICINE

## 2018-03-06 PROCEDURE — 25000125 ZZHC RX 250: Performed by: FAMILY MEDICINE

## 2018-03-06 PROCEDURE — 85018 HEMOGLOBIN: CPT | Performed by: FAMILY MEDICINE

## 2018-03-06 PROCEDURE — 25000128 H RX IP 250 OP 636: Performed by: FAMILY MEDICINE

## 2018-03-06 RX ORDER — AMOXICILLIN 250 MG
1 CAPSULE ORAL 2 TIMES DAILY PRN
Qty: 100 TABLET | DISCHARGE
Start: 2018-03-06 | End: 2018-03-06

## 2018-03-06 RX ORDER — FERROUS SULFATE 325(65) MG
325 TABLET ORAL 2 TIMES DAILY WITH MEALS
Qty: 100 TABLET | COMMUNITY
Start: 2018-03-06

## 2018-03-06 RX ORDER — AMOXICILLIN 250 MG
1 CAPSULE ORAL 2 TIMES DAILY PRN
Qty: 100 TABLET | COMMUNITY
Start: 2018-03-06 | End: 2018-05-09

## 2018-03-06 RX ORDER — POLYETHYLENE GLYCOL 3350 17 G/17G
17 POWDER, FOR SOLUTION ORAL DAILY PRN
Qty: 7 PACKET | COMMUNITY
Start: 2018-03-06 | End: 2018-05-09

## 2018-03-06 RX ORDER — ACETAMINOPHEN 325 MG/1
650 TABLET ORAL EVERY 4 HOURS PRN
Qty: 100 TABLET | COMMUNITY
Start: 2018-03-06

## 2018-03-06 RX ORDER — WARFARIN SODIUM 2.5 MG/1
2.5 TABLET ORAL
Status: DISCONTINUED | OUTPATIENT
Start: 2018-03-06 | End: 2018-03-06 | Stop reason: HOSPADM

## 2018-03-06 RX ORDER — OXYCODONE HYDROCHLORIDE 5 MG/1
5-10 TABLET ORAL EVERY 4 HOURS PRN
Qty: 40 TABLET | Refills: 0 | Status: SHIPPED | DISCHARGE
Start: 2018-03-06 | End: 2018-03-15

## 2018-03-06 RX ORDER — FERROUS SULFATE 325(65) MG
325 TABLET ORAL 2 TIMES DAILY WITH MEALS
Qty: 100 TABLET | DISCHARGE
Start: 2018-03-06 | End: 2018-03-06

## 2018-03-06 RX ADMIN — ACETAMINOPHEN 975 MG: 325 TABLET ORAL at 12:52

## 2018-03-06 RX ADMIN — OXYCODONE HYDROCHLORIDE 5 MG: 5 TABLET ORAL at 04:31

## 2018-03-06 RX ADMIN — Medication 12.5 MG: at 09:01

## 2018-03-06 RX ADMIN — ATORVASTATIN CALCIUM 20 MG: 10 TABLET, FILM COATED ORAL at 09:01

## 2018-03-06 RX ADMIN — ACETAMINOPHEN 975 MG: 325 TABLET ORAL at 04:31

## 2018-03-06 RX ADMIN — POTASSIUM PHOSPHATE, MONOBASIC AND POTASSIUM PHOSPHATE, DIBASIC 15 MMOL: 224; 236 INJECTION, SOLUTION INTRAVENOUS at 09:01

## 2018-03-06 RX ADMIN — FERROUS SULFATE TAB 325 MG (65 MG ELEMENTAL FE) 325 MG: 325 (65 FE) TAB at 09:00

## 2018-03-06 NOTE — PROGRESS NOTES
[facility]    Hospitalist Progress Note    Date of Service (when I saw the patient): 03/06/2018    Assessment & Plan   Tomer Weiss is a 85 year old male who was admitted on 3/1/2018 with left femur fracture near his indwelling left knee prosthesis for which he underwent operative repair on March 3.  He has developed worsening anemia acutely presumably due to blood losses from fracture and surgery and this is superimposed upon chronic anemia.  Today he does not appear to be overtly symptomatic from worsening anemia.  Other acute medical problems are stabilizing including hypophosphatemia, and hypophosphatemia is expected to resolve as oral intake continues to advance.  Chronic medical problems are stable including atrial fibrillation which remains well controlled, and INR is therapeutic on warfarin.  Acute and chronic medical problems appear stable for hospital discharge today.    Principal Problem:    Closed displaced supracondylar fracture of distal end of left femur without intracondylar extension (H)  Active Problems:    Periprosthetic fracture around internal prosthetic left knee joint    Anemia    Chronic atrial fibrillation (H)    Pulmonary hypertension    Hypophosphatemia    Malignant neoplasm of kidney excluding renal pelvis (H)    Hyperlipidemia LDL goal <130    Total knee replacement status    Long-term (current) use of anticoagulants [Z79.01]    Dysrhythmia 4 beat run narrow complex    Hypotension, unspecified hypotension type    Recommend continuing chronic dose of metoprolol at discharge  Recommend increasing dose of supplemental iron at discharge   Recommend stopping previous dose of Lasix at this time until reevaluated after discharge  Chronic medications reconciled for discharge today    Pain Plan: # Pain Assessment:   Current Pain Score 3/6/2018 3/6/2018 3/5/2018   Patient currently in pain? sleeping: patient not able to self report - yes   Pain score  (0-10) 0 6 -   Pain location - - Foot   Pain descriptors - - Aching       DVT Prophylaxis: Warfarin  Code Status: Full Code    Disposition: Expected discharge today per orthopedic surgery.    Rai Evans    Interval History   He says he feels better today.  He denies any lightheadedness or dizziness today.  He is not noticing any exertional dyspnea although he has not yet done much for activity.  He has not had fever.  He has been hemodynamically stable for over 24-48 hours.  Oxygenation is normal.  He is voiding well.  Bowel movements have been normal.  He is eating well.    -Data reviewed today: I reviewed all new labs and imaging results over the last 24 hours. I personally reviewed no images or EKG's today.    Physical Exam   Temp: 97.4  F (36.3  C) Temp src: Oral BP: 115/69 Pulse: 94 Heart Rate: 94 Resp: 20 SpO2: 96 % O2 Device: None (Room air)    Vitals:    03/01/18 1930 03/01/18 2302   Weight: 88.5 kg (195 lb) 88.1 kg (194 lb 3.6 oz)     Vital Signs with Ranges  Temp:  [96  F (35.6  C)-98  F (36.7  C)] 97.4  F (36.3  C)  Pulse:  [] 94  Heart Rate:  [] 94  Resp:  [19-20] 20  BP: ()/(56-78) 115/69  SpO2:  [94 %-98 %] 96 %  I/O last 3 completed shifts:  In: 1760 [P.O.:1760]  Out: 1275 [Urine:1275]    Constitutional: No acute distress  Cardiovascular: Irregularly irregular heart rate and rhythm, brisk capillary refill    Medications     Warfarin Therapy Reminder         ferrous sulfate  325 mg Oral BID w/meals     atorvastatin  20 mg Oral Daily     acetaminophen  975 mg Oral Q8H     metoprolol tartrate  12.5 mg Oral QAM     sodium chloride (PF)  3 mL Intracatheter Q8H       Data   Data reviewed today:  I personally reviewed no images or EKG's today.    Recent Labs  Lab 03/06/18  0636 03/05/18  1654 03/05/18  0527 03/04/18  1547 03/04/18  0526  03/01/18 2000   WBC  --   --   --   --   --   --  8.7   HGB 7.5* 7.6* 8.0*  --  8.2*  < > 13.2*   MCV  --   --   --   --   --   --  98   PLT  --   --    --   --   --   --  281   INR 1.93*  --  1.26*  --  1.09  < > 2.10*   NA  --   --  138  --   --   --  142   POTASSIUM  --   --  4.3 4.2  --   --  4.4   CHLORIDE  --   --  107  --   --   --  104   CO2  --   --  24  --   --   --  28   BUN  --   --  27  --   --   --  26   CR  --   --  1.00  --   --   --  0.88   ANIONGAP  --   --  7  --   --   --  10   KARTHIK  --   --  7.1*  --   --   --  8.3*   GLC  --   --  116*  --  130*  --  106*   < > = values in this interval not displayed.

## 2018-03-06 NOTE — PROGRESS NOTES
Name: Tomer Weiss    MRN#: [id number]    Reason for Hospitalization: Closed displaced supracondylar fracture of distal end of left femur without intracondylar extension, initial encounter (H) [S75.729B]    Discharge Date: 3/6/2018    Patient / Family response to discharge plan: in agreement    Follow-Up Appt: Future Appointments  Date Time Provider Department Center   3/6/2018 2:30 PM Viridiana Mario, PT PHPT [facility]   3/20/2018 10:00 AM [id number] [facility]   3/20/2018 11:00 AM SCIMR1 [facility]MR [facility]   3/28/2018 3:00 PM ZM ANTI COAG [facility]   4/2/2018 11:00 AM SHCVR1 [facility]O [facility]   4/16/2018 7:45 AM Russell John MD [facility]       Other Providers (Care Coordinator, County Services, PCA services etc): No    Discharge Disposition: transitional care unit - [facility] (Admissions: [phone] Main Phone: [phone] Fax: [phone]) via [employer] at 1300.    RICHARD Maguire  [facility] [phone]/ [facility] [phone]    PAS-RR    D: Per DHS regulation, BARRY completed and submitted PAS-[place] to [place] Board on [place] Direct Connect via the [employer] LinkAge Line.  PAS-RR confirmation # is : [id number]

## 2018-03-06 NOTE — PLAN OF CARE
Problem: Patient Care Overview  Goal: Plan of Care/Patient Progress Review  Outcome: Improving  Pt having minimal amt of pain, states tylenol is sufficient for his left leg pain. 1700  Hgb 7.6 order received to recheck in a.m.   Left leg hinged brace on, Aquacell, no drng.  Ice pack to the knee. Had a medium BM this evening.  Miralax given.  Good appetite this evening, using urinal in bed.

## 2018-03-06 NOTE — DISCHARGE SUMMARY
[facility] Discharge Summary    Tomer Weiss MRN# [id number]   Age: 85 year old YOB: 1932     Date of Admission:  3/1/2018  Date of Discharge::  3/6/2018  Admitting Physician:  Angel Lucio MD  Discharge Physician:  Kylah Bose PA-C     Home clinic: [facility]          Admission Diagnoses:   Closed displaced supracondylar fracture of distal end of left femur without intracondylar extension, initial encounter (H) [S72.452A]          Discharge Diagnosis:   Closed displaced supracondylar fracture of distal end of left femur without intracondylar extension, initial encounter (H) [S72.452A]  Acute blood loss anemia though patient with long term use of iron supplementation for chronic anemia          Procedures:   Procedure(s): Procedure(s):  OPEN REDUCTION INTERNAL FIXATION FEMUR DISTAL left - periprosthetic fracture.        No other procedures performed during this admission           Medications Prior to Admission:     Prescriptions Prior to Admission   Medication Sig Dispense Refill Last Dose     atorvastatin (LIPITOR) 20 MG tablet Take 1 tablet (20 mg) by mouth daily 90 tablet 3 Past Week at Unknown time     metoprolol tartrate (LOPRESSOR) 25 MG tablet TAKE ONE-HALF TABLET BY MOUTH ONCE DAILY 45 tablet 9 3/1/2018 at 0800     warfarin (COUMADIN) 5 MG tablet Take 2.5 mg (1/2 tablet) Tuesday and Saturday and 5 mg other 5 days or as directed by the coumadin clinic 75 tablet 1 3/1/2018 at 0800     [DISCONTINUED] furosemide (LASIX) 20 MG tablet TAKE ONE TABLET BY MOUTH ONCE DAILY 90 tablet 3 3/1/2018 at Unknown time     order for DME Equipment being ordered: Walker ()  Treatment Diagnosis: s/p joint replacement 1 Device 0 Taking     [DISCONTINUED] iron 66 MG TABS Take 1 tablet (66 mg) by mouth daily 1 tablet 0 3/1/2018 at 0800     GLUCOSAMINE 500 MG OR CAPS 2 Tablets every morning   Taking             Discharge Medications:     Current Discharge  Medication List      START taking these medications    Details   oxyCODONE IR (ROXICODONE) 5 MG tablet Take 1-2 tablets (5-10 mg) by mouth every 4 hours as needed for other (pain control or improvement in physical function. Hold dose for analgesic side effects.)  Qty: 40 tablet, Refills: 0    Associated Diagnoses: Closed displaced supracondylar fracture of distal end of left femur without intracondylar extension, initial encounter (H)      acetaminophen (TYLENOL) 325 MG tablet Take 2 tablets (650 mg) by mouth every 4 hours as needed for other (multimodal surgical pain management along with NSAIDS and opioid medication as indicated based on pain control and physical function.)  Qty: 100 tablet      polyethylene glycol (MIRALAX/GLYCOLAX) Packet Take 17 g by mouth daily as needed for constipation  Qty: 7 packet      melatonin 1 MG TABS tablet Take 1 tablet (1 mg) by mouth nightly as needed for sleep    Associated Diagnoses: Closed displaced supracondylar fracture of distal end of left femur without intracondylar extension, initial encounter (H)      ferrous sulfate (IRON) 325 (65 FE) MG tablet Take 1 tablet (325 mg) by mouth 2 times daily (with meals)  Qty: 100 tablet    Associated Diagnoses: Anemia, unspecified type      senna-docusate (SENOKOT-S;PERICOLACE) 8.6-50 MG per tablet Take 1 tablet by mouth 2 times daily as needed for constipation  Qty: 100 tablet    Associated Diagnoses: Closed displaced supracondylar fracture of distal end of left femur without intracondylar extension, initial encounter (H)         CONTINUE these medications which have NOT CHANGED    Details   atorvastatin (LIPITOR) 20 MG tablet Take 1 tablet (20 mg) by mouth daily  Qty: 90 tablet, Refills: 3    Associated Diagnoses: Hyperlipidemia LDL goal <130      metoprolol tartrate (LOPRESSOR) 25 MG tablet TAKE ONE-HALF TABLET BY MOUTH ONCE DAILY  Qty: 45 tablet, Refills: 9    Associated Diagnoses: Chronic atrial fibrillation (H)      warfarin  (COUMADIN) 5 MG tablet Take 2.5 mg (1/2 tablet) Tuesday and Saturday and 5 mg other 5 days or as directed by the coumadin clinic  Qty: 75 tablet, Refills: 1    Associated Diagnoses: Long-term (current) use of anticoagulants      order for DME Equipment being ordered: Walker ()  Treatment Diagnosis: s/p joint replacement  Qty: 1 Device, Refills: 0    Associated Diagnoses: Status post total left knee replacement      GLUCOSAMINE 500 MG OR CAPS 2 Tablets every morning    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         STOP taking these medications       furosemide (LASIX) 20 MG tablet Comments:   Reason for Stopping:         iron 66 MG TABS Comments:   Reason for Stopping:                     Consultations:   Consultation during this admission received from internal medicine          Brief History of Illness:   Reason for admission requiring a surgical or invasive procedure:   Closed displaced supracondylar fracture of distal end of left femur without intracondylar extension, initial encounter (H) [P75.195P]   The patient underwent the following procedure(s):   Procedure(s):  OPEN REDUCTION INTERNAL FIXATION FEMUR DISTAL   There were no immediate complications during this procedure.    Please refer to the full operative summary for details.           Hospital Course:   The patient's hospital course was unremarkable.  He recovered as anticipated and experienced no post-operative complications. Hgb remained stable.  Lowest Hgb was 7.5.  Patient denying symptoms however rehabilitation in the room states he has difficulty tolerating sitting upright due to lightheadedness.  Patient will be reevaluated this morning by rehabilitation and a call will be placed if it is felt his dizziness is more substantial.  If this is the case then we would recommend patient obtain 1 unit PRBC prior to discharge to rehab facility.  Patient tolerating oral pain medications.  Patient now has a hinged knee  brace.  This is set at 0-90  to allow for flexion and extension of the knee.  Patient should have this brace in the locked position at approximately 20  for transfers and ambulation.  Tolerating therapy.      # Discharge Pain Plan:   - During his hospitalization, Tomer experienced pain due to left distal femur fracture.  The pain plan for discharge was discussed with Tomer and the plan was created in a collaborative fashion.    - Opioids prescribed on discharge: Oxycodone.  Patient is primarily utilizing Tylenol however has utilized approximately 1-2 oxycodone per day  - Duration of opioids after discharge: This condition will require longer than a 3 day dosing of opioids.  #40 prescribed  - Bowel regimen: senna and miralax           Discharge Instructions and Follow-Up:   Discharge diet: Pre-procedure diet or regular   Discharge activity: Activity as tolerated  Driving restrictions: no driving while taking narcotic analgesics  Weight bearing status: Weight bearing as tolerated   Discharge follow-up: Follow up with Dr. Silver in 10-14 days.  Patient should already have an appointment scheduled   Wound care: Aquacell dressing in place - OK to shower over this  Aquacell and staples to be removed at follow up           Discharge Disposition:   Discharged to short-term care facility      Attestation:  I have reviewed today's vital signs, notes, medications, labs and imaging.    Kylah Bose PA-C

## 2018-03-06 NOTE — PROGRESS NOTES
SPIRITUAL HEALTH SERVICES  SPIRITUAL ASSESSMENT Progress Note  [facility]      (Follow up)   provided a prayer.  With d/c imminent, no follow up it needed.    Emmett Walton M.Div., [facility]  Staff   Office tel: [phone]

## 2018-03-06 NOTE — PLAN OF CARE
Occupational Therapy Discharge Summary    Reason for therapy discharge:    Discharged to transitional care facility.    Progress towards therapy goal(s). See goals on Care Plan in [facility] electronic health record for goal details.  Goals partially met.  Barriers to achieving goals:   limited tolerance for therapy and discharge from facility.    Therapy recommendation(s):    Continued therapy is recommended.  Rationale/Recommendations:  Continued progression of independence with BADL. .

## 2018-03-06 NOTE — PROGRESS NOTES
Tomer Weiss  Gender: male  : 1932  [address]  [place] [address]  [phone] (home)     Medical Record: [id number]  Pharmacy: [facility] [address] [facility]  Primary Care Provider: Gabriel Tan    Parent's names are: Data Unavailable (mother) and Data Unavailable (father).      [facility]  2018     Discharge Phone Call: Discharged to [facility]

## 2018-03-06 NOTE — PROGRESS NOTES
S-(situation): Patient discharged to [facility] via wheelchair with Handivan    B-(background): Left femur fracture with ORIF    A-(assessment): Report given Yoselyn nurse at [facility] regarding pt.Tylenol given before transfer.Pt.will be in Short Term Rehab.  R-(recommendations): Discharge instructions reviewed with pt.. Listed belongings gathered and returned to patient.          Discharge Nursing Criteria:     Care Plan and Patient education resolved: Yes    New Medications- pt has been educated about purpose and side effects: Yes    Vaccines  Influenza status verified at discharge:  Yes          MISC  Prescriptions if needed, hard copies sent with patient  Yes  Home and hospital aquired medications returned to patient: Yes  Medication Bin checked and emptied on discharge Yes  Patient reports post-discharge pain management plan is effective: Yes    TKA/CAN ONLY:   Discharged with [facility] Stockings Yes  SHANE[facility] stocking Education Completed Yes

## 2018-03-06 NOTE — PROGRESS NOTES
Hgb 7.5 this am. Charge nurse notified.  MD notified via page. No response/new orders at this time.

## 2018-03-06 NOTE — PROGRESS NOTES
Nurse at [facility] was informed of bruise looking spot on his left outer foot by his little toe which was there before he came in.She was also told on how to use pts.brace and she said she would just ask the patient.

## 2018-03-06 NOTE — PLAN OF CARE
Discharge Planner OT   Patient plan for discharge: Patient reports that he will being going to short term rehab.    Current status: Patient currently NWB on LLE and using jose steady for transfer.  Patient reported that he used bedside commode last evening and felt that went well.  He has been using the urinal on his own with assistance to clean up/emptying of the urinal when through.  Patient practiced use of reacher and sock aid for LB dressing on RLE while seated in recliner chair.  Patient reported increased pain while sitting up with feet on the floor with small towel roll to support LLE.  When patient reclined chair pain decreased.  AE handout provided to patient.    Barriers to return to prior living situation: Increased level of assistance required to complete BADL, weight bearing status  Recommendations for discharge: TCU   Rationale for recommendations: continued progression on independence with BADL following precautions.        Entered by: Shanthi Ramirez 03/06/2018 10:52 AM

## 2018-03-06 NOTE — PLAN OF CARE
Problem: Patient Care Overview  Goal: Plan of Care/Patient Progress Review  Discharge Planner PT   Current status: Min A x 1 and CGA x 1 for transfer to chair with Tricia Saba NWB L LE in hinged brace locked for transfer. Ok to do ex in brace unlocked. Supine to sit SBA. Leans to R some but less so than previous session.  Pain controlled. MD approved post op ex, use of brace during transfers and will transition him to a hinged brace we can do exercise in. Seemed ok without lightheadedness in chair, but will alert MD if symptomatic of low hbG. Pending DC this PM to [facility].   Barriers to return to prior living situation: Level of assist, NWB status  Recommendations for discharge: TCU  Rationale for recommendations: as above       Entered by: Viridiana Mario 03/06/2018 9:52 AM         Physical Therapy Discharge Summary    Current functional/mobility status: See above in care plan note for details.    Reason for therapy discharge:    Discharged to transitional care facility.    Progress towards therapy goal(s):[facility] for goal details.  Goals partially met.  Barriers to achieving goals:   discharge from facility.    Therapy recommendation(s):    Continued therapy is recommended.  Rationale/Recommendations:  Transfers, strengthening in preparation for future gait .     Viridiana Mario................... PT, DPT, CLT   3/6/2018, 11:49 AM  [phone]

## 2018-03-06 NOTE — PHARMACY-ANTICOAGULATION SERVICE
Clinical Pharmacy - Warfarin Dosing Consult     Pharmacy has been consulted to manage this patient s warfarin therapy.  Indication: Atrial Fibrillation  Therapy Goal: Other - see comments  Warfarin Prior to Admission: Yes  Warfarin PTA Regimen: 2.5mg tuesday and saturday and 5 mg the rest of the week  Recent documented change in oral intake/nutrition: No  Dose Comments: INR goal of 1.8-2.5  Drug interactions: acetaminophen    INR   Date Value Ref Range Status   03/06/2018 1.93 (H) 0.86 - 1.14 Final   03/05/2018 1.26 (H) 0.86 - 1.14 Final     Chromogenic Factor 10   Date Value Ref Range Status   04/24/2015 21 (L) 70 - 130 % Final     Comment:     Therapeutic Range:  A Chromogenic Factor 10 level of approximately 20-40%   inversely correlates with an INR of 2-3 for patients receiving Warfarin.   Chromogenic Factor 10 levels below 20% indicate an INR greater than 3 and   levels above 40% indicate an INR less than 2.         Recommend warfarin 2.5 mg today.  Pharmacy will monitor Tomer Weiss daily and order warfarin doses to achieve specified goal.      Please contact pharmacy as soon as possible if the warfarin needs to be held for a procedure or if the warfarin goals change.

## 2018-03-06 NOTE — PROGRESS NOTES
Notified MD at 8:00 AM regarding lab results.      Spoke with: Krystina    Orders were not obtained.    Comments: hbg 7.5 - will not transfuse since patient is asymptomatic and VSS.

## 2018-03-07 ENCOUNTER — CARE COORDINATION (OUTPATIENT)
Dept: CARE COORDINATION | Facility: CLINIC | Age: 83
End: 2018-03-07

## 2018-03-07 ENCOUNTER — NURSING HOME VISIT (OUTPATIENT)
Dept: GERIATRICS | Facility: CLINIC | Age: 83
End: 2018-03-07
Payer: MEDICARE

## 2018-03-07 VITALS
OXYGEN SATURATION: 98 % | DIASTOLIC BLOOD PRESSURE: 49 MMHG | SYSTOLIC BLOOD PRESSURE: 89 MMHG | TEMPERATURE: 98.1 F | WEIGHT: 217 LBS | RESPIRATION RATE: 24 BRPM | HEART RATE: 82 BPM | BODY MASS INDEX: 29.43 KG/M2

## 2018-03-07 DIAGNOSIS — I48.20 CHRONIC ATRIAL FIBRILLATION (H): ICD-10-CM

## 2018-03-07 DIAGNOSIS — G47.00 INSOMNIA, UNSPECIFIED TYPE: ICD-10-CM

## 2018-03-07 DIAGNOSIS — D62 ANEMIA DUE TO BLOOD LOSS, ACUTE: ICD-10-CM

## 2018-03-07 DIAGNOSIS — S72.402D CLOSED FRACTURE OF DISTAL END OF LEFT FEMUR WITH ROUTINE HEALING, UNSPECIFIED FRACTURE MORPHOLOGY, SUBSEQUENT ENCOUNTER: Primary | ICD-10-CM

## 2018-03-07 PROCEDURE — 99310 SBSQ NF CARE HIGH MDM 45: CPT | Performed by: NURSE PRACTITIONER

## 2018-03-07 NOTE — PROGRESS NOTES
Clinic Care Coordination Contact  [facility]/Voicemail    Referral Source: [facility]  Clinical Data: Care Coordinator Outreach: pt was recently discharged from [facility] due to hip fracture  Outreach attempted x 1.  Left message on voicemail with call back information and requested return call with anticipated discharge date.  Plan: Care Coordinator will follow up in approximately one month or TCU  will let [facility] know of discharge date if sooner.     RICHARD Vazquez Clinic Care Coordinator  [facility]  3/7/2018 9:37 AM  [phone]

## 2018-03-07 NOTE — PROGRESS NOTES
[place] [facility]  PRIMARY CARE PROVIDER AND CLINIC:  Gabriel Tan [facility] [address] [facility]  / AMY OLSEN 5*  Chief Complaint   Patient presents with     Hospital F/U       HPI:    Tomer Weiss is a 85 year old  (4/9/1932),admitted to the [facility]  from [facility].  Hospital stay 3/1/18 through 3/6/18.  Admitted to this facility for  rehab, medical management and nursing care.  HPI information obtained from: facility chart records, facility staff, patient report, [facility] chart review and Care Everywhere [facility] chart review.     [facility] SUMMARY:     Reason for admission requiring a surgical or invasive procedure:    Closed displaced supracondylar fracture of distal end of left femur without intracondylar extension, initial encounter (H) [B57.286L]   The patient underwent the following procedure(s):    Procedure(s):  OPEN REDUCTION INTERNAL FIXATION FEMUR DISTAL   There were no immediate complications during this procedure.    Please refer to the full operative summary for details.    Current issues are:         Closed fracture of distal end of left femur with routine healing, unspecified fracture morphology, subsequent encounter  Anemia due to blood loss, acute  Chronic atrial fibrillation (H)  Insomnia, unspecified type    See assessment / plan for HPI     CODE STATUS/ADVANCE DIRECTIVES DISCUSSION:   CPR/Full code   Patient's living condition: lives with spouse    ALLERGIES:No known drug allergies  PAST MEDICAL HISTORY:  has a past medical history of Atrial fibrillation (H); Atrial flutter (H); Bladder stone; Cancer (H); Contracture of palmar fascia; Hematuria; Hypertrophy (benign) of prostate; and Neoplasm of uncertain behavior (2010). He also has no past medical history of Complication of anesthesia.  PAST SURGICAL HISTORY:  has a past surgical history that includes ABLATION RENAL TUMOR PERCUT CRYOTHERAPY  UNILATERAL (6/17/10); Colonoscopy (7/17/2013); Cystoscopy, litholapaxy, combined (N/A, 2/26/2015); Laser holmium lithotripsy bladder (N/A, 2/26/2015); Laser KTP green light photoselective vaporization prostate (N/A, 2/11/2015); Cystoscopy, litholapaxy, combined (N/A, 2/11/2015); EP Ablation atrial flutter (4/18/15); Arthroplasty knee (Left, 3/14/2016); Cystoscopy, retrogrades, extract stone, combined (N/A, 2/8/2018); and Open reduction internal fixation femur distal (Left, 3/3/2018).  FAMILY HISTORY: family history includes Alzheimer Disease in his mother; CANCER in his mother; Hypertension in his mother.  SOCIAL HISTORY:  reports that he has never smoked. He has never used smokeless tobacco. He reports that he does not drink alcohol or use illicit drugs.    Post Discharge Medication Reconciliation Status: discharge medications reconciled and changed, per note/orders (see AVS).  Current Outpatient Prescriptions   Medication Sig Dispense Refill     Warfarin Sodium (COUMADIN PO) Take by mouth daily Take as directed per INR results. Current dose:       oxyCODONE IR (ROXICODONE) 5 MG tablet Take 1-2 tablets (5-10 mg) by mouth every 4 hours as needed for other (pain control or improvement in physical function. Hold dose for analgesic side effects.) 40 tablet 0     acetaminophen (TYLENOL) 325 MG tablet Take 2 tablets (650 mg) by mouth every 4 hours as needed for other (multimodal surgical pain management along with NSAIDS and opioid medication as indicated based on pain control and physical function.) 100 tablet      polyethylene glycol (MIRALAX/GLYCOLAX) Packet Take 17 g by mouth daily as needed for constipation 7 packet      melatonin 1 MG TABS tablet Take 1 tablet (1 mg) by mouth nightly as needed for sleep       ferrous sulfate (IRON) 325 (65 FE) MG tablet Take 1 tablet (325 mg) by mouth 2 times daily (with meals) 100 tablet      senna-docusate (SENOKOT-S;PERICOLACE) 8.6-50 MG per tablet Take 1 tablet by mouth 2 times  daily as needed for constipation 100 tablet      atorvastatin (LIPITOR) 20 MG tablet Take 1 tablet (20 mg) by mouth daily 90 tablet 3     metoprolol tartrate (LOPRESSOR) 25 MG tablet TAKE ONE-HALF TABLET BY MOUTH ONCE DAILY 45 tablet 9     order for DME Equipment being ordered: Walker ()  Treatment Diagnosis: s/p joint replacement 1 Device 0     GLUCOSAMINE 500 MG OR CAPS 2 Tablets every morning         ROS:  10 point ROS of systems including Constitutional, Eyes, Respiratory, Cardiovascular, Gastroenterology, Genitourinary, Integumentary, Muscularskeletal, Psychiatric were all negative except for pertinent positives noted.    Exam:  BP (!) 89/49  Pulse 82  Temp 98.1  F (36.7  C)  Resp 24  Wt 217 lb (98.4 kg)  SpO2 98%  BMI 29.43 kg/m2  GENERAL APPEARANCE:  Alert, in no distress  RESP:  respiratory effort and palpation of chest normal, no respiratory distress, lungs sounds clear  CV:  Palpation and auscultation of heart done , regular rate and rhythm, no murmur, rub, or gallop, edema slight meeting up with her sister  ABDOMEN:  normal bowel sounds, soft, nontender, no hepatosplenomegaly or other masses  M/S:  Gait and station walks with assist ,   SKIN:  Inspection and palpation of skin and subcutaneous tissue at Baseline  incision not observed  PSYCH incision not observed:  insight and judgement, memory intact, affect and mood normal     Lab/Diagnostic data: Hospital labs reviewed.    ASSESSMENT/PLAN:  Closed fracture of distal end of left femur with routine healing, unspecified fracture morphology, subsequent encounter  S/p surgical repair on March 3.  Pain has been managed with Tylenol and as needed oxycodone.  Not currently using much oxycodone and not having much pain.  Admitted on 3 6 to to the TCU for therapy  -Goal is to improve mobility and strength and be independent with cares in order to return home  -Follow-up as directed with orthopedic surgeon  -Denies constipation currently on senna and  MiraLAX    Anemia due to blood loss, acute  Hemoglobin dropped to 7.5 while at the hospital no transfusions were given  -Recheck hemoglobin on Friday    Chronic atrial fibrillation (H)  Rate controlled.  Total metoprolol tartrate is scheduled for once a day looks like he was on metoprolol succinate prior to hospital stay, will change.  On warfarin for stroke prophylaxis and DVT prophylaxis. managed to keep INR between 2 and 3.  Blood pressure on the lower side.   Home dose of warfarin is 2.5 mg on Tuesday and Saturday and 5 mg all other days of the week     Insomnia, unspec  Complains of not being able to sleep.  Denies the pain is keeping him awake states he takes melatonin 5 mg at bedtime at home.  -Increase melatonin to 5 mg by mouth at bedtime    Total time spent with patient visit was 35 min including patient visit and review of past records. Greater than 50% of total time spent with counseling and coordinating care due to dx.     Orders:  1. D/C Compression device  2. Change melatonin to 5 mg PO at HS  3. Check Hgb on Friday Dx: Anemia  4. D/C Metoprolol tartrate and start metoprolol succinate 12.5 mg PO once Daily. Hold if B/P <90   5. IN 2.3   Warfarin 2.5mg PO today and 5 mg PO on Thursday  Recheck INR on Friday Dx: Afib      Electronically signed by:  Beti Roach NP

## 2018-03-09 ENCOUNTER — TRANSFERRED RECORDS (OUTPATIENT)
Dept: HEALTH INFORMATION MANAGEMENT | Facility: CLINIC | Age: 83
End: 2018-03-09

## 2018-03-09 ENCOUNTER — RECORDS - HEALTHEAST (OUTPATIENT)
Dept: LAB | Facility: CLINIC | Age: 83
End: 2018-03-09

## 2018-03-09 LAB
HEMOGLOBIN: 8.1 G/DL (ref 14–18)
HGB BLD-MCNC: 8.1 G/DL (ref 14–18)

## 2018-03-12 ENCOUNTER — NURSING HOME VISIT (OUTPATIENT)
Dept: FAMILY MEDICINE | Facility: OTHER | Age: 83
End: 2018-03-12
Payer: MEDICARE

## 2018-03-12 VITALS
WEIGHT: 208.8 LBS | OXYGEN SATURATION: 98 % | BODY MASS INDEX: 28.32 KG/M2 | TEMPERATURE: 97.9 F | SYSTOLIC BLOOD PRESSURE: 109 MMHG | RESPIRATION RATE: 22 BRPM | DIASTOLIC BLOOD PRESSURE: 63 MMHG | HEART RATE: 91 BPM

## 2018-03-12 DIAGNOSIS — G47.09 OTHER INSOMNIA: ICD-10-CM

## 2018-03-12 DIAGNOSIS — D64.9 ANEMIA, UNSPECIFIED TYPE: ICD-10-CM

## 2018-03-12 DIAGNOSIS — S72.452D CLOSED DISPLACED SUPRACONDYLAR FRACTURE OF DISTAL END OF LEFT FEMUR WITHOUT INTRACONDYLAR EXTENSION WITH ROUTINE HEALING, SUBSEQUENT ENCOUNTER: ICD-10-CM

## 2018-03-12 DIAGNOSIS — I48.20 CHRONIC ATRIAL FIBRILLATION (H): ICD-10-CM

## 2018-03-12 PROBLEM — M97.12XA PERIPROSTHETIC FRACTURE AROUND INTERNAL PROSTHETIC LEFT KNEE JOINT: Status: RESOLVED | Noted: 2018-03-01 | Resolved: 2018-03-12

## 2018-03-12 PROBLEM — I95.9 HYPOTENSION, UNSPECIFIED HYPOTENSION TYPE: Status: RESOLVED | Noted: 2018-03-04 | Resolved: 2018-03-12

## 2018-03-12 PROCEDURE — 99305 1ST NF CARE MODERATE MDM 35: CPT | Performed by: FAMILY MEDICINE

## 2018-03-12 NOTE — ASSESSMENT & PLAN NOTE
S/p surgical repair on March 3.  Pain has been managed with Tylenol and as needed oxycodone.  Not currently using much oxycodone and not having much pain.  Admitted on  3/6 to the TCU for therapy  -Goal is to improve mobility and strength and be independent with cares in order to return home  -Follow-up as directed with orthopedic surgeon  -Denies constipation. currently on senna and MiraLAX

## 2018-03-12 NOTE — ASSESSMENT & PLAN NOTE
He complains of insomnia.  He is currently taking melatonin 5 mg at night.  Will increase this to 10 mg.

## 2018-03-12 NOTE — ASSESSMENT & PLAN NOTE
Rate controlled on metoprolol 12.5 mg once daily.  He remains on warfarin for anticoagulation with a goal between 2 and 3.

## 2018-03-12 NOTE — PROGRESS NOTES
HISTORY OF PRESENT ILLNESS:  Tomer is a 85 year old male, (4/9/1932), admitted to [facility] from [facility].  Hospital stay 3/1/18 through 3/6/18.  Admitted to this facility for  rehab, medical management and nursing care.  HPI information obtained from: facility chart records, facility staff, patient report, [facility] chart review--hospital course copied below         Hospital Course:     The patient's hospital course was unremarkable.  He recovered as anticipated and experienced no post-operative complications. Hgb remained stable.  Lowest Hgb was 7.5.  Patient denying symptoms however rehabilitation in the room states he has difficulty tolerating sitting upright due to lightheadedness.  Patient will be reevaluated this morning by rehabilitation and a call will be placed if it is felt his dizziness is more substantial.  If this is the case then we would recommend patient obtain 1 unit PRBC prior to discharge to rehab facility.  Patient tolerating oral pain medications.  Patient now has a hinged knee brace.  This is set at 0-90  to allow for flexion and extension of the knee.  Patient should have this brace in the locked position at approximately 20  for transfers and ambulation.  Tolerating therapy.     Past Medical History/  Patient Active Problem List    Diagnosis Date Noted     Other insomnia 03/12/2018     Priority: Medium     Hypophosphatemia 03/05/2018     Priority: Medium     Dysrhythmia 4 beat run narrow complex 03/04/2018     Priority: Medium     Closed displaced supracondylar fracture of distal end of left femur without intracondylar extension (H) 03/01/2018     Priority: Medium     Pulmonary hypertension 03/01/2018     Priority: Medium     Long-term (current) use of anticoagulants [Z79.01] 03/18/2016     Priority: Medium     Chronic atrial fibrillation (H) 03/18/2016     Priority: Medium     Anemia 03/17/2016     Priority: Medium     Total knee replacement  status 03/14/2016     Priority: Medium     Post-traumatic osteoarthritis of left knee 12/03/2015     Priority: Medium     Atrial flutter 03/16/2015     Priority: Medium     Advanced directives, counseling/discussion 12/20/2011     Priority: Medium     Has a living will and will bring it in  MP/MA       Dupuytren's contracture of hand 03/15/2011     Priority: Medium     DJD (degenerative joint disease) of knee 03/15/2011     Priority: Medium     Hyperlipidemia LDL goal <130 10/31/2010     Priority: Medium     Malignant neoplasm of kidney excluding renal pelvis (H) 01/22/2010     Priority: Medium     SURGERY, PATHOLOGY, AND TREATMENT HISTORY FOR KIDNEY CANCER  6/17/10 Right Laparoscopic Cryoablation.  Dr Irwin Rey, [facility].  Pathology not done as no biopsy was able to be taken.      Problem list name updated by automated process. Provider to review       Unspecified hyperplasia of prostate with urinary obstruction and other lower urinary tract symptoms (LUTS) 08/10/2007     Priority: Medium     Cervicalgia 09/18/2006     Priority: Medium     Polymyalgia rheumatica (H) 06/28/2005     Priority: Medium     Alopecia 05/07/2002     Priority: Medium     Problem list name updated by automated process. Provider to review         Past Surgical History:   Procedure Laterality Date     ARTHROPLASTY KNEE Left 3/14/2016    Procedure: ARTHROPLASTY KNEE;  Surgeon: Deonte Silver MD;  Location:  OR     COLONOSCOPY  7/17/2013    Procedure: COMBINED COLONOSCOPY, SINGLE BIOPSY/POLYPECTOMY BY BIOPSY;  Colonoscopy, with polypectomy by biopsy;  Surgeon: Fernando Mario MD;  Location:  GI     CYSTOSCOPY, LITHOLAPAXY, COMBINED N/A 2/26/2015    Procedure: COMBINED CYSTOSCOPY, LITHOLAPAXY;  Surgeon: Orlando Marr MD;  Location:  OR     CYSTOSCOPY, LITHOLAPAXY, COMBINED N/A 2/11/2015    Procedure: COMBINED CYSTOSCOPY, LITHOLAPAXY;  Surgeon: Orlando Marr MD;  Location:  OR      CYSTOSCOPY, RETROGRADES, EXTRACT STONE, COMBINED N/A 2018    Procedure: COMBINED CYSTOSCOPY, RETROGRADES, EXTRACT STONE;  cystoscopy, bladder stone removal;  Surgeon: Orlando Marr MD;  Location: [facility] OR     H ABLATION ATRIAL FLUTTER  4/18/15    atypical a flutter ablation & Ablation of PACs from the SVC-RA junction     HC ABLATION RENAL TUMOR PERCUT CRYOTHERAPY UNILATERAL  6/17/10    Right renal mass     LASER HOLMIUM LITHOTRIPSY BLADDER N/A 2015    Procedure: LASER HOLMIUM LITHOTRIPSY BLADDER;  Surgeon: Orlando Marr MD;  Location: [facility] OR     LASER KTP GREEN LIGHT PHOTOSELECTIVE VAPORIZATION PROSTATE N/A 2015    Procedure: LASER KTP GREEN LIGHT PHOTOSELECTIVE VAPORIZATION PROSTATE;  Surgeon: Orlando Marr MD;  Location: [facility] OR     OPEN REDUCTION INTERNAL FIXATION FEMUR DISTAL Left 3/3/2018    Procedure: OPEN REDUCTION INTERNAL FIXATION FEMUR DISTAL;  Open Reduction Internal Fixation Left Femur;  Surgeon: Mason Parra MD;  Location: [facility] OR       Family History   Problem Relation Age of Onset     CANCER Mother      breast     Hypertension Mother      Alzheimer Disease Mother       at age 96.           Social History     Social History     Marital status:      Spouse name: Shari     Number of children: 4     Years of education: N/A     Occupational History     Not on file.     Social History Main Topics     Smoking status: Never Smoker     Smokeless tobacco: Never Used     Alcohol use No     Drug use: No     Sexual activity: Not Currently     Other Topics Concern     Not on file     Social History Narrative        Current Outpatient Prescriptions   Medication Sig     Warfarin Sodium (COUMADIN PO) Take by mouth daily Take as directed per INR results. Current dose:     oxyCODONE IR (ROXICODONE) 5 MG tablet Take 1-2 tablets (5-10 mg) by mouth every 4 hours as needed for other (pain control or improvement in physical function. Hold dose for analgesic side effects.)      acetaminophen (TYLENOL) 325 MG tablet Take 2 tablets (650 mg) by mouth every 4 hours as needed for other (multimodal surgical pain management along with NSAIDS and opioid medication as indicated based on pain control and physical function.)     polyethylene glycol (MIRALAX/GLYCOLAX) Packet Take 17 g by mouth daily as needed for constipation     melatonin 1 MG TABS tablet Take 1 tablet (1 mg) by mouth nightly as needed for sleep     ferrous sulfate (IRON) 325 (65 FE) MG tablet Take 1 tablet (325 mg) by mouth 2 times daily (with meals)     senna-docusate (SENOKOT-S;PERICOLACE) 8.6-50 MG per tablet Take 1 tablet by mouth 2 times daily as needed for constipation     atorvastatin (LIPITOR) 20 MG tablet Take 1 tablet (20 mg) by mouth daily     metoprolol tartrate (LOPRESSOR) 25 MG tablet TAKE ONE-HALF TABLET BY MOUTH ONCE DAILY     order for DME Equipment being ordered: Walker ()  Treatment Diagnosis: s/p joint replacement     GLUCOSAMINE 500 MG OR CAPS 2 Tablets every morning     No current facility-administered medications for this visit.        Allergies   Allergen Reactions     No Known Drug Allergies        I have reviewed the care plan and do agree with the plan.    Information reviewed:  Medications, vital signs, orders, and nursing notes.    ROS:  No chest pain, shortness of breath, fevers, chills, headache, nausea, vomiting, dysuria or bowel abnormalities.  Appetite is  fair.  Denies pain in his left knee.  He does admit to some pain in his right knee which she attributes to putting more stress on the side during his therapy.  He does feel that ice is helpful.    OBJECTIVE:  /63  Pulse 91  Temp 97.9  F (36.6  C)  Resp 22  Wt 208 lb 12.8 oz (94.7 kg)  SpO2 98%  BMI 28.32 kg/m2  GENERAL APPEARANCE:  Alert, in no distress  Eyes:  sclera and conjunctiva clear  Ears: Hearing intact to normal conversation level  Oropharynx: Mucous membranes are moist  RESP:  respiratory effort normal, no  respiratory distress, lungs sounds clear  CV:  regular rate and rhythm, no murmur, edema slight   ABDOMEN:  normal bowel sounds, soft, nontender, no masses  M/S:   Wearing hinged brace.  SKIN:  Inspection and palpation of skin and subcutaneous tissue at Baseline.  incision not observed  PSYCH:  insight and judgement, memory intact, affect and mood normal     ASSESSMENT/PLAN:    Closed displaced supracondylar fracture of distal end of left femur without intracondylar extension (H)  S/p surgical repair on March 3.  Pain has been managed with Tylenol and as needed oxycodone.  Not currently using much oxycodone and not having much pain.  Admitted on  3/6 to the TCU for therapy  -Goal is to improve mobility and strength and be independent with cares in order to return home  -Follow-up as directed with orthopedic surgeon  -Denies constipation. currently on senna and MiraLAX    Anemia  Hemoglobin dropped to 7.5 while at the hospital no transfusions were given.  Continue to monitor hemoglobin.    Chronic atrial fibrillation (H)  Rate controlled on metoprolol 12.5 mg once daily.  He remains on warfarin for anticoagulation with a goal between 2 and 3.    Other insomnia  He complains of insomnia.  He is currently taking melatonin 5 mg at night.  Will increase this to 10 mg.          Lu Culp MD

## 2018-03-12 NOTE — ASSESSMENT & PLAN NOTE
Hemoglobin dropped to 7.5 while at the hospital no transfusions were given.  Continue to monitor hemoglobin.

## 2018-03-12 NOTE — MR AVS SNAPSHOT
After Visit Summary   3/12/2018    Tomer Weiss    MRN: [id number]           Patient Information     Date Of Birth          4/9/1932        Visit Information        Provider Department      3/12/2018 11:15 AM Lu Culp MD [facility]        Today's Diagnoses     Closed displaced supracondylar fracture of distal end of left femur without intracondylar extension with routine healing, subsequent encounter        Anemia, unspecified type        Chronic atrial fibrillation (H)        Other insomnia           Follow-ups after your visit        Your next 10 appointments already scheduled     Mar 15, 2018 11:20 AM CDT   [facility] Deonte Silver MD   [facility]    [address] [id number] [phone]            Mar 20, 2018 10:00 AM CDT   Ech Limited with [id number]   [facility] CV Echocardiography (Cardiovascular Imaging at [facility])    [address] [place] [id number] [phone]           1.  Please bring or wear a comfortable two-piece outfit. 2.  You may eat, drink and take your normal medicines. 3.  For any questions that cannot be answered, please contact the ordering physician            Mar 20, 2018 11:00 AM CDT   MR MYOCARDIUM W CONTRAST with [id number]   [facility] (Cardiovascular Imaging at [facility])    [address] [place] [id number] [phone]           Take your medicines as usual, unless your doctor tells you not to. Bring a list of your current medicines to your exam (including vitamins, minerals and over-the-counter drugs).  You may or may not receive intravenous (IV) contrast for this exam pending the discretion of the Radiologist.  You do not need to do anything special to prepare.  The MRI machine uses a strong magnet. Please wear clothes without metal (snaps,  zippers). A sweatsuit works well, or we may give you a hospital gown.  Please remove any body piercings and hair extensions before you arrive. You will also remove watches, jewelry, hairpins, wallets, dentures, partial dental plates and hearing aids. You may wear contact lenses, and you may be able to wear your rings. We have a safe place to keep your personal items, but it is safer to leave them at home.  **IMPORTANT** THE INSTRUCTIONS BELOW ARE ONLY FOR THOSE PATIENTS WHO HAVE BEEN PRESCRIBED SEDATION OR GENERAL ANESTHESIA DURING THEIR MRI PROCEDURE:  IF YOUR DOCTOR PRESCRIBED ORAL SEDATION (take medicine to help you relax during your exam):   You must get the medicine from your doctor (oral medication) before you arrive. Bring the medicine to the exam. Do not take it at home. You ll be told when to take it upon arriving for your exam.   Arrive one hour early. Bring someone who can take you home after the test. Your medicine will make you sleepy. After the exam, you may not drive, take a bus or take a taxi by yourself.  IF YOUR DOCTOR PRESCRIBED IV SEDATION:   Arrive one hour early. Bring someone who can take you home after the test. Your medicine will make you sleepy. After the exam, you may not drive, take a bus or take a taxi by yourself.   No eating 6 hours before your exam. You may have clear liquids up until 4 hours before your exam. (Clear liquids include water, clear tea, black coffee and fruit juice without pulp.)  IF YOUR DOCTOR PRESCRIBED ANESTHESIA (be asleep for your exam):   Arrive 1 1/2 hours early. Bring someone who can take you home after the test. You may not drive, take a bus or take a taxi by yourself.   No eating 8 hours before your exam. You may have clear liquids up until 4 hours before your exam. (Clear liquids include water, clear tea, black coffee and fruit juice without pulp.)   You will spend four to five hours in the recovery room.  Please call the Imaging Department at your exam site  with any questions.            Mar 28, 2018  3:00 PM CDT   Anticoagulation Visit with ZM ANTI COAG   [facility] ([facility])    [address]  [place] [address] [phone]            Apr 02, 2018 11:00 AM CDT   Cath 90 Minute with [email or web address]   [facility] Cardiac [facility]atheter[facility] ([facility])    [address]  [place] [address] [phone]            Apr 16, 2018  7:45 AM CDT   Pulmonary Hypertension Return with Russell John MD   [facility]   [place] ([facility])    [address]  [place] [address] [phone]              Who to contact     If you have questions or need follow up information about today's clinic visit or your schedule please contact [facility] [place] directly at [phone].  Normal or non-critical lab and imaging results will be communicated to you by MyChart, letter or phone within 4 business days after the clinic has received the results. If you do not hear from us within 7 days, please contact the clinic through MyChart or phone. If you have a critical or abnormal lab result, we will notify you by phone as soon as possible.  Submit refill requests through aCon or call your pharmacy and they will forward the refill request to us. Please allow 3 business days for your refill to be completed.          Additional Information About Your Visit        Care EveryWhere ID     This is your Care EveryWhere ID. This could be used by other organizations to access your [facility] medical records  [id number]        Your Vitals Were     Pulse Temperature Respirations Pulse Oximetry BMI (Body Mass Index)       91 97.9  F (36.6  C) 22 98% 28.32 kg/m2        Blood Pressure from Last 3 Encounters:   03/12/18 109/63   03/07/18 (!) 89/49   03/06/18 115/69    Weight from Last 3 Encounters:   03/12/18 208 lb 12.8 oz (94.7 kg)   03/07/18 217 lb (98.4 kg)    03/01/18 194 lb 3.6 oz (88.1 kg)              Today, you had the following     No orders found for display       Primary Care Provider Office Phone # Fax #    Gabriel Tan [phone]       [facility] [address] [place] [address]        Equal Access to Services     [facility]: Hadii aad ku hadasho Soomaali, waaxda luqadaha, qaybta kaalmada adeegyada, waxay idiin hayaan adeeg kharash la'aan . So [facility] [phone].    ATENCIÓN: Si habla español, tiene a rojas disposición servicios gratuitos de asistencia lingüística. Evelyname al [phone].    We comply with applicable federal civil rights laws and [place] laws. We do not discriminate on the basis of race, color, national origin, age, disability, sex, sexual orientation, or gender identity.            Thank you!     Thank you for choosing [facility]  for your care. Our goal is always to provide you with excellent care. Hearing back from our patients is one way we can continue to improve our services. Please take a few minutes to complete the written survey that you may receive in the mail after your visit with us. Thank you!             Your Updated Medication List - Protect others around you: Learn how to safely use, store and throw away your medicines at www.disposemymeds.org.          This list is accurate as of 3/12/18  1:26 PM.  Always use your most recent med list.                   Brand Name Dispense Instructions for use Diagnosis    acetaminophen 325 MG tablet    TYLENOL    100 tablet    Take 2 tablets (650 mg) by mouth every 4 hours as needed for other (multimodal surgical pain management along with NSAIDS and opioid medication as indicated based on pain control and physical function.)        atorvastatin 20 MG tablet    LIPITOR    90 tablet    Take 1 tablet (20 mg) by mouth daily    Hyperlipidemia LDL goal <130       COUMADIN PO      Take by mouth daily Take as directed per INR results. 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       melatonin 1 MG Tabs tablet      Take 1 tablet (1 mg) by mouth nightly as needed for sleep    Closed displaced supracondylar fracture of distal end of left femur without intracondylar extension, initial encounter (H)       metoprolol tartrate 25 MG tablet    LOPRESSOR    45 tablet    TAKE ONE-HALF TABLET BY MOUTH ONCE DAILY    Chronic atrial fibrillation (H)       order for DME     1 Device    Equipment being ordered: Walker () Treatment Diagnosis: s/p joint replacement    Status post total left knee replacement       oxyCODONE IR 5 MG tablet    ROXICODONE    40 tablet    Take 1-2 tablets (5-10 mg) by mouth every 4 hours as needed for other (pain control or improvement in physical function. Hold dose for analgesic side effects.)    Closed displaced supracondylar fracture of distal end of left femur without intracondylar extension, initial encounter (H)       polyethylene glycol Packet    MIRALAX/GLYCOLAX    7 packet    Take 17 g by mouth daily as needed for constipation        senna-docusate 8.6-50 MG per tablet    SENOKOT-S;PERICOLACE    100 tablet    Take 1 tablet by mouth 2 times daily as needed for constipation    Closed displaced supracondylar fracture of distal end of left femur without intracondylar extension, initial encounter (H)

## 2018-03-15 ENCOUNTER — OFFICE VISIT (OUTPATIENT)
Dept: ORTHOPEDICS | Facility: CLINIC | Age: 83
End: 2018-03-15
Payer: MEDICARE

## 2018-03-15 ENCOUNTER — RADIANT APPOINTMENT (OUTPATIENT)
Dept: GENERAL RADIOLOGY | Facility: CLINIC | Age: 83
End: 2018-03-15
Attending: ORTHOPAEDIC SURGERY
Payer: MEDICARE

## 2018-03-15 VITALS — HEIGHT: 72 IN | TEMPERATURE: 98 F | WEIGHT: 194 LBS | BODY MASS INDEX: 26.28 KG/M2

## 2018-03-15 DIAGNOSIS — S72.452D CLOSED DISPLACED SUPRACONDYLAR FRACTURE OF DISTAL END OF LEFT FEMUR WITHOUT INTRACONDYLAR EXTENSION WITH ROUTINE HEALING, SUBSEQUENT ENCOUNTER: ICD-10-CM

## 2018-03-15 DIAGNOSIS — S72.452D CLOSED DISPLACED SUPRACONDYLAR FRACTURE OF DISTAL END OF LEFT FEMUR WITHOUT INTRACONDYLAR EXTENSION WITH ROUTINE HEALING, SUBSEQUENT ENCOUNTER: Primary | ICD-10-CM

## 2018-03-15 PROCEDURE — 73552 X-RAY EXAM OF FEMUR 2/>: CPT | Mod: TC

## 2018-03-15 PROCEDURE — 99024 POSTOP FOLLOW-UP VISIT: CPT | Performed by: ORTHOPAEDIC SURGERY

## 2018-03-15 ASSESSMENT — PAIN SCALES - GENERAL: PAINLEVEL: NO PAIN (0)

## 2018-03-15 NOTE — PROGRESS NOTES
Office Visit-Follow up  HISTORY OF PRESENT ILLNESS:    Tomer Weiss is a 85 year old male who is seen in follow up for   Chief Complaint   Patient presents with     RECHECK     Open Reduction Internal Fixation Left Femur.  DOS: 3/3/18 (12 days postop) - [facility]     Surgical Followup     PREOPERATIVE DIAGNOSIS:  Left supracondylar femur fracture above a total knee arthroplasty.POSTOPERATIVE DIAGNOSIS:  Left supracondylar femur fracture above a total knee arthroplasty. PROCEDURE:  Open reduction and internal fixation, left supracondylar femur fracture above a total knee arthroplasty.       Patient presents with:  RECHECK: Open Reduction Internal Fixation Left Femur.  DOS: 3/3/18 (12 days postop) - [facility]  Surgical Followup: PREOPERATIVE DIAGNOSIS:  Left supracondylar femur fracture above a total knee arthroplasty.POSTOPERATIVE DIAGNOSIS:  Left supracondylar femur fracture above a total knee arthroplasty. PROCEDURE:  Open reduction and internal fixation, left supracondylar femur fracture above a total knee arthroplasty.      Patient is accompanied by his daughter today  Present symptoms: He denies having any pain.  Treatments tried to this point: Is 12 days post surgery.  He is in a rehab facility.  He is nonweightbearing.  He has been wearing a hinged knee brace and working on range of motion.    REVIEW OF SYSTEMS    General: negative for, night sweats, dizziness, fatigue  Resp: No shortness of breath and no cough  CV: negative for chest pain, syncope or near-syncope  GI: negative for nausea, vomiting and diarrhea  : negative for dysuria and hematuria  Musculoskeletal: as above  Neurologic: negative for syncope   Hematologic: negative for bleeding disorder    Physical Exam:    Vitals: Temp 98  F (36.7  C) (Temporal)  Ht 1.829 m (6')  Wt 88 kg (194 lb)  BMI 26.31 kg/m2  BMI= Body mass index is 26.31 kg/(m^2).  Constitutional: healthy, alert and no acute distress   Psychiatric: mentation appears  normal and affect normal/bright  NEURO: no focal deficits  SKIN: no excoriation or erythema. No signs of infection.    GAIT: He is in a wheelchair today.    JOINT/EXTREMITIES:     Direct inspection of the leg shows that he has an appropriate amount of swelling at the knee but well controlled swelling in the leg.  He is wearing the SHANELL stockings.  The wound is very healthy and all sutures were removed today.  He actually has the ability to do a straight leg lift on his own.  When assisted we can flex him to about 50  at the knee.  Neurologic and vascular examination is intact.    IMAGING INTERPRETATION: X-rays of the distal femur were taken today.  They were compared to his intraoperative photos.  There is no change in the alignment of the hardware, no change in the alignment of the fracture, no hardware failure.       Impression:      ICD-10-CM    1. Closed displaced supracondylar fracture of distal end of left femur without intracondylar extension with routine healing, subsequent encounter S72.452D XR Femur Left 2 Views       He appears to be doing well.    Plan:   The above was reviewed with Tomer and his daughter..     We will maintain all the previous treatment parameters.  I will ask the therapist at the rehab facility to work on range of motion at the knee on a more diligent fashion.      We had a fairly thorough discussion about his potential progression.  We will see him back in about 3 or 4 weeks.  We will re-x-ray the leg.  I will probably start partial weightbearing at that time.      Return to clinic 3-4 weeks.  Repeat x-ray AP and lateral of the distal one half of the femur out of the splint prior to being seen.    Deonte Silver MD

## 2018-03-15 NOTE — LETTER
3/15/2018         RE: Tomer Weiss  [address] [facility] [facility] [address]        Dear Colleague,    Thank you for referring your patient, Tomer Weiss, to the [facility]. Please see a copy of my visit note below.    Office Visit-Follow up  HISTORY OF PRESENT ILLNESS:    Tomer Weiss is a 85 year old male who is seen in follow up for   Chief Complaint   Patient presents with     RECHECK     Open Reduction Internal Fixation Left Femur.  DOS: 3/3/18 (12 days postop) - [facility]     Surgical Followup     PREOPERATIVE DIAGNOSIS:  Left supracondylar femur fracture above a total knee arthroplasty.POSTOPERATIVE DIAGNOSIS:  Left supracondylar femur fracture above a total knee arthroplasty. PROCEDURE:  Open reduction and internal fixation, left supracondylar femur fracture above a total knee arthroplasty.       Patient presents with:  RECHECK: Open Reduction Internal Fixation Left Femur.  DOS: 3/3/18 (12 days postop) - [facility]  Surgical Followup: PREOPERATIVE DIAGNOSIS:  Left supracondylar femur fracture above a total knee arthroplasty.POSTOPERATIVE DIAGNOSIS:  Left supracondylar femur fracture above a total knee arthroplasty. PROCEDURE:  Open reduction and internal fixation, left supracondylar femur fracture above a total knee arthroplasty.      Patient is accompanied by his daughter today  Present symptoms: He denies having any pain.  Treatments tried to this point: Is 12 days post surgery.  He is in a rehab facility.  He is nonweightbearing.  He has been wearing a hinged knee brace and working on range of motion.    REVIEW OF SYSTEMS    General: negative for, night sweats, dizziness, fatigue  Resp: No shortness of breath and no cough  CV: negative for chest pain, syncope or near-syncope  GI: negative for nausea, vomiting and diarrhea  : negative for dysuria and hematuria  Musculoskeletal: as above  Neurologic: negative for syncope   Hematologic: negative for  bleeding disorder    Physical Exam:    Vitals: Temp 98  F (36.7  C) (Temporal)  Ht 1.829 m (6')  Wt 88 kg (194 lb)  BMI 26.31 kg/m2  BMI= Body mass index is 26.31 kg/(m^2).  Constitutional: healthy, alert and no acute distress   Psychiatric: mentation appears normal and affect normal/bright  NEURO: no focal deficits  SKIN: no excoriation or erythema. No signs of infection.    GAIT: He is in a wheelchair today.    JOINT/EXTREMITIES:     Direct inspection of the leg shows that he has an appropriate amount of swelling at the knee but well controlled swelling in the leg.  He is wearing the SHANELL stockings.  The wound is very healthy and all sutures were removed today.  He actually has the ability to do a straight leg lift on his own.  When assisted we can flex him to about 50  at the knee.  Neurologic and vascular examination is intact.    IMAGING INTERPRETATION: X-rays of the distal femur were taken today.  They were compared to his intraoperative photos.  There is no change in the alignment of the hardware, no change in the alignment of the fracture, no hardware failure.       Impression:      ICD-10-CM    1. Closed displaced supracondylar fracture of distal end of left femur without intracondylar extension with routine healing, subsequent encounter S72.452D XR Femur Left 2 Views       He appears to be doing well.    Plan:   The above was reviewed with Tomer and his daughter..     We will maintain all the previous treatment parameters.  I will ask the therapist at the rehab facility to work on range of motion at the knee on a more diligent fashion.      We had a fairly thorough discussion about his potential progression.  We will see him back in about 3 or 4 weeks.  We will re-x-ray the leg.  I will probably start partial weightbearing at that time.      Return to clinic 3-4 weeks.  Repeat x-ray AP and lateral of the distal one half of the femur out of the splint prior to being seen.    Deonte Silver MD    Again,  thank you for allowing me to participate in the care of your patient.        Sincerely,        Deonte Silver MD

## 2018-03-15 NOTE — MR AVS SNAPSHOT
After Visit Summary   3/15/2018    Tomer Weiss    MRN: [id number]           Patient Information     Date Of Birth          4/9/1932        Visit Information        Provider Department      3/15/2018 11:20 AM Deonte Silver MD [facility]        Today's Diagnoses     Closed displaced supracondylar fracture of distal end of left femur without intracondylar extension with routine healing, subsequent encounter    -  1       Follow-ups after your visit        Your next 10 appointments already scheduled     Mar 20, 2018 10:00 AM CDT   Ech Limited with [id number]   [facility] CV Echocardiography ([facility])    [facility] [facility]  [address]  [facility] [address]   [phone]           1.  Please bring or wear a comfortable two-piece outfit. 2.  You may eat, drink and take your normal medicines. 3.  For any questions that cannot be answered, please contact the ordering physician            Mar 20, 2018 11:00 AM CDT   MR MYOCARDIUM W CONTRAST with SCIMR1   [facility] ([facility])    [address] [place] [address] [address]  [facility] [address]   [phone]           Take your medicines as usual, unless your doctor tells you not to. Bring a list of your current medicines to your exam (including vitamins, minerals and over-the-counter drugs).  You may or may not receive intravenous (IV) contrast for this exam pending the discretion of the Radiologist.  You do not need to do anything special to prepare.  The MRI machine uses a strong magnet. Please wear clothes without metal (snaps, zippers). A sweatsuit works well, or we may give you a hospital gown.  Please remove any body piercings and hair extensions before you arrive. You will also remove watches, jewelry, hairpins, wallets, dentures, partial dental plates and hearing aids. You may wear contact lenses, and you may be  able to wear your rings. We have a safe place to keep your personal items, but it is safer to leave them at home.  **IMPORTANT** THE INSTRUCTIONS BELOW ARE ONLY FOR THOSE PATIENTS WHO HAVE BEEN PRESCRIBED SEDATION OR GENERAL ANESTHESIA DURING THEIR MRI PROCEDURE:  IF YOUR DOCTOR PRESCRIBED ORAL SEDATION (take medicine to help you relax during your exam):   You must get the medicine from your doctor (oral medication) before you arrive. Bring the medicine to the exam. Do not take it at home. You ll be told when to take it upon arriving for your exam.   Arrive one hour early. Bring someone who can take you home after the test. Your medicine will make you sleepy. After the exam, you may not drive, take a bus or take a taxi by yourself.  IF YOUR DOCTOR PRESCRIBED IV SEDATION:   Arrive one hour early. Bring someone who can take you home after the test. Your medicine will make you sleepy. After the exam, you may not drive, take a bus or take a taxi by yourself.   No eating 6 hours before your exam. You may have clear liquids up until 4 hours before your exam. (Clear liquids include water, clear tea, black coffee and fruit juice without pulp.)  IF YOUR DOCTOR PRESCRIBED ANESTHESIA (be asleep for your exam):   Arrive 1 1/2 hours early. Bring someone who can take you home after the test. You may not drive, take a bus or take a taxi by yourself.   No eating 8 hours before your exam. You may have clear liquids up until 4 hours before your exam. (Clear liquids include water, clear tea, black coffee and fruit juice without pulp.)   You will spend four to five hours in the recovery room.  Please call the Imaging Department at your exam site with any questions.            Mar 28, 2018  3:00 PM CDT   Anticoagulation Visit with ZM ANTI COABEAR   [facility]    [address]  [facility] [address] [phone]            Apr 02, 2018 11:00 AM CDT   Cath 90 Minute with SHCVR1    [facility] Cardiac Catheterization [facility] ([facility])    [address]  [place] [address] [phone]            Apr 16, 2018  7:45 AM CDT   Pulmonary Hypertension Return with Russell John MD   [facility] ([facility])    [address]  [place] [id number] [phone]              Who to contact     If you have questions or need follow up information about today's clinic visit or your schedule please contact [facility] directly at [phone].  Normal or non-critical lab and imaging results will be communicated to you by MyChart, letter or phone within 4 business days after the clinic has received the results. If you do not hear from us within 7 days, please contact the clinic through MyChart or phone. If you have a critical or abnormal lab result, we will notify you by phone as soon as possible.  Submit refill requests through [employer] or call your pharmacy and they will forward the refill request to us. Please allow 3 business days for your refill to be completed.          Additional Information About Your Visit        Care EveryWhere ID     This is your Care EveryWhere ID. This could be used by other organizations to access your [place] medical records  [id number]        Your Vitals Were     Temperature Height BMI (Body Mass Index)             98  F (36.7  C) (Temporal) 1.829 m (6') 26.31 kg/m2          Blood Pressure from Last 3 Encounters:   03/12/18 109/63   03/07/18 (!) 89/49   03/06/18 115/69    Weight from Last 3 Encounters:   03/15/18 88 kg (194 lb)   03/12/18 94.7 kg (208 lb 12.8 oz)   03/07/18 98.4 kg (217 lb)               Primary Care Provider Office Phone # Fax #    Gabriel Tan [phone]       [facility] [address] [place] [address]        Equal Access to Services     NANCY MILAN AH: rocky Naik,  todd lenardvirgen edimariposa murcia iliain hayaan adeeg kharash la'aan ah. So [facility] [phone].    ATENCIÓN: Si keven bazzi, tiene a rojas disposición servicios gratuitos de asistencia lingüística. Ellis al [phone].    We comply with applicable federal civil rights laws and [place] laws. We do not discriminate on the basis of race, color, national origin, age, disability, sex, sexual orientation, or gender identity.            Thank you!     Thank you for choosing [facility]  for your care. Our goal is always to provide you with excellent care. Hearing back from our patients is one way we can continue to improve our services. Please take a few minutes to complete the written survey that you may receive in the mail after your visit with us. Thank you!             Your Updated Medication List - Protect others around you: Learn how to safely use, store and throw away your medicines at www.disposemymeds.org.          This list is accurate as of 3/15/18 12:17 PM.  Always use your most recent med list.                   Brand Name Dispense Instructions for use Diagnosis    acetaminophen 325 MG tablet    TYLENOL    100 tablet    Take 2 tablets (650 mg) by mouth every 4 hours as needed for other (multimodal surgical pain management along with NSAIDS and opioid medication as indicated based on pain control and physical function.)        atorvastatin 20 MG tablet    LIPITOR    90 tablet    Take 1 tablet (20 mg) by mouth daily    Hyperlipidemia LDL goal <130       COUMADIN PO      Take by mouth daily Take as directed per INR results. 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       melatonin 1 MG Tabs tablet      Take 1 tablet (1 mg) by mouth nightly as needed for sleep     Closed displaced supracondylar fracture of distal end of left femur without intracondylar extension, initial encounter (H)       metoprolol tartrate 25 MG tablet    LOPRESSOR    45 tablet    TAKE ONE-HALF TABLET BY MOUTH ONCE DAILY    Chronic atrial fibrillation (H)       order for DME     1 Device    Equipment being ordered: Walker () Treatment Diagnosis: s/p joint replacement    Status post total left knee replacement       polyethylene glycol Packet    MIRALAX/GLYCOLAX    7 packet    Take 17 g by mouth daily as needed for constipation        senna-docusate 8.6-50 MG per tablet    SENOKOT-S;PERICOLACE    100 tablet    Take 1 tablet by mouth 2 times daily as needed for constipation    Closed displaced supracondylar fracture of distal end of left femur without intracondylar extension, initial encounter (H)

## 2018-03-15 NOTE — PROGRESS NOTES
Appropriate assistive devices provided during their visit. y (Yes, No, N/A) walker / belt (list device)    Exam table and/or cart  placed in the lowest position. y (Yes, No, N/A)    Brakes on tables/carts/wheelchairs used at all times. y (Yes, No, N/A)    Non slip footwear applied. shoes (Yes, No, NA)    Patient was accompanied by staff throughout visit. y (Yes, No, N/A)    Equipment safety straps used. na (Yes, No, N/A)    Assist with toileting. na (Yes, No, N/A)  Leola Cross  3/15/2018  11:49 AM

## 2018-03-15 NOTE — NURSING NOTE
Chief Complaint   Patient presents with     RECHECK     Open Reduction Internal Fixation Left Femur.  DOS: 3/3/18 (12 days postop) - [facility]     Surgical Followup     PREOPERATIVE DIAGNOSIS:  Left supracondylar femur fracture above a total knee arthroplasty.POSTOPERATIVE DIAGNOSIS:  Left supracondylar femur fracture above a total knee arthroplasty. PROCEDURE:  Open reduction and internal fixation, left supracondylar femur fracture above a total knee arthroplasty.       Initial Temp 98  F (36.7  C) (Temporal)  Ht 1.829 m (6')  Wt 88 kg (194 lb)  BMI 26.31 kg/m2 Estimated body mass index is 26.31 kg/(m^2) as calculated from the following:    Height as of this encounter: 1.829 m (6').    Weight as of this encounter: 88 kg (194 lb).  Medication Reconciliation: complete   Maureen/SOPHIE

## 2018-03-19 ENCOUNTER — RECORDS - HEALTHEAST (OUTPATIENT)
Dept: LAB | Facility: CLINIC | Age: 83
End: 2018-03-19

## 2018-03-20 ENCOUNTER — TRANSFERRED RECORDS (OUTPATIENT)
Dept: HEALTH INFORMATION MANAGEMENT | Facility: CLINIC | Age: 83
End: 2018-03-20

## 2018-03-20 LAB
HEMOGLOBIN: 9.4 G/DL (ref 14–18)
HGB BLD-MCNC: 9.4 G/DL (ref 14–18)

## 2018-03-21 ENCOUNTER — NURSING HOME VISIT (OUTPATIENT)
Dept: GERIATRICS | Facility: CLINIC | Age: 83
End: 2018-03-21
Payer: MEDICARE

## 2018-03-21 VITALS
OXYGEN SATURATION: 98 % | DIASTOLIC BLOOD PRESSURE: 72 MMHG | SYSTOLIC BLOOD PRESSURE: 116 MMHG | HEART RATE: 91 BPM | TEMPERATURE: 97.8 F | WEIGHT: 194.2 LBS | BODY MASS INDEX: 26.34 KG/M2 | RESPIRATION RATE: 22 BRPM

## 2018-03-21 DIAGNOSIS — G47.00 INSOMNIA, UNSPECIFIED TYPE: ICD-10-CM

## 2018-03-21 DIAGNOSIS — I48.20 CHRONIC ATRIAL FIBRILLATION (H): ICD-10-CM

## 2018-03-21 DIAGNOSIS — S72.402D CLOSED FRACTURE OF DISTAL END OF LEFT FEMUR WITH ROUTINE HEALING, UNSPECIFIED FRACTURE MORPHOLOGY, SUBSEQUENT ENCOUNTER: Primary | ICD-10-CM

## 2018-03-21 DIAGNOSIS — D62 ANEMIA DUE TO BLOOD LOSS, ACUTE: ICD-10-CM

## 2018-03-21 PROCEDURE — 99309 SBSQ NF CARE MODERATE MDM 30: CPT | Performed by: NURSE PRACTITIONER

## 2018-03-26 ENCOUNTER — NURSING HOME VISIT (OUTPATIENT)
Dept: GERIATRICS | Facility: CLINIC | Age: 83
End: 2018-03-26
Payer: MEDICARE

## 2018-03-26 VITALS
DIASTOLIC BLOOD PRESSURE: 73 MMHG | SYSTOLIC BLOOD PRESSURE: 118 MMHG | HEART RATE: 72 BPM | RESPIRATION RATE: 22 BRPM | BODY MASS INDEX: 25.85 KG/M2 | OXYGEN SATURATION: 98 % | WEIGHT: 190.6 LBS | TEMPERATURE: 97.1 F

## 2018-03-26 DIAGNOSIS — Z79.01 LONG-TERM (CURRENT) USE OF ANTICOAGULANTS: Primary | ICD-10-CM

## 2018-03-26 DIAGNOSIS — I48.20 CHRONIC ATRIAL FIBRILLATION (H): ICD-10-CM

## 2018-03-26 DIAGNOSIS — Z51.81 ENCOUNTER FOR THERAPEUTIC DRUG MONITORING: ICD-10-CM

## 2018-03-26 PROCEDURE — 99308 SBSQ NF CARE LOW MDM 20: CPT | Performed by: NURSE PRACTITIONER

## 2018-03-26 NOTE — PROGRESS NOTES
[place] [facility]    HPI:    Tomer Weiss is a 85 year old  (4/9/1932), who is being seen today for an episodic care visit at [facility].   HPI information obtained from: facility chart records, facility staff, patient report and [facility] chart review. Today's concern is INR/Coumadin management for A. Fib    Bleeding Signs/Symptoms:  None  Thromboembolic Signs/Symptoms:  None    Medication Changes:  No  Dietary Changes:  No  Activity Changes: No  Bacterial/Viral Infection:  No    Missed Coumadin Doses:  None    On ASA: No    Other Concerns:  No    OBJECTIVE:    INR Today:  3.0  Current Dose:  5 mg Mon/Wed/Fri/sat and 2.5 mg Sun/ Tues/ Thu/      ASSESSMENT:     Long-term (current) use of anticoagulants  Encounter for therapeutic drug monitoring  Chronic atrial fibrillation (H)  Therapeutic INR for goal of 2-3    PLAN:    New Dose: Warfarin 5 mg Monday Wednesday Friday and 2.5 mg Sunday Tuesday Thursday    Next INR: April 2, 2018      Electronically signed by:  Beti Roach NP

## 2018-03-28 ENCOUNTER — CARE COORDINATION (OUTPATIENT)
Dept: CARDIOLOGY | Facility: CLINIC | Age: 83
End: 2018-03-28

## 2018-03-28 RX ORDER — POTASSIUM CHLORIDE 1500 MG/1
20 TABLET, EXTENDED RELEASE ORAL
Status: CANCELLED | OUTPATIENT
Start: 2018-03-28

## 2018-03-28 RX ORDER — LIDOCAINE 40 MG/G
CREAM TOPICAL
Status: CANCELLED | OUTPATIENT
Start: 2018-03-28

## 2018-03-28 RX ORDER — SODIUM CHLORIDE 9 MG/ML
INJECTION, SOLUTION INTRAVENOUS CONTINUOUS
Status: CANCELLED | OUTPATIENT
Start: 2018-03-28

## 2018-03-28 NOTE — PROGRESS NOTES
Call to patient care team Shiloh HARRINGTON [phone] to review pre-procedure instructions for Left Heart Catheterization scheduled 900 am for 1100 am procedure at [facility].   Reviewed the following with patient care team and they will relay to patient per their facility policy; care team states understanding of instructions:  Blood thinners: coumadin hold 5 days preop per Dr John starting 3/28/2018.  Diabetic: confirmed No  Hold diuretics am of procedure - confirmed not on.  Review of Contrast allergy: confirmed no allergy.  NPO status reviewed  Aspirin therapy reviewed per protocol Including taking 325 mg day prior and morning of procedure.   Patient has transportation to and from procedure.   Patient has arranged for someone to stay with them 24 hours post procedure.  Labs on admit per order.  Katelyn Weaver RN 03/28/18 1:31 PM

## 2018-04-02 ENCOUNTER — NURSING HOME VISIT (OUTPATIENT)
Dept: GERIATRICS | Facility: CLINIC | Age: 83
End: 2018-04-02
Payer: MEDICARE

## 2018-04-02 VITALS
SYSTOLIC BLOOD PRESSURE: 116 MMHG | HEART RATE: 92 BPM | OXYGEN SATURATION: 98 % | TEMPERATURE: 98.2 F | WEIGHT: 186 LBS | RESPIRATION RATE: 20 BRPM | BODY MASS INDEX: 25.23 KG/M2 | DIASTOLIC BLOOD PRESSURE: 65 MMHG

## 2018-04-02 DIAGNOSIS — Z79.01 LONG-TERM (CURRENT) USE OF ANTICOAGULANTS: ICD-10-CM

## 2018-04-02 DIAGNOSIS — I48.20 CHRONIC ATRIAL FIBRILLATION (H): Primary | ICD-10-CM

## 2018-04-02 DIAGNOSIS — Z51.81 ENCOUNTER FOR THERAPEUTIC DRUG MONITORING: ICD-10-CM

## 2018-04-02 PROCEDURE — 99308 SBSQ NF CARE LOW MDM 20: CPT | Performed by: NURSE PRACTITIONER

## 2018-04-02 NOTE — PROGRESS NOTES
[place] [facility]    HPI:    Tomer Weiss is a 85 year old  (4/9/1932), who is being seen today for an episodic care visit at [facility].   HPI information obtained from: facility chart records, facility staff, patient report and [facility] chart review. Today's concern is INR/Coumadin management for A. Fib    Bleeding Signs/Symptoms:  None  Thromboembolic Signs/Symptoms:  None    Medication Changes:  No  Dietary Changes:  No  Activity Changes: No  Bacterial/Viral Infection:  No    Missed Coumadin Doses:  None    On ASA: No    Other Concerns:  No    OBJECTIVE:    INR Today:  2.5  Current Dose: Warfarin 5 mg Monday Wednesday Friday and 2.5 mg Sunday Tuesday Thursday    ASSESSMENT:     Chronic atrial fibrillation (H)  Long-term (current) use of anticoagulants  Encounter for therapeutic drug monitoring  Therapeutic INR for goal of 2-3    PLAN:    New Dose:  Warfarin PO 5 mg Mon, Wed, Fri, and Warfarin 2.5 mg PO tues, Thurs, Sat, Sun    Next INR: 2 weeks    Electronically signed by:  Beti Roach NP

## 2018-04-11 ENCOUNTER — PATIENT OUTREACH (OUTPATIENT)
Dept: CARE COORDINATION | Facility: CLINIC | Age: 83
End: 2018-04-11

## 2018-04-11 NOTE — PROGRESS NOTES
Clinic Care Coordination Contact  [facility]/Voicemail    Referral Source: IP Handoff  Clinical Data: Care Coordinator Outreach- [facility] following up with TCU  Abeba checking if pt still doing rehab there.   Outreach attempted x 2.  Left message on voicemail with call back information and requested return call.   Plan: Care Coordinator will try to reach patient again in 7-10 business days.    RICHARD Vazquez Clinic Care Coordinator  [facility]  4/11/2018 3:43 PM  [phone]

## 2018-04-12 ENCOUNTER — RECORDS - HEALTHEAST (OUTPATIENT)
Dept: LAB | Facility: CLINIC | Age: 83
End: 2018-04-12

## 2018-04-12 ENCOUNTER — TRANSFERRED RECORDS (OUTPATIENT)
Dept: HEALTH INFORMATION MANAGEMENT | Facility: CLINIC | Age: 83
End: 2018-04-12

## 2018-04-12 LAB
ALBUMIN UR-MCNC: NEGATIVE MG/DL
ANION GAP SERPL CALCULATED.3IONS-SCNC: 9 MMOL/L (ref 5–18)
ANION GAP SERPL CALCULATED.3IONS-SCNC: 9 MMOL/L (ref 5–18)
APPEARANCE UR: CLEAR
BILIRUB UR QL STRIP: NEGATIVE
BUN SERPL-MCNC: 19 MG/DL (ref 8–28)
BUN SERPL-MCNC: 19 MG/DL (ref 8–28)
CALCIUM SERPL-MCNC: 8.9 MG/DL (ref 8.5–10.5)
CALCIUM SERPL-MCNC: 8.9 MG/DL (ref 8.5–10.5)
CHLORIDE BLD-SCNC: 101 MMOL/L (ref 98–107)
CHLORIDE SERPLBLD-SCNC: 101 MMOL/L (ref 98–107)
CO2 SERPL-SCNC: 24 MMOL/L (ref 22–31)
CO2 SERPL-SCNC: 24 MMOL/L (ref 22–31)
COLOR UR AUTO: YELLOW
CREAT SERPL-MCNC: 0.8 MG/DL (ref 0.7–1.3)
CREAT SERPL-MCNC: 0.8 MG/DL (ref 0.7–1.3)
ERYTHROCYTE [DISTWIDTH] IN BLOOD BY AUTOMATED COUNT: 13.9 % (ref 11–14.5)
ERYTHROCYTE [DISTWIDTH] IN BLOOD BY AUTOMATED COUNT: 13.9 % (ref 11–14.5)
GFR SERPL CREATININE-BSD FRML MDRD: >60 ML/MIN/1.73M2
GFR SERPL CREATININE-BSD FRML MDRD: >60 ML/MIN/1.73M2
GLUCOSE BLD-MCNC: 88 MG/DL (ref 70–125)
GLUCOSE SERPL-MCNC: 88 MG/DL (ref 70–125)
GLUCOSE UR STRIP-MCNC: NEGATIVE MG/DL
HCT VFR BLD AUTO: 38.2 % (ref 40–54)
HCT VFR BLD AUTO: 38.2 % (ref 40–54)
HEMOGLOBIN: 12.3 G/DL (ref 14–18)
HGB BLD-MCNC: 12.3 G/DL (ref 14–18)
HGB UR QL STRIP: NEGATIVE
KETONES UR STRIP-MCNC: NEGATIVE MG/DL
LEUKOCYTE ESTERASE UR QL STRIP: NEGATIVE
MCH RBC QN AUTO: 31.1 PG (ref 27–34)
MCH RBC QN AUTO: 31.1 PG (ref 27–34)
MCHC RBC AUTO-ENTMCNC: 32.2 G/DL (ref 32–36)
MCHC RBC AUTO-ENTMCNC: 32.2 G/DL (ref 32–36)
MCV RBC AUTO: 97 FL (ref 80–100)
MCV RBC AUTO: 97 FL (ref 80–100)
NITRATE UR QL: NEGATIVE
PH UR STRIP: 6 [PH] (ref 4.5–8)
PLATELET # BLD AUTO: 304 THOU/UL (ref 140–440)
PLATELET # BLD AUTO: 304 THOU/UL (ref 140–440)
PMV BLD AUTO: 9.5 FL (ref 8.5–12.5)
POTASSIUM BLD-SCNC: 5.1 MMOL/L (ref 3.5–5)
POTASSIUM SERPL-SCNC: 5.1 MMOL/L (ref 3.5–5)
RBC # BLD AUTO: 3.96 MILL/UL (ref 4.4–6.2)
RBC # BLD AUTO: 3.96 MILL/UL (ref 4.4–6.2)
SODIUM SERPL-SCNC: 134 MMOL/L (ref 136–145)
SODIUM SERPL-SCNC: 134 MMOL/L (ref 136–145)
SP GR UR STRIP: 1.01 (ref 1–1.03)
UROBILINOGEN UR STRIP-ACNC: NORMAL
WBC # BLD AUTO: 8.6 THOU/UL (ref 4–11)
WBC: 8.6 THOU/UL (ref 4–11)

## 2018-04-13 ENCOUNTER — NURSING HOME VISIT (OUTPATIENT)
Dept: GERIATRICS | Facility: CLINIC | Age: 83
End: 2018-04-13
Payer: MEDICARE

## 2018-04-13 VITALS
HEART RATE: 74 BPM | SYSTOLIC BLOOD PRESSURE: 110 MMHG | TEMPERATURE: 98 F | DIASTOLIC BLOOD PRESSURE: 76 MMHG | WEIGHT: 185 LBS | BODY MASS INDEX: 25.09 KG/M2 | RESPIRATION RATE: 18 BRPM | OXYGEN SATURATION: 96 %

## 2018-04-13 DIAGNOSIS — R50.9 FEVER, UNSPECIFIED FEVER CAUSE: ICD-10-CM

## 2018-04-13 DIAGNOSIS — S72.402D CLOSED FRACTURE OF DISTAL END OF LEFT FEMUR WITH ROUTINE HEALING, UNSPECIFIED FRACTURE MORPHOLOGY, SUBSEQUENT ENCOUNTER: ICD-10-CM

## 2018-04-13 DIAGNOSIS — J06.9 VIRAL UPPER RESPIRATORY TRACT INFECTION: Primary | ICD-10-CM

## 2018-04-13 DIAGNOSIS — R05.9 COUGH: ICD-10-CM

## 2018-04-13 DIAGNOSIS — B37.2 CANDIDIASIS OF SKIN: ICD-10-CM

## 2018-04-13 LAB — BACTERIA SPEC CULT: NO GROWTH

## 2018-04-13 PROCEDURE — 99309 SBSQ NF CARE MODERATE MDM 30: CPT | Performed by: NURSE PRACTITIONER

## 2018-04-13 RX ORDER — NYSTATIN 100000 [USP'U]/G
POWDER TOPICAL 2 TIMES DAILY
COMMUNITY
End: 2018-05-09 | Stop reason: ALTCHOICE

## 2018-04-13 NOTE — PROGRESS NOTES
[place] [facility]    Chief Complaint   Patient presents with     Nursing Home Acute       HPI:    Tomer Weiss is a 86 year old  (4/9/1932), who is being seen today for an episodic care visit at [facility].  HPI information obtained from: facility chart records, facility staff and patient report.    Today's concern is:     Viral upper respiratory tract infection  Fever, unspecified fever cause  Cough  Closed fracture of distal end of left femur with routine healing, unspecified fracture morphology, subsequent encounter  Candidiasis of skin    See assessment / plan for HPI     ALLERGIES: No known drug allergies  Past Medical, Surgical, Family and Social History reviewed and updated in EPIC.    REVIEW OF SYSTEMS:  4 point ROS including Respiratory, CV, GI and , other than that noted in the HPI,  is negative    Physical Exam:  /76  Pulse 74  Temp 98  F (36.7  C)  Resp 18  Wt 185 lb (83.9 kg)  SpO2 96%  BMI 25.09 kg/m2  GENERAL APPEARANCE:  Alert, in no distress  ENT: clear rhinorrhea   RESP:  respiratory effort and palpation of chest normal,   SKIN:  Inspection and palpation of skin and subcutaneous tissue at baseline  PSYCH:  insight and judgement, memory intact, affect and mood normal     Recent Labs:  Labs reviewed in nursing home records    Assessment/Plan:  Viral upper respiratory tract infection / / Fever, unspecified fever cause / / Cough  Patient started with a fever and cough on Monday and a flu swab was ordered but was unable to be completed so it was discontinued on Wednesday. On Wednesday he was afebrile and feeling ok. Yesterday nursing reported - 4/11 - patient had a fever of 101.4 and no other symptoms. A ua was neg, cxr was neg and all labs are unremarkable.   Today patient reports cough, sinus symptoms of runny nose. Likely a viral infection that will resolve with time.  - instructed to let nursing know if symptoms worsen or last longer than 7 days.    - ok for robiussin DM    Closed fracture of distal end of left femur with routine healing, unspecified fracture morphology, subsequent encounter  Has apt with ortho next week. Currently nonweightbearing on the left. Does not use oxycodone  - discontinue oxycdone  - continue Physical Therapy / Ocupational Therapy     Candidiasis of skin  C/o itchy rash that is worse with moisture. States he doesn't think he is getting the nystatin powder that is ordered for twice a day.  - d/c nystatin powder  - ok for nystatin cream twice daily until resolved and ok for patient to keep in room and self apply      Orders:  1. Robitussin DM give 10 mL PO every 4 hours PRN for Cough  2. D/C Oxycodone   3. Nystatin cream- apply to rash twice daily until resolved. Ok for patent to keep at bedside and apply   4. D/C Nystatin powder    Electronically signed by  Beti Roach NP

## 2018-04-13 NOTE — PROGRESS NOTES
Clinic Care Coordination Contact  Care Team Conversations    Abeba  at [facility] returned [facility] phone call. Abeba states pt is currently still completing his therapies there and has an ortho appointment coming up. Abeba will keep [facility] updated on discharge plans.     Plan: [facility] to do outreach with pt once he discharges back to home.     RICHARD Vazquez Clinic Care Coordinator  [facility]  4/13/2018 11:16 AM  [phone]

## 2018-04-16 ENCOUNTER — NURSING HOME VISIT (OUTPATIENT)
Dept: GERIATRICS | Facility: CLINIC | Age: 83
End: 2018-04-16
Payer: MEDICARE

## 2018-04-16 VITALS
OXYGEN SATURATION: 96 % | TEMPERATURE: 96.7 F | SYSTOLIC BLOOD PRESSURE: 120 MMHG | DIASTOLIC BLOOD PRESSURE: 66 MMHG | RESPIRATION RATE: 18 BRPM | WEIGHT: 185 LBS | HEART RATE: 86 BPM | BODY MASS INDEX: 25.09 KG/M2

## 2018-04-16 DIAGNOSIS — I48.20 CHRONIC ATRIAL FIBRILLATION (H): Primary | ICD-10-CM

## 2018-04-16 DIAGNOSIS — J06.9 VIRAL UPPER RESPIRATORY TRACT INFECTION: ICD-10-CM

## 2018-04-16 DIAGNOSIS — B37.2 CANDIDIASIS OF SKIN: ICD-10-CM

## 2018-04-16 DIAGNOSIS — Z79.01 LONG-TERM (CURRENT) USE OF ANTICOAGULANTS: ICD-10-CM

## 2018-04-16 DIAGNOSIS — Z51.81 ENCOUNTER FOR THERAPEUTIC DRUG MONITORING: ICD-10-CM

## 2018-04-16 PROCEDURE — 99309 SBSQ NF CARE MODERATE MDM 30: CPT | Performed by: NURSE PRACTITIONER

## 2018-04-16 NOTE — PROGRESS NOTES
[place] [facility]    Chief Complaint   Patient presents with     INR RESULTS       HPI:    Tomer Weiss is a 86 year old  (4/9/1932), who is being seen today for an episodic care visit at [facility].  HPI information obtained from: facility chart records, facility staff and patient report.    Today's concern is:     Chronic atrial fibrillation (H)  Long-term (current) use of anticoagulants  Encounter for therapeutic drug monitoring  Viral upper respiratory tract infection  Candidiasis of skin    See assessment / plan for HPI     ALLERGIES: No known drug allergies  Past Medical, Surgical, Family and Social History reviewed and updated in EPIC.    REVIEW OF SYSTEMS:  4 point ROS including Respiratory, CV, GI and , other than that noted in the HPI,  is negative    Physical Exam:  /66  Pulse 86  Temp 96.7  F (35.9  C)  Resp 18  Wt 185 lb (83.9 kg)  SpO2 96%  BMI 25.09 kg/m2  GENERAL APPEARANCE:  Alert, in no distress  ENT: clear rhinorrhea   RESP:  respiratory effort and palpation of chest normal,   SKIN:  Inspection and palpation of skin and subcutaneous tissue at baseline  PSYCH:  insight and judgement, memory intact, affect and mood normal     Recent Labs:  Labs reviewed in nursing home records    Assessment/Plan:  Viral upper respiratory tract infection   Patient started with a fever and cough on last week and a flu swab was ordered but was unable to be completed so it was discontinued on Wednesday. On Wednesday he was afebrile and feeling ok. On 4/11 - patient had a fever of 101.4 and no other symptoms. A ua was neg, cxr was neg and all labs are unremarkable.     Recheck 4/16 denies fever, headache, shortness of breath. Complains of continued cough.  - continue robitussin DM    Candidiasis of skin  States improved with nystatin.     Chronic atrial fibrillation (H)  Long-term (current) use of anticoagulants [Z79.01]  Encounter for therapeutic drug monitoring  Seen  today for warfarin management for the above problem.   No bleeding symptoms, no thromboembolic symptoms. No medication changes, activity changes, or bacterial viral infection. No missed warfarin doses.   INR today: 2.4  Current Dose: warfarin 5 mg po mon, wed, fri and 2.5 mg tues, thurs sat sun  Assessment:Therapeutic INR for goal of 2-3  Plan: New Dose: No Change    Next INR: 2 weeks      Orders:  INR 2.4 (continue previous dosing)  warfarin 5 mg po mon, wed, fri  And 2.5 mg tues, thurs sat sun  Check INR in 2 weeks dx af fib    Electronically signed by  Beti Roach NP

## 2018-04-17 ENCOUNTER — RADIANT APPOINTMENT (OUTPATIENT)
Dept: GENERAL RADIOLOGY | Facility: CLINIC | Age: 83
End: 2018-04-17
Attending: ORTHOPAEDIC SURGERY
Payer: MEDICARE

## 2018-04-17 ENCOUNTER — OFFICE VISIT (OUTPATIENT)
Dept: ORTHOPEDICS | Facility: CLINIC | Age: 83
End: 2018-04-17
Payer: MEDICARE

## 2018-04-17 VITALS
TEMPERATURE: 98.3 F | BODY MASS INDEX: 26.28 KG/M2 | DIASTOLIC BLOOD PRESSURE: 60 MMHG | WEIGHT: 194 LBS | HEIGHT: 72 IN | SYSTOLIC BLOOD PRESSURE: 110 MMHG

## 2018-04-17 DIAGNOSIS — S72.452D CLOSED DISPLACED SUPRACONDYLAR FRACTURE OF DISTAL END OF LEFT FEMUR WITHOUT INTRACONDYLAR EXTENSION WITH ROUTINE HEALING, SUBSEQUENT ENCOUNTER: ICD-10-CM

## 2018-04-17 DIAGNOSIS — S72.452D CLOSED DISPLACED SUPRACONDYLAR FRACTURE OF DISTAL END OF LEFT FEMUR WITHOUT INTRACONDYLAR EXTENSION WITH ROUTINE HEALING, SUBSEQUENT ENCOUNTER: Primary | ICD-10-CM

## 2018-04-17 PROCEDURE — 73552 X-RAY EXAM OF FEMUR 2/>: CPT | Mod: TC

## 2018-04-17 PROCEDURE — 99024 POSTOP FOLLOW-UP VISIT: CPT | Performed by: ORTHOPAEDIC SURGERY

## 2018-04-17 ASSESSMENT — PAIN SCALES - GENERAL: PAINLEVEL: NO PAIN (0)

## 2018-04-17 NOTE — PROGRESS NOTES
Appropriate assistive devices provided during their visit. yes (Yes, No, N/A) wheelchari (list device)    Exam table and/or cart  placed in the lowest position. yes (Yes, No, N/A)    Brakes on tables/carts/wheelchairs used at all times. yes (Yes, No, N/A)    Non slip footwear applied. No shoes on (Yes, No, NA)    Patient was accompanied by staff throughout visit. yes (Yes, No, N/A)    Equipment safety straps used. na (Yes, No, N/A)    Assist with toileting. na (Yes, No, N/A)

## 2018-04-17 NOTE — NURSING NOTE
Chief Complaint   Patient presents with     RECHECK     Open Reduction Internal Fixation Left Femur.  DOS: 3/3/18 (7 week postop) - [facility]     Surgical Followup     PREOPERATIVE DIAGNOSIS:  Left supracondylar femur fracture above a total knee arthroplasty.POSTOPERATIVE DIAGNOSIS:  Left supracondylar femur fracture above a total knee arthroplasty. PROCEDURE:  Open reduction and internal fixation, left supracondylar femur fracture above a total knee arthroplasty.       Initial /60 (BP Location: Right arm, Patient Position: Chair, Cuff Size: Adult Regular)  Temp 98.3  F (36.8  C) (Temporal)  Ht 1.829 m (6')  Wt 88 kg (194 lb)  BMI 26.31 kg/m2 Estimated body mass index is 26.31 kg/(m^2) as calculated from the following:    Height as of this encounter: 1.829 m (6').    Weight as of this encounter: 88 kg (194 lb).  Medication Reconciliation: complete   Maureen/SOPHIE

## 2018-04-17 NOTE — MR AVS SNAPSHOT
After Visit Summary   4/17/2018    Tomer Weiss    MRN: [id number]           Patient Information     Date Of Birth          4/9/1932        Visit Information        Provider Department      4/17/2018 10:10 AM Deonte Silver MD [facility]        Today's Diagnoses     Closed displaced supracondylar fracture of distal end of left femur without intracondylar extension with routine healing, subsequent encounter    -  1       Follow-ups after your visit        Your next 10 appointments already scheduled     May 01, 2018 10:20 AM CDT   Return Visit with Deonte Silver MD   [facility] ([facility]    [address] [place] [id number] [phone]              Who to contact     If you have questions or need follow up information about today's clinic visit or your schedule please contact [facility] directly at [phone].  Normal or non-critical lab and imaging results will be communicated to you by MyChart, letter or phone within 4 business days after the clinic has received the results. If you do not hear from us within 7 days, please contact the clinic through MyChart or phone. If you have a critical or abnormal lab result, we will notify you by phone as soon as possible.  Submit refill requests through SmartMove or call your pharmacy and they will forward the refill request to us. Please allow 3 business days for your refill to be completed.          Additional Information About Your Visit        Care EveryWhere ID     This is your Care EveryWhere ID. This could be used by other organizations to access your [place] medical records  [id number]        Your Vitals Were     Temperature Height BMI (Body Mass Index)             98.3  F (36.8  C) (Temporal) 1.829 m (6') 26.31 kg/m2          Blood Pressure from Last 3 Encounters:   04/17/18 110/60   04/16/18 120/66   04/13/18 110/76    Weight from Last 3  Encounters:   04/17/18 88 kg (194 lb)   04/16/18 83.9 kg (185 lb)   04/13/18 83.9 kg (185 lb)               Primary Care Provider Office Phone # Fax #    Gabriel Tan [phone] [address] [facility] [address]        Equal Access to Services     DIANMIKE BJORN : Hadii aad ku hadasho Soomaali, waaxda luqadaha, qaybta kaalmada adeegyada, waxay idiin hayaan adeeg diannaalmash laisaacn . So [facility] [phone].    ATENCIÓN: Si habla español, tiene a rojas disposición servicios gratuitos de asistencia lingüística. Llame al [phone].    We comply with applicable federal civil rights laws and [place] laws. We do not discriminate on the basis of race, color, national origin, age, disability, sex, sexual orientation, or gender identity.            Thank you!     Thank you for choosing [facility]  for your care. Our goal is always to provide you with excellent care. Hearing back from our patients is one way we can continue to improve our services. Please take a few minutes to complete the written survey that you may receive in the mail after your visit with us. Thank you!             Your Updated Medication List - Protect others around you: Learn how to safely use, store and throw away your medicines at www.disposemymeds.org.          This list is accurate as of 4/17/18 10:28 AM.  Always use your most recent med list.                   Brand Name Dispense Instructions for use Diagnosis    acetaminophen 325 MG tablet    TYLENOL    100 tablet    Take 2 tablets (650 mg) by mouth every 4 hours as needed for other (multimodal surgical pain management along with NSAIDS and opioid medication as indicated based on pain control and physical function.)        atorvastatin 20 MG tablet    LIPITOR    90 tablet    Take 1 tablet (20 mg) by mouth daily    Hyperlipidemia LDL goal <130       COUMADIN PO      Take by mouth daily Take as directed per INR results. 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       melatonin 1 MG Tabs tablet      Take 5 mg by mouth At Bedtime    Closed displaced supracondylar fracture of distal end of left femur without intracondylar extension, initial encounter (H)       metoprolol tartrate 25 MG tablet    LOPRESSOR    45 tablet    TAKE ONE-HALF TABLET BY MOUTH ONCE DAILY    Chronic atrial fibrillation (H)       nystatin 810482 UNIT/GM Powd    MYCOSTATIN     Apply topically 2 times daily Until resolved        order for DME     1 Device    Equipment being ordered: Walker () Treatment Diagnosis: s/p joint replacement    Status post total left knee replacement       polyethylene glycol Packet    MIRALAX/GLYCOLAX    7 packet    Take 17 g by mouth daily as needed for constipation        senna-docusate 8.6-50 MG per tablet    SENOKOT-S;PERICOLACE    100 tablet    Take 1 tablet by mouth 2 times daily as needed for constipation    Closed displaced supracondylar fracture of distal end of left femur without intracondylar extension, initial encounter (H)

## 2018-04-17 NOTE — LETTER
4/17/2018         RE: Tomer Weiss  [address] [facility]  [place] [address]        Dear Colleague,    Thank you for referring your patient, Tomer Weiss, to the [facility]. Please see a copy of my visit note below.    Office Visit-Follow up  HISTORY OF PRESENT ILLNESS:    Tomer Weiss is a 86 year old male who is seen in follow up for   Chief Complaint   Patient presents with     RECHECK     Open Reduction Internal Fixation Left Femur.  DOS: 3/3/18 (7 week postop) - [facility]     Surgical Followup     PREOPERATIVE DIAGNOSIS:  Left supracondylar femur fracture above a total knee arthroplasty.POSTOPERATIVE DIAGNOSIS:  Left supracondylar femur fracture above a total knee arthroplasty. PROCEDURE:  Open reduction and internal fixation, left supracondylar femur fracture above a total knee arthroplasty.       Patient presents with:  RECHECK: Open Reduction Internal Fixation Left Femur.  DOS: 3/3/18 (7 week postop) - [facility]  Surgical Followup: PREOPERATIVE DIAGNOSIS:  Left supracondylar femur fracture above a total knee arthroplasty.POSTOPERATIVE DIAGNOSIS:  Left supracondylar femur fracture above a total knee arthroplasty. PROCEDURE:  Open reduction and internal fixation, left supracondylar femur fracture above a total knee arthroplasty.      Patient is again accompanied by his daughter  Present symptoms: He does not offer any new complaints.  Treatments tried to this point: He has been nonweightbearing.  He has been working on knee range of motion.  He has used a hinged knee brace.    REVIEW OF SYSTEMS    General: negative for, night sweats, dizziness, fatigue  Resp: No shortness of breath and no cough  CV: negative for chest pain, syncope or near-syncope  GI: negative for nausea, vomiting and diarrhea  : negative for dysuria and hematuria  Musculoskeletal: as above  Neurologic: negative for syncope   Hematologic: negative for bleeding disorder    Physical  Exam:    Vitals: /60 (BP Location: Right arm, Patient Position: Chair, Cuff Size: Adult Regular)  Temp 98.3  F (36.8  C) (Temporal)  Ht 1.829 m (6')  Wt 88 kg (194 lb)  BMI 26.31 kg/m2  BMI= Body mass index is 26.31 kg/(m^2).  Constitutional: healthy, alert and no acute distress   Psychiatric: mentation appears normal and affect normal/bright  NEURO: no focal deficits  SKIN: no excoriation or erythema. No signs of infection.    GAIT: In a wheelchair.    JOINT/EXTREMITIES:     There is no unusual swelling to the left lower extremity.  The left knee will come to full extension.  I get in the office only about 80  of flexion.  He reports that the therapist says she can get him to 90 .  He has good muscle control the leg.    IMAGING INTERPRETATION: X-rays were repeated.  The fracture alignment is unchanged.  There is no signs of hardware failure.  One can see now some fairly significant callus formation.       Impression:      ICD-10-CM    1. Closed displaced supracondylar fracture of distal end of left femur without intracondylar extension with routine healing, subsequent encounter S72.452D XR Femur Left 2 Views       He is 7 weeks post surgery.  He appears to be progressing very appropriately.    Plan:   The above was reviewed with Tomer and his daughter..     We will allow a progressive weightbearing as tolerated program.  We once again we will ask the therapist and allow them to be a little more aggressive with range of motion.  It is discussed that he will probably not regain the range of motion he had prior to the injury.  The brace hinges can be unlocked at all times.  I did not write on the form but if he wishes to sleep without the brace that would be reasonable.  We will re-x-ray his knee in 2 weeks to be certain that the weightbearing progression is not causing issues.  If all is well at that time we will hopefully moving along rapidly.      Return to clinic 2, weeks, xray repeat.    Deonte Silver   MD    Again, thank you for allowing me to participate in the care of your patient.        Sincerely,        Deonte Silver MD

## 2018-04-17 NOTE — PROGRESS NOTES
Office Visit-Follow up  HISTORY OF PRESENT ILLNESS:    Tomer Weiss is a 86 year old male who is seen in follow up for   Chief Complaint   Patient presents with     RECHECK     Open Reduction Internal Fixation Left Femur.  DOS: 3/3/18 (7 week postop) - [facility]     Surgical Followup     PREOPERATIVE DIAGNOSIS:  Left supracondylar femur fracture above a total knee arthroplasty.POSTOPERATIVE DIAGNOSIS:  Left supracondylar femur fracture above a total knee arthroplasty. PROCEDURE:  Open reduction and internal fixation, left supracondylar femur fracture above a total knee arthroplasty.       Patient presents with:  RECHECK: Open Reduction Internal Fixation Left Femur.  DOS: 3/3/18 (7 week postop) - [facility]  Surgical Followup: PREOPERATIVE DIAGNOSIS:  Left supracondylar femur fracture above a total knee arthroplasty.POSTOPERATIVE DIAGNOSIS:  Left supracondylar femur fracture above a total knee arthroplasty. PROCEDURE:  Open reduction and internal fixation, left supracondylar femur fracture above a total knee arthroplasty.      Patient is again accompanied by his daughter  Present symptoms: He does not offer any new complaints.  Treatments tried to this point: He has been nonweightbearing.  He has been working on knee range of motion.  He has used a hinged knee brace.    REVIEW OF SYSTEMS    General: negative for, night sweats, dizziness, fatigue  Resp: No shortness of breath and no cough  CV: negative for chest pain, syncope or near-syncope  GI: negative for nausea, vomiting and diarrhea  : negative for dysuria and hematuria  Musculoskeletal: as above  Neurologic: negative for syncope   Hematologic: negative for bleeding disorder    Physical Exam:    Vitals: /60 (BP Location: Right arm, Patient Position: Chair, Cuff Size: Adult Regular)  Temp 98.3  F (36.8  C) (Temporal)  Ht 1.829 m (6')  Wt 88 kg (194 lb)  BMI 26.31 kg/m2  BMI= Body mass index is 26.31 kg/(m^2).  Constitutional: healthy, alert  and no acute distress   Psychiatric: mentation appears normal and affect normal/bright  NEURO: no focal deficits  SKIN: no excoriation or erythema. No signs of infection.    GAIT: In a wheelchair.    JOINT/EXTREMITIES:     There is no unusual swelling to the left lower extremity.  The left knee will come to full extension.  I get in the office only about 80  of flexion.  He reports that the therapist says she can get him to 90 .  He has good muscle control the leg.    IMAGING INTERPRETATION: X-rays were repeated.  The fracture alignment is unchanged.  There is no signs of hardware failure.  One can see now some fairly significant callus formation.       Impression:      ICD-10-CM    1. Closed displaced supracondylar fracture of distal end of left femur without intracondylar extension with routine healing, subsequent encounter S72.452D XR Femur Left 2 Views       He is 7 weeks post surgery.  He appears to be progressing very appropriately.    Plan:   The above was reviewed with Tomer and his daughter..     We will allow a progressive weightbearing as tolerated program.  We once again we will ask the therapist and allow them to be a little more aggressive with range of motion.  It is discussed that he will probably not regain the range of motion he had prior to the injury.  The brace hinges can be unlocked at all times.  I did not write on the form but if he wishes to sleep without the brace that would be reasonable.  We will re-x-ray his knee in 2 weeks to be certain that the weightbearing progression is not causing issues.  If all is well at that time we will hopefully moving along rapidly.      Return to clinic 2, weeks, xray repeat.    Deonte Silver MD

## 2018-05-01 ENCOUNTER — OFFICE VISIT (OUTPATIENT)
Dept: ORTHOPEDICS | Facility: CLINIC | Age: 83
End: 2018-05-01
Payer: MEDICARE

## 2018-05-01 ENCOUNTER — RADIANT APPOINTMENT (OUTPATIENT)
Dept: GENERAL RADIOLOGY | Facility: CLINIC | Age: 83
End: 2018-05-01
Attending: ORTHOPAEDIC SURGERY
Payer: MEDICARE

## 2018-05-01 VITALS
SYSTOLIC BLOOD PRESSURE: 103 MMHG | HEART RATE: 66 BPM | WEIGHT: 194 LBS | BODY MASS INDEX: 26.28 KG/M2 | DIASTOLIC BLOOD PRESSURE: 52 MMHG | HEIGHT: 72 IN

## 2018-05-01 DIAGNOSIS — S72.452D CLOSED DISPLACED SUPRACONDYLAR FRACTURE OF DISTAL END OF LEFT FEMUR WITHOUT INTRACONDYLAR EXTENSION WITH ROUTINE HEALING, SUBSEQUENT ENCOUNTER: ICD-10-CM

## 2018-05-01 DIAGNOSIS — S72.452D CLOSED DISPLACED SUPRACONDYLAR FRACTURE OF DISTAL END OF LEFT FEMUR WITHOUT INTRACONDYLAR EXTENSION WITH ROUTINE HEALING, SUBSEQUENT ENCOUNTER: Primary | ICD-10-CM

## 2018-05-01 PROCEDURE — 73552 X-RAY EXAM OF FEMUR 2/>: CPT | Mod: TC

## 2018-05-01 PROCEDURE — 99024 POSTOP FOLLOW-UP VISIT: CPT | Performed by: ORTHOPAEDIC SURGERY

## 2018-05-01 ASSESSMENT — PAIN SCALES - GENERAL: PAINLEVEL: NO PAIN (0)

## 2018-05-01 NOTE — MR AVS SNAPSHOT
After Visit Summary   5/1/2018    Tomer Weiss    MRN: [id number]           Patient Information     Date Of Birth          4/9/1932        Visit Information        Provider Department      5/1/2018 10:20 AM Deonte Silver MD [facility]        Today's Diagnoses     Closed displaced supracondylar fracture of distal end of left femur without intracondylar extension with routine healing, subsequent encounter    -  1       Follow-ups after your visit        Who to contact     If you have questions or need follow up information about today's clinic visit or your schedule please contact [facility] directly at [phone].  Normal or non-critical lab and imaging results will be communicated to you by MyChart, letter or phone within 4 business days after the clinic has received the results. If you do not hear from us within 7 days, please contact the clinic through MyChart or phone. If you have a critical or abnormal lab result, we will notify you by phone as soon as possible.  Submit refill requests through GLOG or call your pharmacy and they will forward the refill request to us. Please allow 3 business days for your refill to be completed.          Additional Information About Your Visit        Care EveryWhere ID     This is your Care EveryWhere ID. This could be used by other organizations to access your [place] medical records  [id number]        Your Vitals Were     Pulse Height BMI (Body Mass Index)             66 1.829 m (6') 26.31 kg/m2          Blood Pressure from Last 3 Encounters:   05/01/18 103/52   04/17/18 110/60   04/16/18 120/66    Weight from Last 3 Encounters:   05/01/18 88 kg (194 lb)   04/17/18 88 kg (194 lb)   04/16/18 83.9 kg (185 lb)               Primary Care Provider Office Phone # Fax #    Gabriel Tan [phone]       [address] [facility] [address]        Equal Access to Services     NANCY MILAN AH:  Hadii whitney Hilario, wamatthewda luqadaha, qaybta kaalxiomara ferguson, mariposa iliain hayaagertrude daleyzaki diannagirish laisaacgertrude alex. So [facility] [phone].    ATENCIÓN: Si keven bazzi, tiene a rojas disposición servicios gratuitos de asistencia lingüística. Llame al [phone].    We comply with applicable federal civil rights laws and [place] laws. We do not discriminate on the basis of race, color, national origin, age, disability, sex, sexual orientation, or gender identity.            Thank you!     Thank you for choosing [facility]  for your care. Our goal is always to provide you with excellent care. Hearing back from our patients is one way we can continue to improve our services. Please take a few minutes to complete the written survey that you may receive in the mail after your visit with us. Thank you!             Your Updated Medication List - Protect others around you: Learn how to safely use, store and throw away your medicines at www.disposemymeds.org.          This list is accurate as of 5/1/18 10:24 AM.  Always use your most recent med list.                   Brand Name Dispense Instructions for use Diagnosis    acetaminophen 325 MG tablet    TYLENOL    100 tablet    Take 2 tablets (650 mg) by mouth every 4 hours as needed for other (multimodal surgical pain management along with NSAIDS and opioid medication as indicated based on pain control and physical function.)        atorvastatin 20 MG tablet    LIPITOR    90 tablet    Take 1 tablet (20 mg) by mouth daily    Hyperlipidemia LDL goal <130       COUMADIN PO      Take by mouth daily Take as directed per INR results. 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       melatonin 1 MG Tabs tablet      Take 5 mg by  mouth At Bedtime    Closed displaced supracondylar fracture of distal end of left femur without intracondylar extension, initial encounter (H)       metoprolol tartrate 25 MG tablet    LOPRESSOR    45 tablet    TAKE ONE-HALF TABLET BY MOUTH ONCE DAILY    Chronic atrial fibrillation (H)       nystatin 333930 UNIT/GM Powd    MYCOSTATIN     Apply topically 2 times daily Until resolved        order for DME     1 Device    Equipment being ordered: Walker () Treatment Diagnosis: s/p joint replacement    Status post total left knee replacement       polyethylene glycol Packet    MIRALAX/GLYCOLAX    7 packet    Take 17 g by mouth daily as needed for constipation        senna-docusate 8.6-50 MG per tablet    SENOKOT-S;PERICOLACE    100 tablet    Take 1 tablet by mouth 2 times daily as needed for constipation    Closed displaced supracondylar fracture of distal end of left femur without intracondylar extension, initial encounter (H)

## 2018-05-01 NOTE — LETTER
5/1/2018         RE: Tomer Weiss  [address] [facility]  [place] [address]        Dear Colleague,    Thank you for referring your patient, Tomer Weiss, to the [facility]. Please see a copy of my visit note below.    Office Visit-Follow up  HISTORY OF PRESENT ILLNESS:    Tomer Weiss is a 86 year old male who is seen in follow up for   Chief Complaint   Patient presents with     RECHECK     Open Reduction Internal Fixation Left Femur.  DOS: 3/3/18 (8 weeks postop) - [facility]     Surgical Followup     PREOPERATIVE DIAGNOSIS:  Left supracondylar femur fracture above a total knee arthroplasty.POSTOPERATIVE DIAGNOSIS:  Left supracondylar femur fracture above a total knee arthroplasty. PROCEDURE:  Open reduction and internal fixation, left supracondylar femur fracture above a total knee arthroplasty.       Patient presents with:  RECHECK: Open Reduction Internal Fixation Left Femur.  DOS: 3/3/18 (8 weeks postop) - [facility]  Surgical Followup: PREOPERATIVE DIAGNOSIS:  Left supracondylar femur fracture above a total knee arthroplasty.POSTOPERATIVE DIAGNOSIS:  Left supracondylar femur fracture above a total knee arthroplasty. PROCEDURE:  Open reduction and internal fixation, left supracondylar femur fracture above a total knee arthroplasty.        Present symptoms: No unusual complaints at this time.  No change in symptoms with his beginning weightbearing.  Treatments tried to this point: 8 weeks post surgery.  Weightbearing was begun on last office visit.    REVIEW OF SYSTEMS    General: negative for, night sweats, dizziness, fatigue  Resp: No shortness of breath and no cough  CV: negative for chest pain, syncope or near-syncope  GI: negative for nausea, vomiting and diarrhea  : negative for dysuria and hematuria  Musculoskeletal: as above  Neurologic: negative for syncope   Hematologic: negative for bleeding disorder    Physical Exam:    Vitals: /52 (BP  Location: Left arm, Patient Position: Chair, Cuff Size: Adult Regular)  Pulse 66  Ht 1.829 m (6')  Wt 88 kg (194 lb)  BMI 26.31 kg/m2  BMI= Body mass index is 26.31 kg/(m^2).  Constitutional: healthy, alert and no acute distress   Psychiatric: mentation appears normal and affect normal/bright  NEURO: no focal deficits  SKIN: no excoriation or erythema. No signs of infection.    GAIT: Continues to be in a wheelchair.    JOINT/EXTREMITIES:     He is wearing the hinged knee brace.  Full extension is easy to obtain.  Flexion is just past 90 .  Do not detect any unusual swelling at this time.  He does not have any significant discomfort over the fractured area.    IMAGING INTERPRETATION: X-ray of the femur was repeated.  These are compared to previous views.  There does appear to be progressive callus identified.  There is no sign of hardware failure or any sign of change in the appearance of the hardware.       Impression:      ICD-10-CM    1. Closed displaced supracondylar fracture of distal end of left femur without intracondylar extension with routine healing, subsequent encounter S72.452D XR Femur Left 2 Views       Continues to make appropriate progression    Plan:   The above was reviewed with Tomer and he was reassured.     We will allow them to begin progressive weightbearing as tolerated.  They should continue to work on knee range of motion.  They may continue to work on strengthening.      Return to clinic 6, weeks, repeat x-ray prior to being seen.    Deonte Silver MD    Again, thank you for allowing me to participate in the care of your patient.        Sincerely,        Deonte Silver MD

## 2018-05-01 NOTE — NURSING NOTE
Chief Complaint   Patient presents with     RECHECK     Open Reduction Internal Fixation Left Femur.  DOS: 3/3/18 (8 weeks postop) - [facility]     Surgical Followup     PREOPERATIVE DIAGNOSIS:  Left supracondylar femur fracture above a total knee arthroplasty.POSTOPERATIVE DIAGNOSIS:  Left supracondylar femur fracture above a total knee arthroplasty. PROCEDURE:  Open reduction and internal fixation, left supracondylar femur fracture above a total knee arthroplasty.       Initial /52 (BP Location: Left arm, Patient Position: Chair, Cuff Size: Adult Regular)  Pulse 66  Ht 1.829 m (6')  Wt 88 kg (194 lb)  BMI 26.31 kg/m2 Estimated body mass index is 26.31 kg/(m^2) as calculated from the following:    Height as of this encounter: 1.829 m (6').    Weight as of this encounter: 88 kg (194 lb).  Medication Reconciliation: complete   SOPHIE Carvajal

## 2018-05-01 NOTE — PROGRESS NOTES
Office Visit-Follow up  HISTORY OF PRESENT ILLNESS:    Tomer Weiss is a 86 year old male who is seen in follow up for   Chief Complaint   Patient presents with     RECHECK     Open Reduction Internal Fixation Left Femur.  DOS: 3/3/18 (8 weeks postop) - [facility]     Surgical Followup     PREOPERATIVE DIAGNOSIS:  Left supracondylar femur fracture above a total knee arthroplasty.POSTOPERATIVE DIAGNOSIS:  Left supracondylar femur fracture above a total knee arthroplasty. PROCEDURE:  Open reduction and internal fixation, left supracondylar femur fracture above a total knee arthroplasty.       Patient presents with:  RECHECK: Open Reduction Internal Fixation Left Femur.  DOS: 3/3/18 (8 weeks postop) - [facility]  Surgical Followup: PREOPERATIVE DIAGNOSIS:  Left supracondylar femur fracture above a total knee arthroplasty.POSTOPERATIVE DIAGNOSIS:  Left supracondylar femur fracture above a total knee arthroplasty. PROCEDURE:  Open reduction and internal fixation, left supracondylar femur fracture above a total knee arthroplasty.        Present symptoms: No unusual complaints at this time.  No change in symptoms with his beginning weightbearing.  Treatments tried to this point: 8 weeks post surgery.  Weightbearing was begun on last office visit.    REVIEW OF SYSTEMS    General: negative for, night sweats, dizziness, fatigue  Resp: No shortness of breath and no cough  CV: negative for chest pain, syncope or near-syncope  GI: negative for nausea, vomiting and diarrhea  : negative for dysuria and hematuria  Musculoskeletal: as above  Neurologic: negative for syncope   Hematologic: negative for bleeding disorder    Physical Exam:    Vitals: /52 (BP Location: Left arm, Patient Position: Chair, Cuff Size: Adult Regular)  Pulse 66  Ht 1.829 m (6')  Wt 88 kg (194 lb)  BMI 26.31 kg/m2  BMI= Body mass index is 26.31 kg/(m^2).  Constitutional: healthy, alert and no acute distress   Psychiatric: mentation appears  normal and affect normal/bright  NEURO: no focal deficits  SKIN: no excoriation or erythema. No signs of infection.    GAIT: Continues to be in a wheelchair.    JOINT/EXTREMITIES:     He is wearing the hinged knee brace.  Full extension is easy to obtain.  Flexion is just past 90 .  Do not detect any unusual swelling at this time.  He does not have any significant discomfort over the fractured area.    IMAGING INTERPRETATION: X-ray of the femur was repeated.  These are compared to previous views.  There does appear to be progressive callus identified.  There is no sign of hardware failure or any sign of change in the appearance of the hardware.       Impression:      ICD-10-CM    1. Closed displaced supracondylar fracture of distal end of left femur without intracondylar extension with routine healing, subsequent encounter S72.452D XR Femur Left 2 Views       Continues to make appropriate progression    Plan:   The above was reviewed with Tomer and he was reassured.     We will allow them to begin progressive weightbearing as tolerated.  They should continue to work on knee range of motion.  They may continue to work on strengthening.      Return to clinic 6, weeks, repeat x-ray prior to being seen.    Deonte Silver MD

## 2018-05-04 ENCOUNTER — DISCHARGE SUMMARY NURSING HOME (OUTPATIENT)
Dept: GERIATRICS | Facility: CLINIC | Age: 83
End: 2018-05-04
Payer: MEDICARE

## 2018-05-04 ENCOUNTER — PATIENT OUTREACH (OUTPATIENT)
Dept: CARE COORDINATION | Facility: CLINIC | Age: 83
End: 2018-05-04

## 2018-05-04 VITALS
WEIGHT: 181.2 LBS | RESPIRATION RATE: 16 BRPM | TEMPERATURE: 98.5 F | DIASTOLIC BLOOD PRESSURE: 64 MMHG | HEART RATE: 99 BPM | OXYGEN SATURATION: 96 % | SYSTOLIC BLOOD PRESSURE: 109 MMHG | BODY MASS INDEX: 24.58 KG/M2

## 2018-05-04 DIAGNOSIS — G47.00 INSOMNIA, UNSPECIFIED TYPE: ICD-10-CM

## 2018-05-04 DIAGNOSIS — D62 ANEMIA DUE TO BLOOD LOSS, ACUTE: ICD-10-CM

## 2018-05-04 DIAGNOSIS — I48.20 CHRONIC ATRIAL FIBRILLATION (H): ICD-10-CM

## 2018-05-04 DIAGNOSIS — S72.402D CLOSED FRACTURE OF DISTAL END OF LEFT FEMUR WITH ROUTINE HEALING, UNSPECIFIED FRACTURE MORPHOLOGY, SUBSEQUENT ENCOUNTER: Primary | ICD-10-CM

## 2018-05-04 PROCEDURE — 99316 NF DSCHRG MGMT 30 MIN+: CPT | Performed by: NURSE PRACTITIONER

## 2018-05-04 RX ORDER — GUAIFENESIN AND DEXTROMETHORPHAN HYDROBROMIDE 100; 10 MG/5ML; MG/5ML
10 SOLUTION ORAL EVERY 4 HOURS PRN
COMMUNITY
End: 2018-05-09

## 2018-05-04 NOTE — PROGRESS NOTES
Clinic Care Coordination Contact  Care Team Conversations    BARRY CC received phone call from Abeba at [facility]. Pt has been doing rehab there following hospitalization due to hip fracture. Abeba states that pt will be discharging back to home on 5/6/18 and will be doing outpatient therapies at [facility].     Plan: BARRY JOINER will follow up with pt next week following his discharge from [facility].     RICHARD Vazquez Clinic Care Coordinator  [facility]  5/4/2018 2:55 PM  [phone]

## 2018-05-04 NOTE — PROGRESS NOTES
[facility] DISCHARGE SUMMARY    PATIENT'S NAME: Tomer Weiss  YOB: 1932  MEDICAL RECORD NUMBER:  [id number]    PRIMARY CARE PROVIDER AND CLINIC RESPONSIBLE AFTER TRANSFER: Gabriel Tan [address] [facility] [address]     CODE STATUS/ADVANCE DIRECTIVES DISCUSSION:   CPR/Full code        Allergies   Allergen Reactions     No Known Drug Allergies        TRANSFERRING PROVIDERS: Beti Roach NP, Lu Culp MD  DATE OF SNF ADMISSION:  March / 06 / 2018  DATE OF SNF (anticipated) DISCHARGE: May / 06 / 2018  DISCHARGE DISPOSITION: FMG Provider   Nursing Facility: [facility] stay 3/1/18 to 3/6/18.     Condition on Discharge:  Improving.  Function:  Lives in house with s.o.  Feeding: ind  Dressing: ind  Ambulation: ind with walker  Toileting: ind  Bathing: minimal assistance  Medication Management: ind  Cognitive Scores: no concerns  Equipment: walker    HOSPITAL COURSE (copied from discharge summary): Reason for admission requiring a surgical or invasive procedure:     Closed displaced supracondylar fracture of distal end of left femur without intracondylar extension, initial encounter (H) [V64.214B]   The patient underwent the following procedure(s):     Procedure(s):  OPEN REDUCTION INTERNAL FIXATION FEMUR DISTAL   There were no immediate complications during this procedure.      DISCHARGE DIAGNOSIS:   1. Closed fracture of distal end of left femur with routine healing, unspecified fracture morphology, subsequent encounter    2. Anemia due to blood loss, acute    3. Chronic atrial fibrillation (H)    4. Insomnia, unspecified type        HPI Nursing Facility Course:  HPI information obtained from: facility chart records and facility staff.  Closed fracture of distal end of left femur with routine healing, unspecified fracture morphology, subsequent encounter  Improving. Up walking ind with  walker.  - out patient therapy  - follow up with dr. king ~ 6/12    Anemia due to blood loss, acute  improved  Hemoglobin   Date Value Ref Range Status   04/12/2018 12.3 (L) 14.0 - 18.0 g/dL Final       Chronic atrial fibrillation (H)  Rate controled with metoprolol.  Continues on warfarin for clot prevention.    Insomnia, unspecified type  Melatonin increased during TCU stay.      PAST MEDICAL HISTORY:  has a past medical history of Atrial fibrillation (H); Atrial flutter (H); Bladder stone; Cancer (H); Contracture of palmar fascia; Hematuria; Hypertrophy (benign) of prostate; and Neoplasm of uncertain behavior (2010). He also has no past medical history of Complication of anesthesia.    DISCHARGE MEDICATIONS:  Current Outpatient Prescriptions   Medication Sig Dispense Refill     acetaminophen (TYLENOL) 325 MG tablet Take 2 tablets (650 mg) by mouth every 4 hours as needed for other (multimodal surgical pain management along with NSAIDS and opioid medication as indicated based on pain control and physical function.) 100 tablet      atorvastatin (LIPITOR) 20 MG tablet Take 1 tablet (20 mg) by mouth daily 90 tablet 3     Dextromethorphan-guaiFENesin  MG/5ML syrup Take 10 mLs by mouth every 4 hours as needed for cough       ferrous sulfate (IRON) 325 (65 FE) MG tablet Take 1 tablet (325 mg) by mouth 2 times daily (with meals) 100 tablet      GLUCOSAMINE 500 MG OR CAPS 2 Tablets every morning       melatonin 1 MG TABS tablet Take 5 mg by mouth At Bedtime        metoprolol tartrate (LOPRESSOR) 25 MG tablet TAKE ONE-HALF TABLET BY MOUTH ONCE DAILY 45 tablet 9     nystatin (MYCOSTATIN) 926219 UNIT/GM POWD Apply topically 2 times daily Until resolved       order for DME Equipment being ordered: Walker ()  Treatment Diagnosis: s/p joint replacement 1 Device 0     polyethylene glycol (MIRALAX/GLYCOLAX) Packet Take 17 g by mouth daily as needed for constipation 7 packet      senna-docusate (SENOKOT-S;PERICOLACE)  8.6-50 MG per tablet Take 1 tablet by mouth 2 times daily as needed for constipation 100 tablet      Warfarin Sodium (COUMADIN PO) Take by mouth daily Take as directed per INR results. Current dose:         MEDICATION CHANGES/RATIONALE:   1. Melatonin dose increased due to insomnia  2.  Warfarin managed based on INR     ROS:    4 point ROS including Respiratory, CV, GI and , other than that noted in the HPI,  is negative    Physical Exam:   Vitals: /64  Pulse 99  Temp 98.5  F (36.9  C)  Resp 16  Wt 181 lb 3.2 oz (82.2 kg)  SpO2 96%  BMI 24.58 kg/m2  BMI= Body mass index is 24.58 kg/(m^2).    GENERAL APPEARANCE:  Alert, in no distress    DISCHARGE PLAN:  Physical Therapy  Patient instructed to follow-up with:  PCP in 7 days      Current [place] scheduled appointments:  Future Appointments  Date Time Provider Department Center   5/4/2018 1:00 PM Beti Roach NP [facility]   6/12/2018 10:10 AM Deonte Silver MD [facility] referral needed and placed by this provider: No    Pending labs: none  SNF labs 4/12 UA negative, sodium 134 potassium 5.1 chloride 101 glucose 88 calcium 8.9 BUN 19 creatinine 0.80 RBC 3.96 hemoglobin 12.3 hematocrit 38.2 platelets 304    Discharge Treatments:    - Ok to discharge to home with current medications and treatments  - Outpatient PT  - Follow up with Primary care provider in 1 week  - Scripts sent to pharmacy: n/a   - Scripts written: n/a    TOTAL DISCHARGE TIME:   Greater than 30 minutes  Electronically signed by:  Btei Roach NP

## 2018-05-08 ENCOUNTER — PATIENT OUTREACH (OUTPATIENT)
Dept: CARE COORDINATION | Facility: CLINIC | Age: 83
End: 2018-05-08

## 2018-05-08 NOTE — PROGRESS NOTES
Clinic Care Coordination Contact    Clinic Care Coordination Contact  OUTREACH    Referral Information:  Referral Source: IP Handoff    Primary Diagnosis: Orthopedic- pt was hospitalized due to hip fracture    Chief Complaint   Patient presents with     Clinic Care Coordination - Post Hospital     SW        [place] Utilization: has appointment scheduled for follow up with PCP tomorrow as well as an ortho appointment scheduled  Clinic Utilization  Difficulty keeping appointments:: No  Utilization    Last refreshed: 5/7/2018 11:22 AM:  No Show Count (past year) 4       Last refreshed: 5/7/2018 11:22 AM:  ED visits 1       Last refreshed: 5/7/2018 11:22 AM:  Hospital admissions 1          Current as of: 5/7/2018 11:22 AM             Clinical Concerns: hospitalized due to hip fracture from 3/1-3/6/18. He was doing rehab following hospitalization at [facility] and recently discharged back to home on 5/6/18.  Current Medical Concerns:    Patient Active Problem List   Diagnosis     Alopecia     Polymyalgia rheumatica (H)     Cervicalgia     Unspecified hyperplasia of prostate with urinary obstruction and other lower urinary tract symptoms (LUTS)     Malignant neoplasm of kidney excluding renal pelvis (H)     Hyperlipidemia LDL goal <130     Dupuytren's contracture of hand     DJD (degenerative joint disease) of knee     Advanced directives, counseling/discussion     Atrial flutter     Post-traumatic osteoarthritis of left knee     Total knee replacement status     Anemia     Long-term (current) use of anticoagulants [Z79.01]     Chronic atrial fibrillation (H)     Closed displaced supracondylar fracture of distal end of left femur without intracondylar extension (H)     Pulmonary hypertension     Dysrhythmia 4 beat run narrow complex     Hypophosphatemia     Other insomnia         Current Behavioral Concerns: none noted. Pt was very pleasant, is happy to be back at home and says things are going very well. Pt      Education Provided to patient: Reminded pt of his follow up PCP appointment tomorrow and he was aware of it.    Pain  Chronic pain (GOAL):: No  Health Maintenance Reviewed: Not assessed  Clinical Pathway: None    Medication Management:  See medication list     Functional Status:  Dependent ADLs:: Ambulation-walker  Bed or wheelchair confined:: No  Mobility Status: Independent w/Device    Living Situation:  Current living arrangement:: I live in a private home with family  Type of residence:: Private home - no stairs    Diet/Exercise/Sleep:  Diet:: Regular  Inadequate nutrition (GOAL):: No  Food Insecurity: No  Tube Feeding: No  Exercise:: Yes- will be doing outpatient therapies at [facility]  Inadequate activity/exercise (GOAL):: No  Significant changes in sleep pattern (GOAL): No    Transportation: pt states he is driving and has no concerns  Transportation concerns (GOAL):: No  Transportation means:: Regular car     Psychosocial:  Judaism or spiritual beliefs that impact treatment:: No  Mental health DX:: No  Mental health management concern (GOAL):: No  Informal Support system:: Family, Significant other, Children     Financial/Insurance:   Financial/Insurance concerns (GOAL):: No  Medicare, AARP     Resources and Interventions:  Current Resources:      Community Resources: Other (see comment) (outpatient therapies at [facility] in [place])     Equipment Currently Used at Home: walker, standard     Goals:     Patient/Caregiver understanding: yes       Future Appointments              Tomorrow Gabriel Tan MD [facility]    In 1 month Deonte Silver MD [facility]          Plan: Pt plans to attend scheduled follow up appointments as well as his outpatient therapies at [facility]. Pt states he is doing well. He is getting around fine at home he said and has no concerns. Pt declines any further CC needs. SW CC will do no further  outreaches.     RICHARD Vazquez Clinic Care Coordinator  [facility]  5/8/2018 1:42 PM  [phone]

## 2018-05-09 ENCOUNTER — OFFICE VISIT (OUTPATIENT)
Dept: INTERNAL MEDICINE | Facility: CLINIC | Age: 83
End: 2018-05-09
Payer: MEDICARE

## 2018-05-09 VITALS
OXYGEN SATURATION: 97 % | WEIGHT: 184 LBS | TEMPERATURE: 97.4 F | BODY MASS INDEX: 24.95 KG/M2 | RESPIRATION RATE: 16 BRPM | HEART RATE: 88 BPM | SYSTOLIC BLOOD PRESSURE: 128 MMHG | DIASTOLIC BLOOD PRESSURE: 64 MMHG

## 2018-05-09 DIAGNOSIS — Z79.01 LONG-TERM (CURRENT) USE OF ANTICOAGULANTS: ICD-10-CM

## 2018-05-09 DIAGNOSIS — S72.452D CLOSED DISPLACED SUPRACONDYLAR FRACTURE OF DISTAL END OF LEFT FEMUR WITHOUT INTRACONDYLAR EXTENSION WITH ROUTINE HEALING, SUBSEQUENT ENCOUNTER: Primary | ICD-10-CM

## 2018-05-09 DIAGNOSIS — I48.21 PERMANENT ATRIAL FIBRILLATION (H): ICD-10-CM

## 2018-05-09 DIAGNOSIS — K59.00 CONSTIPATION, UNSPECIFIED CONSTIPATION TYPE: ICD-10-CM

## 2018-05-09 DIAGNOSIS — K62.89 ANAL PAIN: ICD-10-CM

## 2018-05-09 PROCEDURE — 99495 TRANSJ CARE MGMT MOD F2F 14D: CPT | Performed by: INTERNAL MEDICINE

## 2018-05-09 RX ORDER — FUROSEMIDE 20 MG
20 TABLET ORAL DAILY PRN
Qty: 30 TABLET | Refills: 3 | Status: SHIPPED | OUTPATIENT
Start: 2018-05-09 | End: 2018-07-05

## 2018-05-09 RX ORDER — METOPROLOL SUCCINATE 25 MG/1
12.5 TABLET, EXTENDED RELEASE ORAL DAILY
Qty: 45 TABLET | Refills: 1 | Status: SHIPPED | OUTPATIENT
Start: 2018-05-09 | End: 2019-01-01

## 2018-05-09 ASSESSMENT — PAIN SCALES - GENERAL: PAINLEVEL: NO PAIN (0)

## 2018-05-09 NOTE — PROGRESS NOTES
SUBJECTIVE:   Tomer Weiss is a 86 year old male who presents to clinic today for the following health issues:      Hospital Follow-up Visit:    Hospital/Nursing Home/IP Rehab Facility: [facility]   Date of Admission: 3/6/18  Date of Discharge: 5/6/18  Reason(s) for Admission: Left femur fracture            Problems taking medications regularly:  None       Medication changes since discharge: None       Problems adhering to non-medication therapy:  None    Summary of hospitalization:  Nursing home discharge report reviewed  Diagnostic Tests/Treatments reviewed.  Follow up needed: none  Other Healthcare Providers Involved in Patient s Care:         None  Update since discharge: improved.     Post Discharge Medication Reconciliation: discharge medications reconciled and changed, per note/orders (see AVS).  Plan of care communicated with patient            Slipped on ice and broke his left femur just above the replaced knee.  Had screws and metal brace placed on his lower leg.  Little adjustment to getting back home.  He is taking care of food.      No pain pills, left leg isn't a problem.      Patient is walking with his brace and walker.  He has follow-up with orthopedics in another month.  He will get set up to see the Coumadin clinic.    No change in medications.  Other than his Lasix is as needed.  On the diet in the nursing home did not need Lasix.    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      Social History   Substance Use Topics     Smoking status: Never Smoker     Smokeless tobacco: Never Used     Alcohol use No     Review of Systems  Constitutional-No fevers, chills, or weight changes..  ENT-No earpain, sore throat, voice changes or rhinitis.  Cardiac-No chest pain or palpitations.  Respiratory-No cough, sob, or hemoptysis.  GI-Constipation and mild anal pain with bowel movement.  Musculoskeletal-no leg pains.    Physical Exam  /64  Pulse 88  Temp 97.4  F (36.3  C) (Temporal)  Resp 16  Wt 184 lb (83.5 kg)  SpO2 97%  BMI 24.95 kg/m2  General Appearance-healthy, alert, no distress  Cardiac-irregularly irregular rhythm  Lungs-clear to auscultation  -rectal exam without pain or masses, no hemorrhoids, rash or fissure seen  Extremities-no peripheral edema, peripheral pulses normal    ASSESSMENT:  86-year-old gentleman who had a closed displaced subcondylar fracture of the distal end of the left femur.  He underwent surgery.  He spent 2 months in the care facility.  He is up walking with a walker and a leg brace.  He is back home for the last week.  He is getting better but a little bit frustrated and will remove at home.  He will continue to get out, he is expecting to get  in July, he has family around.    His atrial fibrillation is rate controlled we did discuss his metoprolol and will make sure he is on Toprol-XL once a day otherwise he is on metoprolol tartrate he will have to take it twice a day.    His anemia is resolved, last hemoglobin was 12.3, he will continue on his chronic iron once a day instead of twice a day as he was after the surgery.    Mild anal pain.  He did have a fungal  rash but that appears to be resolving gone, he has nothing on exam today.  Recommended to use a over-the-counter fiber supplement to soften his stools and hopefully the pain will resolve.      Electronically signed by Gabriel Tan MD

## 2018-05-09 NOTE — MR AVS SNAPSHOT
After Visit Summary   5/9/2018    Tomer Weiss    MRN: [id number]           Patient Information     Date Of Birth          4/9/1932        Visit Information        Provider Department      5/9/2018 7:00 AM Gabriel Tan MD [facility]         Follow-ups after your visit        Your next 10 appointments already scheduled     Jun 12, 2018 10:10 AM CDT   [facility] with Deonte Silver MD   [facility]    [address] [place] [address] [id number] [phone]              Who to contact     If you have questions or need follow up information about today's clinic visit or your schedule please contact [facility] directly at [phone].  Normal or non-critical lab and imaging results will be communicated to you by MyChart, letter or phone within 4 business days after the clinic has received the results. If you do not hear from us within 7 days, please contact the clinic through MyChart or phone. If you have a critical or abnormal lab result, we will notify you by phone as soon as possible.  Submit refill requests through [employer] or call your pharmacy and they will forward the refill request to us. Please allow 3 business days for your refill to be completed.          Additional Information About Your Visit        Care EveryWhere ID     This is your Care EveryWhere ID. This could be used by other organizations to access your [facility] medical records  [id number]        Your Vitals Were     Pulse Temperature Respirations Pulse Oximetry BMI (Body Mass Index)       88 97.4  F (36.3  C) (Temporal) 16 97% 24.95 kg/m2        Blood Pressure from Last 3 Encounters:   05/09/18 128/64   05/04/18 109/64   05/01/18 103/52    Weight from Last 3 Encounters:   05/09/18 184 lb (83.5 kg)   05/04/18 181 lb 3.2 oz (82.2 kg)   05/01/18 194 lb (88 kg)              Today, you had the following     No orders found for display          Today's Medication Changes          These changes are accurate as of 5/9/18  7:07 AM.  If you have any questions, ask your nurse or doctor.               Stop taking these medicines if you haven't already. Please contact your care team if you have questions.     nystatin [id number] UNIT/GM Powd   Commonly known as:  MYCOSTATIN   Stopped by:  Gabriel Tan MD                    Primary Care Provider Office Phone # Fax #    Gabriel Tan [phone] [address] [facility] [address]        Equal Access to Services     [facility]: Hadii aad ku hadasho Soomaali, waaxda luqadaha, qaybta kaalmada adeegyada, waxay idiin hayaan adeeg coy yang . So [facility] [phone].    ATENCIÓN: Si habla español, tiene a rojas disposición servicios gratuitos de asistencia lingüística. Ll[facility] [phone].    We comply with applicable federal civil rights laws and [place] laws. We do not discriminate on the basis of race, color, national origin, age, disability, sex, sexual orientation, or gender identity.            Thank you!     Thank you for choosing [facility]  for your care. Our goal is always to provide you with excellent care. Hearing back from our patients is one way we can continue to improve our services. Please take a few minutes to complete the written survey that you may receive in the mail after your visit with us. Thank you!             Your Updated Medication List - Protect others around you: Learn how to safely use, store and throw away your medicines at www.disposemymeds.org.          This list is accurate as of 5/9/18  7:07 AM.  Always use your most recent med list.                   Brand Name Dispense Instructions for use Diagnosis    acetaminophen 325 MG tablet    TYLENOL    100 tablet    Take 2 tablets (650 mg) by mouth every 4 hours as needed for other (multimodal surgical pain management along with NSAIDS and opioid medication as indicated based on pain  control and physical function.)        atorvastatin 20 MG tablet    LIPITOR    90 tablet    Take 1 tablet (20 mg) by mouth daily    Hyperlipidemia LDL goal <130       COUMADIN PO      Take by mouth daily Take as directed per INR results. 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

## 2018-05-16 ENCOUNTER — ANTICOAGULATION THERAPY VISIT (OUTPATIENT)
Dept: ANTICOAGULATION | Facility: OTHER | Age: 83
End: 2018-05-16
Payer: MEDICARE

## 2018-05-16 DIAGNOSIS — Z79.01 LONG-TERM (CURRENT) USE OF ANTICOAGULANTS: ICD-10-CM

## 2018-05-16 DIAGNOSIS — I48.20 CHRONIC ATRIAL FIBRILLATION (H): ICD-10-CM

## 2018-05-16 LAB — INR POINT OF CARE: 2.2 (ref 0.86–1.14)

## 2018-05-16 PROCEDURE — 36416 COLLJ CAPILLARY BLOOD SPEC: CPT

## 2018-05-16 PROCEDURE — 85610 PROTHROMBIN TIME: CPT | Mod: QW

## 2018-05-16 PROCEDURE — 99207 ZZC NO CHARGE NURSE ONLY: CPT

## 2018-05-16 NOTE — MR AVS SNAPSHOT
Tomer Weiss   5/16/2018 4:00 PM   Anticoagulation Therapy Visit    Description:  86 year old male   Provider:  SARA ANTI COAG   Department:  [facility]           INR as of 5/16/2018     Today's INR 2.2      Anticoagulation Summary as of 5/16/2018     INR goal 2.0-3.0   Today's INR 2.2   Full instructions 2.5 mg on Tue, Sat; 5 mg all other days   Next INR check 6/13/2018    Indications   Long-term (current) use of anticoagulants [Z79.01] [Z79.01]  Chronic atrial fibrillation (H) [I48.2]         Your next Anticoagulation Clinic appointment(s)     Jun 13, 2018  4:00 PM CDT   [facility] with S[facility]    [address] [address] [facility] [address] [phone]              Contact Numbers     Clinic Number:         May 2018 Details    Sun Mon Tue Wed Thu Fri Sat       1               2               3               4               5                 6               7               8               9               10               11               12                 13               14               15               16      5 mg   See details      17      5 mg         18      5 mg         19      2.5 mg           20      5 mg         21      5 mg         22      2.5 mg         23      5 mg         24      5 mg         25      5 mg         26      2.5 mg           27      5 mg         28      5 mg         29      2.5 mg         30      5 mg         31      5 mg            Date Details   05/16 This INR check               How to take your warfarin dose     To take:  2.5 mg Take 0.5 of a 5 mg tablet.    To take:  5 mg Take 1 of the 5 mg tablets.           June 2018 Details    Sun Mon Tue Wed Thu Fri Sat          1      5 mg         2      2.5 mg           3      5 mg         4      5 mg         5      2.5 mg         6      5 mg         7      5 mg         8      5 mg         9      2.5 mg           10      5 mg         11      5 mg          12      2.5 mg         13            14               15               16                 17               18               19               20               21               22               23                 24               25               26               27               28               29               30                Date Details   No additional details    Date of next INR:  6/13/2018         How to take your warfarin dose     To take:  2.5 mg Take 0.5 of a 5 mg tablet.    To take:  5 mg Take 1 of the 5 mg tablets.

## 2018-05-16 NOTE — PROGRESS NOTES
ANTICOAGULATION FOLLOW-UP CLINIC VISIT    Patient Name:  Tomer Weiss  Date:  5/16/2018  Contact Type:  Face to Face    SUBJECTIVE:     Patient Findings     Positives No Problem Findings    Comments Kit broke his femur a couple months ago and has been in [facility], got home a week ago. He's stayed on his same dose the entire time.            OBJECTIVE    INR Protime   Date Value Ref Range Status   05/16/2018 2.2 (A) 0.86 - 1.14 Final     Chromogenic Factor 10   Date Value Ref Range Status   04/24/2015 21 (L) 70 - 130 % Final     Comment:     Therapeutic Range:  A Chromogenic Factor 10 level of approximately 20-40%   inversely correlates with an INR of 2-3 for patients receiving Warfarin.   Chromogenic Factor 10 levels below 20% indicate an INR greater than 3 and   levels above 40% indicate an INR less than 2.         ASSESSMENT / PLAN  INR assessment THER    Recheck INR In: 4 WEEKS    INR Location Clinic      Anticoagulation Summary as of 5/16/2018     INR goal 2.0-3.0   Today's INR 2.2   Maintenance plan 2.5 mg (5 mg x 0.5) on Tue, Sat; 5 mg (5 mg x 1) all other days   Full instructions 2.5 mg on Tue, Sat; 5 mg all other days   Weekly total 30 mg   No change documented Tana Pérez, RN   Plan last modified Tana Pérez, RN (4/27/2016)   Next INR check 6/13/2018   Target end date     Indications   Long-term (current) use of anticoagulants [Z79.01] [Z79.01]  Chronic atrial fibrillation (H) [I48.2]         Anticoagulation Episode Summary     INR check location     Preferred lab     Send INR reminders to [facility] POOL    Comments 5 mg tablets, AM dose, AVS      Anticoagulation Care Providers     Provider Role Specialty Phone number    Gabriel Tan MD [facility] Internal Medicine [phone]            See the Encounter Report to view Anticoagulation Flowsheet and Dosing Calendar (Go to Encounters tab in chart review, and find the Anticoagulation Therapy Visit)    Dosage adjustment made based  on physician directed care plan.    Tana Pérez RN

## 2018-06-12 ENCOUNTER — OFFICE VISIT (OUTPATIENT)
Dept: ORTHOPEDICS | Facility: CLINIC | Age: 83
End: 2018-06-12
Payer: MEDICARE

## 2018-06-12 ENCOUNTER — RADIANT APPOINTMENT (OUTPATIENT)
Dept: GENERAL RADIOLOGY | Facility: CLINIC | Age: 83
End: 2018-06-12
Attending: ORTHOPAEDIC SURGERY
Payer: MEDICARE

## 2018-06-12 VITALS
WEIGHT: 184 LBS | DIASTOLIC BLOOD PRESSURE: 80 MMHG | BODY MASS INDEX: 24.92 KG/M2 | HEIGHT: 72 IN | HEART RATE: 75 BPM | SYSTOLIC BLOOD PRESSURE: 122 MMHG

## 2018-06-12 DIAGNOSIS — S72.452D CLOSED DISPLACED SUPRACONDYLAR FRACTURE OF DISTAL END OF LEFT FEMUR WITHOUT INTRACONDYLAR EXTENSION WITH ROUTINE HEALING, SUBSEQUENT ENCOUNTER: Primary | ICD-10-CM

## 2018-06-12 DIAGNOSIS — S72.452D CLOSED DISPLACED SUPRACONDYLAR FRACTURE OF DISTAL END OF LEFT FEMUR WITHOUT INTRACONDYLAR EXTENSION WITH ROUTINE HEALING, SUBSEQUENT ENCOUNTER: ICD-10-CM

## 2018-06-12 PROCEDURE — 99213 OFFICE O/P EST LOW 20 MIN: CPT | Performed by: ORTHOPAEDIC SURGERY

## 2018-06-12 PROCEDURE — 73552 X-RAY EXAM OF FEMUR 2/>: CPT | Mod: TC

## 2018-06-12 ASSESSMENT — PAIN SCALES - GENERAL: PAINLEVEL: MILD PAIN (3)

## 2018-06-12 NOTE — MR AVS SNAPSHOT
After Visit Summary   6/12/2018    Tomer Weiss    MRN: [id number]           Patient Information     Date Of Birth          4/9/1932        Visit Information        Provider Department      6/12/2018 12:10 PM Deonte Silver MD [facility]        Today's Diagnoses     Closed displaced supracondylar fracture of distal end of left femur without intracondylar extension with routine healing, subsequent encounter    -  1       Follow-ups after your visit        Your next 10 appointments already scheduled     Jun 13, 2018  4:00 PM CDT   Anticoagulation [facility] with [facility]M ANTI [facility]AG   [facility]    [address] [address] [facility] [address] [facility] [address] [phone]              Who to contact     If you have questions or need follow up information about today's clinic visit or your schedule please contact [facility] directly at [phone].  Normal or non-critical lab and imaging results will be communicated to you by MyChart, letter or phone within 4 business days after the clinic has received the results. If you do not hear from us within 7 days, please contact the clinic through MyChart or phone. If you have a critical or abnormal lab result, we will notify you by phone as soon as possible.  Submit refill requests through [employer] or call your pharmacy and they will forward the refill request to us. Please allow 3 business days for your refill to be completed.          Additional Information About Your Visit        Care EveryWhere ID     This is your Care EveryWhere ID. This could be used by other organizations to access your [place] medical records  [id number]        Your Vitals Were     Pulse Height BMI (Body Mass Index)             75 1.829 m (6') 24.95 kg/m2          Blood Pressure from Last 3 Encounters:   06/12/18 122/80   05/09/18 128/64   05/04/18 109/64    Weight from Last 3 Encounters:   06/12/18 83.5 kg (184 lb)    05/09/18 83.5 kg (184 lb)   05/04/18 82.2 kg (181 lb 3.2 oz)               Primary Care Provider Office Phone # Fax #    Gabriel Tan [phone] [address] [facility] [address]        Equal Access to Services     DIANMIKE BJORN : Hadii aad ku hadasho Soomaali, waaxda luqadaha, qaybta kaalmada adeegyada, waxay iliain whitneyn adezaki coy trey johnson. So [facility] [phone].    ATENCIÓN: Si habla español, tiene a rojas disposición servicios gratuitos de asistencia lingüística. Llame al [phone].    We comply with applicable federal civil rights laws and [place] laws. We do not discriminate on the basis of race, color, national origin, age, disability, sex, sexual orientation, or gender identity.            Thank you!     Thank you for choosing [facility]  for your care. Our goal is always to provide you with excellent care. Hearing back from our patients is one way we can continue to improve our services. Please take a few minutes to complete the written survey that you may receive in the mail after your visit with us. Thank you!             Your Updated Medication List - Protect others around you: Learn how to safely use, store and throw away your medicines at www.disposemymeds.org.          This list is accurate as of 6/12/18 12:17 PM.  Always use your most recent med list.                   Brand Name Dispense Instructions for use Diagnosis    acetaminophen 325 MG tablet    TYLENOL    100 tablet    Take 2 tablets (650 mg) by mouth every 4 hours as needed for other (multimodal surgical pain management along with NSAIDS and opioid medication as indicated based on pain control and physical function.)        atorvastatin 20 MG tablet    LIPITOR    90 tablet    Take 1 tablet (20 mg) by mouth daily    Hyperlipidemia LDL goal <130       COUMADIN PO      Take by mouth daily Take as directed per INR results. 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

## 2018-06-12 NOTE — LETTER
6/12/2018         RE: Tomer Weiss  [address]  [place] [address]        Dear Colleague,    Thank you for referring your patient, Tomer Weiss, to the [facility]. Please see a copy of my visit note below.    Office Visit-Follow up  HISTORY OF PRESENT ILLNESS:    Tomer Weiss is a 86 year old male who is seen in follow up for   Chief Complaint   Patient presents with     RECHECK     Open Reduction Internal Fixation Left Femur.  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 PROCEDURE:  Open reduction and internal fixation, left supracondylar femur fracture above a total knee arthroplasty       Patient presents with:  RECHECK: Open Reduction Internal Fixation Left Femur.  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 PROCEDURE:  Open reduction and internal fixation, left supracondylar femur fracture above a total knee arthroplasty      Patient is now at home doing outpatient therapy.  Present symptoms: He defines primarily knee stiffness and weakness.  In an unrelated fashion he recognizes that he has lost facial hair and that he no longer has to shave.  His eyebrows have thinned out substantially.      Treatments tried to this point: Outpatient physical therapy and is using a cane.    REVIEW OF SYSTEMS    General: negative for, night sweats, dizziness, fatigue  Resp: No shortness of breath and no cough  CV: negative for chest pain, syncope or near-syncope  GI: negative for nausea, vomiting and diarrhea  : negative for dysuria and  hematuria  Musculoskeletal: as above  Neurologic: negative for syncope   Hematologic: negative for bleeding disorder    Physical Exam:    Vitals: /80 (BP Location: Left arm, Patient Position: Chair, Cuff Size: Adult Regular)  Pulse 75  Ht 1.829 m (6')  Wt 83.5 kg (184 lb)  BMI 24.95 kg/m2  BMI= Body mass index is 24.95 kg/(m^2).  Constitutional: healthy, alert and no acute distress   Psychiatric: mentation appears normal and affect normal/bright  NEURO: no focal deficits  SKIN: no excoriation or erythema. No signs of infection.  However, I can recognize that he has lost facial and eyebrow hair.  He has kept his hair very closely cropped the degree that he appears to be bald.  GAIT: He has an antalgic gait even with the use of the cane.  Antalgic becomes exacerbated if he goes without the cane but he is able to walk without the cane.    JOINT/EXTREMITIES:     Left knee is obviously still swollen.  Calf is not swollen.  He comes very near full extension.  He flexes the knee just past 90 .  He states it is measuring 100  with the therapist.        Knee stability testing is grossly intact.    IMAGING INTERPRETATION: Repeat x-ray AP and lateral of the knee shows no evidence of hardware failure.  No evidence of loss of alignment or fixation.  Progressive callus maturation is noted.       Impression:      ICD-10-CM    1. Closed displaced supracondylar fracture of distal end of left femur without intracondylar extension with routine healing, subsequent encounter S72.452D XR Femur Left 2 Views       He appears to be making appropriate progress at just over 3 months post surgery.    Plan:   The above was reviewed with Tomer.     He is reassured to simply continue his increasing activity slowly but surely.  He should continue to work hard on range of motion and strengthening as he is great potential to continue to improve.  Suggested a 2 month timeframe as a reasonable time for follow-up but he was given the option  of not returning if he feels he is doing well.    It would appear that he is spontaneously developed alopecia.  He is explained that I cannot offer him any solid discussion concerning this.  If he wishes to pursue more information either needs to contact his primary physician or dermatologist.      Return to clinic 2, months, or PRN    Deonte Silver MD    Again, thank you for allowing me to participate in the care of your patient.        Sincerely,        Deonte Silver MD

## 2018-06-12 NOTE — PROGRESS NOTES
Office Visit-Follow up  HISTORY OF PRESENT ILLNESS:    Tomer Weiss is a 86 year old male who is seen in follow up for   Chief Complaint   Patient presents with     RECHECK     Open Reduction Internal Fixation Left Femur.  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 PROCEDURE:  Open reduction and internal fixation, left supracondylar femur fracture above a total knee arthroplasty       Patient presents with:  RECHECK: Open Reduction Internal Fixation Left Femur.  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 PROCEDURE:  Open reduction and internal fixation, left supracondylar femur fracture above a total knee arthroplasty      Patient is now at home doing outpatient therapy.  Present symptoms: He defines primarily knee stiffness and weakness.  In an unrelated fashion he recognizes that he has lost facial hair and that he no longer has to shave.  His eyebrows have thinned out substantially.      Treatments tried to this point: Outpatient physical therapy and is using a cane.    REVIEW OF SYSTEMS    General: negative for, night sweats, dizziness, fatigue  Resp: No shortness of breath and no cough  CV: negative for chest pain, syncope or near-syncope  GI: negative for nausea, vomiting and diarrhea  : negative for dysuria and hematuria  Musculoskeletal: as above  Neurologic: negative for syncope   Hematologic: negative for bleeding disorder    Physical Exam:    Vitals: /80 (BP Location: Left arm, Patient Position: Chair, Cuff Size: Adult Regular)  Pulse 75  Ht 1.829 m (6')  Wt 83.5 kg (184 lb)  BMI  24.95 kg/m2  BMI= Body mass index is 24.95 kg/(m^2).  Constitutional: healthy, alert and no acute distress   Psychiatric: mentation appears normal and affect normal/bright  NEURO: no focal deficits  SKIN: no excoriation or erythema. No signs of infection.  However, I can recognize that he has lost facial and eyebrow hair.  He has kept his hair very closely cropped the degree that he appears to be bald.  GAIT: He has an antalgic gait even with the use of the cane.  Antalgic becomes exacerbated if he goes without the cane but he is able to walk without the cane.    JOINT/EXTREMITIES:     Left knee is obviously still swollen.  Calf is not swollen.  He comes very near full extension.  He flexes the knee just past 90 .  He states it is measuring 100  with the therapist.        Knee stability testing is grossly intact.    IMAGING INTERPRETATION: Repeat x-ray AP and lateral of the knee shows no evidence of hardware failure.  No evidence of loss of alignment or fixation.  Progressive callus maturation is noted.       Impression:      ICD-10-CM    1. Closed displaced supracondylar fracture of distal end of left femur without intracondylar extension with routine healing, subsequent encounter S72.452D XR Femur Left 2 Views       He appears to be making appropriate progress at just over 3 months post surgery.    Plan:   The above was reviewed with Tomer.     He is reassured to simply continue his increasing activity slowly but surely.  He should continue to work hard on range of motion and strengthening as he is great potential to continue to improve.  Suggested a 2 month timeframe as a reasonable time for follow-up but he was given the option of not returning if he feels he is doing well.    It would appear that he is spontaneously developed alopecia.  He is explained that I cannot offer him any solid discussion concerning this.  If he wishes to pursue more information either needs to contact his primary physician or  dermatologist.      Return to clinic 2, months, or PRN    Deonte Silver MD

## 2018-06-12 NOTE — PROGRESS NOTES
Appropriate assistive devices provided during their visit. yes (Yes, No, N/A) cane (list device)    Exam table and/or cart  placed in the lowest position. yes (Yes, No, N/A)    Brakes on tables/carts/wheelchairs used at all times. yes (Yes, No, N/A)    Non slip footwear applied. No shoes on (Yes, No, NA)    Patient was accompanied by staff throughout visit. yes (Yes, No, N/A)    Equipment safety straps used. na (Yes, No, N/A)    Assist with toileting. na (Yes, No, N/A)

## 2018-06-13 ENCOUNTER — ANTICOAGULATION THERAPY VISIT (OUTPATIENT)
Dept: ANTICOAGULATION | Facility: OTHER | Age: 83
End: 2018-06-13
Payer: MEDICARE

## 2018-06-13 ENCOUNTER — TELEPHONE (OUTPATIENT)
Dept: INTERNAL MEDICINE | Facility: CLINIC | Age: 83
End: 2018-06-13

## 2018-06-13 DIAGNOSIS — I48.20 CHRONIC ATRIAL FIBRILLATION (H): Primary | ICD-10-CM

## 2018-06-13 DIAGNOSIS — I48.20 CHRONIC ATRIAL FIBRILLATION (H): ICD-10-CM

## 2018-06-13 DIAGNOSIS — Z79.01 LONG-TERM (CURRENT) USE OF ANTICOAGULANTS: ICD-10-CM

## 2018-06-13 LAB — INR POINT OF CARE: 3.6 (ref 0.86–1.14)

## 2018-06-13 PROCEDURE — 36416 COLLJ CAPILLARY BLOOD SPEC: CPT

## 2018-06-13 PROCEDURE — 85610 PROTHROMBIN TIME: CPT | Mod: QW

## 2018-06-13 PROCEDURE — 99207 ZZC NO CHARGE NURSE ONLY: CPT

## 2018-06-13 NOTE — MR AVS SNAPSHOT
Tomer Weiss   6/13/2018 4:00 PM   Anticoagulation Therapy Visit    Description:  86 year old male   Provider:  SARA ANTI COAG   Department:  [facility]           INR as of 6/13/2018     Today's INR 3.6!      Anticoagulation Summary as of 6/13/2018     INR goal 2.0-3.0   Today's INR 3.6!   Full warfarin instructions 6/14: 2.5 mg; Otherwise 2.5 mg on Tue, Sat; 5 mg all other days   Next INR check 6/20/2018    Indications   Long-term (current) use of anticoagulants [Z79.01] [Z79.01]  Chronic atrial fibrillation (H) [I48.2]         Your next Anticoagulation Clinic appointment(s)     Jun 20, 2018  4:00 PM CDT   An[facility]agulation [facility] with ZM ANTI COAG   [facility]    [address] [address] [address] [phone]              Contact Numbers     Clinic Number:         June 2018 Details    Sun Mon Tue Wed Thu Fri Sat          1               2                 3               4               5               6               7               8               9                 10               11               12               13      5 mg   See details      14      2.5 mg         15      5 mg         16      2.5 mg           17      5 mg         18      5 mg         19      2.5 mg         20            21               22               23                 24               25               26               27               28               29               30                Date Details   06/13 This INR check       Date of next INR:  6/20/2018         How to take your warfarin dose     To take:  2.5 mg Take 0.5 of a 5 mg tablet.    To take:  5 mg Take 1 of the 5 mg tablets.

## 2018-06-13 NOTE — PROGRESS NOTES
ANTICOAGULATION FOLLOW-UP CLINIC VISIT    Patient Name:  Tomer Weiss  Date:  6/13/2018  Contact Type:  Face to Face    SUBJECTIVE:     Patient Findings     Positives No Problem Findings (NO stated changes in diet, etc.)           OBJECTIVE    INR Protime   Date Value Ref Range Status   06/13/2018 3.6 (A) 0.86 - 1.14 Final     Chromogenic Factor 10   Date Value Ref Range Status   04/24/2015 21 (L) 70 - 130 % Final     Comment:     Therapeutic Range:  A Chromogenic Factor 10 level of approximately 20-40%   inversely correlates with an INR of 2-3 for patients receiving Warfarin.   Chromogenic Factor 10 levels below 20% indicate an INR greater than 3 and   levels above 40% indicate an INR less than 2.         ASSESSMENT / PLAN  INR assessment SUPRA    Recheck INR In: 1 WEEK    INR Location Clinic      Anticoagulation Summary as of 6/13/2018     INR goal 2.0-3.0   Today's INR 3.6!   Warfarin maintenance plan 2.5 mg (5 mg x 0.5) on Tue, Sat; 5 mg (5 mg x 1) all other days   Full warfarin instructions 6/14: 2.5 mg; Otherwise 2.5 mg on Tue, Sat; 5 mg all other days   Weekly warfarin total 30 mg   Plan last modified Tana Pérez, RN (4/27/2016)   Next INR check 6/20/2018   Target end date     Indications   Long-term (current) use of anticoagulants [Z79.01] [Z79.01]  Chronic atrial fibrillation (H) [I48.2]         Anticoagulation Episode Summary     INR check location     Preferred lab     Send INR reminders to [facility] POOL    Comments 5 mg tablets, AM dose, AVS      Anticoagulation Care Providers     Provider Role Specialty Phone number    Gabriel Tan MD [facility] Internal Medicine [phone]            See the Encounter Report to view Anticoagulation Flowsheet and Dosing Calendar (Go to Encounters tab in chart review, and find the Anticoagulation Therapy Visit)    States no changes in medication, diet, activity.  Already took dose for today, will decrease tomorrow's dose and recheck in 1 week.  Also  encouraged to eat greens today.     Eda Olmos, RP

## 2018-06-13 NOTE — TELEPHONE ENCOUNTER
Tomer is due for INR renewal, order marge'd up for review and approval.    Eda Olmos, PharmD BCACP  Anticoagulation Clinical Pharmacist

## 2018-06-20 ENCOUNTER — ANTICOAGULATION THERAPY VISIT (OUTPATIENT)
Dept: ANTICOAGULATION | Facility: OTHER | Age: 83
End: 2018-06-20
Payer: MEDICARE

## 2018-06-20 DIAGNOSIS — I48.20 CHRONIC ATRIAL FIBRILLATION (H): ICD-10-CM

## 2018-06-20 DIAGNOSIS — Z79.01 LONG-TERM (CURRENT) USE OF ANTICOAGULANTS: ICD-10-CM

## 2018-06-20 LAB — INR POINT OF CARE: 3.2 (ref 0.86–1.14)

## 2018-06-20 PROCEDURE — 85610 PROTHROMBIN TIME: CPT | Mod: QW

## 2018-06-20 PROCEDURE — 99207 ZZC NO CHARGE NURSE ONLY: CPT

## 2018-06-20 PROCEDURE — 36416 COLLJ CAPILLARY BLOOD SPEC: CPT

## 2018-06-20 NOTE — MR AVS SNAPSHOT
Tomer Weiss   6/20/2018 4:00 PM   Anticoagulation Therapy Visit    Description:  86 year old male   Provider:  [facility]   Department:  [facility]           INR as of 6/20/2018     Today's INR 3.2!      Anticoagulation Summary as of 6/20/2018     INR goal 2.0-3.0   Today's INR 3.2!   Full warfarin instructions 2.5 mg on Tue, Thu, Sat; 5 mg all other days   Next INR check 7/11/2018    Indications   Long-term (current) use of anticoagulants [Z79.01] [Z79.01]  Chronic atrial fibrillation (H) [I48.2]         Your next Anticoagulation Clinic appointment(s)     Jul 11, 2018  3:45 PM CDT   [facility]agulation [facility] with [facility]   [facility]    [address] [address] [phone]              Contact Numbers     Clinic Number:         June 2018 Details    Sun Mon Tue Wed Thu Fri Sat          1               2                 3               4               5               6               7               8               9                 10               11               12               13               14               15               16                 17               18               19               20      5 mg   See details      21      2.5 mg         22      5 mg         23      2.5 mg           24      5 mg         25      5 mg         26      2.5 mg         27      5 mg         28      2.5 mg         29      5 mg         30      2.5 mg          Date Details   06/20 This INR check               How to take your warfarin dose     To take:  2.5 mg Take 0.5 of a 5 mg tablet.    To take:  5 mg Take 1 of the 5 mg tablets.           July 2018 Details    Sun Mon Tue Wed Thu Fri Sat     1      5 mg         2      5 mg         3      2.5 mg         4      5 mg         5      2.5 mg         6      5 mg         7      2.5 mg           8      5 mg         9      5 mg         10      2.5 mg         11            12               13                14                 15               16               17               18               19               20               21                 22               23               24               25               26               27               28                 29               30               31                    Date Details   No additional details    Date of next INR:  7/11/2018         How to take your warfarin dose     To take:  2.5 mg Take 0.5 of a 5 mg tablet.    To take:  5 mg Take 1 of the 5 mg tablets.

## 2018-06-20 NOTE — PROGRESS NOTES
ANTICOAGULATION FOLLOW-UP CLINIC VISIT    Patient Name:  Tomer Weiss  Date:  6/20/2018  Contact Type:  Face to Face    SUBJECTIVE:     Patient Findings     Positives Unexplained INR or factor level change           OBJECTIVE    INR Protime   Date Value Ref Range Status   06/20/2018 3.2 (A) 0.86 - 1.14 Final     Chromogenic Factor 10   Date Value Ref Range Status   04/24/2015 21 (L) 70 - 130 % Final     Comment:     Therapeutic Range:  A Chromogenic Factor 10 level of approximately 20-40%   inversely correlates with an INR of 2-3 for patients receiving Warfarin.   Chromogenic Factor 10 levels below 20% indicate an INR greater than 3 and   levels above 40% indicate an INR less than 2.         ASSESSMENT / PLAN  INR assessment THER    Recheck INR In: 3 WEEKS    INR Location Clinic      Anticoagulation Summary as of 6/20/2018     INR goal 2.0-3.0   Today's INR 3.2!   Warfarin maintenance plan 2.5 mg (5 mg x 0.5) on Tue, Thu, Sat; 5 mg (5 mg x 1) all other days   Full warfarin instructions 2.5 mg on Tue, Thu, Sat; 5 mg all other days   Weekly warfarin total 27.5 mg   Plan last modified Tana Pérez, RN (6/20/2018)   Next INR check 7/11/2018   Target end date     Indications   Long-term (current) use of anticoagulants [Z79.01] [Z79.01]  Chronic atrial fibrillation (H) [I48.2]         Anticoagulation Episode Summary     INR check location     Preferred lab     Send INR reminders to [facility] POOL    Comments 5 mg tablets, AM dose, AVS      Anticoagulation Care Providers     Provider Role Specialty Phone number    Gabriel Tan MD [facility] Internal Medicine [phone]            See the Encounter Report to view Anticoagulation Flowsheet and Dosing Calendar (Go to Encounters tab in chart review, and find the Anticoagulation Therapy Visit)    Dosage adjustment made based on physician directed care plan.    Tana Pérez, JUANY

## 2018-07-05 ENCOUNTER — OFFICE VISIT (OUTPATIENT)
Dept: FAMILY MEDICINE | Facility: OTHER | Age: 83
End: 2018-07-05
Payer: MEDICARE

## 2018-07-05 VITALS
BODY MASS INDEX: 27.4 KG/M2 | DIASTOLIC BLOOD PRESSURE: 64 MMHG | HEART RATE: 90 BPM | SYSTOLIC BLOOD PRESSURE: 110 MMHG | TEMPERATURE: 99.3 F | WEIGHT: 202 LBS | RESPIRATION RATE: 18 BRPM

## 2018-07-05 DIAGNOSIS — C64.9 MALIGNANT NEOPLASM OF KIDNEY EXCLUDING RENAL PELVIS, UNSPECIFIED LATERALITY (H): ICD-10-CM

## 2018-07-05 DIAGNOSIS — I71.20 THORACIC AORTIC ANEURYSM WITHOUT RUPTURE (H): ICD-10-CM

## 2018-07-05 DIAGNOSIS — H10.022 PINK EYE DISEASE OF LEFT EYE: Primary | ICD-10-CM

## 2018-07-05 DIAGNOSIS — M35.3 POLYMYALGIA RHEUMATICA (H): ICD-10-CM

## 2018-07-05 PROCEDURE — 99213 OFFICE O/P EST LOW 20 MIN: CPT | Performed by: PHYSICIAN ASSISTANT

## 2018-07-05 RX ORDER — SULFACETAMIDE SODIUM 100 MG/ML
1 SOLUTION/ DROPS OPHTHALMIC
Qty: 1 BOTTLE | Refills: 0 | Status: SHIPPED | OUTPATIENT
Start: 2018-07-05 | End: 2018-01-01

## 2018-07-05 ASSESSMENT — PAIN SCALES - GENERAL: PAINLEVEL: NO PAIN (0)

## 2018-07-05 NOTE — MR AVS SNAPSHOT
After Visit Summary   7/5/2018    Tomer Weiss    MRN: [id number]           Patient Information     Date Of Birth          4/9/1932        Visit Information        Provider Department      7/5/2018 2:00 PM Martin Cadena PA-C [facility]        Today's Diagnoses     Pink eye disease of left eye    -  1       Follow-ups after your visit        Your next 10 appointments already scheduled     Jul 11, 2018  3:45 PM CDT   [facility] with [facility]   [facility]    [address] [address] [phone]            Aug 14, 2018 12:00 PM CDT   Return Visit with Deonte Silver MD   [facility]    [address] [address] [phone]              Who to contact     If you have questions or need follow up information about today's clinic visit or your schedule please contact [facility] directly at [phone].  Normal or non-critical lab and imaging results will be communicated to you by MyChart, letter or phone within 4 business days after the clinic has received the results. If you do not hear from us within 7 days, please contact the clinic through MyChart or phone. If you have a critical or abnormal lab result, we will notify you by phone as soon as possible.  Submit refill requests through [employer] or call your pharmacy and they will forward the refill request to us. Please allow 3 business days for your refill to be completed.          Additional Information About Your Visit        Care EveryWhere ID     This is your Care EveryWhere ID. This could be used by other organizations to access your [facility] medical records  [id number]        Your Vitals Were     Pulse Temperature Respirations BMI (Body Mass Index)          90 99.3  F (37.4  C) (Temporal) 18 27.4 kg/m2         Blood Pressure from Last 3 Encounters:    07/05/18 110/64   06/12/18 122/80   05/09/18 128/64    Weight from Last 3 Encounters:   07/05/18 202 lb (91.6 kg)   06/12/18 184 lb (83.5 kg)   05/09/18 184 lb (83.5 kg)              Today, you had the following     No orders found for display         Today's Medication Changes          These changes are accurate as of 7/5/18  2:14 PM.  If you have any questions, ask your nurse or doctor.               Start taking these medicines.        Dose/Directions    sulfacetamide 10 % ophthalmic solution   Commonly known as:  BLEPH-10   Used for:  Pink eye disease of left eye   Started by:  Martin Cadena PA-C        Dose:  1 drop   Apply 1 drop to eye every 4 hours (while awake) for 7 days   Quantity:  1 Bottle   Refills:  0         Stop taking these medicines if you haven't already. Please contact your care team if you have questions.     furosemide 20 MG tablet   Commonly known as:  LASIX   Stopped by:  Martin Cadena PA-C                Where to get your medicines      These medications were sent to [facility] [place] [address]   [address] [place] [address]     Phone:  [phone]     sulfacetamide 10 % ophthalmic solution                Primary Care Provider Office Phone # Fax #    Gabriel Tan [phone]       [address] [facility] [address]        Equal Access to Services     [facility] AH: Hadii whitney conte hadasho Soalexandra, waaxda luqadaha, qaybta kaalmada marty, mariposa yang . So [facility] [phone].    ATENCIÓN: Si habla español, tiene a rojas disposición servicios gratuitos de asistencia lingüística. Llame al [phone].    We comply with applicable federal civil rights laws and [place] laws. We do not discriminate on the basis of race, color, national origin, age, disability, sex, sexual orientation, or gender identity.            Thank you!     Thank you for choosing [facility]  for your care.  Our goal is always to provide you with excellent care. Hearing back from our patients is one way we can continue to improve our services. Please take a few minutes to complete the written survey that you may receive in the mail after your visit with us. Thank you!             Your Updated Medication List - Protect others around you: Learn how to safely use, store and throw away your medicines at www.disposemymeds.org.          This list is accurate as of 7/5/18  2:14 PM.  Always use your most recent med list.                   Brand Name Dispense Instructions for use Diagnosis    acetaminophen 325 MG tablet    TYLENOL    100 tablet    Take 2 tablets (650 mg) by mouth every 4 hours as needed for other (multimodal surgical pain management along with NSAIDS and opioid medication as indicated based on pain control and physical function.)        atorvastatin 20 MG tablet    LIPITOR    90 tablet    Take 1 tablet (20 mg) by mouth daily    Hyperlipidemia LDL goal <130       COUMADIN PO      Take by mouth daily Take as directed per INR results. 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

## 2018-07-05 NOTE — PROGRESS NOTES
SUBJECTIVE:   Tomer Weiss is a 86 year old male who presents to clinic today for the following health issues:      HPI  Eye(s) Problem  Onset: 3 days    Description:   Location: left  Pain: no  Redness: YES    Accompanying Signs & Symptoms:  Discharge/mattering: YES  Swelling: YES- The first day  Visual changes: no  Fever: no  Nasal Congestion: no  Bothered by bright lights: no    History:   Trauma: no   Foreign body exposure: no    Precipitating factors:   Wearing contacts: no    Alleviating factors:  Improved by: none    Therapies Tried and outcome: none  Problem list and histories reviewed & adjusted, as indicated.  Additional history: as documented    Patient Active Problem List   Diagnosis     Alopecia     Polymyalgia rheumatica (H)     Cervicalgia     Unspecified hyperplasia of prostate with urinary obstruction and other lower urinary tract symptoms (LUTS)     Malignant neoplasm of kidney excluding renal pelvis (H)     Hyperlipidemia LDL goal <130     Dupuytren's contracture of hand     DJD (degenerative joint disease) of knee     Advanced directives, counseling/discussion     Atrial flutter     Post-traumatic osteoarthritis of left knee     Total knee replacement status     Anemia     Long-term (current) use of anticoagulants [Z79.01]     Chronic atrial fibrillation (H)     Closed displaced supracondylar fracture of distal end of left femur without intracondylar extension (H)     Pulmonary hypertension     Dysrhythmia 4 beat run narrow complex     Hypophosphatemia     Other insomnia     Past Surgical History:   Procedure Laterality Date     ARTHROPLASTY KNEE Left 3/14/2016    Procedure: ARTHROPLASTY KNEE;  Surgeon: Deonte Silver MD;  Location:  OR     COLONOSCOPY  7/17/2013    Procedure: COMBINED COLONOSCOPY, SINGLE BIOPSY/POLYPECTOMY BY BIOPSY;  Colonoscopy, with polypectomy by biopsy;  Surgeon: Fernando Mario MD;  Location:  GI     CYSTOSCOPY, LITHOLAPAXY, COMBINED N/A  2015    Procedure: COMBINED CYSTOSCOPY, LITHOLAPAXY;  Surgeon: Orlando Marr MD;  Location: [facility]     CYSTOSCOPY, LITHOLAPAXY, COMBINED N/A 2015    Procedure: COMBINED CYSTOSCOPY, LITHOLAPAXY;  Surgeon: Orlando Marr MD;  Location: [facility]     CYSTOSCOPY, RETROGRADES, EXTRACT STONE, COMBINED N/A 2018    Procedure: COMBINED CYSTOSCOPY, RETROGRADES, EXTRACT STONE;  cystoscopy, bladder stone removal;  Surgeon: Orlando Marr MD;  Location: [facility]     H ABLATION ATRIAL FLUTTER  4/18/15    atypical a flutter ablation & Ablation of PACs from the SVC-RA junction     HC ABLATION RENAL TUMOR PERCUT CRYOTHERAPY UNILATERAL  6/17/10    Right renal mass     LASER HOLMIUM LITHOTRIPSY BLADDER N/A 2015    Procedure: LASER HOLMIUM LITHOTRIPSY BLADDER;  Surgeon: Orlando Marr MD;  Location: [facility] OR     LASER KTP GREEN LIGHT PHOTOSELECTIVE VAPORIZATION PROSTATE N/A 2015    Procedure: LASER KTP GREEN LIGHT PHOTOSELECTIVE VAPORIZATION PROSTATE;  Surgeon: Orlando Marr MD;  Location: [facility]     OPEN REDUCTION INTERNAL FIXATION FEMUR DISTAL Left 3/3/2018    Procedure: OPEN REDUCTION INTERNAL FIXATION FEMUR DISTAL;  Open Reduction Internal Fixation Left Femur;  Surgeon: Mason Parra MD;  Location: [facility]       Social History   Substance Use Topics     Smoking status: Never Smoker     Smokeless tobacco: Never Used     Alcohol use No     Family History   Problem Relation Age of Onset     Cancer Mother      breast     Hypertension Mother      Alzheimer Disease Mother       at age 96.         Current Outpatient Prescriptions   Medication Sig Dispense Refill     acetaminophen (TYLENOL) 325 MG tablet Take 2 tablets (650 mg) by mouth every 4 hours as needed for other (multimodal surgical pain management along with NSAIDS and opioid medication as indicated based on pain control and physical function.) 100 tablet      atorvastatin (LIPITOR) 20 MG tablet Take 1 tablet (20 mg) by mouth  daily 90 tablet 3     ferrous sulfate (IRON) 325 (65 FE) MG tablet Take 1 tablet (325 mg) by mouth 2 times daily (with meals) 100 tablet      GLUCOSAMINE 500 MG OR CAPS 2 Tablets every morning       melatonin 1 MG TABS tablet Take 5 mg by mouth At Bedtime        metoprolol succinate (TOPROL-XL) 25 MG 24 hr tablet Take 0.5 tablets (12.5 mg) by mouth daily 45 tablet 1     sulfacetamide (BLEPH-10) 10 % ophthalmic solution Apply 1 drop to eye every 4 hours (while awake) for 7 days 1 Bottle 0     Warfarin Sodium (COUMADIN PO) Take by mouth daily Take as directed per INR results. Current dose:       Allergies   Allergen Reactions     No Known Drug Allergies      Recent Labs   Lab Test 04/12/18 03/05/18   0527   02/26/18   1053  11/27/17   0929  01/13/17   0915   09/08/15   0907   03/16/15   1536   03/27/12   0906   LDL   --    --    --    --    --   58   --   77   --    --    --   132*   HDL   --    --    --    --    --   47   --   49   --    --    --   50   TRIG   --    --    --    --    --   61   --   91   --    --    --   72   ALT   --    --    --   31  42  35   < >  29   --    --    < >  11   CR  0.80  1.00   < >  0.94  0.98  1.10   < >   --    < >   --    < >  0.88   GFRESTIMATED  >60  71   < >  77  72  64   < >   --    < >   --    < >  84   GFRESTBLACK  >60  86   < >  >90  88  77   < >   --    < >   --    < >  >90   POTASSIUM  5.1*  4.3   < >  4.4  4.6  4.7   < >   --    < >   --    < >  4.4   TSH   --    --    --   4.96*   --    --    --    --    --   3.85   --    --     < > = values in this interval not displayed.      BP Readings from Last 3 Encounters:   07/05/18 110/64   06/12/18 122/80   05/09/18 128/64    Wt Readings from Last 3 Encounters:   07/05/18 202 lb (91.6 kg)   06/12/18 184 lb (83.5 kg)   05/09/18 184 lb (83.5 kg)                    ROS:  Constitutional, HEENT, cardiovascular, pulmonary, gi and gu systems are negative, except as otherwise noted.    OBJECTIVE:     /64 (Cuff Size: Adult Regular)   Pulse 90  Temp 99.3  F (37.4  C) (Temporal)  Resp 18  Wt 202 lb (91.6 kg)  BMI 27.4 kg/m2  Body mass index is 27.4 kg/(m^2).  GENERAL: healthy, alert and no distress  EYES: conjunctiva/corneas- conjunctival injection OS and yellow colored discharge present left  RESP: lungs clear to auscultation - no rales, rhonchi or wheezes  CV: regular rate and rhythm, normal S1 S2, no S3 or S4, no murmur, click or rub, no peripheral edema and peripheral pulses strong  MS: no gross musculoskeletal defects noted, no edema  SKIN: no suspicious lesions or rashes to aging visible skin  PSYCH: mentation appears normal, affect normal/bright    Diagnostic Test Results:  No results found for this or any previous visit (from the past 24 hour(s)).    ASSESSMENT/PLAN:     1. Pink eye disease of left eye  Treat and observe  - sulfacetamide (BLEPH-10) 10 % ophthalmic solution; Apply 1 drop to eye every 4 hours (while awake) for 7 days  Dispense: 1 Bottle; Refill: 0    2. Malignant neoplasm of kidney excluding renal pelvis, unspecified laterality (H)  No new concerns with respect to this.  Patient states he will follow-up with his primary care provider.    3. Polymyalgia rheumatica (H)  When questioned he states he feels as good as he can be he will follow-up with his PCP sometime soon.    4. Thoracic aortic aneurysm without rupture (H)  Historically noted.  He has no new concerns.    MEREDITH Strickland PA-C  [facility] for HPI/ROS submitted by the patient on 7/5/2018   PHQ-2 Score: 1

## 2018-07-13 NOTE — TELEPHONE ENCOUNTER
Reason for Call:  Other appointment    Detailed comments: pt calling, wondering if he can be worked in today to check his INR in [place]. Please advise.     Phone Number Patient can be reached at: Home number on file [phone] (home)    Best Time: any     Can we leave a detailed message on this number? YES    Call taken on 7/13/2018 at 1:14 PM by Sima Elizalde

## 2018-07-13 NOTE — PROGRESS NOTES
ANTICOAGULATION FOLLOW-UP CLINIC VISIT    Patient Name:  Tomer Weiss  Date:  7/13/2018  Contact Type:  Face to Face    SUBJECTIVE:     Patient Findings     Positives Unexplained INR or factor level change           OBJECTIVE    INR Protime   Date Value Ref Range Status   07/13/2018 3.4 (A) 0.86 - 1.14 Final     Chromogenic Factor 10   Date Value Ref Range Status   04/24/2015 21 (L) 70 - 130 % Final     Comment:     Therapeutic Range:  A Chromogenic Factor 10 level of approximately 20-40%   inversely correlates with an INR of 2-3 for patients receiving Warfarin.   Chromogenic Factor 10 levels below 20% indicate an INR greater than 3 and   levels above 40% indicate an INR less than 2.         ASSESSMENT / PLAN  INR assessment SUPRA    Recheck INR In: 2 WEEKS    INR Location Clinic      Anticoagulation Summary as of 7/13/2018     INR goal 2.0-3.0   Today's INR 3.4!   Warfarin maintenance plan 5 mg (5 mg x 1) on Mon, Wed, Fri; 2.5 mg (5 mg x 0.5) all other days   Full warfarin instructions 5 mg on Mon, Wed, Fri; 2.5 mg all other days   Weekly warfarin total 25 mg   Plan last modified Tana Pérez, RN (7/13/2018)   Next INR check 7/27/2018   Target end date     Indications   Long-term (current) use of anticoagulants [Z79.01] [Z79.01]  Chronic atrial fibrillation (H) [I48.2]         Anticoagulation Episode Summary     INR check location     Preferred lab     Send INR reminders to [facility] POOL    Comments 5 mg tablets, AM dose, AVS      Anticoagulation Care Providers     Provider Role Specialty Phone number    Gabriel Tan MD [facility] Internal Medicine [phone]            See the Encounter Report to view Anticoagulation Flowsheet and Dosing Calendar (Go to Encounters tab in chart review, and find the Anticoagulation Therapy Visit)    Dosage adjustment made based on physician directed care plan.    Tana Pérez, RN

## 2018-07-13 NOTE — MR AVS SNAPSHOT
Tomer Weiss   7/13/2018 2:30 PM   Anticoagulation Therapy Visit    Description:  86 year old male   Provider:  [facility]   Department:  [facility]           INR as of 7/13/2018     Today's INR 3.4!      Anticoagulation Summary as of 7/13/2018     INR goal 2.0-3.0   Today's INR 3.4!   Full warfarin instructions 5 mg on Mon, Wed, Fri; 2.5 mg all other days   Next INR check 7/27/2018    Indications   Long-term (current) use of anticoagulants [Z79.01] [Z79.01]  Chronic atrial fibrillation (H) [I48.2]         Your next Anticoagulation Clinic appointment(s)     Jul 27, 2018  1:15 PM CDT   Anticoagulation Visit with [facility]M ANTI COAG   [facility]    [address] [address] [phone]              Contact Numbers     Clinic Number:         July 2018 Details    Sun Mon Tue Wed Thu Fri Sat     1               2               3               4               5               6               7                 8               9               10               11               12               13      5 mg   See details      14      2.5 mg           15      2.5 mg         16      5 mg         17      2.5 mg         18      5 mg         19      2.5 mg         20      5 mg         21      2.5 mg           22      2.5 mg         23      5 mg         24      2.5 mg         25      5 mg         26      2.5 mg         27            28                 29               30               31                    Date Details   07/13 This INR check       Date of next INR:  7/27/2018         How to take your warfarin dose     To take:  2.5 mg Take 0.5 of a 5 mg tablet.    To take:  5 mg Take 1 of the 5 mg tablets.

## 2018-07-27 NOTE — MR AVS SNAPSHOT
Tomer Weiss   7/27/2018 1:15 PM   Anticoagulation Therapy Visit    Description:  86 year old male   Provider:  [facility]   Department:  [facility]           INR as of 7/27/2018     Today's INR 2.2      Anticoagulation Summary as of 7/27/2018     INR goal 2.0-3.0   Today's INR 2.2   Full warfarin instructions 5 mg on Mon, Wed, Fri; 2.5 mg all other days   Next INR check 8/24/2018    Indications   Long-term (current) use of anticoagulants [Z79.01] [Z79.01]  Chronic atrial fibrillation (H) [I48.2]         Your next Anticoagulation Clinic appointment(s)     Aug 24, 2018  4:00 PM CDT   Anticoagulation Visit with ZM ANTI COAG   [facility] ([facility]    [address] [facility] [address] [phone]              Contact Numbers     Clinic Number:         July 2018 Details    Sun Mon Tue Wed Thu Fri Sat     1               2               3               4               5               6               7                 8               9               10               11               12               13               14                 15               16               17               18               19               20               21                 22               23               24               25               26               27      5 mg   See details      28      2.5 mg           29      2.5 mg         30      5 mg         31      2.5 mg              Date Details   07/27 This INR check               How to take your warfarin dose     To take:  2.5 mg Take 0.5 of a 5 mg tablet.    To take:  5 mg Take 1 of the 5 mg tablets.           August 2018 Details    Sun Mon Tue Wed Thu Fri Sat        1      5 mg         2      2.5 mg         3      5 mg         4      2.5 mg           5      2.5 mg         6      5 mg         7      2.5 mg         8      5 mg         9      2.5 mg         10      5 mg         11      2.5 mg           12      2.5 mg          13      5 mg         14      2.5 mg         15      5 mg         16      2.5 mg         17      5 mg         18      2.5 mg           19      2.5 mg         20      5 mg         21      2.5 mg         22      5 mg         23      2.5 mg         24            25                 26               27               28               29               30               31                 Date Details   No additional details    Date of next INR:  8/24/2018         How to take your warfarin dose     To take:  2.5 mg Take 0.5 of a 5 mg tablet.    To take:  5 mg Take 1 of the 5 mg tablets.

## 2018-07-27 NOTE — TELEPHONE ENCOUNTER
Reason for Call:  Medication or medication refill:    Do you use a [facility]?  Name of the pharmacy and phone number for the current request:  [facility] [place] - [phone]    Name of the medication requested: Sildenafil 20 mg     Other request: [facility] told him to call you for this, they need new Rx     Can we leave a detailed message on this number? YES    Phone number patient can be reached at: Home number on file [phone] (home)    Best Time: any     Call taken on 7/27/2018 at 11:45 AM by Sabrina Rios

## 2018-07-27 NOTE — PROGRESS NOTES
ANTICOAGULATION FOLLOW-UP CLINIC VISIT    Patient Name:  Tomer Weiss  Date:  7/27/2018  Contact Type:  Face to Face    SUBJECTIVE:     Patient Findings     Positives No Problem Findings           OBJECTIVE    INR Protime   Date Value Ref Range Status   07/27/2018 2.2 (A) 0.86 - 1.14 Final     Chromogenic Factor 10   Date Value Ref Range Status   04/24/2015 21 (L) 70 - 130 % Final     Comment:     Therapeutic Range:  A Chromogenic Factor 10 level of approximately 20-40%   inversely correlates with an INR of 2-3 for patients receiving Warfarin.   Chromogenic Factor 10 levels below 20% indicate an INR greater than 3 and   levels above 40% indicate an INR less than 2.         ASSESSMENT / PLAN  INR assessment THER    Recheck INR In: 4 WEEKS    INR Location Clinic      Anticoagulation Summary as of 7/27/2018     INR goal 2.0-3.0   Today's INR 2.2   Warfarin maintenance plan 5 mg (5 mg x 1) on Mon, Wed, Fri; 2.5 mg (5 mg x 0.5) all other days   Full warfarin instructions 5 mg on Mon, Wed, Fri; 2.5 mg all other days   Weekly warfarin total 25 mg   No change documented Tana Pérez, JUANY   Plan last modified Tana Pérez RN (7/13/2018)   Next INR check 8/24/2018   Target end date     Indications   Long-term (current) use of anticoagulants [Z79.01] [Z79.01]  Chronic atrial fibrillation (H) [I48.2]         Anticoagulation Episode Summary     INR check location     Preferred lab     Send INR reminders to [facility] POOL    Comments 5 mg tablets, AM dose, AVS      Anticoagulation Care Providers     Provider Role Specialty Phone number    Gabriel Tan MD [facility] Internal Medicine [phone]            See the Encounter Report to view Anticoagulation Flowsheet and Dosing Calendar (Go to Encounters tab in chart review, and find the Anticoagulation Therapy Visit)    Dosage adjustment made based on physician directed care plan.    Tana Pérez RN

## 2018-08-14 PROBLEM — M97.12XD PERIPROSTHETIC FRACTURE AROUND INTERNAL PROSTHETIC LEFT KNEE JOINT, SUBSEQUENT ENCOUNTER: Status: ACTIVE | Noted: 2018-01-01

## 2018-08-14 NOTE — MR AVS SNAPSHOT
After Visit Summary   8/14/2018    Tomer Weiss    MRN: [id number]           Patient Information     Date Of Birth          4/9/1932        Visit Information        Provider Department      8/14/2018 12:00 PM Deonte Silver MD [facility]         Follow-ups after your visit        Your next 10 appointments already scheduled     Aug 24, 2018  4:00 PM CDT   Anticoagulation Visit with ZM ANTI COAG   [facility]    [address] [facility] [address] [phone]            Oct 15, 2018  1:00 PM CDT   Return Visit with Deonte Silver MD   [facility] ([facility])    [address] [facility] [address] [phone]              Who to contact     If you have questions or need follow up information about today's clinic visit or your schedule please contact [facility] directly at [phone].  Normal or non-critical lab and imaging results will be communicated to you by MyChart, letter or phone within 4 business days after the clinic has received the results. If you do not hear from us within 7 days, please contact the clinic through MyChart or phone. If you have a critical or abnormal lab result, we will notify you by phone as soon as possible.  Submit refill requests through [employer] or call your pharmacy and they will forward the refill request to us. Please allow 3 business days for your refill to be completed.          Additional Information About Your Visit        Care EveryWhere ID     This is your Care EveryWhere ID. This could be used by other organizations to access your [place] medical records  [id number]        Your Vitals Were     Pulse Height BMI (Body Mass Index)             81 1.829 m (6') 27.4 kg/m2          Blood Pressure from Last 3 Encounters:   08/14/18 136/86   07/05/18 110/64   06/12/18 122/80    Weight from Last 3 Encounters:    08/14/18 91.6 kg (202 lb)   07/05/18 91.6 kg (202 lb)   06/12/18 83.5 kg (184 lb)              Today, you had the following     No orders found for display       Primary Care Provider Office Phone # Fax #    Gabriel Tan [phone] [address] [facility] [address]        Equal Access to Services     NANCY MILAN : Hadii aad ku hadasho Soomaali, waaxda luqadaha, qaybta kaalmada adeegyada, waxay idiin hayaan adeeg kharash la'aan ah. So [facility] [phone].    ATENCIÓN: Si habla espvenecia, tiene a rojas disposición servicios gratuitos de asistencia lingüística. Llame al [phone].    We comply with applicable federal civil rights laws and [place] laws. We do not discriminate on the basis of race, color, national origin, age, disability, sex, sexual orientation, or gender identity.            Thank you!     Thank you for choosing [facility]  for your care. Our goal is always to provide you with excellent care. Hearing back from our patients is one way we can continue to improve our services. Please take a few minutes to complete the written survey that you may receive in the mail after your visit with us. Thank you!             Your Updated Medication List - Protect others around you: Learn how to safely use, store and throw away your medicines at www.disposemymeds.org.          This list is accurate as of 8/14/18  1:20 PM.  Always use your most recent med list.                   Brand Name Dispense Instructions for use Diagnosis    acetaminophen 325 MG tablet    TYLENOL    100 tablet    Take 2 tablets (650 mg) by mouth every 4 hours as needed for other (multimodal surgical pain management along with NSAIDS and opioid medication as indicated based on pain control and physical function.)        atorvastatin 20 MG tablet    LIPITOR    90 tablet    Take 1 tablet (20 mg) by mouth daily    Hyperlipidemia LDL goal <130       COUMADIN PO      Take by mouth daily Take as directed  per INR results. 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 Never use with nitroglycerin, terazosin or doxazosin.    Erectile dysfunction, unspecified erectile dysfunction type

## 2018-08-14 NOTE — LETTER
8/14/2018         RE: Tomer Weiss  [address]  [place] [address]        Dear Colleague,    Thank you for referring your patient, Tomer Weiss, to the [facility]. Please see a copy of my visit note below.    HISTORY OF PRESENT ILLNESS:    Tomer Weiss is a 86 year old male who is seen in follow up for   Chief Complaint   Patient presents with     RECHECK     Open Reduction Internal Fixation Left Femur.  DOS: 3/3/18 (5 months postop) - [facility]     Surgical Followup     PREOPERATIVE DIAGNOSIS:  Left supracondylar femur fracture above a total knee arthroplasty.POSTOPERATIVE DIAGNOSIS:  Left supracondylar femur fracture above a total knee arthroplasty. PROCEDURE:  Open reduction and internal fixation, left supracondylar femur fracture above a total knee arthroplasty   Interval: Patient is 5 months status post open reduction internal fixation of the left distal femur/periprosthetic fracture.  Patient reports he still has a noticeable limp.  He states he feels limited strength in his left knee.  He does continue to have swelling of the left knee however this has improved in the past couple weeks.  He does admit he is on his feet a lot.  He has been working on strength of his left leg by leading with his left leg to climb stairs.  Patient reports he has been  for about 3 weeks now.  Patient evaluation done with Dr. Silver    Physical Exam:  Vitals: /86 (BP Location: Left arm, Patient Position: Chair, Cuff Size: Adult Regular)  Pulse 81  Ht 1.829 m (6')  Wt 91.6 kg (202 lb)  BMI 27.4 kg/m2  BMI= Body mass index is 27.4 kg/(m^2).  Constitutional: healthy, alert and no acute distress   Psychiatric: mentation appears normal and affect normal/bright  NEURO: no focal deficits  Location of surgery: Left knee  Swelling is still present today in the left knee and thigh.  Patient with no pain on palpation of the knee or thigh.  Range of motion:  Patient is able to obtain full extension and approximately 100  of flexion.  Patient gait is significant with a limp.  It appears patient does have an element of hip involvement in his limp.  Left hip is also evaluated.  Rocking of the legs does not reveal any discomfort in the left hip.  Flexion, internal rotation and external rotation does not cause any discomfort in the hip but does cause discomfort in the knee.  Patient is weak in abduction.  He has no pain at the greater trochanter.    IMAGING INTERPRETATION: Images from June x-rays were reviewed which revealed good healing and alignment of distal femur.  Patient had previous hip x-rays available.  There is some narrowing present of the left hip.  Independent visualization of the images was performed.     ASSESSMENT:    Chief Complaint   Patient presents with     RECHECK     Open Reduction Internal Fixation Left Femur.  DOS: 3/3/18 (5 months postop) - [facility]     Surgical Followup     PREOPERATIVE DIAGNOSIS:  Left supracondylar femur fracture above a total knee arthroplasty.POSTOPERATIVE DIAGNOSIS:  Left supracondylar femur fracture above a total knee arthroplasty. PROCEDURE:  Open reduction and internal fixation, left supracondylar femur fracture above a total knee arthroplasty       ICD-10-CM    1. Closed displaced supracondylar fracture of distal end of left femur without intracondylar extension with routine healing, subsequent encounter S72.452D    2. 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 Periprosthetic fracture around internal prosthetic left knee joint, subsequent encounter M97.12XD      Patient is 5 months status post left distal femur fracture that is periprosthetic in nature.  Patient is still struggling with a considerable limp and swelling.  Swelling does hinder his progress in range of motion.  Believe a big contributing factor to patient's current limp is hip arthritis/weakness.    Plan:   Patient is advised of the above.  Patient  to continue to work on range of motion as it is very possible he can obtain greater flexion.  Patient can continue strengthening of the left lower leg with his stair work.  Patient is also asked to add in hip exercises of left hip abduction.  Continue use of the cane in the right hand as needed  Return to clinic 2 months for follow-up    BP Readings from Last 1 Encounters:   08/14/18 136/86     BP noted to be well controlled today in office.     Kylah Bose PA-C   8/14/2018  3:23 PM      I attest I have seen and evaluated the patient.  I agree with above impression and plan.    Deonte Silver MD    Again, thank you for allowing me to participate in the care of your patient.        Sincerely,        Deonte Silver MD

## 2018-09-04 NOTE — PROGRESS NOTES
ANTICOAGULATION FOLLOW-UP CLINIC VISIT    Patient Name:  Tomer Weiss  Date:  9/4/2018  Contact Type:  Face to Face    SUBJECTIVE:     Patient Findings     Positives No Problem Findings           OBJECTIVE    INR Protime   Date Value Ref Range Status   09/04/2018 2.2 (A) 0.86 - 1.14 Final     Chromogenic Factor 10   Date Value Ref Range Status   04/24/2015 21 (L) 70 - 130 % Final     Comment:     Therapeutic Range:  A Chromogenic Factor 10 level of approximately 20-40%   inversely correlates with an INR of 2-3 for patients receiving Warfarin.   Chromogenic Factor 10 levels below 20% indicate an INR greater than 3 and   levels above 40% indicate an INR less than 2.         ASSESSMENT / PLAN  INR assessment THER    Recheck INR In: 5 WEEKS    INR Location Clinic      Anticoagulation Summary as of 9/4/2018     INR goal 2.0-3.0   Today's INR 2.2   Warfarin maintenance plan 5 mg (5 mg x 1) on Mon, Wed, Fri; 2.5 mg (5 mg x 0.5) all other days   Full warfarin instructions 5 mg on Mon, Wed, Fri; 2.5 mg all other days   Weekly warfarin total 25 mg   No change documented Tana Pérez, JUANY   Plan last modified Tana Pérez RN (7/13/2018)   Next INR check 10/12/2018   Target end date     Indications   Long-term (current) use of anticoagulants [Z79.01] [Z79.01]  Chronic atrial fibrillation (H) [I48.2]         Anticoagulation Episode Summary     INR check location     Preferred lab     Send INR reminders to [facility] POOL    Comments 5 mg tablets, AM dose, AVS      Anticoagulation Care Providers     Provider Role Specialty Phone number    Gabriel Tan MD [facility] Internal Medicine [phone]            See the Encounter Report to view Anticoagulation Flowsheet and Dosing Calendar (Go to Encounters tab in chart review, and find the Anticoagulation Therapy Visit)    Dosage adjustment made based on physician directed care plan.    Tana Pérez RN

## 2018-09-11 NOTE — PROGRESS NOTES
SUBJECTIVE:   Tomer Weiss is a 86 year old male who presents to clinic today for the following health issues:    Chief Complaint   Patient presents with     Edema     discuss bilateral leg swelling     He is doing ok, swelling in his legs and in the scrotum as well.  He was on lasix for years and with being in the nursing home he was taken off it.  He wonders about going back on it.    Morning the legs are good, then throughout the day swelling, right is with worse then left.  Left is from knee surgery and broken femur last March.  Back to walking quite a bit, works on his trees lots.  Will do a couple hours then elevate his legs.      Hair loss on his head, eyebrows.  Doesn't need to shave.      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      Social History   Substance Use Topics     Smoking status: Never Smoker     Smokeless tobacco: Never Used     Alcohol use No     Review of Systems  Constitutional-Weight gain.  Cardiac-No chest pain or palpitations.  Respiratory-No cough, sob, or hemoptysis.  GI-No nausea, vomitting, diarrhea, constipation, or blood in the stool.    Physical Exam  /84  Pulse  100  Temp 97.3  F (36.3  C) (Temporal)  Resp 16  Wt 208 lb (94.3 kg)  SpO2 98%  BMI 28.21 kg/m2  General Appearance-healthy, alert, no distress  Cardiac-irregularly irregular rhythm  Lungs-clear to auscultation  Extremities- Both legs have 2+ pitting edema up to the knees    ASSESSMENT:  This is an 86-year-old gentleman who has a history of atrial fibrillation and is on Coumadin and is rate controlled with low-dose metoprolol.  He continues to have an irregularly irregular rate but as mentioned is rate controlled.  He is active he is able to walk what he needs to do without getting short of breath.  He works in [employer] but does not cane therefore he is given a handicap sticker today.  He has known pulmonary hypertension which was quite severe.  He did have a son echo.  He saw pulmonary hypertension specialist but because he is not short of breath there was no real treatment needed he was given further workup with cardiac MRI repeat echo but is not interested in this at this time.  He does have peripheral edema which is from his pulmonary hypertension.  He used to be on Lasix 20 mg and will restart this at 20 mg daily, recheck labs in 1 week.  He is also had significant hair loss  Check his electrolytes, LFTs, TSH.    Electronically signed by Gabriel Tan MD

## 2018-09-11 NOTE — MR AVS SNAPSHOT
"              After Visit Summary   9/11/2018    Tomer Weiss    MRN: [id number]           Patient Information     Date Of Birth          4/9/1932        Visit Information        Provider Department      9/11/2018 7:30 AM Gabriel Tan MD [facility]         Follow-ups after your visit        Your next 10 appointments already scheduled     Oct 12, 2018  8:30 AM CDT   Anticoagulation Visit with [facility]   [facility]    [address] [address] [phone]            Oct 15, 2018  1:00 PM CDT   Return Visit with Deonte Silver MD   [facility]    [address] [address] [phone]              Who to contact     If you have questions or need follow up information about today's clinic visit or your schedule please contact [facility] directly at [phone].  Normal or non-critical lab and imaging results will be communicated to you by [employer], letter or phone within 4 business days after the clinic has received the results. If you do not hear from us within 7 days, please contact the clinic through [employer] or phone. If you have a critical or abnormal lab result, we will notify you by phone as soon as possible.  Submit refill requests through [employer] or call your pharmacy and they will forward the refill request to us. Please allow 3 business days for your refill to be completed.          Additional Information About Your Visit        [employer] Information     [employer] lets you send messages to your doctor, view your test results, renew your prescriptions, schedule appointments and more. To sign up, go to [email or web address] . Click on \"Log in\" on the left side of the screen, which will take you to the Welcome page. Then click on \"Sign up Now\" on the right side of the page.     You will be asked to enter the access code listed " below, as well as some personal information. Please follow the directions to create your username and password.     Your access code is: [email or web address]  Expires: 12/10/2018  7:32 AM     Your access code will  in 90 days. If you need help or a new code, please call your [facility] clinic or [phone].        Care EveryWhere ID     This is your Care EveryWhere ID. This could be used by other organizations to access your [facility] medical records  [id number]        Your Vitals Were     Pulse Temperature Respirations Pulse Oximetry BMI (Body Mass Index)       100 97.3  F (36.3  C) (Temporal) 16 98% 28.21 kg/m2        Blood Pressure from Last 3 Encounters:   18 136/84   18 136/86   18 110/64    Weight from Last 3 Encounters:   18 208 lb (94.3 kg)   18 202 lb (91.6 kg)   18 202 lb (91.6 kg)              Today, you had the following     No orders found for display       Primary Care Provider Office Phone # Fax #    Gabriel Tan [phone]       [address] [facility] [address]        Equal Access to Services     NANCY MILAN AH: Hadii whitney ku hadasho Soomaali, waaxda luqadaha, qaybta kaalmada adeegyada, waxay lilibeth hayagnieszkan nadege johnson. So [facility] [phone].    ATENCIÓN: Si habla español, tiene a rojas disposición servicios gratuitos de asistencia lingüística. Llame al [phone].    We comply with applicable federal civil rights laws and [place] laws. We do not discriminate on the basis of race, color, national origin, age, disability, sex, sexual orientation, or gender identity.            Thank you!     Thank you for choosing [facility]  for your care. Our goal is always to provide you with excellent care. Hearing back from our patients is one way we can continue to improve our services. Please take a few minutes to complete the written survey that you may receive in the mail after your visit with us. Thank you!             Your  Updated Medication List - Protect others around you: Learn how to safely use, store and throw away your medicines at www.disposemymeds.org.          This list is accurate as of 9/11/18  7:32 AM.  Always use your most recent med list.                   Brand Name Dispense Instructions for use Diagnosis    acetaminophen 325 MG tablet    TYLENOL    100 tablet    Take 2 tablets (650 mg) by mouth every 4 hours as needed for other (multimodal surgical pain management along with NSAIDS and opioid medication as indicated based on pain control and physical function.)        atorvastatin 20 MG tablet    LIPITOR    90 tablet    Take 1 tablet (20 mg) by mouth daily    Hyperlipidemia LDL goal <130       COUMADIN PO      Take by mouth daily Take as directed per INR results. 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 Never use with nitroglycerin, terazosin or doxazosin.    Erectile dysfunction, unspecified erectile dysfunction type

## 2018-10-12 NOTE — MR AVS SNAPSHOT
Tomer Weiss   10/12/2018 8:30 AM   Anticoagulation Therapy Visit    Description:  86 year old male   Provider:  [facility]   Department:  [facility]           INR as of 10/12/2018     Today's INR 2.2      Anticoagulation Summary as of 10/12/2018     INR goal 2.0-3.0   Today's INR 2.2   Full warfarin instructions 5 mg on Mon, Wed, Fri; 2.5 mg all other days   Next INR check 11/14/2018    Indications   Long-term (current) use of anticoagulants [Z79.01] [Z79.01]  Chronic atrial fibrillation (H) [I48.2]         Contact Numbers     Clinic Number:         October 2018 Details    Sun Mon Tue Wed Thu Fri Sat      1               2               3               4               5               6                 7               8               9               10               11               12      5 mg   See details      13      2.5 mg           14      2.5 mg         15      5 mg         16      2.5 mg         17      5 mg         18      2.5 mg         19      5 mg         20      2.5 mg           21      2.5 mg         22      5 mg         23      2.5 mg         24      5 mg         25      2.5 mg         26      5 mg         27      2.5 mg           28      2.5 mg         29      5 mg         30      2.5 mg         31      5 mg             Date Details   10/12 This INR check               How to take your warfarin dose     To take:  2.5 mg Take 0.5 of a 5 mg tablet.    To take:  5 mg Take 1 of the 5 mg tablets.           November 2018 Details    Sun Mon Tue Wed Thu Fri Sat         1      2.5 mg         2      5 mg         3      2.5 mg           4      2.5 mg         5      5 mg         6      2.5 mg         7      5 mg         8      2.5 mg         9      5 mg         10      2.5 mg           11      2.5 mg         12      5 mg         13      2.5 mg         14            15               16               17                 18               19               20               21               22                23               24                 25               26               27               28               29               30                 Date Details   No additional details    Date of next INR:  11/14/2018         How to take your warfarin dose     To take:  2.5 mg Take 0.5 of a 5 mg tablet.    To take:  5 mg Take 1 of the 5 mg tablets.

## 2018-10-12 NOTE — PROGRESS NOTES
ANTICOAGULATION FOLLOW-UP CLINIC VISIT    Patient Name:  Tomer Weiss  Date:  10/12/2018  Contact Type:  Face to Face    SUBJECTIVE:     Patient Findings     Positives No Problem Findings           OBJECTIVE    INR Protime   Date Value Ref Range Status   10/12/2018 2.2 (A) 0.86 - 1.14 Final     Chromogenic Factor 10   Date Value Ref Range Status   04/24/2015 21 (L) 70 - 130 % Final     Comment:     Therapeutic Range:  A Chromogenic Factor 10 level of approximately 20-40%   inversely correlates with an INR of 2-3 for patients receiving Warfarin.   Chromogenic Factor 10 levels below 20% indicate an INR greater than 3 and   levels above 40% indicate an INR less than 2.         ASSESSMENT / PLAN  INR assessment THER    Recheck INR In: 5 WEEKS    INR Location Clinic      Anticoagulation Summary as of 10/12/2018     INR goal 2.0-3.0   Today's INR 2.2   Warfarin maintenance plan 5 mg (5 mg x 1) on Mon, Wed, Fri; 2.5 mg (5 mg x 0.5) all other days   Full warfarin instructions 5 mg on Mon, Wed, Fri; 2.5 mg all other days   Weekly warfarin total 25 mg   No change documented Tana Pérez, JUANY   Plan last modified Tana Pérez RN (7/13/2018)   Next INR check 11/14/2018   Target end date     Indications   Long-term (current) use of anticoagulants [Z79.01] [Z79.01]  Chronic atrial fibrillation (H) [I48.2]         Anticoagulation Episode Summary     INR check location     Preferred lab     Send INR reminders to [facility] POOL    Comments 5 mg tablets, AM dose, AVS      Anticoagulation Care Providers     Provider Role Specialty Phone number    Gabriel Tan MD [facility] [phone]            See the Encounter Report to view Anticoagulation Flowsheet and Dosing Calendar (Go to Encounters tab in chart review, and find the Anticoagulation Therapy Visit)    Dosage adjustment made based on physician directed care plan.    Tana Pérez RN

## 2018-11-14 NOTE — MR AVS SNAPSHOT
Tomer Weiss   11/14/2018   Anticoagulation Therapy Visit    Description:  86 year old male   Provider:  Gabriel Tan MD   Department:  [facility]           INR as of 11/14/2018     Today's INR 2.1      Anticoagulation Summary as of 11/14/2018     INR goal 2.0-3.0   Today's INR 2.1   Full warfarin instructions 5 mg on Mon, Wed, Fri; 2.5 mg all other days   Next INR check 12/21/2018    Indications   Long term current use of anticoagulant therapy [Z79.01]  Chronic atrial fibrillation (H) [I48.2]         Your next Anticoagulation Clinic appointment(s)     Dec 21, 2018  8:30 AM CST   [facility]   [facility]    [address] [address] [phone]              Contact Numbers     Clinic Number:         November 2018 Details    Sun Mon Tue Wed Thu Fri Sat         1               2               3                 4               5               6               7               8               9               10                 11               12               13               14      5 mg   See details      15      2.5 mg         16      5 mg         17      2.5 mg           18      2.5 mg         19      5 mg         20      2.5 mg         21      5 mg         22      2.5 mg         23      5 mg         24      2.5 mg           25      2.5 mg         26      5 mg         27      2.5 mg         28      5 mg         29      2.5 mg         30      5 mg           Date Details   11/14 This INR check               How to take your warfarin dose     To take:  2.5 mg Take 0.5 of a 5 mg tablet.    To take:  5 mg Take 1 of the 5 mg tablets.           December 2018 Details    Sun Mon Tue Wed Thu Fri Sat           1      2.5 mg           2      2.5 mg         3      5 mg         4      2.5 mg         5      5 mg         6      2.5 mg         7      5 mg         8      2.5 mg           9      2.5 mg         10      5 mg          11      2.5 mg         12      5 mg         13      2.5 mg         14      5 mg         15      2.5 mg           16      2.5 mg         17      5 mg         18      2.5 mg         19      5 mg         20      2.5 mg         21            22                 23               24               25               26               27               28               29                 30               31                     Date Details   No additional details    Date of next INR:  12/21/2018         How to take your warfarin dose     To take:  2.5 mg Take 0.5 of a 5 mg tablet.    To take:  5 mg Take 1 of the 5 mg tablets.

## 2018-11-14 NOTE — PROGRESS NOTES
ANTICOAGULATION FOLLOW-UP CLINIC VISIT    Patient Name:  Tomer Weiss  Date:  11/14/2018  Contact Type:  Telephone    SUBJECTIVE:     Patient Findings     Positives No Problem Findings           OBJECTIVE    INR Point of Care   Date Value Ref Range Status   11/14/2018 2.1 (H) 0.86 - 1.14 Final     Comment:     This test is intended for monitoring Coumadin therapy.  Results are not   accurate in patients with prolonged INR due to factor deficiency.       Chromogenic Factor 10   Date Value Ref Range Status   04/24/2015 21 (L) 70 - 130 % Final     Comment:     Therapeutic Range:  A Chromogenic Factor 10 level of approximately 20-40%   inversely correlates with an INR of 2-3 for patients receiving Warfarin.   Chromogenic Factor 10 levels below 20% indicate an INR greater than 3 and   levels above 40% indicate an INR less than 2.         ASSESSMENT / PLAN  INR assessment THER    Recheck INR In: 6 WEEKS    INR Location Outside lab      Anticoagulation Summary as of 11/14/2018     INR goal 2.0-3.0   Today's INR 2.1   Warfarin maintenance plan 5 mg (5 mg x 1) on Mon, Wed, Fri; 2.5 mg (5 mg x 0.5) all other days   Full warfarin instructions 5 mg on Mon, Wed, Fri; 2.5 mg all other days   Weekly warfarin total 25 mg   No change documented Charlene Lane, RN   Plan last modified Tana Pérez, RN (7/13/2018)   Next INR check 12/21/2018   Target end date     Indications   Long term current use of anticoagulant therapy [Z79.01]  Chronic atrial fibrillation (H) [I48.2]         Anticoagulation Episode Summary     INR check location     Preferred lab     Send INR reminders to [facility] POOL    Comments 5 mg tablets, AM dose, AVS      Anticoagulation Care Providers     Provider Role Specialty Phone number    Gabriel Tan MD [facility] Internal Medicine [phone]            See the Encounter Report to view Anticoagulation Flowsheet and Dosing Calendar (Go to Encounters tab in chart review, and find the  Anticoagulation Therapy Visit)    Dosage adjustment made based on physician directed care plan.      Charlene Lane RN

## 2018-11-27 PROBLEM — R60.0 LOCALIZED EDEMA: Status: ACTIVE | Noted: 2018-01-01

## 2018-11-27 PROBLEM — N50.89 SCROTAL SWELLING: Status: ACTIVE | Noted: 2018-01-01

## 2018-11-27 PROBLEM — Z87.438 HISTORY OF PROSTATIC HYPERPLASIA: Status: ACTIVE | Noted: 2018-01-01

## 2018-11-27 PROBLEM — N48.89 EDEMA OF PENIS: Status: ACTIVE | Noted: 2018-01-01

## 2018-11-27 NOTE — TELEPHONE ENCOUNTER
Tomer Weiss is a 86 year old male who calls with genital swelling.    NURSING ASSESSMENT:  Description:  Patient phone in with symptoms of allergies, but also stated he has had genital swelling for over 2 weeks.  Patient states he does not have pain.  Patient states by evening time it is very swollen, and in morning it is less swollen, but still very noticeable.  Patient states the swelling is on both sides / whole area.  Onset/duration:  2 weeks  Precip. factors:  Unknown  Last exam/Treatment:  9/11/18  Allergies:   Allergies   Allergen Reactions     No Known Drug Allergies          NURSING PLAN: Nursing advice to patient to make appointment for today, go to ER if worsening symptoms or pain presents.    RECOMMENDED DISPOSITION:  See in 4 hours  Will comply with recommendation: Yes  If further questions/concerns or if symptoms do not improve, worsen or new symptoms develop, call your PCP or [facility] as soon as possible.      Guideline used:  Genital Problems, Male  Telephone Triage Protocols for Nurses, Fifth Edition, Belgica Chino RN

## 2018-11-27 NOTE — MR AVS SNAPSHOT
After Visit Summary   11/27/2018    Tomer Weiss    MRN: [id number]           Patient Information     Date Of Birth          4/9/1932        Visit Information        Provider Department      11/27/2018 11:40 AM Martin Cadena PA-C [facility]        Today's Diagnoses     Malignant neoplasm of kidney excluding renal pelvis, unspecified laterality (H)    -  1    History of prostatic hyperplasia        Scrotal swelling        Localized edema        Encounter for screening for malignant neoplasm of prostate         Combined systolic and diastolic heart failure, unspecified HF chronicity (H)         Typical atrial flutter (H)        Edema of penis           Follow-ups after your visit        Additional Services     GENERAL SURG ADULT REFERRAL       Your provider [place] you [place]: [facility] [place] [facility] [phone]   [email or web address]    Please be aware that coverage of these services is subject to the terms and limitations of your health insurance plan.  Call member services at your health plan with any benefit or coverage questions.      Please bring the following with you to your appointment:    (1) Any X-Rays, CTs or MRIs which have been performed.  Contact the facility where they were done to arrange for  prior to your scheduled appointment.   (2) List of current medications   (3) This referral request   (4) Any documents/labs given to you for this referral            UROLOGY ADULT REFERRAL       Your provider has referred you to: G: [facility] [phone]   [email or web address]  FHN: [facility] [phone]   [email or web address]    Please be aware that coverage of these services is subject to the terms and limitations of your health insurance plan.  Call member services at your  health plan with any benefit or coverage questions.      Please bring the following with you to your appointment:    (1) Any X-Rays, CTs or MRIs which have been performed.  Contact the facility where they were done to arrange for  prior to your scheduled appointment.    (2) List of current medications  (3) This referral request   (4) Any documents/labs given to you for this referral                  Your next 10 appointments already scheduled     Nov 28, 2018  8:30 AM CST   US TESTICULAR AND SCROTUM with ZMUS1   [facility] ([facility]    [address] [address] [phone]           How do I prepare for my exam? (Food and drink instructions) No Food and Drink Restrictions.  How do I prepare for my exam? (Other instructions) You do not need to do anything special to prepare for your exam.  What should I wear: Wear comfortable clothes.  How long does the exam take: Most ultrasounds take 30 to 60 minutes.  What should I bring: Bring a list of your medicines, including vitamins, minerals and over-the-counter drugs. It is safest to leave personal items at home.  Do I need a :  No  is needed.  What do I need to tell my doctor: Tell your doctor about any allergies you may have.  What should I do after the exam: No restrictions, You may resume normal activities.  What is this test: An ultrasound uses sound waves to make pictures of the body. Sound waves do not cause pain. The only discomfort may be the pressure of the wand against your skin or full bladder.  Who should I call with questions: If you have any questions, please call the Imaging Department where you will have your exam. Directions, parking instructions, and other information is available on our website, [email or web address].            Nov 28, 2018 10:30 AM CST   Return Visit with Orlando Marr MD   [facility]    [address] [facility]  [place] [address]   [phone]            Nov 30, 2018 10:15 AM CST   New Visit with Irvin Bradford,    [facility]    [address] [address] [phone]            Dec 21, 2018  8:30 AM CST   Anticoagulation Visit with SARA ANTI COAG   [facility]    [address] [address] [phone]              Future tests that were ordered for you today     Open Future Orders        Priority Expected Expires Ordered    US Testicular and Scrotum Routine  11/27/2019 11/27/2018    US SCROTUM AND CONTENTS Routine 2/25/2019 5/26/2019 11/27/2018            Who to contact     If you have questions or need follow up information about today's clinic visit or your schedule please contact [facility] directly at [phone].  Normal or non-critical lab and imaging results will be communicated to you by MyChart, letter or phone within 4 business days after the clinic has received the results. If you do not hear from us within 7 days, please contact the clinic through [employer] or phone. If you have a critical or abnormal lab result, we will notify you by phone as soon as possible.  Submit refill requests through [employer] or call your pharmacy and they will forward the refill request to us. Please allow 3 business days for your refill to be completed.          Additional Information About Your Visit        Care EveryWhere ID     This is your Care EveryWhere ID. This could be used by other organizations to access your [facility] medical records  [id number]        Your Vitals Were     Pulse Temperature Respirations BMI (Body Mass Index)          88 97.5  F (36.4  C) (Temporal) 20 30.61 kg/m2         Blood Pressure from Last 3 Encounters:   11/27/18 136/84   09/11/18 136/84   08/14/18 136/86    Weight from Last 3 Encounters:   11/27/18 225 lb 11.2 oz (102.4 kg)   09/11/18 208 lb  (94.3 kg)   08/14/18 202 lb (91.6 kg)              We Performed the Following     *UA reflex to Microscopic and Culture ([place] and [facility] (except [place] and [place])     BNP-N terminal pro     Comprehensive metabolic panel     GENERAL SURG ADULT REFERRAL     PSA, screen     Urine Microscopic     UROLOGY ADULT REFERRAL        Primary Care Provider Office Phone # Fax #    Gabriel Tan [phone]       [address] [address]        Equal Access to Services     NANCY MILAN : Hadii aad ku hadasho Soomaali, waaxda luqadaha, qaybta kaalmada adeegyada, waxay idiin hayaan adeeg kharash la'aan . So [facility] [phone].    ATENCIÓN: Si habla español, tiene a rojas disposición servicios gratuitos de asistencia lingüística. [facility] [phone].    We comply with applicable federal civil rights laws and [place] laws. We do not discriminate on the basis of race, color, national origin, age, disability, sex, sexual orientation, or gender identity.            Thank you!     Thank you for choosing [facility]  for your care. Our goal is always to provide you with excellent care. Hearing back from our patients is one way we can continue to improve our services. Please take a few minutes to complete the written survey that you may receive in the mail after your visit with us. Thank you!             Your Updated Medication List - Protect others around you: Learn how to safely use, store and throw away your medicines at www.disposemymeds.org.          This list is accurate as of 11/27/18 12:32 PM.  Always use your most recent med list.                   Brand Name Dispense Instructions for use Diagnosis    acetaminophen 325 MG tablet    TYLENOL    100 tablet    Take 2 tablets (650 mg) by mouth every 4 hours as needed for other (multimodal surgical pain management along with NSAIDS and opioid medication as indicated based on pain control and physical function.)         atorvastatin 20 MG tablet    LIPITOR    90 tablet    Take 1 tablet (20 mg) by mouth daily    Hyperlipidemia LDL goal <130       ferrous sulfate 325 (65 Fe) MG tablet    FEROSUL    100 tablet    Take 1 tablet (325 mg) by mouth 2 times daily (with meals)    Anemia, unspecified type       furosemide 20 MG tablet    LASIX    90 tablet    Take 1 tablet (20 mg) by mouth 2 times daily    Pulmonary hypertension (H)       glucosamine 500 MG Caps      2 Tablets every morning    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 Never use with nitroglycerin, terazosin or doxazosin.    Erectile dysfunction, unspecified erectile dysfunction type       warfarin 5 MG tablet    COUMADIN    30 tablet    Take 5mg Mon, Wed, Fri and 2.5 mg all other days or as directed by the coumadin clinic.    Long term current use of anticoagulant therapy, Chronic atrial fibrillation (H)

## 2018-11-27 NOTE — PROGRESS NOTES
SUBJECTIVE:   Tomer Weiss is a 86 year old male who presents to clinic today for the following health issues:      HPI  Concern - Genital swelling   Onset: 2 weeks or longer    Description:   Started on right side,     Intensity: moderate    Progression of Symptoms:  worsening    Accompanying Signs & Symptoms:  Mild discomfort more from interfering size     Previous history of similar problem:   Possible all the way back from September of this year as well    Precipitating factors:   Worsened by: nothing but irritated by clothing    Alleviating factors:  Improved by: nothing.    Therapies Tried and outcome: Nystatin  Problem list and histories reviewed & adjusted, as indicated.  Additional history:  Concerns related to possible CHF type fluid retention.    Patient Active Problem List   Diagnosis     Alopecia     Polymyalgia rheumatica (H)     Cervicalgia     Unspecified hyperplasia of prostate with urinary obstruction and other lower urinary tract symptoms (LUTS)     Malignant neoplasm of kidney excluding renal pelvis (H)     Hyperlipidemia LDL goal <130     Dupuytren's contracture of hand     DJD (degenerative joint disease) of knee     Advanced directives, counseling/discussion     Atrial flutter     Post-traumatic osteoarthritis of left knee     Total knee replacement status     Anemia     Long term current use of anticoagulant therapy     Chronic atrial fibrillation (H)     Closed displaced supracondylar fracture of distal end of left femur without intracondylar extension (H)     Pulmonary hypertension (H)     Dysrhythmia 4 beat run narrow complex     Hypophosphatemia     Other insomnia     Thoracic aortic aneurysm without rupture (H)     Periprosthetic fracture around internal prosthetic left knee joint, subsequent encounter     History of prostatic hyperplasia     Edema of penis     Scrotal swelling     Localized edema     Past Surgical History:   Procedure Laterality Date     ARTHROPLASTY KNEE  Left 3/14/2016    Procedure: ARTHROPLASTY KNEE;  Surgeon: Deonte Silver MD;  Location: [facility] OR     COLONOSCOPY  2013    Procedure: COMBINED COLONOSCOPY, SINGLE BIOPSY/POLYPECTOMY BY BIOPSY;  Colonoscopy, with polypectomy by biopsy;  Surgeon: Fernando Mario MD;  Location: [facility] GI     CYSTOSCOPY, LITHOLAPAXY, COMBINED N/A 2015    Procedure: COMBINED CYSTOSCOPY, LITHOLAPAXY;  Surgeon: Orlando Marr MD;  Location: [facility]     CYSTOSCOPY, LITHOLAPAXY, COMBINED N/A 2015    Procedure: COMBINED CYSTOSCOPY, LITHOLAPAXY;  Surgeon: Orlando Marr MD;  Location: [facility]     CYSTOSCOPY, RETROGRADES, EXTRACT STONE, COMBINED N/A 2018    Procedure: COMBINED CYSTOSCOPY, RETROGRADES, EXTRACT STONE;  cystoscopy, bladder stone removal;  Surgeon: Orlando Marr MD;  Location: [facility]     H ABLATION ATRIAL FLUTTER  4/18/15    atypical a flutter ablation & Ablation of PACs from the SVC-RA junction     HC ABLATION RENAL TUMOR PERCUT CRYOTHERAPY UNILATERAL  6/17/10    Right renal mass     LASER HOLMIUM LITHOTRIPSY BLADDER N/A 2015    Procedure: LASER HOLMIUM LITHOTRIPSY BLADDER;  Surgeon: Orlando Marr MD;  Location: [facility]     LASER KTP GREEN LIGHT PHOTOSELECTIVE VAPORIZATION PROSTATE N/A 2015    Procedure: LASER KTP GREEN LIGHT PHOTOSELECTIVE VAPORIZATION PROSTATE;  Surgeon: Orlando Marr MD;  Location: [facility]     OPEN REDUCTION INTERNAL FIXATION FEMUR DISTAL Left 3/3/2018    Procedure: OPEN REDUCTION INTERNAL FIXATION FEMUR DISTAL;  Open Reduction Internal Fixation Left Femur;  Surgeon: Mason Parra MD;  Location: [facility]       Social History   Substance Use Topics     Smoking status: Never Smoker     Smokeless tobacco: Never Used     Alcohol use No     Family History   Problem Relation Age of Onset     Cancer Mother      breast     Hypertension Mother      Alzheimer Disease Mother       at age 96.         Current Outpatient Prescriptions   Medication Sig Dispense  Refill     acetaminophen (TYLENOL) 325 MG tablet Take 2 tablets (650 mg) by mouth every 4 hours as needed for other (multimodal surgical pain management along with NSAIDS and opioid medication as indicated based on pain control and physical function.) 100 tablet      atorvastatin (LIPITOR) 20 MG tablet Take 1 tablet (20 mg) by mouth daily 90 tablet 3     ferrous sulfate (IRON) 325 (65 FE) MG tablet Take 1 tablet (325 mg) by mouth 2 times daily (with meals) 100 tablet      furosemide (LASIX) 20 MG tablet Take 1 tablet (20 mg) by mouth 2 times daily 90 tablet 1     GLUCOSAMINE 500 MG OR CAPS 2 Tablets every morning       melatonin 1 MG TABS tablet Take 5 mg by mouth At Bedtime        metoprolol succinate (TOPROL-XL) 25 MG 24 hr tablet Take 0.5 tablets (12.5 mg) by mouth daily 45 tablet 1     sildenafil (REVATIO) 20 MG tablet Take 1-2 tablets before intercourse. Never use with nitroglycerin, terazosin or doxazosin. 12 tablet 3     warfarin (COUMADIN) 5 MG tablet Take 5mg Mon, Wed, Fri and 2.5 mg all other days or as directed by the coumadin clinic. 30 tablet 1     Allergies   Allergen Reactions     No Known Drug Allergies      Recent Labs   Lab Test 04/12/18 03/05/18   0527   02/26/18   1053  11/27/17   0929  01/13/17   0915   09/08/15   0907   03/16/15   1536   03/27/12   0906   LDL   --    --    --    --    --   58   --   77   --    --    --   132*   HDL   --    --    --    --    --   47   --   49   --    --    --   50   TRIG   --    --    --    --    --   61   --   91   --    --    --   72   ALT   --    --    --   31  42  35   < >  29   --    --    < >  11   CR  0.80  1.00   < >  0.94  0.98  1.10   < >   --    < >   --    < >  0.88   GFRESTIMATED  >60  71   < >  77  72  64   < >   --    < >   --    < >  84   GFRESTBLACK  >60  86   < >  >90  88  77   < >   --    < >   --    < >  >90   POTASSIUM  5.1*  4.3   < >  4.4  4.6  4.7   < >   --    < >   --    < >  4.4   TSH   --    --    --   4.96*   --    --    --    --     --   3.85   --    --     < > = values in this interval not displayed.      BP Readings from Last 3 Encounters:   11/27/18 136/84   09/11/18 136/84   08/14/18 136/86    Wt Readings from Last 3 Encounters:   11/27/18 225 lb 11.2 oz (102.4 kg)   09/11/18 208 lb (94.3 kg)   08/14/18 202 lb (91.6 kg)                  Labs reviewed in EPIC    ROS:  Constitutional, HEENT, cardiovascular, pulmonary, GI, , musculoskeletal, neuro, skin, endocrine and psych systems are negative, except as otherwise noted.    OBJECTIVE:     /84 (Cuff Size: Adult Regular)  Pulse 88  Temp 97.5  F (36.4  C) (Temporal)  Resp 20  Wt 225 lb 11.2 oz (102.4 kg)  BMI 30.61 kg/m2  Body mass index is 30.61 kg/(m^2).  GENERAL: healthy, alert and no distress  NECK: no adenopathy, no asymmetry, masses, or scars, trachea midline and normal to palpation and fairly clear carotid pulse is noted and question of carotid bruit is noted on the right today.  RESP: lungs clear to auscultation - no rales, rhonchi or wheezes  CV: regular rate and rhythm, normal S1 S2, no S3 or S4, no murmur, click or rub, no peripheral edema and peripheral pulses strong  ABDOMEN: soft, nontender, no hepatosplenomegaly, no masses and bowel sounds normal   (male): His scrotum is roughly 12 cm in rough diameter and seems to contain both testicles.  There is protrusion of the right inguinal region and concerns today in my opinion for a possible hernia or varicocele present as well.  His penis is enlarged and flaccid today approximately 4 cm in rough diameter and 20 cm long.  He is uncircumcised.  All this appears to be edema related but I cannot ascertain whether or not some of this may be from a hydrocele.  Further evaluation of course is indicated based on signs and symptoms.  This may indeed be due from simple edema.  MS: no gross musculoskeletal defects noted, no edema  SKIN: no suspicious lesions or rashes to visible skin today.  NEURO: Normal strength and tone,  mentation intact and speech normal  PSYCH: mentation appears normal, affect normal/bright    Diagnostic Test Results:  Results for orders placed or performed in visit on 11/27/18 (from the past 24 hour(s))   *UA reflex to Microscopic and Culture ([place] [facility] (except [place] and [place])   Result Value Ref Range    Color Urine Yellow     Appearance Urine Clear     Glucose Urine Negative NEG^Negative mg/dL    Bilirubin Urine Negative NEG^Negative    Ketones Urine Negative NEG^Negative mg/dL    Specific Gravity Urine 1.025 1.003 - 1.035    Blood Urine Negative NEG^Negative    pH Urine 5.5 5.0 - 7.0 pH    Protein Albumin Urine 30 (A) NEG^Negative mg/dL    Urobilinogen Urine 0.2 0.2 - 1.0 EU/dL    Nitrite Urine Negative NEG^Negative    Leukocyte Esterase Urine Negative NEG^Negative    Source Midstream Urine    Urine Microscopic   Result Value Ref Range    WBC Urine 0 - 5 OTO5^0 - 5 /HPF    RBC Urine O - 2 OTO2^O - 2 /HPF    Hyaline Casts 2-5 (A) OTO2^O - 2 /LPF    Bacteria Urine Few (A) NEG^Negative /HPF    Amorphous Crystals Few (A) NEG^Negative /HPF    Mucous Urine Present (A) NEG^Negative /LPF       ASSESSMENT/PLAN:     1. Malignant neoplasm of kidney excluding renal pelvis, unspecified laterality (H)  - *UA reflex to Microscopic and Culture ([place] [facility] (except [place] and [place])  - Urine Microscopic  - US SCROTUM AND CONTENTS; Future  - GENERAL SURG ADULT REFERRAL    2. History of prostatic hyperplasia  - *UA reflex to Microscopic and Culture ([place] [facility] (except [place] and [place])  - US SCROTUM AND CONTENTS; Future  - GENERAL SURG ADULT REFERRAL    3. Scrotal swelling  - PSA, screen  - Comprehensive metabolic panel  - BNP-N terminal pro  - US SCROTUM AND CONTENTS; Future  - GENERAL SURG ADULT REFERRAL    4. Localized edema  - PSA, screen  - Comprehensive metabolic panel  - BNP-N terminal pro  - US SCROTUM AND CONTENTS; Future  - GENERAL SURG ADULT  REFERRAL    5. Encounter for screening for malignant neoplasm of prostate   - PSA, screen    6. Combined systolic and diastolic heart failure, unspecified HF chronicity (H)   Concerns for this is him diagnosis and further evaluation with BNP is desired.  - BNP-N terminal pro    7. Typical atrial flutter (H)  Historically noted  8. Edema of penis  Part of his overall problem today.  This may indeed be due to simple edema.    Martin Strickland PA-C  [facility]

## 2018-11-28 NOTE — MR AVS SNAPSHOT
After Visit Summary   11/28/2018    Tomer Weiss    MRN: [id number]           Patient Information     Date Of Birth          4/9/1932        Visit Information        Provider Department      11/28/2018 10:30 AM Orlando Marr MD [facility]        Today's Diagnoses     Penile edema    -  1       Follow-ups after your visit        Your next 10 appointments already scheduled     Nov 30, 2018 10:15 AM CST   New Visit with Irvin Bradford DO   [facility] ([facility])    [address] [address] [address] [phone]            Dec 21, 2018  8:30 AM CST   Anticoagulation Visit with ZM ANTI COAG   [facility] ([facility])    [address] [address] [phone]              Future tests that were ordered for you today     Open Future Orders        Priority Expected Expires Ordered    US SCROTUM AND CONTENTS Routine 2/25/2019 5/26/2019 11/27/2018            Who to contact     If you have questions or need follow up information about today's clinic visit or your schedule please contact [facility] directly at [phone].  Normal or non-critical lab and imaging results will be communicated to you by MyChart, letter or phone within 4 business days after the clinic has received the results. If you do not hear from us within 7 days, please contact the clinic through MyChart or phone. If you have a critical or abnormal lab result, we will notify you by phone as soon as possible.  Submit refill requests through [employer] or call your pharmacy and they will forward the refill request to us. Please allow 3 business days for your refill to be completed.          Additional Information About Your Visit        Care EveryWhere ID     This is your Care EveryWhere ID. This could be used by other organizations to access your [place] medical records  [id number]        Your  Vitals Were     Pulse Temperature                78 97.7  F (36.5  C) (Oral)           Blood Pressure from Last 3 Encounters:   11/28/18 134/88   11/27/18 136/84   09/11/18 136/84    Weight from Last 3 Encounters:   11/27/18 102.4 kg (225 lb 11.2 oz)   09/11/18 94.3 kg (208 lb)   08/14/18 91.6 kg (202 lb)              Today, you had the following     No orders found for display       Primary Care Provider Office Phone # Fax #    Gabriel Tan [phone] [address] [facility] [address] [address]        Equal Access to Services     [facility] [place] : Hadii whitney drapero Soalexandra, waaxda luqadaha, qaybta kaalmada nadegeyaalex, mariposa yang . So [facility] [phone].    ATENCIÓN: Si habla español, tiene a rojas disposición servicios gratuitos de asistencia lingüística. Llame al [phone].    We comply with applicable federal civil rights laws and [place] laws. We do not discriminate on the basis of race, color, national origin, age, disability, sex, sexual orientation, or gender identity.            Thank you!     Thank you for choosing [facility]  for your care. Our goal is always to provide you with excellent care. Hearing back from our patients is one way we can continue to improve our services. Please take a few minutes to complete the written survey that you may receive in the mail after your visit with us. Thank you!             Your Updated Medication List - Protect others around you: Learn how to safely use, store and throw away your medicines at www.disposemymeds.org.          This list is accurate as of 11/28/18 10:52 AM.  Always use your most recent med list.                   Brand Name Dispense Instructions for use Diagnosis    acetaminophen 325 MG tablet    TYLENOL    100 tablet    Take 2 tablets (650 mg) by mouth every 4 hours as needed for other (multimodal surgical pain management along with NSAIDS and opioid medication as indicated based on  pain control and physical function.)        atorvastatin 20 MG tablet    LIPITOR    90 tablet    Take 1 tablet (20 mg) by mouth daily    Hyperlipidemia LDL goal <130       ferrous sulfate 325 (65 Fe) MG tablet    FEROSUL    100 tablet    Take 1 tablet (325 mg) by mouth 2 times daily (with meals)    Anemia, unspecified type       furosemide 20 MG tablet    LASIX    90 tablet    Take 1 tablet (20 mg) by mouth 2 times daily    Pulmonary hypertension (H)       glucosamine 500 MG Caps      2 Tablets every morning    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 Never use with nitroglycerin, terazosin or doxazosin.    Erectile dysfunction, unspecified erectile dysfunction type       warfarin 5 MG tablet    COUMADIN    30 tablet    Take 5mg Mon, Wed, Fri and 2.5 mg all other days or as directed by the coumadin clinic.    Long term current use of anticoagulant therapy, Chronic atrial fibrillation (H)

## 2018-11-28 NOTE — PROGRESS NOTES
Chief Complaint   Patient presents with     Consult     Testicular Swelling       Tomer Weiss is a 86 year old male who presents today for follow up of   Chief Complaint   Patient presents with     Consult     Testicular Swelling    patient c/o penile/scrotal swelling.  He is s/p bladder stone removal in the past.  He had surgery for broken femur recently.  He has no voiding symptoms.    Current Outpatient Prescriptions   Medication Sig Dispense Refill     acetaminophen (TYLENOL) 325 MG tablet Take 2 tablets (650 mg) by mouth every 4 hours as needed for other (multimodal surgical pain management along with NSAIDS and opioid medication as indicated based on pain control and physical function.) 100 tablet      atorvastatin (LIPITOR) 20 MG tablet Take 1 tablet (20 mg) by mouth daily 90 tablet 3     ferrous sulfate (IRON) 325 (65 FE) MG tablet Take 1 tablet (325 mg) by mouth 2 times daily (with meals) 100 tablet      GLUCOSAMINE 500 MG OR CAPS 2 Tablets every morning       melatonin 1 MG TABS tablet Take 5 mg by mouth At Bedtime        metoprolol succinate (TOPROL-XL) 25 MG 24 hr tablet Take 0.5 tablets (12.5 mg) by mouth daily 45 tablet 1     sildenafil (REVATIO) 20 MG tablet Take 1-2 tablets before intercourse. Never use with nitroglycerin, terazosin or doxazosin. 12 tablet 3     warfarin (COUMADIN) 5 MG tablet Take 5mg Mon, Wed, Fri and 2.5 mg all other days or as directed by the coumadin clinic. 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     Past Surgical History:   Procedure Laterality Date     ARTHROPLASTY KNEE Left 3/14/2016    Procedure: ARTHROPLASTY KNEE;  Surgeon: Deonte Silver MD;  Location: [facility]     COLONOSCOPY  2013    Procedure: COMBINED COLONOSCOPY, SINGLE BIOPSY/POLYPECTOMY BY BIOPSY;  Colonoscopy, with polypectomy by biopsy;  Surgeon: Fernando Mario MD;  Location: [facility] GI     CYSTOSCOPY, LITHOLAPAXY, COMBINED N/A 2015    Procedure: COMBINED CYSTOSCOPY, LITHOLAPAXY;  Surgeon: Orlando Marr MD;  Location[facility]     CYSTOSCOPY, LITHOLAPAXY, COMBINED N/A 2015    Procedure: COMBINED CYSTOSCOPY, LITHOLAPAXY;  Surgeon: Orlando Marr MD;  Location[facility]     CYSTOSCOPY, RETROGRADES, EXTRACT STONE, COMBINED N/A 2018    Procedure: COMBINED CYSTOSCOPY, RETROGRADES, EXTRACT STONE;  cystoscopy, bladder stone removal;  Surgeon: Orlando Marr MD;  Location: [facility]     H ABLATION ATRIAL FLUTTER  4/18/15    atypical a flutter ablation & Ablation of PACs from the SVC-RA junction     HC ABLATION RENAL TUMOR PERCUT CRYOTHERAPY UNILATERAL  6/17/10    Right renal mass     LASER HOLMIUM LITHOTRIPSY BLADDER N/A 2015    Procedure: LASER HOLMIUM LITHOTRIPSY BLADDER;  Surgeon: Orlando Marr MD;  Location[facility]     LASER KTP GREEN LIGHT PHOTOSELECTIVE VAPORIZATION PROSTATE N/A 2015    Procedure: LASER KTP GREEN LIGHT PHOTOSELECTIVE VAPORIZATION PROSTATE;  Surgeon: Orlando Marr MD;  [facility]     OPEN REDUCTION INTERNAL FIXATION FEMUR DISTAL Left 3/3/2018    Procedure: OPEN REDUCTION INTERNAL FIXATION FEMUR DISTAL;  Open Reduction Internal Fixation Left Femur;  Surgeon: Mason Parra MD;  Location: [facility]     Family History   Problem Relation Age of Onset     Cancer Mother      breast     Hypertension Mother      Alzheimer Disease Mother       at age 96.     Social History     Social History     Marital status:       Spouse name: Shari     Number of children: 4     Years of education: N/A     Social History Main Topics     Smoking status: Never Smoker     Smokeless tobacco: Never Used     Alcohol use No     Drug use: No     Sexual activity: Yes     Partners: Female     Other Topics Concern     None     Social History Narrative       REVIEW OF SYSTEMS  =================  C: NEGATIVE for fever, chills, change in weight  I: NEGATIVE for worrisome rashes, moles or lesions  E/M: NEGATIVE for ear, mouth and throat problems  R: NEGATIVE for significant cough or SHORTNESS OF BREATH,   CV: NEGATIVE for chest pain, palpitations or peripheral edema  GI: NEGATIVE for nausea, abdominal pain, heartburn, or change in bowel habits  NEURO: NEGATIVE any motor/sensory changes  PSYCH: NEGATIVE for recent mood disorder    Physical Exam:  /88  Pulse 78  Temp 97.7  F (36.5  C) (Oral)   Patient is pleasant, in no acute distress, good general condition.  Lung: no evidence of respiratory distress    Abdomen: Soft, nondistended, non tender. No masses. No rebound or guarding.   Exam: penis/scrotal edema.  No testicular masses.    Skin: Warm and dry.  No redness.  Psych: normal mood and affect  Neuro: alert and oriented  Musculaskeletal: moving all extremities    Assessment/Plan:   (N48.89) Penile edema  (primary encounter diagnosis)  Comment: lower extremity edema - scrotal edema  Plan: resume lasix per primary MD

## 2018-11-29 NOTE — LETTER
December 5, 2018      Tomer Weiss  [address]  [place] [address]        Dear ,    We are writing to inform you of your test results.    Labs are consistent with concerns related to congestive heart failure.  You may benefit from a diuretic.  If you are willing to start a diuretic and then see me back next week I am willing to give you a trial dose.  Please let us know if this is something you would be willing to do.  You also need to follow-up with urology as discussed previously, please let us know if you need help with scheduling this.     If you have any questions or concerns, please call the clinic at the number listed above.       Sincerely,        Martin Strickland PA-C

## 2019-01-01 ENCOUNTER — ANTICOAGULATION THERAPY VISIT (OUTPATIENT)
Dept: ANTICOAGULATION | Facility: OTHER | Age: 84
End: 2019-01-01
Payer: MEDICARE

## 2019-01-01 ENCOUNTER — HOSPITAL ENCOUNTER (OUTPATIENT)
Dept: CARDIAC REHAB | Facility: CLINIC | Age: 84
End: 2019-05-10
Attending: INTERNAL MEDICINE
Payer: MEDICARE

## 2019-01-01 ENCOUNTER — SURGERY (OUTPATIENT)
Age: 84
End: 2019-01-01
Payer: MEDICARE

## 2019-01-01 ENCOUNTER — DOCUMENTATION ONLY (OUTPATIENT)
Dept: CARDIOLOGY | Facility: CLINIC | Age: 84
End: 2019-01-01

## 2019-01-01 ENCOUNTER — HOSPITAL ENCOUNTER (OUTPATIENT)
Dept: CARDIOLOGY | Facility: CLINIC | Age: 84
Discharge: HOME | End: 2019-04-04
Attending: INTERNAL MEDICINE | Admitting: INTERNAL MEDICINE
Payer: MEDICARE

## 2019-01-01 ENCOUNTER — CARE COORDINATION (OUTPATIENT)
Dept: CARDIOLOGY | Facility: CLINIC | Age: 84
End: 2019-01-01

## 2019-01-01 ENCOUNTER — HOSPITAL ENCOUNTER (OUTPATIENT)
Dept: NUCLEAR MEDICINE | Facility: CLINIC | Age: 84
Setting detail: NUCLEAR MEDICINE
Discharge: HOME | End: 2019-04-10
Attending: PHYSICIAN ASSISTANT | Admitting: PHYSICIAN ASSISTANT
Payer: MEDICARE

## 2019-01-01 ENCOUNTER — HOSPITAL ENCOUNTER (OUTPATIENT)
Dept: RESPIRATORY THERAPY | Facility: CLINIC | Age: 84
End: 2019-05-03
Attending: INTERNAL MEDICINE
Payer: MEDICARE

## 2019-01-01 ENCOUNTER — TELEPHONE (OUTPATIENT)
Dept: CARDIOLOGY | Facility: CLINIC | Age: 84
End: 2019-01-01

## 2019-01-01 ENCOUNTER — ANTICOAGULATION THERAPY VISIT (OUTPATIENT)
Dept: ANTICOAGULATION | Facility: OTHER | Age: 84
End: 2019-01-01

## 2019-01-01 ENCOUNTER — OFFICE VISIT (OUTPATIENT)
Dept: CARDIOLOGY | Facility: CLINIC | Age: 84
End: 2019-01-01
Payer: MEDICARE

## 2019-01-01 ENCOUNTER — HOSPITAL ENCOUNTER (OUTPATIENT)
Facility: CLINIC | Age: 84
Discharge: HOME | End: 2019-05-06
Admitting: INTERNAL MEDICINE
Payer: MEDICARE

## 2019-01-01 ENCOUNTER — OFFICE VISIT (OUTPATIENT)
Dept: FAMILY MEDICINE | Facility: OTHER | Age: 84
End: 2019-01-01
Payer: MEDICARE

## 2019-01-01 VITALS
WEIGHT: 192.4 LBS | SYSTOLIC BLOOD PRESSURE: 122 MMHG | HEIGHT: 73 IN | DIASTOLIC BLOOD PRESSURE: 60 MMHG | HEART RATE: 68 BPM | BODY MASS INDEX: 25.5 KG/M2 | OXYGEN SATURATION: 98 %

## 2019-01-01 VITALS
BODY MASS INDEX: 24.9 KG/M2 | HEART RATE: 70 BPM | TEMPERATURE: 97.6 F | HEIGHT: 73 IN | DIASTOLIC BLOOD PRESSURE: 72 MMHG | SYSTOLIC BLOOD PRESSURE: 127 MMHG | WEIGHT: 187.9 LBS | RESPIRATION RATE: 16 BRPM | OXYGEN SATURATION: 96 %

## 2019-01-01 VITALS
WEIGHT: 188.2 LBS | DIASTOLIC BLOOD PRESSURE: 66 MMHG | OXYGEN SATURATION: 97 % | SYSTOLIC BLOOD PRESSURE: 116 MMHG | HEART RATE: 64 BPM | BODY MASS INDEX: 24.94 KG/M2 | HEIGHT: 73 IN

## 2019-01-01 VITALS
BODY MASS INDEX: 24.98 KG/M2 | HEIGHT: 73 IN | WEIGHT: 188.5 LBS | SYSTOLIC BLOOD PRESSURE: 136 MMHG | DIASTOLIC BLOOD PRESSURE: 75 MMHG | HEART RATE: 88 BPM

## 2019-01-01 VITALS
BODY MASS INDEX: 24.78 KG/M2 | WEIGHT: 187 LBS | SYSTOLIC BLOOD PRESSURE: 128 MMHG | HEIGHT: 73 IN | HEART RATE: 90 BPM | DIASTOLIC BLOOD PRESSURE: 60 MMHG | RESPIRATION RATE: 16 BRPM | OXYGEN SATURATION: 98 % | TEMPERATURE: 99 F

## 2019-01-01 DIAGNOSIS — Z79.01 LONG TERM CURRENT USE OF ANTICOAGULANT THERAPY: ICD-10-CM

## 2019-01-01 DIAGNOSIS — E78.5 HYPERLIPIDEMIA LDL GOAL <70: ICD-10-CM

## 2019-01-01 DIAGNOSIS — I48.20 CHRONIC ATRIAL FIBRILLATION (H): ICD-10-CM

## 2019-01-01 DIAGNOSIS — I48.21 PERMANENT ATRIAL FIBRILLATION (H): ICD-10-CM

## 2019-01-01 DIAGNOSIS — I50.810 RIGHT HEART FAILURE, NYHA CLASS 1 (H): Primary | ICD-10-CM

## 2019-01-01 DIAGNOSIS — Z79.01 WARFARIN ANTICOAGULATION: ICD-10-CM

## 2019-01-01 DIAGNOSIS — I25.10 CORONARY ARTERY DISEASE INVOLVING NATIVE CORONARY ARTERY OF NATIVE HEART WITHOUT ANGINA PECTORIS: ICD-10-CM

## 2019-01-01 DIAGNOSIS — E78.5 HYPERLIPIDEMIA LDL GOAL <130: ICD-10-CM

## 2019-01-01 DIAGNOSIS — I27.20 PULMONARY HYPERTENSION (H): ICD-10-CM

## 2019-01-01 DIAGNOSIS — I51.7 RIGHT HEART ENLARGEMENT: ICD-10-CM

## 2019-01-01 DIAGNOSIS — I50.810 RIGHT HEART FAILURE, NYHA CLASS 1 (H): ICD-10-CM

## 2019-01-01 DIAGNOSIS — R94.39 ABNORMAL CARDIOVASCULAR STRESS TEST: ICD-10-CM

## 2019-01-01 DIAGNOSIS — I51.89 OTHER ILL-DEFINED HEART DISEASES: Primary | ICD-10-CM

## 2019-01-01 DIAGNOSIS — M25.431 WRIST SWELLING, RIGHT: Primary | ICD-10-CM

## 2019-01-01 DIAGNOSIS — I51.7 RIGHT HEART ENLARGEMENT: Primary | ICD-10-CM

## 2019-01-01 LAB
ALT SERPL W P-5'-P-CCNC: 20 U/L (ref 0–70)
ANION GAP SERPL CALCULATED.3IONS-SCNC: 6 MMOL/L (ref 3–14)
APTT PPP: 32 SEC (ref 22–37)
BUN SERPL-MCNC: 29 MG/DL (ref 7–30)
CALCIUM SERPL-MCNC: 8.5 MG/DL (ref 8.5–10.1)
CHLORIDE SERPL-SCNC: 107 MMOL/L (ref 94–109)
CHOLEST SERPL-MCNC: 135 MG/DL
CO2 BLDCOV-SCNC: 22 MMOL/L (ref 21–28)
CO2 BLDCOV-SCNC: 26 MMOL/L (ref 21–28)
CO2 BLDCOV-SCNC: 27 MMOL/L (ref 21–28)
CO2 BLDCOV-SCNC: 28 MMOL/L (ref 21–28)
CO2 BLDCOV-SCNC: 29 MMOL/L (ref 21–28)
CO2 SERPL-SCNC: 29 MMOL/L (ref 20–32)
CREAT SERPL-MCNC: 1.04 MG/DL (ref 0.66–1.25)
DLCOUNC-%PRED-PRE: 100 %
DLCOUNC-PRE: 25.8 ML/MIN/MMHG
DLCOUNC-PRED: 25.67 ML/MIN/MMHG
ERV-%PRED-PRE: 118 %
ERV-PRE: 1.37 L
ERV-PRED: 1.15 L
ERYTHROCYTE [DISTWIDTH] IN BLOOD BY AUTOMATED COUNT: 14.3 % (ref 10–15)
EXPTIME-PRE: 9.12 SEC
FEF2575-%PRED-POST: 109 %
FEF2575-%PRED-PRE: 68 %
FEF2575-POST: 2.12 L/SEC
FEF2575-PRE: 1.32 L/SEC
FEF2575-PRED: 1.93 L/SEC
FEFMAX-%PRED-PRE: 117 %
FEFMAX-PRE: 8.24 L/SEC
FEFMAX-PRED: 6.98 L/SEC
FEV1-%PRED-PRE: 72 %
FEV1-PRE: 2.14 L
FEV1FEV6-PRE: 73 %
FEV1FEV6-PRED: 75 %
FEV1FVC-PRE: 72 %
FEV1FVC-PRED: 73 %
FEV1SVC-PRE: 57 %
FEV1SVC-PRED: 60 %
FIFMAX-PRE: 1.93 L/SEC
FRCPLETH-%PRED-PRE: 117 %
FRCPLETH-PRE: 4.74 L
FRCPLETH-PRED: 4.03 L
FVC-%PRED-PRE: 73 %
FVC-PRE: 2.98 L
FVC-PRED: 4.07 L
GAW-%PRED-PRE: 71 %
GAW-PRE: 0.74 L/S/CMH2O
GAW-PRED: 1.03 L/S/CMH2O
GFR SERPL CREATININE-BSD FRML MDRD: 64 ML/MIN/{1.73_M2}
GLUCOSE SERPL-MCNC: 86 MG/DL (ref 70–99)
HCT VFR BLD AUTO: 39.6 % (ref 40–53)
HDLC SERPL-MCNC: 54 MG/DL
HGB BLD-MCNC: 13 G/DL (ref 13.3–17.7)
IC-%PRED-PRE: 63 %
IC-PRE: 2.38 L
IC-PRED: 3.74 L
INR BLD: 2.3 (ref 0.86–1.14)
INR POINT OF CARE: 1.6 (ref 0.86–1.14)
INR POINT OF CARE: 1.6 (ref 0.86–1.14)
INR POINT OF CARE: 1.8 (ref 0.86–1.14)
INR POINT OF CARE: 2 (ref 0.86–1.14)
INR POINT OF CARE: 2.5 (ref 0.86–1.14)
INR POINT OF CARE: 2.6 (ref 0.86–1.14)
INR POINT OF CARE: 2.6 (ref 0.86–1.14)
INR PPP: 1.19 (ref 0.86–1.14)
INTERPRETATION ECG - MUSE: NORMAL
LDLC SERPL CALC-MCNC: 66 MG/DL
MCH RBC QN AUTO: 32 PG (ref 26.5–33)
MCHC RBC AUTO-ENTMCNC: 32.8 G/DL (ref 31.5–36.5)
MCV RBC AUTO: 98 FL (ref 78–100)
NONHDLC SERPL-MCNC: 81 MG/DL
PCO2 BLDV: 42 MM HG (ref 40–50)
PCO2 BLDV: 46 MM HG (ref 40–50)
PCO2 BLDV: 48 MM HG (ref 40–50)
PCO2 BLDV: 49 MM HG (ref 40–50)
PCO2 BLDV: 49 MM HG (ref 40–50)
PH BLDV: 7.33 PH (ref 7.32–7.43)
PH BLDV: 7.35 PH (ref 7.32–7.43)
PH BLDV: 7.36 PH (ref 7.32–7.43)
PH BLDV: 7.36 PH (ref 7.32–7.43)
PH BLDV: 7.38 PH (ref 7.32–7.43)
PLATELET # BLD AUTO: 262 10E9/L (ref 150–450)
PO2 BLDV: 32 MM HG (ref 25–47)
PO2 BLDV: 33 MM HG (ref 25–47)
PO2 BLDV: 33 MM HG (ref 25–47)
PO2 BLDV: 34 MM HG (ref 25–47)
PO2 BLDV: 34 MM HG (ref 25–47)
POTASSIUM SERPL-SCNC: 4 MMOL/L (ref 3.4–5.3)
RBC # BLD AUTO: 4.06 10E12/L (ref 4.4–5.9)
RVPLETH-%PRED-PRE: 108 %
RVPLETH-PRE: 3.37 L
RVPLETH-PRED: 3.11 L
SAO2 % BLDV FROM PO2: 58 %
SAO2 % BLDV FROM PO2: 61 %
SAO2 % BLDV FROM PO2: 62 %
SGAW-%PRED-PRE: 196 %
SGAW-PRE: 0.16 1/CMH2O*S
SGAW-PRED: 0.08 1/CMH2O*S
SODIUM SERPL-SCNC: 142 MMOL/L (ref 133–144)
SRAW-%PRED-PRE: 144 %
SRAW-PRE: 6.87 CMH2O*S
SRAW-PRED: 4.76 CMH2O*S
TLCPLETH-%PRED-PRE: 92 %
TLCPLETH-PRE: 7.12 L
TLCPLETH-PRED: 7.74 L
TRIGL SERPL-MCNC: 73 MG/DL
VA-%PRED-PRE: 92 %
VA-PRE: 6.32 L
VC-%PRED-PRE: 76 %
VC-PRE: 3.75 L
VC-PRED: 4.9 L
WBC # BLD AUTO: 4.9 10E9/L (ref 4–11)

## 2019-01-01 PROCEDURE — 99207 ZZC NO CHARGE NURSE ONLY: CPT

## 2019-01-01 PROCEDURE — 94726 PLETHYSMOGRAPHY LUNG VOLUMES: CPT

## 2019-01-01 PROCEDURE — 93463 DRUG ADMIN & HEMODYNMIC MEAS: CPT | Performed by: INTERNAL MEDICINE

## 2019-01-01 PROCEDURE — 99214 OFFICE O/P EST MOD 30 MIN: CPT | Performed by: PHYSICIAN ASSISTANT

## 2019-01-01 PROCEDURE — 94060 EVALUATION OF WHEEZING: CPT

## 2019-01-01 PROCEDURE — 36415 COLL VENOUS BLD VENIPUNCTURE: CPT | Performed by: PHYSICIAN ASSISTANT

## 2019-01-01 PROCEDURE — 93010 ELECTROCARDIOGRAM REPORT: CPT | Performed by: INTERNAL MEDICINE

## 2019-01-01 PROCEDURE — 93460 R&L HRT ART/VENTRICLE ANGIO: CPT | Mod: 26 | Performed by: INTERNAL MEDICINE

## 2019-01-01 PROCEDURE — 85610 PROTHROMBIN TIME: CPT | Mod: QW

## 2019-01-01 PROCEDURE — 36416 COLLJ CAPILLARY BLOOD SPEC: CPT | Performed by: INTERNAL MEDICINE

## 2019-01-01 PROCEDURE — 99207 ZZC NO CHARGE NURSE ONLY: CPT | Performed by: FAMILY MEDICINE

## 2019-01-01 PROCEDURE — 27210210 NM LUNG SCAN VENTILATION AND PERFUSION

## 2019-01-01 PROCEDURE — 94618 PULMONARY STRESS TESTING: CPT

## 2019-01-01 PROCEDURE — 25000128 H RX IP 250 OP 636: Performed by: INTERNAL MEDICINE

## 2019-01-01 PROCEDURE — 85610 PROTHROMBIN TIME: CPT | Performed by: INTERNAL MEDICINE

## 2019-01-01 PROCEDURE — 36416 COLLJ CAPILLARY BLOOD SPEC: CPT

## 2019-01-01 PROCEDURE — 25000125 ZZHC RX 250: Performed by: INTERNAL MEDICINE

## 2019-01-01 PROCEDURE — 25800030 ZZH RX IP 258 OP 636: Performed by: INTERNAL MEDICINE

## 2019-01-01 PROCEDURE — 80061 LIPID PANEL: CPT | Performed by: PHYSICIAN ASSISTANT

## 2019-01-01 PROCEDURE — 36415 COLL VENOUS BLD VENIPUNCTURE: CPT

## 2019-01-01 PROCEDURE — 27210794 ZZH OR GENERAL SUPPLY STERILE: Performed by: INTERNAL MEDICINE

## 2019-01-01 PROCEDURE — 40000235 ZZH STATISTIC TELEMETRY

## 2019-01-01 PROCEDURE — 80048 BASIC METABOLIC PNL TOTAL CA: CPT | Performed by: INTERNAL MEDICINE

## 2019-01-01 PROCEDURE — 40000264 ECHOCARDIOGRAM COMPLETE

## 2019-01-01 PROCEDURE — 36415 COLL VENOUS BLD VENIPUNCTURE: CPT | Performed by: INTERNAL MEDICINE

## 2019-01-01 PROCEDURE — 99207 ZZC NO CHARGE NURSE ONLY: CPT | Performed by: INTERNAL MEDICINE

## 2019-01-01 PROCEDURE — 99213 OFFICE O/P EST LOW 20 MIN: CPT | Performed by: PHYSICIAN ASSISTANT

## 2019-01-01 PROCEDURE — 34300033 ZZH RX 343: Performed by: PHYSICIAN ASSISTANT

## 2019-01-01 PROCEDURE — A9540 TC99M MAA: HCPCS | Performed by: PHYSICIAN ASSISTANT

## 2019-01-01 PROCEDURE — 40000244 ZZH STATISTIC VISIT PULM REHAB

## 2019-01-01 PROCEDURE — 93306 TTE W/DOPPLER COMPLETE: CPT | Mod: 26 | Performed by: INTERNAL MEDICINE

## 2019-01-01 PROCEDURE — 40000065 ZZH STATISTIC EKG NON-CHARGEABLE

## 2019-01-01 PROCEDURE — A9567 TECHNETIUM TC-99M AEROSOL: HCPCS | Performed by: PHYSICIAN ASSISTANT

## 2019-01-01 PROCEDURE — 25000132 ZZH RX MED GY IP 250 OP 250 PS 637: Mod: GY | Performed by: INTERNAL MEDICINE

## 2019-01-01 PROCEDURE — 93005 ELECTROCARDIOGRAM TRACING: CPT

## 2019-01-01 PROCEDURE — 99214 OFFICE O/P EST MOD 30 MIN: CPT | Performed by: INTERNAL MEDICINE

## 2019-01-01 PROCEDURE — 40000852 ZZH STATISTIC HEART CATH LAB OR EP LAB

## 2019-01-01 PROCEDURE — 93460 R&L HRT ART/VENTRICLE ANGIO: CPT | Performed by: INTERNAL MEDICINE

## 2019-01-01 PROCEDURE — C1769 GUIDE WIRE: HCPCS | Performed by: INTERNAL MEDICINE

## 2019-01-01 PROCEDURE — 85730 THROMBOPLASTIN TIME PARTIAL: CPT | Performed by: INTERNAL MEDICINE

## 2019-01-01 PROCEDURE — C1894 INTRO/SHEATH, NON-LASER: HCPCS | Performed by: INTERNAL MEDICINE

## 2019-01-01 PROCEDURE — 84460 ALANINE AMINO (ALT) (SGPT): CPT | Performed by: PHYSICIAN ASSISTANT

## 2019-01-01 PROCEDURE — 36416 COLLJ CAPILLARY BLOOD SPEC: CPT | Performed by: FAMILY MEDICINE

## 2019-01-01 PROCEDURE — 85610 PROTHROMBIN TIME: CPT | Mod: QW | Performed by: INTERNAL MEDICINE

## 2019-01-01 PROCEDURE — 85027 COMPLETE CBC AUTOMATED: CPT | Performed by: INTERNAL MEDICINE

## 2019-01-01 PROCEDURE — 82803 BLOOD GASES ANY COMBINATION: CPT

## 2019-01-01 PROCEDURE — A9270 NON-COVERED ITEM OR SERVICE: HCPCS | Mod: GY | Performed by: INTERNAL MEDICINE

## 2019-01-01 PROCEDURE — 94729 DIFFUSING CAPACITY: CPT

## 2019-01-01 PROCEDURE — 85610 PROTHROMBIN TIME: CPT | Mod: QW | Performed by: FAMILY MEDICINE

## 2019-01-01 RX ORDER — ATORVASTATIN CALCIUM 20 MG/1
20 TABLET, FILM COATED ORAL DAILY
Qty: 90 TABLET | Refills: 0 | Status: SHIPPED | OUTPATIENT
Start: 2019-01-01 | End: 2019-01-01

## 2019-01-01 RX ORDER — LIDOCAINE 40 MG/G
CREAM TOPICAL
Status: DISCONTINUED | OUTPATIENT
Start: 2019-01-01 | End: 2019-01-01 | Stop reason: HOSPADM

## 2019-01-01 RX ORDER — FUROSEMIDE 20 MG
TABLET ORAL
Qty: 90 TABLET | Refills: 0 | Status: SHIPPED | OUTPATIENT
Start: 2019-01-01 | End: 2019-01-01

## 2019-01-01 RX ORDER — IOPAMIDOL 755 MG/ML
INJECTION, SOLUTION INTRAVASCULAR
Status: DISCONTINUED | OUTPATIENT
Start: 2019-01-01 | End: 2019-01-01 | Stop reason: HOSPADM

## 2019-01-01 RX ORDER — ARGATROBAN 1 MG/ML
150 INJECTION, SOLUTION INTRAVENOUS
Status: DISCONTINUED | OUTPATIENT
Start: 2019-01-01 | End: 2019-01-01 | Stop reason: HOSPADM

## 2019-01-01 RX ORDER — SODIUM CHLORIDE 9 MG/ML
INJECTION, SOLUTION INTRAVENOUS CONTINUOUS
Status: CANCELLED | OUTPATIENT
Start: 2019-01-01

## 2019-01-01 RX ORDER — DOPAMINE HYDROCHLORIDE 160 MG/100ML
2-20 INJECTION, SOLUTION INTRAVENOUS CONTINUOUS PRN
Status: DISCONTINUED | OUTPATIENT
Start: 2019-01-01 | End: 2019-01-01 | Stop reason: HOSPADM

## 2019-01-01 RX ORDER — WARFARIN SODIUM 5 MG/1
TABLET ORAL
Qty: 30 TABLET | Refills: 1 | Status: SHIPPED | OUTPATIENT
Start: 2019-01-01 | End: 2019-01-01

## 2019-01-01 RX ORDER — NITROGLYCERIN 20 MG/100ML
.07-2 INJECTION INTRAVENOUS CONTINUOUS PRN
Status: DISCONTINUED | OUTPATIENT
Start: 2019-01-01 | End: 2019-01-01 | Stop reason: HOSPADM

## 2019-01-01 RX ORDER — FUROSEMIDE 20 MG
TABLET ORAL
Qty: 90 TABLET | Refills: 0 | Status: SHIPPED | OUTPATIENT
Start: 2019-01-01

## 2019-01-01 RX ORDER — HYDROCODONE BITARTRATE AND ACETAMINOPHEN 5; 325 MG/1; MG/1
1-2 TABLET ORAL EVERY 4 HOURS PRN
Status: DISCONTINUED | OUTPATIENT
Start: 2019-01-01 | End: 2019-01-01 | Stop reason: HOSPADM

## 2019-01-01 RX ORDER — METOPROLOL SUCCINATE 25 MG/1
TABLET, EXTENDED RELEASE ORAL
Qty: 45 TABLET | Refills: 1 | Status: SHIPPED | OUTPATIENT
Start: 2019-01-01

## 2019-01-01 RX ORDER — HEPARIN SODIUM 1000 [USP'U]/ML
INJECTION, SOLUTION INTRAVENOUS; SUBCUTANEOUS
Status: DISCONTINUED | OUTPATIENT
Start: 2019-01-01 | End: 2019-01-01 | Stop reason: HOSPADM

## 2019-01-01 RX ORDER — NITROGLYCERIN 5 MG/ML
VIAL (ML) INTRAVENOUS
Status: DISCONTINUED | OUTPATIENT
Start: 2019-01-01 | End: 2019-01-01 | Stop reason: HOSPADM

## 2019-01-01 RX ORDER — LORAZEPAM 2 MG/ML
0.5 INJECTION INTRAMUSCULAR
Status: CANCELLED | OUTPATIENT
Start: 2019-01-01

## 2019-01-01 RX ORDER — EPTIFIBATIDE 2 MG/ML
180 INJECTION, SOLUTION INTRAVENOUS EVERY 10 MIN PRN
Status: DISCONTINUED | OUTPATIENT
Start: 2019-01-01 | End: 2019-01-01 | Stop reason: HOSPADM

## 2019-01-01 RX ORDER — DIPHENHYDRAMINE HCL 25 MG
25 CAPSULE ORAL EVERY 6 HOURS PRN
COMMUNITY

## 2019-01-01 RX ORDER — ACETAMINOPHEN 325 MG/1
325-650 TABLET ORAL EVERY 4 HOURS PRN
Status: DISCONTINUED | OUTPATIENT
Start: 2019-01-01 | End: 2019-01-01 | Stop reason: HOSPADM

## 2019-01-01 RX ORDER — PREDNISONE 20 MG/1
40 TABLET ORAL DAILY
Qty: 10 TABLET | Refills: 0 | Status: SHIPPED | OUTPATIENT
Start: 2019-01-01 | End: 2019-01-01

## 2019-01-01 RX ORDER — NALOXONE HYDROCHLORIDE 0.4 MG/ML
.2-.4 INJECTION, SOLUTION INTRAMUSCULAR; INTRAVENOUS; SUBCUTANEOUS
Status: DISCONTINUED | OUTPATIENT
Start: 2019-01-01 | End: 2019-01-01 | Stop reason: HOSPADM

## 2019-01-01 RX ORDER — NALOXONE HYDROCHLORIDE 0.4 MG/ML
.1-.4 INJECTION, SOLUTION INTRAMUSCULAR; INTRAVENOUS; SUBCUTANEOUS
Status: DISCONTINUED | OUTPATIENT
Start: 2019-01-01 | End: 2019-01-01 | Stop reason: HOSPADM

## 2019-01-01 RX ORDER — ALBUTEROL SULFATE 0.83 MG/ML
2.5 SOLUTION RESPIRATORY (INHALATION)
Status: COMPLETED | OUTPATIENT
Start: 2019-01-01 | End: 2019-01-01

## 2019-01-01 RX ORDER — LIDOCAINE 40 MG/G
CREAM TOPICAL
Status: CANCELLED | OUTPATIENT
Start: 2019-01-01

## 2019-01-01 RX ORDER — ATROPINE SULFATE 0.1 MG/ML
0.5 INJECTION INTRAVENOUS EVERY 5 MIN PRN
Status: DISCONTINUED | OUTPATIENT
Start: 2019-01-01 | End: 2019-01-01 | Stop reason: HOSPADM

## 2019-01-01 RX ORDER — POTASSIUM CHLORIDE 1500 MG/1
20 TABLET, EXTENDED RELEASE ORAL
Status: CANCELLED | OUTPATIENT
Start: 2019-01-01

## 2019-01-01 RX ORDER — EPTIFIBATIDE 2 MG/ML
2 INJECTION, SOLUTION INTRAVENOUS CONTINUOUS PRN
Status: DISCONTINUED | OUTPATIENT
Start: 2019-01-01 | End: 2019-01-01 | Stop reason: HOSPADM

## 2019-01-01 RX ORDER — WARFARIN SODIUM 5 MG/1
TABLET ORAL
Qty: 75 TABLET | Refills: 0 | Status: SHIPPED | OUTPATIENT
Start: 2019-01-01

## 2019-01-01 RX ORDER — WARFARIN SODIUM 5 MG/1
TABLET ORAL
Qty: 30 TABLET | Refills: 1
Start: 2019-01-01

## 2019-01-01 RX ORDER — SODIUM CHLORIDE 9 MG/ML
INJECTION, SOLUTION INTRAVENOUS CONTINUOUS
Status: DISCONTINUED | OUTPATIENT
Start: 2019-01-01 | End: 2019-01-01 | Stop reason: HOSPADM

## 2019-01-01 RX ORDER — NOREPINEPHRINE BITARTRATE/D5W 16MG/250ML
.03-.4 PLASTIC BAG, INJECTION (ML) INTRAVENOUS CONTINUOUS PRN
Status: DISCONTINUED | OUTPATIENT
Start: 2019-01-01 | End: 2019-01-01 | Stop reason: HOSPADM

## 2019-01-01 RX ORDER — POTASSIUM CHLORIDE 1500 MG/1
20 TABLET, EXTENDED RELEASE ORAL
Status: DISCONTINUED | OUTPATIENT
Start: 2019-01-01 | End: 2019-01-01 | Stop reason: HOSPADM

## 2019-01-01 RX ORDER — ATORVASTATIN CALCIUM 20 MG/1
20 TABLET, FILM COATED ORAL DAILY
Qty: 90 TABLET | Refills: 1 | Status: SHIPPED | OUTPATIENT
Start: 2019-01-01

## 2019-01-01 RX ORDER — FUROSEMIDE 20 MG
20 TABLET ORAL DAILY
Qty: 90 TABLET | Refills: 1 | COMMUNITY
Start: 2019-01-01 | End: 2019-01-01

## 2019-01-01 RX ORDER — DOBUTAMINE HYDROCHLORIDE 200 MG/100ML
2-20 INJECTION INTRAVENOUS CONTINUOUS PRN
Status: DISCONTINUED | OUTPATIENT
Start: 2019-01-01 | End: 2019-01-01 | Stop reason: HOSPADM

## 2019-01-01 RX ORDER — VERAPAMIL HYDROCHLORIDE 2.5 MG/ML
INJECTION, SOLUTION INTRAVENOUS
Status: DISCONTINUED | OUTPATIENT
Start: 2019-01-01 | End: 2019-01-01 | Stop reason: HOSPADM

## 2019-01-01 RX ORDER — LORAZEPAM 0.5 MG/1
0.5 TABLET ORAL
Status: CANCELLED | OUTPATIENT
Start: 2019-01-01

## 2019-01-01 RX ORDER — FLUMAZENIL 0.1 MG/ML
0.2 INJECTION, SOLUTION INTRAVENOUS
Status: DISCONTINUED | OUTPATIENT
Start: 2019-01-01 | End: 2019-01-01 | Stop reason: HOSPADM

## 2019-01-01 RX ORDER — ARGATROBAN 1 MG/ML
350 INJECTION, SOLUTION INTRAVENOUS
Status: DISCONTINUED | OUTPATIENT
Start: 2019-01-01 | End: 2019-01-01 | Stop reason: HOSPADM

## 2019-01-01 RX ORDER — FENTANYL CITRATE 50 UG/ML
25-50 INJECTION, SOLUTION INTRAMUSCULAR; INTRAVENOUS
Status: DISCONTINUED | OUTPATIENT
Start: 2019-01-01 | End: 2019-01-01 | Stop reason: HOSPADM

## 2019-01-01 RX ADMIN — SODIUM CHLORIDE: 9 INJECTION, SOLUTION INTRAVENOUS at 09:07

## 2019-01-01 RX ADMIN — NITROGLYCERIN 200 MCG: 5 INJECTION, SOLUTION INTRAVENOUS at 10:20

## 2019-01-01 RX ADMIN — KIT FOR THE PREPARATION OF TECHNETIUM TC 99M PENTETATE 55.5 MILLICURIE: 20 INJECTION, POWDER, LYOPHILIZED, FOR SOLUTION INTRAVENOUS; RESPIRATORY (INHALATION) at 08:51

## 2019-01-01 RX ADMIN — LIDOCAINE HYDROCHLORIDE 2 ML: 10 INJECTION, SOLUTION EPIDURAL; INFILTRATION; INTRACAUDAL; PERINEURAL at 10:12

## 2019-01-01 RX ADMIN — ALBUTEROL SULFATE 2.5 MG: 2.5 SOLUTION RESPIRATORY (INHALATION) at 10:10

## 2019-01-01 RX ADMIN — HEPARIN SODIUM 5000 UNITS: 1000 INJECTION, SOLUTION INTRAVENOUS; SUBCUTANEOUS at 10:21

## 2019-01-01 RX ADMIN — Medication 5.4 MILLICURIE: at 09:15

## 2019-01-01 RX ADMIN — VERAPAMIL HYDROCHLORIDE 2.5 MG: 2.5 INJECTION, SOLUTION INTRAVENOUS at 10:20

## 2019-01-01 RX ADMIN — ASPIRIN 162 MG: 81 TABLET, COATED ORAL at 09:39

## 2019-01-01 RX ADMIN — LIDOCAINE HYDROCHLORIDE 4 ML: 10 INJECTION, SOLUTION EPIDURAL; INFILTRATION; INTRACAUDAL; PERINEURAL at 10:08

## 2019-01-01 RX ADMIN — IOPAMIDOL 170 ML: 755 INJECTION, SOLUTION INTRAVENOUS at 11:05

## 2019-01-01 ASSESSMENT — MIFFLIN-ST. JEOR
SCORE: 1601.6
SCORE: 1581.19
SCORE: 1583.91
SCORE: 1587.55
SCORE: 1577.11

## 2019-01-01 ASSESSMENT — PAIN SCALES - GENERAL: PAINLEVEL: MILD PAIN (3)

## 2019-01-09 NOTE — PROGRESS NOTES
ANTICOAGULATION FOLLOW-UP CLINIC VISIT    Patient Name:  Tomer Weiss  Date:  2019  Contact Type:  Face to Face    SUBJECTIVE:     Patient Findings     Positives:   Change in diet/appetite (Reports he's been eating quite a few salads, unsure if its more then earlier this fall. )    Comments:   Had restarted lasix recently and lost quite a bit of LE fluid, may have contributed some to change in INR             OBJECTIVE    INR Protime   Date Value Ref Range Status   2019 1.6 (A) 0.86 - 1.14 Final     Chromogenic Factor 10   Date Value Ref Range Status   2015 21 (L) 70 - 130 % Final     Comment:     Therapeutic Range:  A Chromogenic Factor 10 level of approximately 20-40%   inversely correlates with an INR of 2-3 for patients receiving Warfarin.   Chromogenic Factor 10 levels below 20% indicate an INR greater than 3 and   levels above 40% indicate an INR less than 2.         ASSESSMENT / PLAN  INR assessment SUB    Recheck INR In: 10 DAYS    INR Location Clinic      Anticoagulation Summary  As of 2019    INR goal:   2.0-3.0   TTR:   68.9 % (2.6 y)   INR used for dosin.6! (2019)   Warfarin maintenance plan:   2.5 mg (5 mg x 0.5) every Tue, Thu, Sat; 5 mg (5 mg x 1) all other days   Full warfarin instructions:   : 7.5 mg; Otherwise 2.5 mg every Tue, Thu, Sat; 5 mg all other days   Weekly warfarin total:   27.5 mg   Plan last modified:   Tana Pérez RN (2019)   Next INR check:   2019   Target end date:       Indications    Long term current use of anticoagulant therapy [Z79.01]  Chronic atrial fibrillation (H) [I48.2]             Anticoagulation Episode Summary     INR check location:       Preferred lab:       Send INR reminders to:   KRYSTAL ABDI    Comments:   5 mg tablets, AM dose, AVS      Anticoagulation Care Providers     Provider Role Specialty Phone number    Gabriel Tan MD [facility] Internal Medicine [phone]            See the Encounter Report to  view Anticoagulation Flowsheet and Dosing Calendar (Go to Encounters tab in chart review, and find the Anticoagulation Therapy Visit)    Dosage adjustment made based on physician directed care plan.    Tana Pérez RN

## 2019-01-18 NOTE — PROGRESS NOTES
ANTICOAGULATION FOLLOW-UP CLINIC VISIT    Patient Name:  Tomer Weiss  Date:  2019  Contact Type:  Face to Face    SUBJECTIVE:     Patient Findings     Positives:   Unexplained INR or factor level change           OBJECTIVE    INR Protime   Date Value Ref Range Status   2019 1.8 (A) 0.86 - 1.14 Final     Chromogenic Factor 10   Date Value Ref Range Status   2015 21 (L) 70 - 130 % Final     Comment:     Therapeutic Range:  A Chromogenic Factor 10 level of approximately 20-40%   inversely correlates with an INR of 2-3 for patients receiving Warfarin.   Chromogenic Factor 10 levels below 20% indicate an INR greater than 3 and   levels above 40% indicate an INR less than 2.         ASSESSMENT / PLAN  INR assessment SUB    Recheck INR In: 2 WEEKS    INR Location Clinic      Anticoagulation Summary  As of 2019    INR goal:   2.0-3.0   TTR:   68.2 % (2.6 y)   INR used for dosin.8! (2019)   Warfarin maintenance plan:   2.5 mg (5 mg x 0.5) every Tue, Thu; 5 mg (5 mg x 1) all other days   Full warfarin instructions:   : 7.5 mg; Otherwise 2.5 mg every Tue, Thu; 5 mg all other days   Weekly warfarin total:   30 mg   Plan last modified:   Tana Pérez, RN (2019)   Next INR check:   2019   Target end date:       Indications    Long term current use of anticoagulant therapy [Z79.01]  Chronic atrial fibrillation (H) [I48.2]             Anticoagulation Episode Summary     INR check location:       Preferred lab:       Send INR reminders to:   KRYSTAL ABDI    Comments:   5 mg tablets, AM dose, AVS      Anticoagulation Care Providers     Provider Role Specialty Phone number    Gabriel Tan MD [facility] Internal Medicine [phone]            See the Encounter Report to view Anticoagulation Flowsheet and Dosing Calendar (Go to Encounters tab in chart review, and find the Anticoagulation Therapy Visit)    Dosage adjustment made based on physician directed care  plan.    Tana Pérez RN

## 2019-02-01 NOTE — PROGRESS NOTES
ANTICOAGULATION FOLLOW-UP CLINIC VISIT    Patient Name:  Tomer Weiss  Date:  2019  Contact Type:  Face to Face    SUBJECTIVE:     Patient Findings     Positives:   No Problem Findings           OBJECTIVE    INR Protime   Date Value Ref Range Status   2019 2.6 (A) 0.86 - 1.14 Final     Chromogenic Factor 10   Date Value Ref Range Status   2015 21 (L) 70 - 130 % Final     Comment:     Therapeutic Range:  A Chromogenic Factor 10 level of approximately 20-40%   inversely correlates with an INR of 2-3 for patients receiving Warfarin.   Chromogenic Factor 10 levels below 20% indicate an INR greater than 3 and   levels above 40% indicate an INR less than 2.         ASSESSMENT / PLAN  INR assessment THER    Recheck INR In: 3 WEEKS    INR Location Clinic      Anticoagulation Summary  As of 2019    INR goal:   2.0-3.0   TTR:   68.3 % (2.6 y)   INR used for dosin.6 (2019)   Warfarin maintenance plan:   2.5 mg (5 mg x 0.5) every Tue, Fri; 5 mg (5 mg x 1) all other days   Full warfarin instructions:   2.5 mg every Tue, Fri; 5 mg all other days   Weekly warfarin total:   30 mg   Plan last modified:   Tana Pérez RN (2019)   Next INR check:   2019   Target end date:       Indications    Long term current use of anticoagulant therapy [Z79.01]  Chronic atrial fibrillation (H) [I48.2]             Anticoagulation Episode Summary     INR check location:       Preferred lab:       Send INR reminders to:   [facility] JIN    Comments:   5 mg tablets, AM dose, AVS      Anticoagulation Care Providers     Provider Role Specialty Phone number    Gabriel Tan MD [facility] Internal Medicine [phone]            See the Encounter Report to view Anticoagulation Flowsheet and Dosing Calendar (Go to Encounters tab in chart review, and find the Anticoagulation Therapy Visit)    Dosage adjustment made based on physician directed care plan.    Tana Pérez RN

## 2019-02-07 NOTE — TELEPHONE ENCOUNTER
"Prescription approved per RN refill protocol- metoprolol  Kourtney Mckeon, RN, BSN      Warfarin  Last Written Prescription Date:  11/16/2018  Last Fill Quantity: 30,  # refills: 1   Last office visit: 9/11/2018 with prescribing provider:  9/11/2018   Metoprolol succinate ER  Last Written Prescription Date:  5/9/2018  Last Fill Quantity: 45,  # refills: 1   Last office visit: 9/11/2018 with prescribing provider:  9/11/2018   Future Office Visit:      Requested Prescriptions   Pending Prescriptions Disp Refills     metoprolol succinate ER (TOPROL-XL) 25 MG 24 hr tablet [Pharmacy Med Name: METOPROLOL ER 25MG  TAB] 45 tablet 1     Sig: TAKE 1/2 (ONE-HALF) TABLET BY MOUTH ONCE DAILY    Beta-Blockers Protocol Passed - 2/6/2019  8:08 AM       Passed - Blood pressure under 140/90 in past 12 months    BP Readings from Last 3 Encounters:   11/28/18 134/88   11/27/18 136/84   09/11/18 136/84                Passed - Patient is age 6 or older       Passed - Recent (12 mo) or future (30 days) visit within the authorizing provider's specialty    Patient had office visit in the last 12 months or has a visit in the next 30 days with authorizing provider or within the authorizing provider's specialty.  See \"Patient Info\" tab in inbasket, or \"Choose Columns\" in Meds & Orders section of the refill encounter.             Passed - Medication is active on med list        warfarin (COUMADIN) 5 MG tablet [Pharmacy Med Name: WARFARIN 5MG        TAB] 30 tablet 1     Sig: TAKE 1 TABLET (5MG) BY MOUTH ON MONDAY, WEDNESDAY, FRIDAY AND 1/2 TABLET (2.5MG) ALL OTHER DAYS OR AS DIRECTED BY THE COUMADIN CLINIC    Vitamin K Antagonists Failed - 2/6/2019  8:08 AM       Failed - INR is within goal in the past 6 weeks    Confirm INR is within goal in the past 6 weeks.     Recent Labs   Lab Test 02/01/19   INR 2.6*                      Passed - Recent (12 mo) or future (30 days) visit within the authorizing provider's specialty    Patient had office visit " "in the last 12 months or has a visit in the next 30 days with authorizing provider or within the authorizing provider's specialty.  See \"Patient Info\" tab in inbasket, or \"Choose Columns\" in Meds & Orders section of the refill encounter.             Passed - Medication is active on med list       Passed - Patient is 18 years of age or older          Kourtney Mckeon RN on 2/7/2019 at 9:43 AM    "

## 2019-02-22 NOTE — PROGRESS NOTES
ANTICOAGULATION FOLLOW-UP CLINIC VISIT    Patient Name:  Tomer Weiss  Date:  2019  Contact Type:  Face to Face    SUBJECTIVE:     Patient Findings     Positives:   No Problem Findings           OBJECTIVE    INR Protime   Date Value Ref Range Status   2019 2.6 (A) 0.86 - 1.14 Final     Chromogenic Factor 10   Date Value Ref Range Status   2015 21 (L) 70 - 130 % Final     Comment:     Therapeutic Range:  A Chromogenic Factor 10 level of approximately 20-40%   inversely correlates with an INR of 2-3 for patients receiving Warfarin.   Chromogenic Factor 10 levels below 20% indicate an INR greater than 3 and   levels above 40% indicate an INR less than 2.         ASSESSMENT / PLAN  INR assessment THER    Recheck INR In: 5 WEEKS    INR Location Clinic      Anticoagulation Summary  As of 2019    INR goal:   2.0-3.0   TTR:   69.0 % (2.7 y)   INR used for dosin.6 (2019)   Warfarin maintenance plan:   2.5 mg (5 mg x 0.5) every Tue, Fri; 5 mg (5 mg x 1) all other days   Full warfarin instructions:   2.5 mg every Tue, Fri; 5 mg all other days   Weekly warfarin total:   30 mg   No change documented:   Tnaa Pérez, RN   Plan last modified:   Tana Pérez RN (2019)   Next INR check:   3/29/2019   Target end date:       Indications    Long term current use of anticoagulant therapy [Z79.01]  Chronic atrial fibrillation (H) [I48.2]             Anticoagulation Episode Summary     INR check location:       Preferred lab:       Send INR reminders to:   KRYSTAL ABDI    Comments:   5 mg tablets, AM dose, AVS      Anticoagulation Care Providers     Provider Role Specialty Phone number    Gabriel Tan MD [facility] Internal Medicine [phone]            See the Encounter Report to view Anticoagulation Flowsheet and Dosing Calendar (Go to Encounters tab in chart review, and find the Anticoagulation Therapy Visit)    Dosage adjustment made based on physician directed care  plan.    Tana Pérez RN

## 2019-03-29 NOTE — PROGRESS NOTES
ANTICOAGULATION FOLLOW-UP CLINIC VISIT    Patient Name:  Tomer Weiss  Date:  3/29/2019  Contact Type:  Face to Face    SUBJECTIVE:     Patient Findings     Positives:   Missed doses (missed last sat)           OBJECTIVE    INR Protime   Date Value Ref Range Status   2019 2.0 (A) 0.86 - 1.14 Final     Chromogenic Factor 10   Date Value Ref Range Status   2015 21 (L) 70 - 130 % Final     Comment:     Therapeutic Range:  A Chromogenic Factor 10 level of approximately 20-40%   inversely correlates with an INR of 2-3 for patients receiving Warfarin.   Chromogenic Factor 10 levels below 20% indicate an INR greater than 3 and   levels above 40% indicate an INR less than 2.         ASSESSMENT / PLAN  INR assessment THER    Recheck INR In: 6 WEEKS    INR Location Clinic      Anticoagulation Summary  As of 3/29/2019    INR goal:   2.0-3.0   TTR:   70.1 % (2.8 y)   INR used for dosin.0 (3/29/2019)   Warfarin maintenance plan:   2.5 mg (5 mg x 0.5) every Tue, Fri; 5 mg (5 mg x 1) all other days   Full warfarin instructions:   2.5 mg every Tue, Fri; 5 mg all other days   Weekly warfarin total:   30 mg   No change documented:   Tana Pérez, RN   Plan last modified:   Tana Pérez RN (2019)   Next INR check:   5/10/2019   Target end date:       Indications    Long term current use of anticoagulant therapy [Z79.01]  Chronic atrial fibrillation (H) [I48.2]             Anticoagulation Episode Summary     INR check location:       Preferred lab:       Send INR reminders to:   KRYSTAL ABDI    Comments:   5 mg tablets, AM dose, AVS      Anticoagulation Care Providers     Provider Role Specialty Phone number    Gabriel Tan MD [facility] Internal Medicine [phone]            See the Encounter Report to view Anticoagulation Flowsheet and Dosing Calendar (Go to Encounters tab in chart review, and find the Anticoagulation Therapy Visit)    Dosage adjustment made based on physician directed care  plan.    Tana Pérez RN

## 2019-04-02 NOTE — PROGRESS NOTES
Primary Cardiologist: Dr. Brennan    Reason for Visit: Annual Follow Up (?)    History of Present Illness:   This a very pleasant 86-year-old gentleman with past medical history notable for chronic atrial fibrillation (rate control strategy with beta-blocker, warfarin for thromboembolic protection), hyperlipidemia, history of atrial flutter (s/p catheter ablation in 2015), presumptive diagnosis of CAD based on mildly abnormal nuclear stress test in 2015 (no coronary angiogram was performed as he was completely asymptomatic), and discovery of severe right ventricular enlargement with moderate decrease in systolic function based on echocardiogram in 2/2018 (he was referred to Dr. John in pulmonary hypertension clinic for further evaluation of this who had ordered panel of studies for further workup palpation was not able to complete it due to significant fracture of his femur).    In 2/2018 his echocardiogram demonstrated moderate to severely enlarged right ventricle with moderate Shilo decreased systolic function.  There was no obvious interatrial shunt but images were noted to be suboptimal.  His LV function was preserved and he had moderate to severe concentric left ventricular hypertrophy.  He had elevated RVSP as well as flattening of the interventricular septum suggestive of pulmonary hypertension.  He fortunately had no significant symptoms of heart failure.  He was only remarkable for trace peripheral edema on physical exam at that time.  He was recommended to undergo series of lab studies as well as a VQ scan, echocardiogram with bubble study to more definitely rule out intra-atrial shunt, and cardiac MRI.  Of note trans-esophageal echocardiogram could not be completed in the past due to the inability to pass a probe into the esophagus.    His lab panel included comprehensive metabolic panel which was completely normal.  CBC showed no evidence of anemia or thrombocytopenia.  His HIV antigen antibody as well as  hepatitis B core antibodies were negative.  TSH was mildly high but T4 free was within normal limits.  A and a and rheumatoid factors were normal as well.    He tells me that he had a significant fall leading to fracture of his femur requiring surgical fixation.  He now has metal rods in his bone.  He states that he had a prolonged recovery but he is doing much better now.  He is able to ambulate without any difficulties.  Cardiac standpoint he denies any significant symptoms of chest discomfort, shortness of breath, peripheral edema, orthopnea, abdominal distention, lightheadedness, or bleeding issues.  He continues to be on furosemide 20 mg daily which was increased for short-term to 20 mg twice a day after his procedure.  He tells me he checks his weight regularly has been stable.  He feels well overall and has no new concerns.    Assessment and Plan:   This a very pleasant 86-year-old gentleman with past medical history notable for chronic atrial fibrillation (rate control strategy with beta-blocker, warfarin for thromboembolic protection), hyperlipidemia, history of atrial flutter (s/p catheter ablation in 2015), presumptive diagnosis of CAD based on mildly abnormal nuclear stress test in 2015 (no coronary angiogram was performed as he was completely asymptomatic), and discovery of severe right ventricular enlargement with moderate decrease in systolic function based on echocardiogram in 2/2018 (he was referred to Dr. John in pulmonary hypertension clinic for further evaluation of this who had ordered panel of studies for further workup palpation was not able to complete it due to significant fracture of his femur).    I have encouraged him to complete his pulmonary hypertension workup which inclueds V/Q scan to rule out CTEPH and echocardiogram with bubble study to rule out interatrial shunt. We will cancel his cMRI as he is not able to get this done now since he has a metal in his body. I also talked about  proceeding with the Right and diagnostic Left heart cath but patient is reluctant to do this as he feels completely fine. He would like to start with the other studies and see Dr. John before proceeding with this.     He appears to be in euvolemic state and therefore we will not adjust his diuretic regimen. I did ask him to have fasting lipid/ALT in the next several weeks.     Thank you for allowing me to participate in the care of this patient today.      This note was completed in part using Dragon voice recognition software. Although reviewed after completion, some word and grammatical errors may occur.    Orders this Visit:  Orders Placed This Encounter   Procedures     V/Q Scan     Lipid Profile     ALT     Follow-Up with Cardiologist     Orders Placed This Encounter   Medications     furosemide (LASIX) 20 MG tablet     Sig: Take 1 tablet (20 mg) by mouth daily     Dispense:  90 tablet     Refill:  1     Medications Discontinued During This Encounter   Medication Reason     furosemide (LASIX) 20 MG tablet          Encounter Diagnoses   Name Primary?     Right heart enlargement Yes     Pulmonary hypertension (H)      Chronic atrial fibrillation (H)      Coronary artery disease involving native coronary artery of native heart without angina pectoris      Hyperlipidemia LDL goal <130        CURRENT MEDICATIONS:  Current Outpatient Medications   Medication Sig Dispense Refill     acetaminophen (TYLENOL) 325 MG tablet Take 2 tablets (650 mg) by mouth every 4 hours as needed for other (multimodal surgical pain management along with NSAIDS and opioid medication as indicated based on pain control and physical function.) 100 tablet      atorvastatin (LIPITOR) 20 MG tablet Take 1 tablet (20 mg) by mouth daily 90 tablet 0     ferrous sulfate (IRON) 325 (65 FE) MG tablet Take 1 tablet (325 mg) by mouth 2 times daily (with meals) 100 tablet      furosemide (LASIX) 20 MG tablet Take 1 tablet (20 mg) by mouth daily 90 tablet  1     GLUCOSAMINE 500 MG OR CAPS 2 Tablets every morning       melatonin 1 MG TABS tablet Take 5 mg by mouth At Bedtime        metoprolol succinate ER (TOPROL-XL) 25 MG 24 hr tablet TAKE 1/2 (ONE-HALF) TABLET BY MOUTH ONCE DAILY 45 tablet 1     sildenafil (REVATIO) 20 MG tablet Take 1-2 tablets before intercourse. Never use with nitroglycerin, terazosin or doxazosin. 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       PAST SURGICAL HISTORY:  Past Surgical History:   Procedure Laterality Date     ARTHROPLASTY KNEE Left 3/14/2016    Procedure: ARTHROPLASTY KNEE;  Surgeon: Deonte Silver MD;  Location: [facility]     COLONOSCOPY  7/17/2013    Procedure: COMBINED COLONOSCOPY, SINGLE BIOPSY/POLYPECTOMY BY BIOPSY;  Colonoscopy, with polypectomy by biopsy;  Surgeon: Fernando Mario MD;  Location: [facility]     CYSTOSCOPY, LITHOLAPAXY, COMBINED N/A 2/26/2015    Procedure: COMBINED CYSTOSCOPY, LITHOLAPAXY;  Surgeon: Orlando Marr MD;  Location: [facility]     CYSTOSCOPY, LITHOLAPAXY, COMBINED N/A 2/11/2015    Procedure: COMBINED CYSTOSCOPY, LITHOLAPAXY;  Surgeon: Orlando Marr MD;  Location: [facility]     CYSTOSCOPY, RETROGRADES, EXTRACT STONE, COMBINED N/A 2/8/2018    Procedure: COMBINED CYSTOSCOPY, RETROGRADES, EXTRACT STONE;  cystoscopy, bladder stone removal;  Surgeon: Orlando Marr MD;  Location:  OR     H  ABLATION ATRIAL FLUTTER  4/18/15    atypical a flutter ablation & Ablation of PACs from the SVC-RA junction     HC ABLATION RENAL TUMOR PERCUT CRYOTHERAPY UNILATERAL  6/17/10    Right renal mass     LASER HOLMIUM LITHOTRIPSY BLADDER N/A 2015    Procedure: LASER HOLMIUM LITHOTRIPSY BLADDER;  Surgeon: Orlando Marr MD;  Location: [facility]     LASER KTP GREEN LIGHT PHOTOSELECTIVE VAPORIZATION PROSTATE N/A 2015    Procedure: LASER KTP GREEN LIGHT PHOTOSELECTIVE VAPORIZATION PROSTATE;  Surgeon: Orlando Marr MD;  Location: [facility]     OPEN REDUCTION INTERNAL FIXATION FEMUR DISTAL Left 3/3/2018    Procedure: OPEN REDUCTION INTERNAL FIXATION FEMUR DISTAL;  Open Reduction Internal Fixation Left Femur;  Surgeon: Mason Parra MD;  Location: [facility]       FAMILY HISTORY:  Family History   Problem Relation Age of Onset     Cancer Mother         breast     Hypertension Mother      Alzheimer Disease Mother          at age 96.       SOCIAL HISTORY:  Social History     Socioeconomic History     Marital status:      Spouse name: Shari     Number of children: 4     Years of education: Not on file     Highest education level: Not on file   Occupational History     Not on file   Social Needs     Financial resource strain: Not on file     Food insecurity:     Worry: Not on file     Inability: Not on file     Transportation needs:     Medical: Not on file     Non-medical: Not on file   Tobacco Use     Smoking status: Never Smoker     Smokeless tobacco: Never Used   Substance and Sexual Activity     Alcohol use: No     Alcohol/week: 0.0 oz     Drug use: No     Sexual activity: Yes     Partners: Female   Lifestyle     Physical activity:     Days per week: Not on file     Minutes per session: Not on file     Stress: Not on file   Relationships     Social connections:     Talks on phone: Not on file     Gets together: Not on file     Attends [employer] service: Not on file     Active member of club or  "organization: Not on file     Attends meetings of clubs or organizations: Not on file     Relationship status: Not on file     Intimate partner violence:     Fear of current or ex partner: Not on file     Emotionally abused: Not on file     Physically abused: Not on file     Forced sexual activity: Not on file   Other Topics Concern     Parent/sibling w/ CABG, MI or angioplasty before 65F 55M? Not Asked   Social History Narrative     Not on file       Review of Systems:  Skin:  Negative     Eyes:  Positive for glasses  ENT:  Positive for hearing loss  Respiratory:  Negative    Cardiovascular:  palpitations;chest pain;Negative for;edema;lightheadedness;dizziness    Gastroenterology: Negative    Genitourinary:  Negative    Musculoskeletal:  Negative    Neurologic:  Negative    Psychiatric:  Negative    Heme/Lymph/Imm:  Negative    Endocrine:  Negative      Physical Exam:  Vitals: /66 (BP Location: Right arm, Patient Position: Fowlers, Cuff Size: Adult Regular)   Pulse 64   Ht 1.854 m (6' 1\")   Wt 85.4 kg (188 lb 3.2 oz)   SpO2 97%   BMI 24.83 kg/m       GEN:  NAD  NECK: No JVD  C/V:  Regular rate and rhythm, no murmur, rub or gallop.  RESP: Clear to auscultation bilaterally without wheezing, rales, or rhonchi.  GI: Abdomen soft, nontender, nondistended.   EXTREM: No LE edema (wearing compression socks).   NEURO: Alert and oriented, cooperative. No obvious focal deficits.   PSYCH: Normal affect.  SKIN: Warm and dry.       Recent Lab Results:  LIPID RESULTS:  Lab Results   Component Value Date    CHOL 117 01/13/2017    HDL 47 01/13/2017    LDL 58 01/13/2017    TRIG 61 01/13/2017    CHOLHDLRATIO 2.9 09/08/2015       LIVER ENZYME RESULTS:  Lab Results   Component Value Date    AST 38 11/27/2018    ALT 35 11/27/2018       CBC RESULTS:  Lab Results   Component Value Date    WBC 8.6 04/12/2018    RBC 3.96 (L) 04/12/2018    HGB 12.3 (L) 04/12/2018    HCT 38.2 (L) 04/12/2018    MCV 97 04/12/2018    MCH 31.1 " 04/12/2018    MCHC 32.2 04/12/2018    RDW 13.9 04/12/2018     04/12/2018       BMP RESULTS:  Lab Results   Component Value Date     11/27/2018    POTASSIUM 4.9 11/27/2018    CHLORIDE 109 11/27/2018    CO2 28 11/27/2018    ANIONGAP 6 11/27/2018    GLC 75 11/27/2018    BUN 38 (H) 11/27/2018    CR 1.06 11/27/2018    GFRESTIMATED 66 11/27/2018    GFRESTBLACK 80 11/27/2018    KARTHIK 8.1 (L) 11/27/2018        A1C RESULTS:  No results found for: A1C    INR RESULTS:  Lab Results   Component Value Date    INR 2.0 (A) 03/29/2019    INR 2.6 (A) 02/22/2019    INR 1.93 (H) 03/06/2018    INR 1.26 (H) 03/05/2018           Ricci Giron PA-C   April 2, 2019

## 2019-04-02 NOTE — PATIENT INSTRUCTIONS
Today's Plan:   1) Continue with current medications.   2) Schedule fasting labs, heart ultrasound, and lung scan in [place] in the next 2-3 weeks.   3) Schedule follow up with Dr. John in [place] in about a month.     If you have questions or concerns please call clinic at [phone].    Please call [phone] for scheduling.       It was a pleasure seeing you today!     Reminder: Please bring in all current medications, over the counter supplements and vitamin bottles to your next appointment.    Ricci Giron  4/2/2019

## 2019-04-02 NOTE — LETTER
4/2/2019    Gabriel Tan MD  [address] [facility] [address]    RE: Tomer Sandovalnathalia Isela       Dear Colleague,    I had the pleasure of seeing Tomer Weiss in the [facility].    Primary Cardiologist: Dr. Brennan    Reason for Visit: Annual Follow Up (?)    History of Present Illness:   This a very pleasant 86-year-old gentleman with past medical history notable for chronic atrial fibrillation (rate control strategy with beta-blocker, warfarin for thromboembolic protection), hyperlipidemia, history of atrial flutter (s/p catheter ablation in 2015), presumptive diagnosis of CAD based on mildly abnormal nuclear stress test in 2015 (no coronary angiogram was performed as he was completely asymptomatic), and discovery of severe right ventricular enlargement with moderate decrease in systolic function based on echocardiogram in 2/2018 (he was referred to Dr. John in pulmonary hypertension clinic for further evaluation of this who had ordered panel of studies for further workup palpation was not able to complete it due to significant fracture of his femur).    In 2/2018 his echocardiogram demonstrated moderate to severely enlarged right ventricle with moderate Shilo decreased systolic function.  There was no obvious interatrial shunt but images were noted to be suboptimal.  His LV function was preserved and he had moderate to severe concentric left ventricular hypertrophy.  He had elevated RVSP as well as flattening of the interventricular septum suggestive of pulmonary hypertension.  He fortunately had no significant symptoms of heart failure.  He was only remarkable for trace peripheral edema on physical exam at that time.  He was recommended to undergo series of lab studies as well as a VQ scan, echocardiogram with bubble study to more definitely rule out intra-atrial shunt, and cardiac MRI.  Of note trans-esophageal echocardiogram could not be completed in the past due  to the inability to pass a probe into the esophagus.    His lab panel included comprehensive metabolic panel which was completely normal.  CBC showed no evidence of anemia or thrombocytopenia.  His HIV antigen antibody as well as hepatitis B core antibodies were negative.  TSH was mildly high but T4 free was within normal limits.  A and a and rheumatoid factors were normal as well.    He tells me that he had a significant fall leading to fracture of his femur requiring surgical fixation.  He now has metal rods in his bone.  He states that he had a prolonged recovery but he is doing much better now.  He is able to ambulate without any difficulties.  Cardiac standpoint he denies any significant symptoms of chest discomfort, shortness of breath, peripheral edema, orthopnea, abdominal distention, lightheadedness, or bleeding issues.  He continues to be on furosemide 20 mg daily which was increased for short-term to 20 mg twice a day after his procedure.  He tells me he checks his weight regularly has been stable.  He feels well overall and has no new concerns.    Assessment and Plan:   This a very pleasant 86-year-old gentleman with past medical history notable for chronic atrial fibrillation (rate control strategy with beta-blocker, warfarin for thromboembolic protection), hyperlipidemia, history of atrial flutter (s/p catheter ablation in 2015), presumptive diagnosis of CAD based on mildly abnormal nuclear stress test in 2015 (no coronary angiogram was performed as he was completely asymptomatic), and discovery of severe right ventricular enlargement with moderate decrease in systolic function based on echocardiogram in 2/2018 (he was referred to Dr. John in pulmonary hypertension clinic for further evaluation of this who had ordered panel of studies for further workup palpation was not able to complete it due to significant fracture of his femur).    I have encouraged him to complete his pulmonary hypertension workup  which inclueds V/Q scan to rule out CTEPH and echocardiogram with bubble study to rule out interatrial shunt. We will cancel his cMRI as he is not able to get this done now since he has a metal in his body. I also talked about proceeding with the Right and diagnostic Left heart cath but patient is reluctant to do this as he feels completely fine. He would like to start with the other studies and see Dr. John before proceeding with this.     He appears to be in euvolemic state and therefore we will not adjust his diuretic regimen. I did ask him to have fasting lipid/ALT in the next several weeks.     Thank you for allowing me to participate in the care of this patient today.      This note was completed in part using Dragon voice recognition software. Although reviewed after completion, some word and grammatical errors may occur.    Orders this Visit:  Orders Placed This Encounter   Procedures     V/Q Scan     Lipid Profile     ALT     Follow-Up with Cardiologist     Orders Placed This Encounter   Medications     furosemide (LASIX) 20 MG tablet     Sig: Take 1 tablet (20 mg) by mouth daily     Dispense:  90 tablet     Refill:  1     Medications Discontinued During This Encounter   Medication Reason     furosemide (LASIX) 20 MG tablet          Encounter Diagnoses   Name Primary?     Right heart enlargement Yes     Pulmonary hypertension (H)      Chronic atrial fibrillation (H)      Coronary artery disease involving native coronary artery of native heart without angina pectoris      Hyperlipidemia LDL goal <130        CURRENT MEDICATIONS:  Current Outpatient Medications   Medication Sig Dispense Refill     acetaminophen (TYLENOL) 325 MG tablet Take 2 tablets (650 mg) by mouth every 4 hours as needed for other (multimodal surgical pain management along with NSAIDS and opioid medication as indicated based on pain control and physical function.) 100 tablet      atorvastatin (LIPITOR) 20 MG tablet Take 1 tablet (20 mg) by  mouth daily 90 tablet 0     ferrous sulfate (IRON) 325 (65 FE) MG tablet Take 1 tablet (325 mg) by mouth 2 times daily (with meals) 100 tablet      furosemide (LASIX) 20 MG tablet Take 1 tablet (20 mg) by mouth daily 90 tablet 1     GLUCOSAMINE 500 MG OR CAPS 2 Tablets every morning       melatonin 1 MG TABS tablet Take 5 mg by mouth At Bedtime        metoprolol succinate ER (TOPROL-XL) 25 MG 24 hr tablet TAKE 1/2 (ONE-HALF) TABLET BY MOUTH ONCE DAILY 45 tablet 1     sildenafil (REVATIO) 20 MG tablet Take 1-2 tablets before intercourse. Never use with nitroglycerin, terazosin or doxazosin. 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       PAST SURGICAL HISTORY:  Past Surgical History:   Procedure Laterality Date     ARTHROPLASTY KNEE Left 3/14/2016    Procedure: ARTHROPLASTY KNEE;  Surgeon: Deonte Silver MD;  Location:  OR     COLONOSCOPY  7/17/2013    Procedure: COMBINED COLONOSCOPY, SINGLE BIOPSY/POLYPECTOMY BY BIOPSY;  Colonoscopy, with polypectomy by biopsy;  Surgeon: Fernando Mario MD;  Location:  GI     CYSTOSCOPY, LITHOLAPAXY, COMBINED N/A 2/26/2015    Procedure: COMBINED CYSTOSCOPY, LITHOLAPAXY;  Surgeon: Orlando Marr MD;  Location:  OR     CYSTOSCOPY, LITHOLAPAXY, COMBINED N/A 2/11/2015    Procedure: COMBINED CYSTOSCOPY, LITHOLAPAXY;  Surgeon: Orlando Marr MD;  Location:   OR     CYSTOSCOPY, RETROGRADES, EXTRACT STONE, COMBINED N/A 2018    Procedure: COMBINED CYSTOSCOPY, RETROGRADES, EXTRACT STONE;  cystoscopy, bladder stone removal;  Surgeon: Orlando Marr MD;  Location: [facility] OR     H ABLATION ATRIAL FLUTTER  4/18/15    atypical a flutter ablation & Ablation of PACs from the SVC-RA junction     HC ABLATION RENAL TUMOR PERCUT CRYOTHERAPY UNILATERAL  6/17/10    Right renal mass     LASER HOLMIUM LITHOTRIPSY BLADDER N/A 2015    Procedure: LASER HOLMIUM LITHOTRIPSY BLADDER;  Surgeon: Orlando Marr MD;  Location: [facility] OR     LASER KTP GREEN LIGHT PHOTOSELECTIVE VAPORIZATION PROSTATE N/A 2015    Procedure: LASER KTP GREEN LIGHT PHOTOSELECTIVE VAPORIZATION PROSTATE;  Surgeon: Orlando Marr MD;  Location: [facility] OR     OPEN REDUCTION INTERNAL FIXATION FEMUR DISTAL Left 3/3/2018    Procedure: OPEN REDUCTION INTERNAL FIXATION FEMUR DISTAL;  Open Reduction Internal Fixation Left Femur;  Surgeon: Mason Parra MD;  Location: [facility] OR       FAMILY HISTORY:  Family History   Problem Relation Age of Onset     Cancer Mother         breast     Hypertension Mother      Alzheimer Disease Mother          at age 96.       SOCIAL HISTORY:  Social History     Socioeconomic History     Marital status:      Spouse name: Shari     Number of children: 4     Years of education: Not on file     Highest education level: Not on file   Occupational History     Not on file   Social Needs     Financial resource strain: Not on file     Food insecurity:     Worry: Not on file     Inability: Not on file     Transportation needs:     Medical: Not on file     Non-medical: Not on file   Tobacco Use     Smoking status: Never Smoker     Smokeless tobacco: Never Used   Substance and Sexual Activity     Alcohol use: No     Alcohol/week: 0.0 oz     Drug use: No     Sexual activity: Yes     Partners: Female   Lifestyle     Physical activity:     Days per week: Not on file      "Minutes per session: Not on file     Stress: Not on file   Relationships     Social connections:     Talks on phone: Not on file     Gets together: Not on file     Attends Congregational service: Not on file     Active member of club or organization: Not on file     Attends meetings of clubs or organizations: Not on file     Relationship status: Not on file     Intimate partner violence:     Fear of current or ex partner: Not on file     Emotionally abused: Not on file     Physically abused: Not on file     Forced sexual activity: Not on file   Other Topics Concern     Parent/sibling w/ CABG, MI or angioplasty before 65F 55M? Not Asked   Social History Narrative     Not on file       Review of Systems:  Skin:  Negative     Eyes:  Positive for glasses  ENT:  Positive for hearing loss  Respiratory:  Negative    Cardiovascular:  palpitations;chest pain;Negative for;edema;lightheadedness;dizziness    Gastroenterology: Negative    Genitourinary:  Negative    Musculoskeletal:  Negative    Neurologic:  Negative    Psychiatric:  Negative    Heme/Lymph/Imm:  Negative    Endocrine:  Negative      Physical Exam:  Vitals: /66 (BP Location: Right arm, Patient Position: Fowlers, Cuff Size: Adult Regular)   Pulse 64   Ht 1.854 m (6' 1\")   Wt 85.4 kg (188 lb 3.2 oz)   SpO2 97%   BMI 24.83 kg/m        GEN:  NAD  NECK: No JVD  C/V:  Regular rate and rhythm, no murmur, rub or gallop.  RESP: Clear to auscultation bilaterally without wheezing, rales, or rhonchi.  GI: Abdomen soft, nontender, nondistended.   EXTREM: No LE edema (wearing compression socks).   NEURO: Alert and oriented, cooperative. No obvious focal deficits.   PSYCH: Normal affect.  SKIN: Warm and dry.       Recent Lab Results:  LIPID RESULTS:  Lab Results   Component Value Date    CHOL 117 01/13/2017    HDL 47 01/13/2017    LDL 58 01/13/2017    TRIG 61 01/13/2017    CHOLHDLRATIO 2.9 09/08/2015       LIVER ENZYME RESULTS:  Lab Results   Component Value Date    AST " 38 11/27/2018    ALT 35 11/27/2018       CBC RESULTS:  Lab Results   Component Value Date    WBC 8.6 04/12/2018    RBC 3.96 (L) 04/12/2018    HGB 12.3 (L) 04/12/2018    HCT 38.2 (L) 04/12/2018    MCV 97 04/12/2018    MCH 31.1 04/12/2018    MCHC 32.2 04/12/2018    RDW 13.9 04/12/2018     04/12/2018       BMP RESULTS:  Lab Results   Component Value Date     11/27/2018    POTASSIUM 4.9 11/27/2018    CHLORIDE 109 11/27/2018    CO2 28 11/27/2018    ANIONGAP 6 11/27/2018    GLC 75 11/27/2018    BUN 38 (H) 11/27/2018    CR 1.06 11/27/2018    GFRESTIMATED 66 11/27/2018    GFRESTBLACK 80 11/27/2018    KARTHIK 8.1 (L) 11/27/2018        A1C RESULTS:  No results found for: A1C    INR RESULTS:  Lab Results   Component Value Date    INR 2.0 (A) 03/29/2019    INR 2.6 (A) 02/22/2019    INR 1.93 (H) 03/06/2018    INR 1.26 (H) 03/05/2018         Thank you for allowing me to participate in the care of your patient.    Sincerely,     Ricci Giron PA-C     [facility]

## 2019-04-10 NOTE — TELEPHONE ENCOUNTER
Ricci Giron PA-C [facility] Heart Team 2             Please let patient know there is no evidence of PE. Further details to be discussed when he sees Dr. John.     Thanks!     Ricci

## 2019-04-10 NOTE — TELEPHONE ENCOUNTER
Spoke with Patient regarding FLP's and Nuclear PE scan.   Patient reminded of upcoming OV with Dr. John.

## 2019-04-10 NOTE — TELEPHONE ENCOUNTER
Ricci Giron PA-C [facility] Heart Team 2             Please let patient know his cholesterol is controlled.     Thanks,     Ricci      Attempted to contact patient, wife answered stated patient is not at home. Will call back again later.

## 2019-04-22 NOTE — TELEPHONE ENCOUNTER
"warfarin  Last Written Prescription Date:  2/22/2019  Last Fill Quantity: 30,  # refills: 1   Last office visit: 9/11/2018 with prescribing provider:  alin   Future Office Visit:      Requested Prescriptions   Pending Prescriptions Disp Refills     warfarin (COUMADIN) 5 MG tablet [Pharmacy Med Name: WARFARIN 5MG        TAB] 30 tablet 1     Sig:  TAKE 1 TABLET (5MG) BY MOUTH ON MONDAY, WEDNESDAY, FRIDAY AND 1/2 TABLET (2.5MG) ALL OTHER DAYS OR AS DIRECTED BY THE COUMADIN CLINIC       Vitamin K Antagonists Failed - 4/22/2019  8:54 AM        Failed - INR is within goal in the past 6 weeks     Confirm INR is within goal in the past 6 weeks.     Recent Labs   Lab Test 03/29/19   INR 2.0*                       Passed - Recent (12 mo) or future (30 days) visit within the authorizing provider's specialty     Patient had office visit in the last 12 months or has a visit in the next 30 days with authorizing provider or within the authorizing provider's specialty.  See \"Patient Info\" tab in inbasket, or \"Choose Columns\" in Meds & Orders section of the refill encounter.              Passed - Medication is active on med list        Passed - Patient is 18 years of age or older          Kourtney Mckeon RN on 4/22/2019 at 9:47 AM    "

## 2019-04-29 PROBLEM — E83.39 HYPOPHOSPHATEMIA: Status: RESOLVED | Noted: 2018-03-05 | Resolved: 2019-01-01

## 2019-04-29 PROBLEM — I71.20 THORACIC AORTIC ANEURYSM WITHOUT RUPTURE (H): Status: RESOLVED | Noted: 2018-07-05 | Resolved: 2019-01-01

## 2019-04-29 PROBLEM — Z79.01 WARFARIN ANTICOAGULATION: Status: ACTIVE | Noted: 2019-01-01

## 2019-04-29 PROBLEM — N50.89 SCROTAL SWELLING: Status: RESOLVED | Noted: 2018-01-01 | Resolved: 2019-01-01

## 2019-04-29 PROBLEM — I50.810: Status: ACTIVE | Noted: 2019-01-01

## 2019-04-29 PROBLEM — E78.5 HYPERLIPIDEMIA LDL GOAL <70: Status: ACTIVE | Noted: 2019-01-01

## 2019-04-29 PROBLEM — N48.89 EDEMA OF PENIS: Status: RESOLVED | Noted: 2018-01-01 | Resolved: 2019-01-01

## 2019-04-29 NOTE — PROGRESS NOTES
Reviewed the AVS (After Visit Summary) with patient in detail following their office visit with Dr. John. The patient was educated on the outlined plan of care including ordered tests, labs, medications, and follow up. See DR John plan to hold warfarin 5 days for heart catheterization. Patient verbalized understanding of the information and agrees to call with any questions or concerns.   Return appointment: Patient was given instructions on when and how to schedule their next office visit with the PH clinic.    Katelyn THORPEN, [place], RN  RN Care Coordinator  [facility]  [phone]

## 2019-04-29 NOTE — PATIENT INSTRUCTIONS
1.  No changes to medications.    2.  Diagnostic right and left heart catheterization, with vasodilator testing as needed.  Also include diagnostic coronary angiogram.  Hold warfarin 5 days before the test.  No need for heparin bridging.    3.  6-minute walk test complete pulmonary function test.  This can be done at [place].    4.  Follow-up with the results in my pulmonary hypertension clinic.  Pre-visit CBC with iron studies, comprehensive metabolic panel, NT proBNP.    If you have any questions or concerns, please call my nurse Katelyn Barrientos at [phone].

## 2019-04-29 NOTE — LETTER
"4/29/2019      Gabriel Tan MD  [address] [facility] [address]      RE: Tomer Weiss       Dear Colleague,    I had the pleasure of seeing Tomer Weiss in the [facility].    Service Date: 04/29/2019      PRIMARY CARDIOLOGIST:  Dr. Vance Brennan      PRIMARY CARE PROVIDER:  Dr. Gabriel Tan       REASON FOR VISIT:  Followup of right heart enlargement and pulmonary hypertension noted on echocardiogram.      HISTORY OF PRESENT ILLNESS:    Tomer Weiss is known to me from an initial consult visit a year ago on 02/26/2018 and was unaccompanied today.  Please see my note for details.  He has established cardiology care with my partner, Dr. Vance Brennan, who typically sees him at [facility].  He was referred to me.      The patient is quite an extraordinary man.  He is a tall, non-obese 87-year-old gentleman who appears younger than his stated age.  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  They have [place] acres with about [address] [place] [employer] [place] trees.  Both mentally and physically, he is quite vigorous and astute.  He is a lifelong nonsmoker and non alcohol drinker and his BMI is 25 kg/m2.      His medical history is significant for:  1.  I had seen him for a second opinion for unexplained progressive right heart enlargement and pulmonary hypertension noted on echocardiogram.  I had advised right and left heart catheterization and cardiac MRI.  However, a month after he saw me, he slipped on ice and broke his femur and had to undergo surgery.  Two months later, he got  for the second time after the death of his first wife of 55 years.  Due to the presence of metal hardware in his leg, cardiac MRI was not completed.  He did not proceed with a heart catheterization because \"I feel just great.\"   2.  Chronic atrial fibrillation.  Rate controlled on low-dose metoprolol and chronic warfarin anticoagulation with stable INRs.  No " "history of stroke, not diabetic.   3.  Presumptive diagnosis of CAD based on a mildly abnormal stress test in 2015.  No angiogram.  No symptoms of CAD.   4.  Status post successful catheter ablation of atrial flutter in 2015.   5.  Optimally treated hyperlipidemia.      About 9 months ago, he did have an episode of progressive lower extremity edema and gained almost 20 pounds in fluid weight (\"everything below my waist was swollen up\").  This was even before he had the femur fracture.  He was started on 20 mg of torsemide by Dr. Gabriel Tan and with this, he was back to his euvolemic weight.  When recuperating in the nursing home for 2 months after his fracture, the furosemide was withheld and he started gaining fluid weight again.  He is now back on it.  [place] and essentially denies any symptoms.      DIAGNOSTIC DATA:    His last labs from a few months ago show a creatinine of 1.0, BUN 38, estimated GFR 66.  NT-proBNP markedly elevated at 5000.  Serial INRs are stable.  No recent CBC.        ECG done today shows chronic atrial fibrillation with a ventricular rate of 85 BPM.      Repeat transthoracic echocardiogram 04/2019.  I personally reviewed the images.  His right ventricle is mildly dilated (basal diameter 4.7 cm) with mildly reduced systolic function (TAPSE 1.4 to 1.6 cm).  The IVC is markedly dilated at almost 4 cm with decreased collapsibility.  The pulmonary arteries are also dilated.  There is only mild tricuspid valve regurgitation (2 jets).  LV function is normal with the EF of 55%-60% but there is diastolic septal flattening suggesting right ventricular volume overload.  Trileaflet aortic valve sclerosis without stenosis.  No evidence of significant pulmonary regurgitation.  Severe biatrial enlargement (right greater than left).  The bubble study was negative for interatrial shunt.     V/Q scan ruled out chronic venous thromboembolic disease.     CURRENT MEDICATIONS:   1.  " Atorvastatin 20 mg daily.   2.  Ferrous sulfate 325 mg b.i.d.   3.  Furosemide 20 mg daily.   4.  Metoprolol XL 12.5 mg daily.   5.  Warfarin for thromboembolic prophylaxis from atrial fibrillation.  Goal INR 2.0 to 3.0.     6.  Sildenafil as needed for erectile dysfunction.      PHYSICAL EXAMINATION:   VITAL SIGNS:  /60, pulse 68 per minute, irregularly irregular.  Height 6 feet 1 inch (1.8 meters), weight 87.3 kg (192 pounds).  Respiratory rate 16 per minute, sats 98% on room air, BMI 25.4 kg/m2.   GENERAL:  He is a tall and stocky gentleman who appears younger than his stated age, is cognitively astute and has good muscle mass and a normal gait.  Alert and oriented x 3.  CARDIOVASCULAR:  His jugular venous pressure is mildly elevated at 3 cm (absent A waves due to atrial fibrillation), carotid upstroke is normal, apical impulse is undisplaced, mild pectus excavatum without any RV heave, heart sounds are irregularly irregular.  There is a soft systolic murmur of mild tricuspid regurgitation, no S3 or S4.   LUNGS:  Clear to auscultation.   LOWER EXTREMITIES:  Has 1+ bilateral pitting edema.   ABDOMEN:  Soft, nontender without abdominal free fluid or hepatomegaly and active bowel sounds.   ENT:  Multiple dental fillings but no cyanosis and satisfactory dental hygiene.      DIAGNOSES:     1.  Right heart failure, NYHA class I.  Mildly dilated right ventricle with mildly decreased systolic function.   2.  Pulmonary hypertension based on echo estimated pulmonary artery pressure.   3.  Chronic atrial fibrillation, rate controlled on warfarin anticoagulation strategy.   4.  Hyperlipidemia.      ASSESSMENT AND PLAN:    Essentially, I had seen Kit a year ago for unexplained right heart enlargement and recommended cardiac MRI, V/Q scan and right heart catheterization.  The V/Q scan ruled out chronic venous thromboembolic disease, cardiac MRI could not be done due to metal hardware in his leg and patient has deferred  "invasive testing.      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  However, I think \"asymptomatic\" is not entirely true.  A few months ago, he did have what sounds like right heart failure which has stabilized with low-dose furosemide.  On repeat imaging, his right heart is probably a little bigger and the function is down.  His IVC is markedly dilated.  He does not have an interatrial shunt or significant valve disease to explain this.  I suspect he has significantly elevated right-sided filling pressures and pulmonary hypertension.  Given that he is clinically stable for now, he is hesitant to go up on his diuretic dosage. However, he is willing to proceed with testing.     1.  Continue furosemide 20 mg daily.  The patient is hesitant to go up on diuretic dosage as documented above.   2.  Proceed to diagnostic coronary angiogram (abnormal stress test in 2015 with presumed diagnosis of CAD but since he is asymptomatic, stenting should be deferred unless essential).  Right and left heart catheterization.  Vasodilator testing, as indicated. The risks and benefits reviewed with the patient.  He is willing to proceed.  Warfarin to be held 5 days before the test.  No indication for heparin bridging.   2.  A 6-minute walk to rule out exercise desaturation and complete primary function tests.  This can be done at [facility].    3.  Follow up in the Pulmonary Hypertension Clinic with the results of above.  Requested that the patient bring along a friend or family member to his next appointment.        It was my pleasure to see this nice man.  I look forward to seeing him again.      cc:   Gabriel Tan MD    [address] [place] [address]      Vance Brennan MD    [facility] at [address], [address]   [place] [address]         MOTHILAL " CRISTOBAL JOHN MD             D: 2019   T: 2019   MT: SU      Name:     ELIER BUTLER   MRN:      [id number]        Account:      [id number]   :      1932           Service Date: 2019      Document: [id number]           Outpatient Encounter Medications as of 2019   Medication Sig Dispense Refill     acetaminophen (TYLENOL) 325 MG tablet Take 2 tablets (650 mg) by mouth every 4 hours as needed for other (multimodal surgical pain management along with NSAIDS and opioid medication as indicated based on pain control and physical function.) 100 tablet      atorvastatin (LIPITOR) 20 MG tablet Take 1 tablet (20 mg) by mouth daily 90 tablet 0     diphenhydrAMINE (BENADRYL) 25 MG capsule Take 25 mg by mouth every 6 hours as needed for itching or allergies       ferrous sulfate (IRON) 325 (65 FE) MG tablet Take 1 tablet (325 mg) by mouth 2 times daily (with meals) 100 tablet      furosemide (LASIX) 20 MG tablet Take 1 tablet (20 mg) by mouth daily 90 tablet 1     GLUCOSAMINE 500 MG OR CAPS 2 Tablets every morning       metoprolol succinate ER (TOPROL-XL) 25 MG 24 hr tablet TAKE 1/2 (ONE-HALF) TABLET BY MOUTH ONCE DAILY 45 tablet 1     sildenafil (REVATIO) 20 MG tablet Take 1-2 tablets before intercourse. Never use with nitroglycerin, terazosin or doxazosin. 12 tablet 3     warfarin (COUMADIN) 5 MG tablet Take 2.5 mg Tues, Fri and 5 mg all other days, or as directed by the [facility]. 75 tablet 0     warfarin (COUMADIN) 5 MG tablet Take 2.5 mg (1/2 tab) Tues and Fri and 5 mg (1 tab) all other days or as directed by coumadin clinic 30 tablet 1     melatonin 1 MG TABS tablet Take 5 mg by mouth At Bedtime        No facility-administered encounter medications on file as of 2019.        Again, thank you for allowing me to participate in the care of your patient.      Sincerely,    Russell John MD     [facility]

## 2019-04-29 NOTE — PROGRESS NOTES
Clinic visit note dictated. Dictation reference number - [id number]      REVIEW OF SYSTEMS:  A comprehensive 10-point review of systems was completed and the pertinent positives are documented in the history of present illness.    Skin:  Negative     Eyes:  Positive for glasses  ENT:  Positive for hearing loss  Respiratory:  Negative    Cardiovascular:  Negative    Gastroenterology: Negative    Genitourinary:  Positive for kidney stone  Musculoskeletal:  Positive for joint pain  Neurologic:  Negative    Psychiatric:  Positive for sleep disturbances  Heme/Lymph/Imm:  Negative    Endocrine:  Negative      CURRENT MEDICATIONS:  Current Outpatient Medications   Medication Sig Dispense Refill     acetaminophen (TYLENOL) 325 MG tablet Take 2 tablets (650 mg) by mouth every 4 hours as needed for other (multimodal surgical pain management along with NSAIDS and opioid medication as indicated based on pain control and physical function.) 100 tablet      atorvastatin (LIPITOR) 20 MG tablet Take 1 tablet (20 mg) by mouth daily 90 tablet 0     diphenhydrAMINE (BENADRYL) 25 MG capsule Take 25 mg by mouth every 6 hours as needed for itching or allergies       ferrous sulfate (IRON) 325 (65 FE) MG tablet Take 1 tablet (325 mg) by mouth 2 times daily (with meals) 100 tablet      furosemide (LASIX) 20 MG tablet Take 1 tablet (20 mg) by mouth daily 90 tablet 1     GLUCOSAMINE 500 MG OR CAPS 2 Tablets every morning       metoprolol succinate ER (TOPROL-XL) 25 MG 24 hr tablet TAKE 1/2 (ONE-HALF) TABLET BY MOUTH ONCE DAILY 45 tablet 1     sildenafil (REVATIO) 20 MG tablet Take 1-2 tablets before intercourse. Never use with nitroglycerin, terazosin or doxazosin. 12 tablet 3     warfarin (COUMADIN) 5 MG tablet Take 2.5 mg Tues, Fri and 5 mg all other days, or as directed by the Coumadin Clinic. 75 tablet 0     warfarin (COUMADIN) 5 MG tablet Take 2.5 mg (1/2 tab) Tues and Fri and 5 mg (1 tab) all other days or as directed by coumadin clinic 30  tablet 1     melatonin 1 MG TABS tablet Take 5 mg by mouth At Bedtime            ALLERGIES:  Allergies   Allergen Reactions     No Known Drug Allergies        PAST MEDICAL HISTORY:    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     Other insomnia 3/12/2018     Polymyalgia rheumatica (H) 6/28/2005     Pulmonary hypertension (H) 3/1/2018     Thoracic aortic aneurysm without rupture (H) 7/5/2018       PAST SURGICAL HISTORY:    Past Surgical History:   Procedure Laterality Date     ARTHROPLASTY KNEE Left 3/14/2016    Procedure: ARTHROPLASTY KNEE;  Surgeon: Deonte Silver MD;  Location: [facility]     COLONOSCOPY  7/17/2013    Procedure: COMBINED COLONOSCOPY, SINGLE BIOPSY/POLYPECTOMY BY BIOPSY;  Colonoscopy, with polypectomy by biopsy;  Surgeon: Fernando Mario MD;  Location: [facility]     CYSTOSCOPY, LITHOLAPAXY, COMBINED N/A 2/26/2015    Procedure: COMBINED CYSTOSCOPY, LITHOLAPAXY;  Surgeon: Orlando Marr MD;  Location: [facility]     CYSTOSCOPY, LITHOLAPAXY, COMBINED N/A 2/11/2015    Procedure: COMBINED CYSTOSCOPY, LITHOLAPAXY;  Surgeon: Orlando Marr MD;  Location: [facility]     CYSTOSCOPY, RETROGRADES, EXTRACT STONE, COMBINED N/A 2/8/2018    Procedure: COMBINED CYSTOSCOPY, RETROGRADES, EXTRACT STONE;  cystoscopy, bladder stone removal;  Surgeon: Orlando Marr MD;  Location: [facility]     H ABLATION ATRIAL FLUTTER  4/18/15    atypical a flutter ablation & Ablation of PACs from the SVC-RA junction     HC ABLATION RENAL TUMOR PERCUT CRYOTHERAPY UNILATERAL  6/17/10    Right renal mass     LASER HOLMIUM LITHOTRIPSY BLADDER N/A 2/26/2015    Procedure: LASER HOLMIUM LITHOTRIPSY  BLADDER;  Surgeon: Orlando Marr MD;  Location: [facility]     LASER KTP GREEN LIGHT PHOTOSELECTIVE VAPORIZATION PROSTATE N/A 2015    Procedure: LASER KTP GREEN LIGHT PHOTOSELECTIVE VAPORIZATION PROSTATE;  Surgeon: Orlando Marr MD;  Location: [facility]     OPEN REDUCTION INTERNAL FIXATION FEMUR DISTAL Left 3/3/2018    Procedure: OPEN REDUCTION INTERNAL FIXATION FEMUR DISTAL;  Open Reduction Internal Fixation Left Femur;  Surgeon: Mason Parra MD;  Location: [facility]       FAMILY HISTORY:    Family History   Problem Relation Age of Onset     Cancer Mother         breast     Hypertension Mother      Alzheimer Disease Mother          at age 96.       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   Social Needs     Financial resource strain: None     Food insecurity:     Worry: None     Inability: None     Transportation needs:     Medical: None     Non-medical: None   Tobacco Use     Smoking status: Never Smoker     Smokeless tobacco: Never Used   Substance and Sexual Activity     Alcohol use: No     Alcohol/week: 0.0 oz     Drug use: No     Sexual activity: Yes     Partners: Female   Lifestyle     Physical activity:     Days per week: None     Minutes per session: None     Stress: None   Relationships     Social connections:     Talks on phone: None     Gets together: None     Attends [employer] service: None     Active member of club or organization: None     Attends meetings of clubs or organizations: None     Relationship status: None     Intimate partner violence:     Fear of current or ex partner: None     Emotionally abused: None     Physically abused: None     Forced sexual activity: None   Other Topics Concern     Parent/sibling w/ CABG, MI or angioplasty before 65F  "55M? Not Asked   Social History Narrative     None       PHYSICAL EXAM:    Vitals: /60   Pulse 68   Ht 1.854 m (6' 1\")   Wt 87.3 kg (192 lb 6.4 oz)   SpO2 98%   BMI 25.38 kg/m    Wt Readings from Last 5 Encounters:   04/29/19 87.3 kg (192 lb 6.4 oz)   04/02/19 85.4 kg (188 lb 3.2 oz)   11/27/18 102.4 kg (225 lb 11.2 oz)   09/11/18 94.3 kg (208 lb)   08/14/18 91.6 kg (202 lb)       Constitutional: Comfortable at rest. Cooperative, alert and oriented, well developed, well nourished.  Eyes: Pupils equal and round, conjunctivae and lids unremarkable, sclera white, no xanthalasma,   ENT: Satisfactory dentition.  No cyanosis or pallor.  Neck: Carotid pulses are full and equal bilaterally, no carotid bruit, no thyromegaly     Respiratory: Normal respiratory effort with symmetrical chest wall movements and no use of accessory muscles. Good air entry with normal breath sounds and no rales or wheeze.  Cardiovascular: Elevated JVP 4 cm. Undisplaced apical impulse, no RV heave. Soft systolic murmur of tricuspid regurgitation.  GI: Soft, nontender, no hepatosplenomegaly, no masses, active bowel sounds.    Skin: No rash, erythema, ecchymosis.  Musculoskeletal: Normal muscle tone and strength. Normal gait. No spinal deformities.  Neuropsychiatric: Oriented to time place and person.  Affect normal.  No gross motor deficits.  Extremities: 1+ bilateral pitting edema. No clubbing or deformities.          "

## 2019-04-29 NOTE — LETTER
4/29/2019    Gabriel Tan MD  [address] [facility] [address]    RE: Tomer Weiss       Dear Colleague,    I had the pleasure of seeing Tomer Weiss in the [facility].    Clinic visit note dictated. Dictation reference number - [id number]      REVIEW OF SYSTEMS:  A comprehensive 10-point review of systems was completed and the pertinent positives are documented in the history of present illness.    Skin:  Negative     Eyes:  Positive for glasses  ENT:  Positive for hearing loss  Respiratory:  Negative    Cardiovascular:  Negative    Gastroenterology: Negative    Genitourinary:  Positive for kidney stone  Musculoskeletal:  Positive for joint pain  Neurologic:  Negative    Psychiatric:  Positive for sleep disturbances  Heme/Lymph/Imm:  Negative    Endocrine:  Negative      CURRENT MEDICATIONS:  Current Outpatient Medications   Medication Sig Dispense Refill     acetaminophen (TYLENOL) 325 MG tablet Take 2 tablets (650 mg) by mouth every 4 hours as needed for other (multimodal surgical pain management along with NSAIDS and opioid medication as indicated based on pain control and physical function.) 100 tablet      atorvastatin (LIPITOR) 20 MG tablet Take 1 tablet (20 mg) by mouth daily 90 tablet 0     diphenhydrAMINE (BENADRYL) 25 MG capsule Take 25 mg by mouth every 6 hours as needed for itching or allergies       ferrous sulfate (IRON) 325 (65 FE) MG tablet Take 1 tablet (325 mg) by mouth 2 times daily (with meals) 100 tablet      furosemide (LASIX) 20 MG tablet Take 1 tablet (20 mg) by mouth daily 90 tablet 1     GLUCOSAMINE 500 MG OR CAPS 2 Tablets every morning       metoprolol succinate ER (TOPROL-XL) 25 MG 24 hr tablet TAKE 1/2 (ONE-HALF) TABLET BY MOUTH ONCE DAILY 45 tablet 1     sildenafil (REVATIO) 20 MG tablet Take 1-2 tablets before intercourse. Never use with nitroglycerin, terazosin or doxazosin. 12 tablet 3     warfarin (COUMADIN) 5 MG tablet  Take 2.5 mg Tues, Fri and 5 mg all other days, or as directed by the [facility]. 75 tablet 0     warfarin (COUMADIN) 5 MG tablet Take 2.5 mg (1/2 tab) Tues and Fri and 5 mg (1 tab) all other days or as directed by coumadin clinic 30 tablet 1     melatonin 1 MG TABS tablet Take 5 mg by mouth At Bedtime            ALLERGIES:  Allergies   Allergen Reactions     No Known Drug Allergies        PAST MEDICAL HISTORY:    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     Other insomnia 3/12/2018     Polymyalgia rheumatica (H) 6/28/2005     Pulmonary hypertension (H) 3/1/2018     Thoracic aortic aneurysm without rupture (H) 7/5/2018       PAST SURGICAL HISTORY:    Past Surgical History:   Procedure Laterality Date     ARTHROPLASTY KNEE Left 3/14/2016    Procedure: ARTHROPLASTY KNEE;  Surgeon: Deonte Silver MD;  Location: [facility]     COLONOSCOPY  7/17/2013    Procedure: COMBINED COLONOSCOPY, SINGLE BIOPSY/POLYPECTOMY BY BIOPSY;  Colonoscopy, with polypectomy by biopsy;  Surgeon: Fernando Mario MD;  Location: [facility]     CYSTOSCOPY, LITHOLAPAXY, COMBINED N/A 2/26/2015    Procedure: COMBINED CYSTOSCOPY, LITHOLAPAXY;  Surgeon: Orlando Marr MD;  Location: [facility]     CYSTOSCOPY, LITHOLAPAXY, COMBINED N/A 2/11/2015    Procedure: COMBINED CYSTOSCOPY, LITHOLAPAXY;  Surgeon: Orlando Marr MD;  Location: [facility]     CYSTOSCOPY, RETROGRADES, EXTRACT STONE, COMBINED N/A 2/8/2018    Procedure: COMBINED CYSTOSCOPY, RETROGRADES, EXTRACT STONE;  cystoscopy, bladder stone removal;  Surgeon: Orlando Marr MD;  Location: [facility]     H ABLATION ATRIAL FLUTTER  4/18/15    atypical a  flutter ablation & Ablation of PACs from the SVC-RA junction     HC ABLATION RENAL TUMOR PERCUT CRYOTHERAPY UNILATERAL  6/17/10    Right renal mass     LASER HOLMIUM LITHOTRIPSY BLADDER N/A 2015    Procedure: LASER HOLMIUM LITHOTRIPSY BLADDER;  Surgeon: Orlando Marr MD;  [facility]     LASER KTP GREEN LIGHT PHOTOSELECTIVE VAPORIZATION PROSTATE N/A 2015    Procedure: LASER KTP GREEN LIGHT PHOTOSELECTIVE VAPORIZATION PROSTATE;  Surgeon: Orlando Marr MD;  [facility]     OPEN REDUCTION INTERNAL FIXATION FEMUR DISTAL Left 3/3/2018    Procedure: OPEN REDUCTION INTERNAL FIXATION FEMUR DISTAL;  Open Reduction Internal Fixation Left Femur;  Surgeon: Mason Parra MD;  Location: [facility]       FAMILY HISTORY:    Family History   Problem Relation Age of Onset     Cancer Mother         breast     Hypertension Mother      Alzheimer Disease Mother          at age 96.       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   Social Needs     Financial resource strain: None     Food insecurity:     Worry: None     Inability: None     Transportation needs:     Medical: None     Non-medical: None   Tobacco Use     Smoking status: Never Smoker     Smokeless tobacco: Never Used   Substance and Sexual Activity     Alcohol use: No     Alcohol/week: 0.0 oz     Drug use: No     Sexual activity: Yes     Partners: Female   Lifestyle     Physical activity:     Days per week: None     Minutes per session: None     Stress: None   Relationships     Social connections:     Talks on phone: None     Gets together: None     Attends Synagogue service: None     Active member of club or organization: None     Attends meetings of clubs or organizations: None     Relationship status: None      "Intimate partner violence:     Fear of current or ex partner: None     Emotionally abused: None     Physically abused: None     Forced sexual activity: None   Other Topics Concern     Parent/sibling w/ CABG, MI or angioplasty before 65F 55M? Not Asked   Social History Narrative     None       PHYSICAL EXAM:    Vitals: /60   Pulse 68   Ht 1.854 m (6' 1\")   Wt 87.3 kg (192 lb 6.4 oz)   SpO2 98%   BMI 25.38 kg/m     Wt Readings from Last 5 Encounters:   04/29/19 87.3 kg (192 lb 6.4 oz)   04/02/19 85.4 kg (188 lb 3.2 oz)   11/27/18 102.4 kg (225 lb 11.2 oz)   09/11/18 94.3 kg (208 lb)   08/14/18 91.6 kg (202 lb)       Constitutional: Comfortable at rest. Cooperative, alert and oriented, well developed, well nourished.  Eyes: Pupils equal and round, conjunctivae and lids unremarkable, sclera white, no xanthalasma,   ENT: Satisfactory dentition.  No cyanosis or pallor.  Neck: Carotid pulses are full and equal bilaterally, no carotid bruit, no thyromegaly     Respiratory: Normal respiratory effort with symmetrical chest wall movements and no use of accessory muscles. Good air entry with normal breath sounds and no rales or wheeze.  Cardiovascular: Elevated JVP 4 cm. Undisplaced apical impulse, no RV heave. Soft systolic murmur of tricuspid regurgitation.  GI: Soft, nontender, no hepatosplenomegaly, no masses, active bowel sounds.    Skin: No rash, erythema, ecchymosis.  Musculoskeletal: Normal muscle tone and strength. Normal gait. No spinal deformities.  Neuropsychiatric: Oriented to time place and person.  Affect normal.  No gross motor deficits.  Extremities: 1+ bilateral pitting edema. No clubbing or deformities.            Service Date: 04/29/2019      PRIMARY CARDIOLOGIST:  Dr. Vance Brennan      PRIMARY CARE PROVIDER:  Dr. Gabriel Tan       REASON FOR VISIT:  Followup of right heart enlargement and pulmonary hypertension noted on echocardiogram.      HISTORY OF PRESENT ILLNESS:  Tomer Weiss is known " "to me from an initial consult visit a year ago on 02/26/2018 and was unaccompanied today.  Please see my note for details.  He has established cardiology care with my partner, Dr. Vance Brennan, who typically sees him at [facility].  He was referred to me.      The patient is quite an extraordinary man.  He is a tall, non-obese 87-year-old gentleman who appears younger than his stated age.  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  Both mentally and physically, he is quite vigorous and astute.  He is a lifelong nonsmoker and ***drinker and his BMI is 25 kg/m2.      MEDICAL HISTORY:  Significant for:   1.  I had seen him for a second opinion for unexplained progressive right heart enlargement and pulmonary hypertension noted on echocardiogram.  I had advised right and left heart catheterization and cardiac MRI.  However, the patient, a month after he saw me, he slipped on ice and broke his femur and had to undergo surgery.  Two months later, he got  for the second time after the death of his first wife of 55 years.  Due to the presence of metal hardware in his leg, cardiac MRI was not completed.  He did not proceed with a heart catheterization because \"I feel just great.\"   2.  Chronic atrial fibrillation.  Rate controlled on low-dose metoprolol and chronic warfarin anticoagulation with stable INRs.  No history of stroke, not diabetic.   3.  Presumptive diagnosis of CAD based on a mildly abnormal stress test in 2015.  No angiogram.  No symptoms of CAD.   4.  Status post successful catheter ablation of atrial flutter in 2015.   5.  Optimally treated hyperlipidemia.      About 9 months ago, he did have an episode of progressive lower extremity edema and gained almost 20 pounds in fluid weight (\"everything below my waist was swollen up\").  This was even before he had the femur fracture.  He was started on 20 mg of torsemide by  " Gabriel Tan and with this, he was back to his euvolemic weight.  When recuperating in the nursing home for 2 months after his fracture, the furosemide was withheld and he started gaining fluid weight again.  He is now back on it.  [place] and essentially denies any symptoms.      DIAGNOSTIC DATA:  His last labs from a few months ago show a creatinine of 1.0, BUN 38, estimated GFR 66.  NT-proBNP markedly elevated at 5000.  Serial INRs are stable.  No recent CBC.        ECG done today shows chronic atrial fibrillation with a ventricular rate of 85 BPM.      Repeat transthoracic echocardiogram, *** 04/2019.  Personally reviewed the images.  His right ventricle is mildly dilated (basal diameter 4.7 cm) with mildly reduced systolic function (TAPSE 1.4 to 1.6 cm).  The IVC is markedly dilated at almost 4 cm with decreased collapsibility.  The pulmonary arteries are also dilated.  There is only mild tricuspid valve regurgitation (2 jets).  LV function is normal with the EF of 55%-60% but there is diastolic septal flattening suggesting right ventricular volume overload.  Trileaflet aortic valve sclerosis without stenosis.  No evidence of significant pulmonary regurgitation.  Severe biatrial enlargement (right greater than left).  The bubble study was negative for interatrial shunt.      CURRENT MEDICATIONS:   1.  Atorvastatin 20 mg daily.   2.  Ferrous sulfate 325 mg b.i.d.   3.  Furosemide 20 mg daily.   4.  Metoprolol XL 12.5 mg daily.   5.  Warfarin for thromboembolic prophylaxis from atrial fibrillation.  Goal INR 2.0 to 3.0.     6.  Sildenafil as needed for erectile dysfunction.      PHYSICAL EXAMINATION:   VITAL SIGNS:  /60, pulse 68 per minute, irregularly irregular.  Height 6 feet 1 inch (1.8 meters), weight 87.3 kg (192 pounds).  Respiratory rate 16 per minute, sats 98% on room air, BMI 25.4 kg/m2.   GENERAL:  This is a tall and stocky gentleman who appears younger than his stated  age, is cognitively astute and has good muscle mass and a normal gait.     CARDIOVASCULAR:  His jugular venous pressure is mildly elevated at 3 cm (absent A waves due to atrial fibrillation), carotid upstroke is normal, apical impulse is undisplaced, mild pectus excavatum without any RV heave, heart sounds are irregularly irregular.  There is a soft systolic murmur of mild tricuspid regurgitation, no S3 or S4.   LUNGS:  Clear to auscultation.   LOWER EXTREMITIES:  Has 1+ bilateral pitting edema.   ABDOMEN:  Soft, nontender without abdominal free fluid or hepatomegaly and active bowel sounds.   NEUROPSYCHIATRIC:  Normal.   ENT:  Multiple dental fillings but no cyanosis and satisfactory dental hygiene.      DIAGNOSES:     1.  Right heart failure, NYHA class I.  Mildly dilated right ventricle with mildly decreased systolic function.   2.  Pulmonary hypertension based on echo estimated pulmonary artery pressure.   3.  Chronic atrial fibrillation, rate controlled on warfarin anticoagulation strategy.   4.  Hyperlipidemia.      ASSESSMENT AND PLAN:  Essentially, I had seen Kit a year ago for unexplained right heart enlargement and recommended cardiac MRI, V/Q scan and right heart catheterization.  Due to the interval circumstances (leg fracture, prolonged recovery in the nursing home and second wedding), a cardiac MRI could not be done.  The V/Q scan ruled out chronic venous thromboembolic disease and patient has deferred invasive testing.      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  However, I think asymptomatic is probably not entirely true.  A few months ago, he did have what sounds like significant right heart failure with a 20-pound weight gain which has stabilized with low-dose furosemide.  On repeat imaging, his right heart is probably a little bigger and the function is down.  His IVC is  markedly dilated.  He does not have an interatrial shunt or significant valve disease to explain this.  I suspect he has significantly elevated right-sided filling pressures and pulmonary hypertension.  Given that he is clinically stable for now, he is hesitant to go up on his diuretic dosage.  I suspect he will need at least 20 mg of torsemide.  However, he is willing to proceed with testing.   1.  Continue furosemide 20 mg daily.  The patient is hesitant to go up on diuretic dosage as documented above.   2.  Proceed to diagnostic coronary angiogram (abnormal stress test in 2015 with presumed diagnosis of CAD but since he is asymptomatic, stenting should be deferred unless essential).  Right and left heart catheterization.  Vasodilator testing, as indicated.   2.  A 6-minute walk to rule out exercise desaturation.  This can be done at [facility].  Complete pulmonary function test.   3.  Warfarin.  The risks and benefits reviewed with the patient.  He is willing to proceed.  Warfarin to be held 5 days before the test.  No indication for heparin bridging.   4.  Follow up in the Pulmonary Hypertension Clinic with the results of above.  Requested that the patient bring along a friend or family member to his next appointment.        It was my pleasure to see this nice man.  I look forward to seeing him again.      cc:   Gabriel Tan MD    [place] [address] [address]      Vance Brennan MD    [facility] [address], [address]   [place] [address]         SOPHIE MANNING MD             D: 2019   T: 2019   MT: DW      Name:     ELIER BUTLER   MRN:      [id number]        Account:      [id number]   :      1932           Service Date: 2019      Document: [id number]         Thank you for allowing me to participate in the care of your patient.      Sincerely,     Sophie Manning MD     [facility]  [facility]    cc:   No referring provider defined for this encounter.

## 2019-04-29 NOTE — PROGRESS NOTES
Called to patient and left message w/wife to call back to review instructions for heart catheterization 5/6/2019.  Katelyn Weaver RN 04/29/19 2:48 PM    Call to patient to review pre-procedure instructions for Right and Left Heart Catheterization scheduled 5/6/2019 arrival at 800 am.   Reviewed the following with patient and patient states understanding of instructions:  Blood thinners: coumadin / warfarin - hold 5 days prior to procedure per Dr John - last dose 4/30/2019 and MD will resume post op.  Diabetic:   No  DO NOT Hold diuretics am of procedure - furosemide - per DR John  Review of Contrast allergy: confirmed no. NKA   NPO status reviewed  Aspirin therapy reviewed per protocol 325 mg day prior and day of procedure  Patient has transportation to and from procedure.   Patient has arranged for someone to stay with them 24 hours post procedure.  Instructed to hold recreational use of viagra/sildenafil 36 hours prior to procedure  Post cath visit with LEAH Ligia PANDYA on 5/13/2019.  Katelyn Weaver RN 04/29/19 4:45 PM

## 2019-04-29 NOTE — PROGRESS NOTES
"Service Date: 04/29/2019      PRIMARY CARDIOLOGIST:  Dr. Vance Brennan      PRIMARY CARE PROVIDER:  Dr. Gabriel Tan       REASON FOR VISIT:  Followup of right heart enlargement and pulmonary hypertension noted on echocardiogram.      HISTORY OF PRESENT ILLNESS:    Tomer Weiss is known to me from an initial consult visit a year ago on 02/26/2018 and was unaccompanied today.  Please see my note for details.  He has established cardiology care with my partner, Dr. Vance Brennan, who typically sees him at [facility].  He was referred to me.      The patient is quite an extraordinary man.  He is a tall, non-obese 87-year-old gentleman who appears younger than his stated age.  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  They have 100 acres with about 35,000 [place] trees.  Both mentally and physically, he is quite vigorous and astute.  He is a lifelong nonsmoker and non alcohol drinker and his BMI is 25 kg/m2.      His medical history is significant for:  1.  I had seen him for a second opinion for unexplained progressive right heart enlargement and pulmonary hypertension noted on echocardiogram.  I had advised right and left heart catheterization and cardiac MRI.  However, a month after he saw me, he slipped on ice and broke his femur and had to undergo surgery.  Two months later, he got  for the second time after the death of his first wife of 55 years.  Due to the presence of metal hardware in his leg, cardiac MRI was not completed.  He did not proceed with a heart catheterization because \"I feel just great.\"   2.  Chronic atrial fibrillation.  Rate controlled on low-dose metoprolol and chronic warfarin anticoagulation with stable INRs.  No history of stroke, not diabetic.   3.  Presumptive diagnosis of CAD based on a mildly abnormal stress test in 2015.  No angiogram.  No symptoms of CAD.   4.  Status post successful catheter ablation of atrial flutter in 2015.   5.  Optimally " "treated hyperlipidemia.      About 9 months ago, he did have an episode of progressive lower extremity edema and gained almost 20 pounds in fluid weight (\"everything below my waist was swollen up\").  This was even before he had the femur fracture.  He was started on 20 mg of torsemide by Dr. Gabriel Tan and with this, he was back to his euvolemic weight.  When recuperating in the nursing home for 2 months after his fracture, the furosemide was withheld and he started gaining fluid weight again.  He is now back on it.  He [place] his [place] extensively and essentially denies any symptoms.      DIAGNOSTIC DATA:    His last labs from a few months ago show a creatinine of 1.0, BUN 38, estimated GFR 66.  NT-proBNP markedly elevated at 5000.  Serial INRs are stable.  No recent CBC.        ECG done today shows chronic atrial fibrillation with a ventricular rate of 85 BPM.      Repeat transthoracic echocardiogram 04/2019.  I personally reviewed the images.  His right ventricle is mildly dilated (basal diameter 4.7 cm) with mildly reduced systolic function (TAPSE 1.4 to 1.6 cm).  The IVC is markedly dilated at almost 4 cm with decreased collapsibility.  The pulmonary arteries are also dilated.  There is only mild tricuspid valve regurgitation (2 jets).  LV function is normal with the EF of 55%-60% but there is diastolic septal flattening suggesting right ventricular volume overload.  Trileaflet aortic valve sclerosis without stenosis.  No evidence of significant pulmonary regurgitation.  Severe biatrial enlargement (right greater than left).  The bubble study was negative for interatrial shunt.     V/Q scan ruled out chronic venous thromboembolic disease.     CURRENT MEDICATIONS:   1.  Atorvastatin 20 mg daily.   2.  Ferrous sulfate 325 mg b.i.d.   3.  Furosemide 20 mg daily.   4.  Metoprolol XL 12.5 mg daily.   5.  Warfarin for thromboembolic prophylaxis from atrial fibrillation.  Goal INR 2.0 to 3.0.     6.  Sildenafil as " "needed for erectile dysfunction.      PHYSICAL EXAMINATION:   VITAL SIGNS:  /60, pulse 68 per minute, irregularly irregular.  Height 6 feet 1 inch (1.8 meters), weight 87.3 kg (192 pounds).  Respiratory rate 16 per minute, sats 98% on room air, BMI 25.4 kg/m2.   GENERAL:  He is a tall and stocky gentleman who appears younger than his stated age, is cognitively astute and has good muscle mass and a normal gait.  Alert and oriented x 3.  CARDIOVASCULAR:  His jugular venous pressure is mildly elevated at 3 cm (absent A waves due to atrial fibrillation), carotid upstroke is normal, apical impulse is undisplaced, mild pectus excavatum without any RV heave, heart sounds are irregularly irregular.  There is a soft systolic murmur of mild tricuspid regurgitation, no S3 or S4.   LUNGS:  Clear to auscultation.   LOWER EXTREMITIES:  Has 1+ bilateral pitting edema.   ABDOMEN:  Soft, nontender without abdominal free fluid or hepatomegaly and active bowel sounds.   ENT:  Multiple dental fillings but no cyanosis and satisfactory dental hygiene.      DIAGNOSES:     1.  Right heart failure, NYHA class I.  Mildly dilated right ventricle with mildly decreased systolic function.   2.  Pulmonary hypertension based on echo estimated pulmonary artery pressure.   3.  Chronic atrial fibrillation, rate controlled on warfarin anticoagulation strategy.   4.  Hyperlipidemia.      ASSESSMENT AND PLAN:    Essentially, I had seen Kit a year ago for unexplained right heart enlargement and recommended cardiac MRI, V/Q scan and right heart catheterization.  The V/Q scan ruled out chronic venous thromboembolic disease, cardiac MRI could not be done due to metal hardware in his leg and patient has deferred invasive testing.      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  However, I think \"asymptomatic\" is not entirely true.  A " few months ago, he did have what sounds like right heart failure which has stabilized with low-dose furosemide.  On repeat imaging, his right heart is probably a little bigger and the function is down.  His IVC is markedly dilated.  He does not have an interatrial shunt or significant valve disease to explain this.  I suspect he has significantly elevated right-sided filling pressures and pulmonary hypertension.  Given that he is clinically stable for now, he is hesitant to go up on his diuretic dosage. However, he is willing to proceed with testing.     1.  Continue furosemide 20 mg daily.  The patient is hesitant to go up on diuretic dosage as documented above.   2.  Proceed to diagnostic coronary angiogram (abnormal stress test in 2015 with presumed diagnosis of CAD but since he is asymptomatic, stenting should be deferred unless essential).  Right and left heart catheterization.  Vasodilator testing, as indicated. The risks and benefits reviewed with the patient.  He is willing to proceed.  Warfarin to be held 5 days before the test.  No indication for heparin bridging.   2.  A 6-minute walk to rule out exercise desaturation and complete primary function tests.  This can be done at [facility].    3.  Follow up in the Pulmonary Hypertension Clinic with the results of above.  Requested that the patient bring along a friend or family member to his next appointment.        It was my pleasure to see this nice man.  I look forward to seeing him again.      cc:   Gabriel Tan MD    [id number] [place] [address]      Vance Brennan MD    [facility] [address], [address]   [place] [address]         [facility] CRISTOBAL MANNING MD             D: 2019   T: 2019   MT: SU      Name:     ELIER BUTLER   MRN:      [id number]        Account:      [id number]   :      1932           Service Date: 2019      Document: [id number]

## 2019-04-30 NOTE — PROGRESS NOTES
Patient scheduled for angiogram on 5//6/19.  Will message Dr. John for directions for lasix and asprin for right and left heart cath.  Patient to hold warfarin x5 day and revatio x 36 hrs.    5/2/19 message from Dr. John:  1.  I would like him to take his Lasix on the morning of the procedure.  I typically do for all my right heart catheterizations.   2.  Okay to take aspirin.   3.  Hold warfarin for 5 days before.  No need for bridging.     Since it is a pulmonary hypertension clinic patient, probably best for Katelyn to coordinate, too.     Thanks   SJ

## 2019-04-30 NOTE — TELEPHONE ENCOUNTER
Patient is scheduled for right and left angiogram on 5/6/19. Patient to start the 5-day coumadin hold on 5/1/19. Attempted to contact patient to confirm this, left message for patient to call back.    Will message Dr. John to confirm pre-procedure use of lasix and aspirin

## 2019-04-30 NOTE — TELEPHONE ENCOUNTER
Spoke with patient, he is aware to take his last warfarin tonight and hold for 5 days, starting 5/1/19 for procedure on 5/6/19.

## 2019-05-03 NOTE — PROGRESS NOTES
Pre-Bronch    Post-Bronch         Actual Pred %Pred  Actual %Pred %Chng       ---- SPIROMETRY ----                          FVC (L) 2.98 4.07 73   3.51 86 +17            FEV1 (L) 2.14 2.94 72   2.39 81 +11            FEV1/FVC (%) 72 73     68   -5            FEV1/SVC (%) 57 60                      FEV1/FEV6 (%) 73 75     68   -6            FEF Max (L/sec) 8.24 6.98 117   9.12 130 +10            FEF 25-75% (L/sec) 1.32 1.93 68   2.12 109 +61            FIVC (L) 2.68       3.21   +20            FIF Max (L/sec) 1.93 6.74 28   4.78 70 +147            Expiratory Time (sec) 9.12       6.59   -27            ----LUNG MECHANICS                           MVV (L/min)   116                      MEP (cmH2O)                          MIP (cmH2O)                          ---- LUNG VOLUMES ----                          SVC (L) 3.75 4.90 76                    IC (L) 2.38 3.74 63                    ERV (L) 1.37 1.15 118                    FRC(Pleth) (L) 4.74 4.03 117                    RV (Pleth) (L) 3.37 3.11 108                    TLC (Pleth) (L) 7.12 7.74 92                    RV/TLC (Pleth) (%) 47 48                      ---- DIFFUSION ----                          DLCOunc (ml/min/mmHg) 25.80 25.67 100                    DLCOcor (ml/min/mmHg)   25.67                      DL/VA (ml/min/mmHg/L) 4.08 3.74                      VA (L) 6.32 6.86 92                    IVC (L) 3.19                        Hgb (gm/dL)   12-18                      ---- BLOOD GASES ----                          FIO2 (%)                          pH   7.40                      PaCO2 (mmHg)   38-42                      PaO2 (mmHg)   71.6                      P(A-a)O2 (mmHg)                          SaO2 (%)                          HCO3 (mEq/L)                          COHb (%)   < 1.5%                      MetHgb (%)   < 1.5%

## 2019-05-03 NOTE — DISCHARGE INSTRUCTIONS
Thank you for completing pulmonary function testing today.  All results will be scanned into your epic results for your doctor to review.  Please resume taking all your current prescribed medications and diet as directed by your provider.   If your have not heard from your provider about your testing with in two weeks and do not have a follow-up appointment scheduled with them please contact your provider about an questions you have concerning your testing.   Thank you  [facility] Pulmonary Function Lab

## 2019-05-06 PROBLEM — Z98.890 STATUS POST CORONARY ANGIOGRAM: Status: ACTIVE | Noted: 2019-01-01

## 2019-05-06 NOTE — PROGRESS NOTES
1330 Report received from Sharon Lo RN.  1345 Pt returned from Cath Lab. A/O. TR band intact to left wrist.  No oozing or hematoma noted. Area soft & flat. Left arm elevated on pillows. Bandaid CDI to right neck puncture site. No oozing or hematoma noted. Area soft & flat. Pt denies pain. Pt's wife at bedside. Detailed update given.  Air released from TR band per protocol.  1440 Report given to Sharon Lo RN.

## 2019-05-06 NOTE — DISCHARGE INSTRUCTIONS
Cardiac Angiogram Discharge Instructions - Neck & Radial    After you go home:      Have an adult stay with you until tomorrow.    Drink extra fluids for 2 days.    You may resume your normal diet.    No smoking       For 24 hours - due to the sedation you received:    Relax and take it easy.    Do NOT make any important or legal decisions.    Do NOT drive or operate machines at home or at work.    Do NOT drink alcohol.    Care of Neck & Wrist Puncture Sites:      For the first 24 hrs - check the puncture site every 1-2 hours while awake.    It is normal to have soreness at the puncture site and mild tingling in your hand for up to 3 days.    Remove the bandaid after 24 hours. If there is minor oozing, apply another bandaid and remove it after 12 hours.    You may shower tomorrow. Do NOT take a bath, or use a hot tub or pool for at least 3 days. Do NOT scrub the site. Do not use lotion or powder near the puncture site.           Activity - For 2 days:    Neck site:    Avoid heavy lifting or the overuse of your shoulder.        Wrist site:    do not use your hand or arm to support your weight (such as rising from a chair)     do not bend your wrist (such as lifting a garage door).    do not lift more than 5 pounds or exercise your arm (such as tennis, golf or bowling).    Do NOT do any heavy activity such as exercise, lifting, or straining.     Bleeding:     Neck site:    If you start bleeding from the site in your neck, sit down and press gently on the site for 10 minutes.     Once bleeding stops, sit still for 2 hours.     Call [facility] as soon as you can    Wrist site:    If you start bleeding from the site in your wrist, sit down and press firmly on/above the site for 10 minutes.     Once bleeding stops, keep arm still for 2 hours.     Call [facility] as soon as you can.    Call 911 right away if you have heavy bleeding or bleeding that does not stop.      Medicines:      Take your medications, including blood  thinners, unless your provider tells you not to.  If you take Coumadin (Warfarin), have your INR checked by your provider in  3-5 days. Call your clinic to schedule this.    If you have stopped any medicines, check with your provider about when to restart them.    Follow Up Appointments:      Follow up with [facility] Nurse Practitioner at [facility] of patient preference in 7-10 days.    Call the clinic if:      You have increased pain or a large or growing hard lump around the site.    The site is red, swollen, hot or tender.    Blood or fluid is draining from the site.    You have chills or a fever greater than 101 F (38 C).    Your arm feels numb, cool or changes color.    You have hives, a rash or unusual itching.    Any questions or concerns.      [facility] [place]:    [phone] [facility] (7 days a week)

## 2019-05-06 NOTE — PROGRESS NOTES
Discharge instructions reviewed and questions answered.  TR band off at 1500.  Site CDI. No hematoma.  Pt ready for discharge.

## 2019-05-06 NOTE — PROGRESS NOTES
Conclusion     1. Pulmonary hypertension with history of right heart failure.  2. Mildly increased right-sided and left-sided filling pressures.  3. Mild mixed pulmonary hypertension with a mean PAP of 31 mmHg. The transpulmonary gradient is 13 and the PVR is 5.1 Woods units.  4. No response in pulmonary artery pressures to nitric oxide.  5. Normal cardiac output of 4.8 L/min and reduced cardiac index of 2.3.  6. No evidence of intracardiac shunt.  7. Separate ostia of the left circumflex from the LAD within the left coronary cusp.  8. Mild nonobstructive coronary artery disease.   9. Normal LVEDP of 15 mmHg with no gradient across the aortic valve on pullback.       Plan     1. Resume warfarin this evening.  2. Bedrest per protocol.  3. Discharge today per protocol.  4. Further management of pulmonary hypertension per Dr. John.  5. Continued medical management and lifestyle modification for cardiovascular risk factor optimization.     ------------------    Results from today's heart cath forwarded to DR John for review. -   Visit w/Ligia PANDYA for post cath visit on 5/13 and then seeing DR John 6/17 in PH clinic.  Katelyn Weaver RN 05/06/19 2:59 PM  _____________      Russell John MD  You 23 hours ago (10:38 AM)      Hi Katelyn     I reviewed his cath.  Please call him and update him that there are no concerning findings, but I would like him to increase his furosemide from 20 mg to 40 mg daily.  I will see him in follow-up with a repeat basic metabolic panel and NT proBNP.     Thank you.   SJ    Routing comment       You  Russell John MD 2 days ago      Cath for review from today 5/6    Routing comment    ____________________    Called to patient with DR John comments and recommendations- left message to call back.  Katelyn Weaver RN 05/08/19 10:38 AM    Call to patient with Dr John comments and recommendations - patient states reluctance to increasing his furosemide stating he is  feeling fine, has a stable weight with same weight he was in high school, has a swollen leg but this is the leg he broke last year and notes no other swelling. Reviewed with patient Right Heart Catheterization results. Patient states he would like to further discuss with Ligia PANDYA at visit on 5/13/2019 prior to agreeing to increase in diuretic.   Katelyn Weaver RN 05/08/19 11:22 AM

## 2019-05-06 NOTE — PROGRESS NOTES
Pt arrived ~0830.  VSS. Afebrile.  IV started and labs collected.  Admission complete.  Pt spouse at bedside and updated.  MD spoke with the pt and spouse.  Consent signed and pt ready for the procedure. 0950back for the procedure ~0950.

## 2019-05-06 NOTE — Clinical Note
Potential access sites were evaluated for patency using ultrasound.   The left radial artery was selected. Access was obtained under with Sonosite guidance using a standard 18 guage needle with direct visualization of needle entry.

## 2019-05-10 NOTE — TELEPHONE ENCOUNTER
"Requested Prescriptions   Pending Prescriptions Disp Refills     furosemide (LASIX) 20 MG tablet [Pharmacy Med Name: FUROSEMIDE 20MG     TAB] 90 tablet 1     Sig: TAKE 1 TABLET BY MOUTH TWICE DAILY   Last Written Prescription Date:  4/2/19  Last Fill Quantity: 90,  # refills: 1   Last office visit: 9/11/2018 with prescribing provider:     Future Office Visit:   Next 5 appointments (look out 90 days)    May 13, 2019  3:10 PM CDT  Return Visit with YA Atkins  [facility] ([facility]) [address] [place] [id number] [phone] OPT 2             Diuretics (Including Combos) Protocol Passed - 5/8/2019  8:13 AM        Passed - Blood pressure under 140/90 in past 12 months     BP Readings from Last 3 Encounters:   05/06/19 127/72   04/29/19 122/60   04/02/19 116/66           Passed - Recent (12 mo) or future (30 days) visit within the authorizing provider's specialty     Patient had office visit in the last 12 months or has a visit in the next 30 days with authorizing provider or within the authorizing provider's specialty.  See \"Patient Info\" tab in inbasket, or \"Choose Columns\" in Meds & Orders section of the refill encounter.          Passed - Medication is active on med list        Passed - Patient is age 18 or older        Passed - Normal serum creatinine on file in past 12 months     Recent Labs   Lab Test 05/06/19  0903   CR 1.04           Passed - Normal serum potassium on file in past 12 months     Recent Labs   Lab Test 05/06/19  0903   POTASSIUM 4.0            Passed - Normal serum sodium on file in past 12 months     Recent Labs   Lab Test 05/06/19  0903             Prescription approved per [facility] Refill Protocol.  Alejandrina Chino RN      "

## 2019-05-10 NOTE — PROGRESS NOTES
ANTICOAGULATION FOLLOW-UP CLINIC VISIT    Patient Name:  Tomer Weiss  Date:  5/10/2019  Contact Type:  Face to Face    SUBJECTIVE:  Patient Findings     Positives:   Missed doses (Pt was off coumadin, restarted mon for procedure.)    Comments:   Took 5 mg all week        Clinical Outcomes     Comments:   Took 5 mg all week           OBJECTIVE    INR Protime   Date Value Ref Range Status   05/10/2019 1.6 (A) 0.86 - 1.14 Final     Chromogenic Factor 10   Date Value Ref Range Status   2015 21 (L) 70 - 130 % Final     Comment:     Therapeutic Range:  A Chromogenic Factor 10 level of approximately 20-40%   inversely correlates with an INR of 2-3 for patients receiving Warfarin.   Chromogenic Factor 10 levels below 20% indicate an INR greater than 3 and   levels above 40% indicate an INR less than 2.         ASSESSMENT / PLAN  INR assessment SUB    Recheck INR In: 1 WEEK    INR Location Clinic      Anticoagulation Summary  As of 5/10/2019    INR goal:   2.0-3.0   TTR:   67.3 % (2.9 y)   INR used for dosin.6! (5/10/2019)   Warfarin maintenance plan:   2.5 mg (5 mg x 0.5) every Tue, Fri; 5 mg (5 mg x 1) all other days   Full warfarin instructions:   5/10: 5 mg; : 5 mg; Otherwise 2.5 mg every Tue, Fri; 5 mg all other days   Weekly warfarin total:   30 mg   Plan last modified:   Tana Pérez, JUANY (2019)   Next INR check:   2019   Target end date:       Indications    Long term current use of anticoagulant therapy [Z79.01]  Chronic atrial fibrillation (H) [I48.2]             Anticoagulation Episode Summary     INR check location:       Preferred lab:       Send INR reminders to:   KRYSTAL ABDI    Comments:   5 mg tablets, AM dose, AVS      Anticoagulation Care Providers     Provider Role Specialty Phone number    Gabriel Tan MD [facility] [phone]            See the Encounter Report to view Anticoagulation Flowsheet and Dosing Calendar (Go to Encounters tab in chart  review, and find the Anticoagulation Therapy Visit)    Dosage adjustment made based on physician directed care plan.    Tana Pérez RN

## 2019-05-13 NOTE — PROGRESS NOTES
Cardiology Progress Note    Date of Service: 05/13/2019      Reason for visit: Follow up right and left heart catheterization.     Primary cardiologist: Dr. Vance Brennan      HPI:  Mr. Weiss is a pleasant 87 year old male with a PMhx including polymyalgia rheumatica, renal cancer, atrial flutter s/p ablation in 2015, and chronic atrial fibrillation. He is followed historically by Dr. Brennan at [facility], but was referred to Dr. John for evaluation of progressive right heart enlargement and pulmonary hypertension noted on echocardiogram. Last year, after fracturing his femur, his low dose lasix was held and he then developed significant swelling/weight gain which resolved after resuming his diuretic. His last echo in April 2019 showed his RV was mildly dilated with mildly reduced function. His IVC was markedly dilated, as were his pulmonary arteries. LV function has remained normal with EF 55-60%. He has severe biatrial enlargement and bubble study was negative. Ultimately, a right and left heart catheterization were recommended, and he is here today for follow up of these results.    He was found to have minimal nonobstructive coronary disease, with greatest being 20% stenosis. He had mixed pulmonary hypertension with a mean PAP of 31mmHg, and mildly increased filling pressures with PCWP reported at 20mmHg. His LVEDP was recorded as 15mmHg. PVR was calculated at 5.1 wood units and he had a mildly reduced CI of 2.3LPM (via Sadie). There was no evidence of intracardiac shunting and he had a negative vasodilator study. It was recommended via phone that he increase his lasix dose, but as he was feeling well, he wanted to wait until our visit today to discuss further.    He tells me that overall he has been feeling quite well. He denies any significant MERCEDES. 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 In fact, he tells me he was in a bit of a hurry today as he wanted to get home and start  planing some more trees before dark. He denies any chest pain or palpitations. He does not believe he has had any leg edema and while he doesn't weigh daily, he tells me he is the same weight currently that he was in high school when he played football. He notes one recent episode of mild dizziness that resolved after sitting and drinking some water. He has not had syncope. Cath sites are healing well with minimal bruising and no evidence of bleeding or infection. Today his BP is mildly elevated at 136/75, and he remains in rate controlled afib in the mid 80s for heart rates.     He has had some other recent testing done as well. A six min walk was performed last week, and he ambulated 1150 feet with no issues. He had no evidence of desaturation on room air. He also had PFTs done which showed normal spirometry and lung volumes.  Of note, he has also had a VQ scan last year to rule out chronic thromboembolic disease.      ASSESSMENT/PLAN:    1. Mixed pulmonary hypertension.   --RHC showed mildly elevated mPAP of 31mmHg, and mildly increased filling pressures with PCWP reported at 20mmHg with a Vwave of 18mmHg. His LVEDP was slightly lower and recorded as 15mmHg. PVR was calculated at 5.1 wood units and he had a mildly reduced CI of 2.3LPM (via Sadie).    --We had a long discussion today about a trial of increased diuretic which he remains hesitant about. He thinks that functionally he is doing well, does not feel he is retaining fluid, and is worried he will become dehydrated as historically he does not drink much water. He is willing to try increasing every other day first and see how he responds to this. Thus, will alternate his Lasix at 20mg/40mg. He does not weigh daily and is unlikely to do so, but will call with any concerns. Check BMP in ~2 weeks.     2. Chronic atrial fibrillation.   --Chronically rate controlled, continue Toprol XL 12.5mg daily.    --He is on anticoagulation with warfarin and follows with the  INR clinic.     Follow up plan: In 1 month with Dr. Russell John ([place]) as already scheduled. Then he would like to continue to follow with Dr. Brennan in [place] as it is much closer to his home.         Orders this Visit:  Orders Placed This Encounter   Procedures     Basic metabolic panel     Orders Placed This Encounter   Medications     furosemide (LASIX) 20 MG tablet     Sig: Take 40mg daily alternating with 20mg every other day     Dispense:  90 tablet     Refill:  0         CURRENT MEDICATIONS:  Current Outpatient Medications   Medication Sig Dispense Refill     acetaminophen (TYLENOL) 325 MG tablet Take 2 tablets (650 mg) by mouth every 4 hours as needed for other (multimodal surgical pain management along with NSAIDS and opioid medication as indicated based on pain control and physical function.) 100 tablet      atorvastatin (LIPITOR) 20 MG tablet Take 1 tablet (20 mg) by mouth daily 90 tablet 0     diphenhydrAMINE (BENADRYL) 25 MG capsule Take 25 mg by mouth every 6 hours as needed for itching or allergies       ferrous sulfate (IRON) 325 (65 FE) MG tablet Take 1 tablet (325 mg) by mouth 2 times daily (with meals) 100 tablet      furosemide (LASIX) 20 MG tablet Take 40mg daily alternating with 20mg every other day 90 tablet 0     GLUCOSAMINE 500 MG OR CAPS 2 Tablets every morning       melatonin 1 MG TABS tablet Take 5 mg by mouth At Bedtime        metoprolol succinate ER (TOPROL-XL) 25 MG 24 hr tablet TAKE 1/2 (ONE-HALF) TABLET BY MOUTH ONCE DAILY 45 tablet 1     sildenafil (REVATIO) 20 MG tablet Take 1-2 tablets before intercourse. Never use with nitroglycerin, terazosin or doxazosin. 12 tablet 3     warfarin (COUMADIN) 5 MG tablet Take 2.5 mg (1/2 tab) Tues and Fri and 5 mg (1 tab) all other days or as directed by coumadin clinic 30 tablet 1     warfarin (COUMADIN) 5 MG tablet Take 2.5 mg Tues, Fri and 5 mg all other days, or as directed by the Coumadin Clinic. 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     Other insomnia 3/12/2018     Polymyalgia rheumatica (H) 6/28/2005     Pulmonary hypertension (H) 3/1/2018     Thoracic aortic aneurysm without rupture (H) 7/5/2018       PAST SURGICAL HISTORY:  Past Surgical History:   Procedure Laterality Date     ARTHROPLASTY KNEE Left 3/14/2016    Procedure: ARTHROPLASTY KNEE;  Surgeon: Deonte Silver MD;  Location: [facility]     COLONOSCOPY  7/17/2013    Procedure: COMBINED COLONOSCOPY, SINGLE BIOPSY/POLYPECTOMY BY BIOPSY;  Colonoscopy, with polypectomy by biopsy;  Surgeon: Fernando Mario MD;  Location: [facility]     CV CORONARY ANGIOGRAM N/A 5/6/2019    Procedure: Coronary Angiogram;  Surgeon: Hemal Nunez MD;  Location:  HEART CARDIAC CATH LAB     CV LEFT HEART CATH N/A 5/6/2019    Procedure: Left Heart Cath;  Surgeon: Hemal Nunez MD;  Location:  HEART CARDIAC CATH LAB     CV RIGHT HEART CATH N/A 5/6/2019    Procedure: Right Heart Cath;  Surgeon: Hemal Nunez MD;  Location:  HEART CARDIAC CATH LAB     CYSTOSCOPY, LITHOLAPAXY, COMBINED N/A 2/26/2015    Procedure: COMBINED CYSTOSCOPY, LITHOLAPAXY;  Surgeon: Orlando Marr MD;  Location: [facility]     CYSTOSCOPY, LITHOLAPAXY, COMBINED N/A 2/11/2015    Procedure: COMBINED CYSTOSCOPY, LITHOLAPAXY;  Surgeon: Orlando Marr MD;  Location: [facility]     CYSTOSCOPY, RETROGRADES, EXTRACT STONE, COMBINED N/A 2/8/2018    Procedure: COMBINED CYSTOSCOPY, RETROGRADES, EXTRACT STONE;  cystoscopy, bladder stone removal;  Surgeon:  Orlando Marr MD;  Location: [facility]     H ABLATION ATRIAL FLUTTER  4/18/15    atypical a flutter ablation & Ablation of PACs from the SVC-RA junction     HC ABLATION RENAL TUMOR PERCUT CRYOTHERAPY UNILATERAL  6/17/10    Right renal mass     LASER HOLMIUM LITHOTRIPSY BLADDER N/A 2015    Procedure: LASER HOLMIUM LITHOTRIPSY BLADDER;  Surgeon: Orlando Marr MD;  Location: [facility]     LASER KTP GREEN LIGHT PHOTOSELECTIVE VAPORIZATION PROSTATE N/A 2015    Procedure: LASER KTP GREEN LIGHT PHOTOSELECTIVE VAPORIZATION PROSTATE;  Surgeon: Orlando Marr MD;  Location: [facility]     OPEN REDUCTION INTERNAL FIXATION FEMUR DISTAL Left 3/3/2018    Procedure: OPEN REDUCTION INTERNAL FIXATION FEMUR DISTAL;  Open Reduction Internal Fixation Left Femur;  Surgeon: Mason Parra MD;  Location: [facility]       FAMILY HISTORY:  Family History   Problem Relation Age of Onset     Cancer Mother         breast     Hypertension Mother      Alzheimer Disease Mother          at age 96.       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   Social Needs     Financial resource strain: None     Food insecurity:     Worry: None     Inability: None     Transportation needs:     Medical: None     Non-medical: None   Tobacco Use     Smoking status: Never Smoker     Smokeless tobacco: Never Used   Substance and Sexual Activity     Alcohol use: No     Alcohol/week: 0.0 oz     Drug use: No     Sexual activity: Yes     Partners: Female   Lifestyle     Physical activity:     Days per week: None     Minutes per session: None     Stress: None   Relationships     Social connections:     Talks on phone: None     Gets together: None     Attends Yarsani service: None     Active member of club or organization:  "None     Attends meetings of clubs or organizations: None     Relationship status: None     Intimate partner violence:     Fear of current or ex partner: None     Emotionally abused: None     Physically abused: None     Forced sexual activity: None   Other Topics Concern     Parent/sibling w/ CABG, MI or angioplasty before 65F 55M? Not Asked   Social History Narrative     None       Review of Systems:  Cardiovascular: negative for chest pain, palpitations, orthopnea,  LE edema  Constitutional: negative for chills, sweats, fevers   Resp: Negative for dyspnea at rest, dyspnea on exertion, cough, known chronic lung disease  HEENT: Negative for new visual changes, frequent headaches  Gastrointestinal: negative for abdominal pain, diarrhea, nausea, vomiting  Hematologic/lymphatic: pos for current systemic anticoagulation, neg hx of blood clots  Musculoskeletal: negative for new back pain, pos leg pain/recent fracture.  Neurological: negative for focal weakness, LOC, seizures, syncope/presyncope. Pos one episode dizziness.      Physical Exam:  Vitals: /75   Pulse 88   Ht 1.854 m (6' 1\")   Wt 85.5 kg (188 lb 8 oz)   BMI 24.87 kg/m     Wt Readings from Last 4 Encounters:   05/13/19 85.5 kg (188 lb 8 oz)   05/06/19 85.2 kg (187 lb 14.4 oz)   04/29/19 87.3 kg (192 lb 6.4 oz)   04/02/19 85.4 kg (188 lb 3.2 oz)       GEN:  In general, this is a well nourished  male in no acute distress on room air.  Patient ambulatory, unaccompanied today.  HEENT:  Pupils equal, round. Sclerae nonicteric.   NECK: Supple, no masses appreciated. Trachea midline. Mild JVD.   C/V:  Irregular rhythm, with no murmur, rub or gallop. No S3 or RV heave.   RESP: Respirations are unlabored. No use of accessory muscles. Clear to auscultation bilaterally without wheezing, rales, or rhonchi.  GI: Abdomen soft, nontender, nondistended. No HSM appreciated.   EXTREM: Trace PT edema, does have compression socks in place. No cyanosis or " clubbing.  NEURO: Alert and oriented, cooperative. Gait not formally assessed. No obvious focal deficits.   PSYCH: Normal affect.  SKIN: Warm and dry. No rashes or petechiae appreciated.       Recent Lab Results:  LIPID RESULTS:  Lab Results   Component Value Date    CHOL 135 04/04/2019    HDL 54 04/04/2019    LDL 66 04/04/2019    TRIG 73 04/04/2019         CBC RESULTS:  Lab Results   Component Value Date    WBC 4.9 05/06/2019    RBC 4.06 (L) 05/06/2019    HGB 13.0 (L) 05/06/2019    HCT 39.6 (L) 05/06/2019    MCV 98 05/06/2019    MCH 32.0 05/06/2019    MCHC 32.8 05/06/2019    RDW 14.3 05/06/2019     05/06/2019       BMP RESULTS:  Lab Results   Component Value Date     05/06/2019    POTASSIUM 4.0 05/06/2019    CHLORIDE 107 05/06/2019    CO2 29 05/06/2019    ANIONGAP 6 05/06/2019    GLC 86 05/06/2019    BUN 29 05/06/2019    CR 1.04 05/06/2019    GFRESTIMATED 64 05/06/2019    GFRESTBLACK 74 05/06/2019    KARTHIK 8.5 05/06/2019        INR RESULTS:  Lab Results   Component Value Date    INR 1.6 (A) 05/10/2019    INR 1.19 (H) 05/06/2019    INR 2.0 (A) 03/29/2019    INR 1.93 (H) 03/06/2018         Additional pertinent testing:  RHC: 5/6/19  RA  A-wave: 11 mmHg  V-wave: 17 mmHg  Mean: 13 mmHg   HR: 71 bpm  RV  Systolic: 50 mmHg  End Diastolic: 9 mmHg  HR: 75 bpm    PA  Systolic:56 mmHg  Diasotlic: 23 mmHg  Mean: 31 mmHg  HR: 78 bpm      PCW  A-wave: 20 mmHg  V-wave: 18 mmHg  Mean: 18 mmHg  HR: 77 bpm      Cardiac Output  CO Sadie: 4.76 L/min  CI Sadie: 2.28 L/min/m2  CO TD:   CI TD:     Vitals  BP: 139 mmHg / 70 mmHg  SpO2: 95 %  PA Stat: 62 %      Resistance Metric  SVR: 1208.67 dsc-5  PVR MOORE: 5.09 MOORE/m2    Nitric Oxide 40 ppm  PA 58/20/33    Nitric Oxide 80 ppm  PA 58/20/32    SVC sat 58.0%  RA sat 61.0%  RV sat 61.0%  PA sat 62.0%      LVEDP 15 mmHg        Ligia Alaniz PA-C  [facility]  Pager [phone]

## 2019-05-13 NOTE — LETTER
5/13/2019    Gabriel Tan MD  [address] [facility] [address]    RE: Tomer Weiss       Dear Colleague,    I had the pleasure of seeing Tomer Weiss in the [facility].      Cardiology Progress Note    Date of Service: 05/13/2019      Reason for visit: Follow up right and left heart catheterization.     Primary cardiologist: Dr. Vance Brennan      HPI:  Mr. Weiss is a pleasant 87 year old male with a PMhx including polymyalgia rheumatica, renal cancer, atrial flutter s/p ablation in 2015, and chronic atrial fibrillation. He is followed historically by Dr. Brennan at [facility], but was referred to Dr. John for evaluation of progressive right heart enlargement and pulmonary hypertension noted on echocardiogram. Last year, after fracturing his femur, his low dose lasix was held and he then developed significant swelling/weight gain which resolved after resuming his diuretic. His last echo in April 2019 showed his RV was mildly dilated with mildly reduced function. His IVC was markedly dilated, as were his pulmonary arteries. LV function has remained normal with EF 55-60%. He has severe biatrial enlargement and bubble study was negative. Ultimately, a right and left heart catheterization were recommended, and he is here today for follow up of these results.    He was found to have minimal nonobstructive coronary disease, with greatest being 20% stenosis. He had mixed pulmonary hypertension with a mean PAP of 31mmHg, and mildly increased filling pressures with PCWP reported at 20mmHg. His LVEDP was recorded as 15mmHg. PVR was calculated at 5.1 wood units and he had a mildly reduced CI of 2.3LPM (via Sadie). There was no evidence of intracardiac shunting and he had a negative vasodilator study. It was recommended via phone that he increase his lasix dose, but as he was feeling well, he wanted to wait until our visit today to discuss further.    He  tells me that overall he has been feeling quite well. He denies any significant MERCEDES. 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 In fact, he tells me he was in a bit of a hurry today as he wanted to get home and start planing some more trees before dark. He denies any chest pain or palpitations. He does not believe he has had any leg edema and while he doesn't weigh daily, he tells me he is the same weight currently that he was in high school when he played football. He notes one recent episode of mild dizziness that resolved after sitting and drinking some water. He has not had syncope. Cath sites are healing well with minimal bruising and no evidence of bleeding or infection. Today his BP is mildly elevated at 136/75, and he remains in rate controlled afib in the mid 80s for heart rates.     He has had some other recent testing done as well. A six min walk was performed last week, and he ambulated 1150 feet with no issues. He had no evidence of desaturation on room air. He also had PFTs done which showed normal spirometry and lung volumes.  Of note, he has also had a VQ scan last year to rule out chronic thromboembolic disease.      ASSESSMENT/PLAN:    1. Mixed pulmonary hypertension.   --RHC showed mildly elevated mPAP of 31mmHg, and mildly increased filling pressures with PCWP reported at 20mmHg with a Vwave of 18mmHg. His LVEDP was slightly lower and recorded as 15mmHg. PVR was calculated at 5.1 wood units and he had a mildly reduced CI of 2.3LPM (via Sadie).    --We had a long discussion today about a trial of increased diuretic which he remains hesitant about. He thinks that functionally he is doing well, does not feel he is retaining fluid, and is worried he will become dehydrated as historically he does not drink much water. He is willing to try increasing every other day first and see how he responds to this. Thus, will alternate his Lasix at 20mg/40mg. He does not weigh daily and is  unlikely to do so, but will call with any concerns. Check BMP in ~2 weeks.     2. Chronic atrial fibrillation.   --Chronically rate controlled, continue Toprol XL 12.5mg daily.    --He is on anticoagulation with warfarin and follows with the INR clinic.     Follow up plan: In 1 month with Dr. Russell John ([place]) as already scheduled. Then he would like to continue to follow with Dr. Brennan in [place] as it is much closer to his home.         Orders this Visit:  Orders Placed This Encounter   Procedures     Basic metabolic panel     Orders Placed This Encounter   Medications     furosemide (LASIX) 20 MG tablet     Sig: Take 40mg daily alternating with 20mg every other day     Dispense:  90 tablet     Refill:  0         CURRENT MEDICATIONS:  Current Outpatient Medications   Medication Sig Dispense Refill     acetaminophen (TYLENOL) 325 MG tablet Take 2 tablets (650 mg) by mouth every 4 hours as needed for other (multimodal surgical pain management along with NSAIDS and opioid medication as indicated based on pain control and physical function.) 100 tablet      atorvastatin (LIPITOR) 20 MG tablet Take 1 tablet (20 mg) by mouth daily 90 tablet 0     diphenhydrAMINE (BENADRYL) 25 MG capsule Take 25 mg by mouth every 6 hours as needed for itching or allergies       ferrous sulfate (IRON) 325 (65 FE) MG tablet Take 1 tablet (325 mg) by mouth 2 times daily (with meals) 100 tablet      furosemide (LASIX) 20 MG tablet Take 40mg daily alternating with 20mg every other day 90 tablet 0     GLUCOSAMINE 500 MG OR CAPS 2 Tablets every morning       melatonin 1 MG TABS tablet Take 5 mg by mouth At Bedtime        metoprolol succinate ER (TOPROL-XL) 25 MG 24 hr tablet TAKE 1/2 (ONE-HALF) TABLET BY MOUTH ONCE DAILY 45 tablet 1     sildenafil (REVATIO) 20 MG tablet Take 1-2 tablets before intercourse. Never use with nitroglycerin, terazosin or doxazosin. 12 tablet 3     warfarin (COUMADIN) 5 MG tablet Take 2.5 mg (1/2 tab) Tues and  Fri and 5 mg (1 tab) all other days or as directed by coumadin clinic 30 tablet 1     warfarin (COUMADIN) 5 MG tablet Take 2.5 mg Tues, Fri and 5 mg all other days, or as directed by the Coumadin Clinic. 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     Other insomnia 3/12/2018     Polymyalgia rheumatica (H) 6/28/2005     Pulmonary hypertension (H) 3/1/2018     Thoracic aortic aneurysm without rupture (H) 7/5/2018       PAST SURGICAL HISTORY:  Past Surgical History:   Procedure Laterality Date     ARTHROPLASTY KNEE Left 3/14/2016    Procedure: ARTHROPLASTY KNEE;  Surgeon: Deonte Silver MD;  Location: [facility] OR     COLONOSCOPY  7/17/2013    Procedure: COMBINED COLONOSCOPY, SINGLE BIOPSY/POLYPECTOMY BY BIOPSY;  Colonoscopy, with polypectomy by biopsy;  Surgeon: Fernando Mario MD;  Location: [facility] GI     CV CORONARY ANGIOGRAM N/A 5/6/2019    Procedure: Coronary Angiogram;  Surgeon: Hemal Nunez MD;  Location:  HEART CARDIAC CATH LAB     CV LEFT HEART CATH N/A 5/6/2019    Procedure: Left Heart Cath;  Surgeon: Hemal Nunez MD;  Location:  HEART CARDIAC CATH LAB     CV RIGHT HEART CATH N/A 5/6/2019    Procedure: Right Heart Cath;  Surgeon: Hemal Nunez MD;  Location:  HEART CARDIAC CATH LAB     CYSTOSCOPY, LITHOLAPAXY, COMBINED N/A 2/26/2015    Procedure: COMBINED CYSTOSCOPY, LITHOLAPAXY;  Surgeon: Orlando Marr MD;  Location: [facility]     CYSTOSCOPY, LITHOLAPAXY, COMBINED N/A 2/11/2015    Procedure: COMBINED CYSTOSCOPY,  LITHOLAPAXY;  Surgeon: Orlando Marr MD;  Location: [facility]     CYSTOSCOPY, RETROGRADES, EXTRACT STONE, COMBINED N/A 2018    Procedure: COMBINED CYSTOSCOPY, RETROGRADES, EXTRACT STONE;  cystoscopy, bladder stone removal;  Surgeon: Orlando Marr MD;  Location: [facility]     H ABLATION ATRIAL FLUTTER  4/18/15    atypical a flutter ablation & Ablation of PACs from the SVC-RA junction     HC ABLATION RENAL TUMOR PERCUT CRYOTHERAPY UNILATERAL  6/17/10    Right renal mass     LASER HOLMIUM LITHOTRIPSY BLADDER N/A 2015    Procedure: LASER HOLMIUM LITHOTRIPSY BLADDER;  Surgeon: Orlando Marr MD;  Location: [facility]     LASER KTP GREEN LIGHT PHOTOSELECTIVE VAPORIZATION PROSTATE N/A 2015    Procedure: LASER KTP GREEN LIGHT PHOTOSELECTIVE VAPORIZATION PROSTATE;  Surgeon: Orlando Marr MD;  Location: [facility]     OPEN REDUCTION INTERNAL FIXATION FEMUR DISTAL Left 3/3/2018    Procedure: OPEN REDUCTION INTERNAL FIXATION FEMUR DISTAL;  Open Reduction Internal Fixation Left Femur;  Surgeon: Mason Parra MD;  Location: [facility]       FAMILY HISTORY:  Family History   Problem Relation Age of Onset     Cancer Mother         breast     Hypertension Mother      Alzheimer Disease Mother          at age 96.       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   Social Needs     Financial resource strain: None     Food insecurity:     Worry: None     Inability: None     Transportation needs:     Medical: None     Non-medical: None   Tobacco Use     Smoking status: Never Smoker     Smokeless tobacco: Never Used   Substance and Sexual Activity     Alcohol use: No     Alcohol/week: 0.0 oz     Drug use: No     Sexual activity: Yes     Partners: Female   Lifestyle     Physical activity:     " Days per week: None     Minutes per session: None     Stress: None   Relationships     Social connections:     Talks on phone: None     Gets together: None     Attends [employer] service: None     Active member of club or organization: None     Attends meetings of clubs or organizations: None     Relationship status: None     Intimate partner violence:     Fear of current or ex partner: None     Emotionally abused: None     Physically abused: None     Forced sexual activity: None   Other Topics Concern     Parent/sibling w/ CABG, MI or angioplasty before 65F 55M? Not Asked   Social History Narrative     None       Review of Systems:  Cardiovascular: negative for chest pain, palpitations, orthopnea,  LE edema  Constitutional: negative for chills, sweats, fevers   Resp: Negative for dyspnea at rest, dyspnea on exertion, cough, known chronic lung disease  HEENT: Negative for new visual changes, frequent headaches  Gastrointestinal: negative for abdominal pain, diarrhea, nausea, vomiting  Hematologic/lymphatic: pos for current systemic anticoagulation, neg hx of blood clots  Musculoskeletal: negative for new back pain, pos leg pain/recent fracture.  Neurological: negative for focal weakness, LOC, seizures, syncope/presyncope. Pos one episode dizziness.      Physical Exam:  Vitals: /75   Pulse 88   Ht 1.854 m (6' 1\")   Wt 85.5 kg (188 lb 8 oz)   BMI 24.87 kg/m      Wt Readings from Last 4 Encounters:   05/13/19 85.5 kg (188 lb 8 oz)   05/06/19 85.2 kg (187 lb 14.4 oz)   04/29/19 87.3 kg (192 lb 6.4 oz)   04/02/19 85.4 kg (188 lb 3.2 oz)       GEN:  In general, this is a well nourished  male in no acute distress on room air.  Patient ambulatory, unaccompanied today.  HEENT:  Pupils equal, round. Sclerae nonicteric.   NECK: Supple, no masses appreciated. Trachea midline. Mild JVD.   C/V:  Irregular rhythm, with no murmur, rub or gallop. No S3 or RV heave.   RESP: Respirations are unlabored. No use of " accessory muscles. Clear to auscultation bilaterally without wheezing, rales, or rhonchi.  GI: Abdomen soft, nontender, nondistended. No HSM appreciated.   EXTREM: Trace PT edema, does have compression socks in place. No cyanosis or clubbing.  NEURO: Alert and oriented, cooperative. Gait not formally assessed. No obvious focal deficits.   PSYCH: Normal affect.  SKIN: Warm and dry. No rashes or petechiae appreciated.       Recent Lab Results:  LIPID RESULTS:  Lab Results   Component Value Date    CHOL 135 04/04/2019    HDL 54 04/04/2019    LDL 66 04/04/2019    TRIG 73 04/04/2019         CBC RESULTS:  Lab Results   Component Value Date    WBC 4.9 05/06/2019    RBC 4.06 (L) 05/06/2019    HGB 13.0 (L) 05/06/2019    HCT 39.6 (L) 05/06/2019    MCV 98 05/06/2019    MCH 32.0 05/06/2019    MCHC 32.8 05/06/2019    RDW 14.3 05/06/2019     05/06/2019       BMP RESULTS:  Lab Results   Component Value Date     05/06/2019    POTASSIUM 4.0 05/06/2019    CHLORIDE 107 05/06/2019    CO2 29 05/06/2019    ANIONGAP 6 05/06/2019    GLC 86 05/06/2019    BUN 29 05/06/2019    CR 1.04 05/06/2019    GFRESTIMATED 64 05/06/2019    GFRESTBLACK 74 05/06/2019    KARTHIK 8.5 05/06/2019        INR RESULTS:  Lab Results   Component Value Date    INR 1.6 (A) 05/10/2019    INR 1.19 (H) 05/06/2019    INR 2.0 (A) 03/29/2019    INR 1.93 (H) 03/06/2018         Additional pertinent testing:  RHC: 5/6/19  RA  A-wave: 11 mmHg  V-wave: 17 mmHg  Mean: 13 mmHg   HR: 71 bpm  RV  Systolic: 50 mmHg  End Diastolic: 9 mmHg  HR: 75 bpm    PA  Systolic:56 mmHg  Diasotlic: 23 mmHg  Mean: 31 mmHg  HR: 78 bpm      PCW  A-wave: 20 mmHg  V-wave: 18 mmHg  Mean: 18 mmHg  HR: 77 bpm      Cardiac Output  CO Sadie: 4.76 L/min  CI Sadie: 2.28 L/min/m2  CO TD:   CI TD:     Vitals  BP: 139 mmHg / 70 mmHg  SpO2: 95 %  PA Stat: 62 %      Resistance Metric  SVR: 1208.67 dsc-5  PVR MOORE: 5.09 MOORE/m2    Nitric Oxide 40 ppm  PA 58/20/33    Nitric Oxide 80 ppm  PA 58/20/32    SVC sat  58.0%  RA sat 61.0%  RV sat 61.0%  PA sat 62.0%      LVEDP 15 mmHg        Ligia Alaniz PA-C  [facility]  Pager [phone]      Thank you for allowing me to participate in the care of your patient.      Sincerely,     YA Atkins     [facility]

## 2019-05-13 NOTE — PATIENT INSTRUCTIONS
Visit Summary:    Today we discussed:   We will try to increase you water pill slightly.  Take your Lasix 20mg alternating with 40mg every other day.    Call if you note dizziness or not feeling well. We can adjust further.    Will plan for labs in ~2 weeks.     Follow up:   With Dr Jhon in June as scheduled.     Please call my nurse Katelyn at [phone] with any questions or concerns.

## 2019-05-17 NOTE — PROGRESS NOTES
ANTICOAGULATION FOLLOW-UP CLINIC VISIT    Patient Name:  Tomer Weiss  Date:  2019  Contact Type:  Telephone    SUBJECTIVE:  Patient Findings     Comments:   The patient was assessed for diet, medication, and activity level changes, missed or extra doses, bruising or bleeding, with no problem findings.          Clinical Outcomes     Negatives:   Major bleeding event, Thromboembolic event, Anticoagulation-related hospital admission, Anticoagulation-related ED visit, Anticoagulation-related fatality    Comments:   The patient was assessed for diet, medication, and activity level changes, missed or extra doses, bruising or bleeding, with no problem findings.             OBJECTIVE    INR Point of Care   Date Value Ref Range Status   2019 2.3 (H) 0.86 - 1.14 Final     Comment:     This test is intended for monitoring Coumadin therapy.  Results are not   accurate in patients with prolonged INR due to factor deficiency.       Chromogenic Factor 10   Date Value Ref Range Status   2015 21 (L) 70 - 130 % Final     Comment:     Therapeutic Range:  A Chromogenic Factor 10 level of approximately 20-40%   inversely correlates with an INR of 2-3 for patients receiving Warfarin.   Chromogenic Factor 10 levels below 20% indicate an INR greater than 3 and   levels above 40% indicate an INR less than 2.         ASSESSMENT / PLAN  INR assessment THER    Recheck INR In: 5 WEEKS    INR Location Outside lab      Anticoagulation Summary  As of 2019    INR goal:   2.0-3.0   TTR:   67.1 % (2.9 y)   INR used for dosin.3 (2019)   Warfarin maintenance plan:   2.5 mg (5 mg x 0.5) every Tue, Fri; 5 mg (5 mg x 1) all other days   Full warfarin instructions:   2.5 mg every Tue, Fri; 5 mg all other days   Weekly warfarin total:   30 mg   No change documented:   Erin Mario, RN   Plan last modified:   Tana Pérez RN (2019)   Next INR check:   2019   Target end date:       Indications     Long term current use of anticoagulant therapy [Z79.01]  Chronic atrial fibrillation (H) [I48.2]             Anticoagulation Episode Summary     INR check location:       Preferred lab:       Send INR reminders to:   KRYSTAL ABDI    Comments:   5 mg tablets, AM dose, AVS      Anticoagulation Care Providers     Provider Role Specialty Phone number    Gabriel Tan MD [facility] Internal Medicine [phone]            See the Encounter Report to view Anticoagulation Flowsheet and Dosing Calendar (Go to Encounters tab in chart review, and find the Anticoagulation Therapy Visit)    Dosage adjustment made based on physician directed care plan.    Erin Mario RN

## 2019-06-17 NOTE — PROGRESS NOTES
Subjective     Tomer Weiss is a 87 year old male who presents to clinic today for the following health issues:    HPI   Joint Pain    Onset: 5 days    Description:   Location: right wrist  Character: dull ache until he flexes it then becomes more painful    Intensity: 3/10    Progression of Symptoms: better    Accompanying Signs & Symptoms:  Other symptoms: swelling    History:   Previous similar pain: YES      Precipitating factors:   Trauma or overuse: no     Alleviating factors:  Improved by: ice and acetaminophen    Therapies Tried and outcome: ice and tylenol    Patient presents today for pain and swelling affecting his right wrist. He reports symptoms started as a dull ache about 5 days ago. He reports yesterday he noticed more swelling and redness about the joint. It is somewhat warm to the touch. He reports the swelling is a little better today. He reports pain worsens with flexing the joint. He reports he is active but no acute injury. Unsure if he has had gout in the past.     Patient Active Problem List   Diagnosis     Cervicalgia     Unspecified hyperplasia of prostate with urinary obstruction and other lower urinary tract symptoms (LUTS)     Malignant neoplasm of kidney excluding renal pelvis (H)     Advanced directives, counseling/discussion     Total knee replacement status     Anemia     Long term current use of anticoagulant therapy     Chronic atrial fibrillation (H)     Closed displaced supracondylar fracture of distal end of left femur without intracondylar extension (H)     Pulmonary hypertension (H)     Dysrhythmia 4 beat run narrow complex     Other insomnia     Periprosthetic fracture around internal prosthetic left knee joint, subsequent encounter     History of prostatic hyperplasia     Localized edema     Right heart failure, NYHA class 1 (H)     Warfarin anticoagulation     Hyperlipidemia LDL goal <70     Status post coronary angiogram     Past Surgical History:   Procedure  Laterality Date     ARTHROPLASTY KNEE Left 3/14/2016    Procedure: ARTHROPLASTY KNEE;  Surgeon: Deonte Silver MD;  Location: [facility]     COLONOSCOPY  7/17/2013    Procedure: COMBINED COLONOSCOPY, SINGLE BIOPSY/POLYPECTOMY BY BIOPSY;  Colonoscopy, with polypectomy by biopsy;  Surgeon: Fernando Mario MD;  Location: [facility]     CV CORONARY ANGIOGRAM N/A 5/6/2019    Procedure: Coronary Angiogram;  Surgeon: Hemal Nunez MD;  Location:  HEART CARDIAC CATH LAB     CV LEFT HEART CATH N/A 5/6/2019    Procedure: Left Heart Cath;  Surgeon: Hemal Nunez MD;  Location:  HEART CARDIAC CATH LAB     CV RIGHT HEART CATH N/A 5/6/2019    Procedure: Right Heart Cath;  Surgeon: Hemal Nunez MD;  Location:  HEART CARDIAC CATH LAB     CYSTOSCOPY, LITHOLAPAXY, COMBINED N/A 2/26/2015    Procedure: COMBINED CYSTOSCOPY, LITHOLAPAXY;  Surgeon: Orlando Marr MD;  Location[facility]     CYSTOSCOPY, LITHOLAPAXY, COMBINED N/A 2/11/2015    Procedure: COMBINED CYSTOSCOPY, LITHOLAPAXY;  Surgeon: Orlando Marr MD;  Location[facility]     CYSTOSCOPY, RETROGRADES, EXTRACT STONE, COMBINED N/A 2/8/2018    Procedure: COMBINED CYSTOSCOPY, RETROGRADES, EXTRACT STONE;  cystoscopy, bladder stone removal;  Surgeon: Orlando Marr MD;  Location: [facility]     H ABLATION ATRIAL FLUTTER  4/18/15    atypical a flutter ablation & Ablation of PACs from the SVC-RA junction     HC ABLATION RENAL TUMOR PERCUT CRYOTHERAPY UNILATERAL  6/17/10    Right renal mass     LASER HOLMIUM LITHOTRIPSY BLADDER N/A 2/26/2015    Procedure: LASER HOLMIUM LITHOTRIPSY BLADDER;  Surgeon: Orlando Marr MD;  [facility]     LASER KTP GREEN LIGHT PHOTOSELECTIVE VAPORIZATION PROSTATE N/A 2/11/2015    Procedure: LASER KTP GREEN LIGHT PHOTOSELECTIVE VAPORIZATION PROSTATE;  Surgeon: Orlando Marr MD;  [facility]     OPEN REDUCTION INTERNAL FIXATION FEMUR DISTAL Left 3/3/2018    Procedure: OPEN REDUCTION INTERNAL FIXATION  FEMUR DISTAL;  Open Reduction Internal Fixation Left Femur;  Surgeon: Mason Parra MD;  Location: [facility]       Social History     Tobacco Use     Smoking status: Never Smoker     Smokeless tobacco: Never Used   Substance Use Topics     Alcohol use: No     Alcohol/week: 0.0 oz     Family History   Problem Relation Age of Onset     Cancer Mother         breast     Hypertension Mother      Alzheimer Disease Mother          at age 96.         Current Outpatient Medications   Medication Sig Dispense Refill     acetaminophen (TYLENOL) 325 MG tablet Take 2 tablets (650 mg) by mouth every 4 hours as needed for other (multimodal surgical pain management along with NSAIDS and opioid medication as indicated based on pain control and physical function.) 100 tablet      atorvastatin (LIPITOR) 20 MG tablet Take 1 tablet (20 mg) by mouth daily 90 tablet 1     diphenhydrAMINE (BENADRYL) 25 MG capsule Take 25 mg by mouth every 6 hours as needed for itching or allergies       ferrous sulfate (IRON) 325 (65 FE) MG tablet Take 1 tablet (325 mg) by mouth 2 times daily (with meals) 100 tablet      furosemide (LASIX) 20 MG tablet Take 40mg daily alternating with 20mg every other day 90 tablet 0     GLUCOSAMINE 500 MG OR CAPS 2 Tablets every morning       metoprolol succinate ER (TOPROL-XL) 25 MG 24 hr tablet TAKE 1/2 (ONE-HALF) TABLET BY MOUTH ONCE DAILY 45 tablet 1     predniSONE (DELTASONE) 20 MG tablet Take 2 tablets (40 mg) by mouth daily for 5 days 10 tablet 0     sildenafil (REVATIO) 20 MG tablet Take 1-2 tablets before intercourse. Never use with nitroglycerin, terazosin or doxazosin. 12 tablet 3     warfarin (COUMADIN) 5 MG tablet Take 2.5 mg Tues, Fri and 5 mg all other days, or as directed by the Coumadin Clinic. 75 tablet 0     warfarin (COUMADIN) 5 MG tablet Take 2.5 mg (1/2 tab) Tues and Fri and 5 mg (1 tab) all other days or as directed by coumadin clinic 30 tablet 1     melatonin 1 MG TABS tablet Take 5 mg  "by mouth At Bedtime        Allergies   Allergen Reactions     No Known Drug Allergies      BP Readings from Last 3 Encounters:   06/17/19 128/60   05/13/19 136/75   05/06/19 127/72    Wt Readings from Last 3 Encounters:   06/17/19 84.8 kg (187 lb)   05/13/19 85.5 kg (188 lb 8 oz)   05/06/19 85.2 kg (187 lb 14.4 oz)         Reviewed and updated as needed this visit by Provider  Allergies  Meds  Problems  Med Hx  Surg Hx         Review of Systems   ROS COMP: Constitutional, HEENT, cardiovascular, pulmonary, gi and gu systems are negative, except as otherwise noted.      Objective    /60   Pulse 90   Temp 99  F (37.2  C) (Temporal)   Resp 16   Ht 1.854 m (6' 1\")   Wt 84.8 kg (187 lb)   SpO2 98%   BMI 24.67 kg/m    Body mass index is 24.67 kg/m .  Physical Exam   GENERAL: healthy, alert and no distress  MS: right wrist with erythema and edema present. Moderate tenderness over the entire right wrist - slightly worse on the ulnar side. Full ROM. Area is warm to the touch.   SKIN: no suspicious lesions or rashes  PSYCH: mentation appears normal, affect normal/bright    Diagnostic Test Results:  Labs reviewed in Epic  none         Assessment & Plan     1. Wrist swelling, right  Symptoms seem very consistent with gout. Patient is confident he did not injure the wrist therefore xray deferred today. Will start him on a 5 day course of prednisone given kidney history. Discussed rest, ice and elevation. Recheck in 3 days if not improving, sooner if needed.   - predniSONE (DELTASONE) 20 MG tablet; Take 2 tablets (40 mg) by mouth daily for 5 days  Dispense: 10 tablet; Refill: 0     The patient indicates understanding of these issues and agrees with the plan.    Rica Pepper PA-C  [facility]    "

## 2019-06-17 NOTE — PATIENT INSTRUCTIONS
Start prednisone - take 2 tabs once daily for 5 days    Continue to ice    Let's recheck in 3 days if not improving (sooner if worsening)

## 2019-06-17 NOTE — PROGRESS NOTES
The patient did not show for a cardiology pulmonary hypertension clinic appointment today.    Dr. ESDRAS John MD [facility]  Cardiology.  [facility].

## 2019-06-28 NOTE — PROGRESS NOTES
ANTICOAGULATION FOLLOW-UP CLINIC VISIT    Patient Name:  Tomer Weiss  Date:  2019  Contact Type:  Face to Face    SUBJECTIVE:  Patient Findings     Comments:   The patient was assessed for diet, medication, and activity level changes, missed or extra doses, bruising or bleeding, with no problem findings.          Clinical Outcomes     Comments:   The patient was assessed for diet, medication, and activity level changes, missed or extra doses, bruising or bleeding, with no problem findings.             OBJECTIVE    INR Protime   Date Value Ref Range Status   2019 2.5 (A) 0.86 - 1.14 Final     Chromogenic Factor 10   Date Value Ref Range Status   2015 21 (L) 70 - 130 % Final     Comment:     Therapeutic Range:  A Chromogenic Factor 10 level of approximately 20-40%   inversely correlates with an INR of 2-3 for patients receiving Warfarin.   Chromogenic Factor 10 levels below 20% indicate an INR greater than 3 and   levels above 40% indicate an INR less than 2.         ASSESSMENT / PLAN  INR assessment THER    Recheck INR In: 5 WEEKS    INR Location Clinic      Anticoagulation Summary  As of 2019    INR goal:   2.0-3.0   TTR:   68.4 % (3 y)   INR used for dosin.5 (2019)   Warfarin maintenance plan:   2.5 mg (5 mg x 0.5) every Tue, Fri; 5 mg (5 mg x 1) all other days   Full warfarin instructions:   2.5 mg every Tue, Fri; 5 mg all other days   Weekly warfarin total:   30 mg   No change documented:   Tana Pérez RN   Plan last modified:   Tana Pérez RN (2019)   Next INR check:   2019   Target end date:       Indications    Long term current use of anticoagulant therapy [Z79.01]  Chronic atrial fibrillation (H) [I48.2]             Anticoagulation Episode Summary     INR check location:       Preferred lab:       Send INR reminders to:   [facility] [place]    Comments:   5 mg tablets, AM dose, AVS      Anticoagulation Care Providers     Provider Role Specialty  Phone number    Gabriel Tan MD [facility] [phone]            See the Encounter Report to view Anticoagulation Flowsheet and Dosing Calendar (Go to Encounters tab in chart review, and find the Anticoagulation Therapy Visit)    Dosage adjustment made based on physician directed care plan.    Taan Pérez RN

## 2019-07-15 ENCOUNTER — TELEPHONE (OUTPATIENT)
Dept: INTERNAL MEDICINE | Facility: CLINIC | Age: 84
End: 2019-07-15

## 2020-12-08 NOTE — TELEPHONE ENCOUNTER
Attempted to reach patient. No VM available at home number.  Trinidad GATES)    
Flagging for provider, please advise if there is another medication that you are wanting patient to start  Thanks  Juanita Ramirez RT (R)         
In this case it would be best if the patient made a follow-up appointment to see if he has had resolution of his signs and symptoms.  Please help him make a follow-up appointment.  Electronically signed:    Martin Strickland PA-C   
LMTC, letter sent   Closing encounter  Juanita Ramirez RT (R)         
Left message asking patient to return our call.  Please inform him of the message below.  Celeste Wetzel, [facility]    Labs are consistent with concerns related to congestive heart failure.  He may benefit from a diuretic.  If he is willing to start a diuretic and then see me back next week I am willing to give him a trial dose.  Please contact him and ask if this is something to be willing to do.  He does need to follow-up with urology as directed previously.  Electronically signed:    Martin Strickland PA-C  
Left message for patient to return call to clinic.     Steve Pandey,     
Left message for patient to return call to clinic. When call is returned please relay message below and assist in scheduling.     Steve Pandey,     
Left message with relative to have patient return call to clinic.     Steve Pandey,     
Reason for Call:  Other returning call    Detailed comments: Patient called clinic back. Relayed message from Leno. Patient stated that he is already taking Furosimide. Please call patient back if there is another medication he was wanting to prescribe.     Phone Number Patient can be reached at: Home number on file [phone] (home)    Best Time: any    Can we leave a detailed message on this number? YES    Call taken on 12/5/2018 at 11:10 AM by Stephen Gandhi    
Spoke to patient who stated he is seeing improvement with taking two furosemide daily. Patient is declining making an appointment at this time.  Trinidad Galan CMA ([facility])    
Quality 265: Biopsy Follow-Up: Biopsy results reviewed, communicated, tracked, and documented
Detail Level: Detailed

## 2022-12-02 NOTE — PROGRESS NOTES
ANTICOAGULATION FOLLOW-UP CLINIC VISIT    Patient Name:  Tomer Weiss  Date:  7/7/2017  Contact Type:  Face to Face    SUBJECTIVE:     Patient Findings     Positives Other complaints (Pt did see Dr nayak last week. advised daily washings with soap and water to the wound and triad. Pt reports no improvements, but no new or worrsening symptoms. See's him again next week. )           OBJECTIVE    INR Protime   Date Value Ref Range Status   07/07/2017 2.8 (A) 0.86 - 1.14 Final     Chromogenic Factor 10   Date Value Ref Range Status   04/24/2015 21 (L) 70 - 130 % Final     Comment:     Therapeutic Range:  A Chromogenic Factor 10 level of approximately 20-40%   inversely correlates with an INR of 2-3 for patients receiving Warfarin.   Chromogenic Factor 10 levels below 20% indicate an INR greater than 3 and   levels above 40% indicate an INR less than 2.         ASSESSMENT / PLAN  INR assessment THER    Recheck INR In: 3 WEEKS    INR Location Clinic      Anticoagulation Summary as of 7/7/2017     INR goal 2.0-3.0   Today's INR 2.8   Maintenance plan 2.5 mg (5 mg x 0.5) on Tue, Sat; 5 mg (5 mg x 1) all other days   Full instructions 2.5 mg on Tue, Sat; 5 mg all other days   Weekly total 30 mg   No change documented Tana Pérez, RN   Plan last modified Tana Pérez, RN (4/27/2016)   Next INR check 7/28/2017   Target end date     Indications   Long-term (current) use of anticoagulants [Z79.01] [Z79.01]  Atrial fibrillation (HCC) [I48.91] [I48.91]         Anticoagulation Episode Summary     INR check location     Preferred lab     Send INR reminders to [facility] POOL    Comments 5 mg tablets, AM dose, AVS      Anticoagulation Care Providers     Provider Role Specialty Phone number    Gabriel Tan MD [facility] Internal Medicine [phone]            See the Encounter Report to view Anticoagulation Flowsheet and Dosing Calendar (Go to Encounters tab in chart review, and find the Anticoagulation Therapy  Visit)    Dosage adjustment made based on physician directed care plan.    Tana Pérez RN                Sarecycline Counseling: Patient advised regarding possible photosensitivity and discoloration of the teeth, skin, lips, tongue and gums.  Patient instructed to avoid sunlight, if possible.  When exposed to sunlight, patients should wear protective clothing, sunglasses, and sunscreen.  The patient was instructed to call the office immediately if the following severe adverse effects occur:  hearing changes, easy bruising/bleeding, severe headache, or vision changes.  The patient verbalized understanding of the proper use and possible adverse effects of sarecycline.  All of the patient's questions and concerns were addressed.

## 2022-12-07 NOTE — PROGRESS NOTES
ORTHO PROGRESS NOTE    POD # 1  Procedure(s):  OPEN REDUCTION INTERNAL FIXATION FEMUR DISTAL - left on 3/3/2018    Pain:  mild    BP 99/47 (BP Location: Right arm)  Pulse 84  Temp 97  F (36.1  C) (Oral)  Resp 16  Ht 1.829 m (6')  Wt 88.1 kg (194 lb 3.6 oz)  SpO2 97%  BMI 26.34 kg/m2    Temp (24hrs), Av.2  F (36.2  C), Min:96.4  F (35.8  C), Max:98.6  F (37  C)      Recent Labs   Lab Test  18   0526  18   1015  18   0535  18   2105   HGB  8.2*  10.1*  9.8*   --    INR  1.09   --   1.11  1.36*               Circulation intact.   Sensation intact.   Calves soft and nontender.   Incision is covered.      PT  Start today. Non weight bearing left leg.  May do gentle range of motion to knee.         ASSESSMENT:  Open-reduction, internal fixation left supracondylar femur fracture doing well.  Minimal pain.    PLAN:  Physical Therapy/ Occupational Therapy.  Dressing change tomorrow.    May rewrap ace wrap onto foot if swelling is too pronounced.    Mason Parra M.D.  Department of Orthopaedic Surgery  [facility]  Pager: [phone]       25

## 2025-02-14 NOTE — DISCHARGE INSTRUCTIONS
Thank you for giving us the opportunity to see you. The impression is that you have a large hematoma of the right lower leg.    This is partly because you are on blood thinners.  Please hold her Coumadin for the next 2 days, and resume on Saturday.    Elevate, and ice for another 2 days, and then add heat.    Use the Ace wrap for compression.    Follow-up in the clinic in the next 2-5 days for recheck.    Watch for any signs of compartment syndrome as we discussed such as increasing severe pain, numbness or tingling of the foot, change in color, or any signs of infection.    Return to the Emergency Department at any time if your symptoms worsen.           4 = No assist / stand by assistance

## (undated) DEVICE — PREP CHLORAPREP 26ML TINTED ORANGE  260815

## (undated) DEVICE — WIRE GUIDE 0.035"X260CM SAFE-T-J EXCHANGE G00517

## (undated) DEVICE — CATH ANGIO SUPERTORQUE PLUS AL2 6FRX100CM 533646

## (undated) DEVICE — SYR 10ML LL W/O NDL

## (undated) DEVICE — INTRO SHEATH 7FRX10CM PINNACLE RSS702

## (undated) DEVICE — SOL WATER INJ 2000ML BAG 07118-07

## (undated) DEVICE — DRSG ABDOMINAL 07 1/2X8" 7197D

## (undated) DEVICE — DRAPE CONVERTORS U-DRAPE 60X72" 8476

## (undated) DEVICE — INTRODUCER CATH VASC 5FRX10CM  MPIS-501-NT-U-SST

## (undated) DEVICE — CATH ANGIO JUDKINS R4 6FRX100CM INFINITI 534621T

## (undated) DEVICE — BNDG COBAN 6"X5YDS STERILE

## (undated) DEVICE — DEFIB PRO-PADZ LVP LQD GEL ADULT 8900-2105-01

## (undated) DEVICE — GUIDE WIRE 2.5X200MM 310.243

## (undated) DEVICE — DRILL BIT 3.2X145MM  310.31

## (undated) DEVICE — SU VICRYL 2-0 CT-2 27" UND J269H

## (undated) DEVICE — SOL NACL 0.9% 30ML VIAL

## (undated) DEVICE — DRAPE C-ARMOR 5 SIDED 5523

## (undated) DEVICE — SLEEVE TR BAND RADIAL COMPRESSION DEVICE 29CM XX-RF06L

## (undated) DEVICE — WIRE GUIDE 0.035"X145CM BENTSON TSFB-35-145-BH

## (undated) DEVICE — PACK BASIC SET-UP 9101

## (undated) DEVICE — MANIFOLD KIT ANGIO AUTOMATED 014613

## (undated) DEVICE — LUBRICATING JELLY 4.25OZ

## (undated) DEVICE — CATH ANGIO JUDKINS JL4 6FRX100CM INFINITI 534620T

## (undated) DEVICE — CATH TRAY FOLEY 16FR SIL

## (undated) DEVICE — SU VICRYL 0 CT-2 27" UND J270H

## (undated) DEVICE — GLOVE PROTEXIS W/NEU-THERA 7.0  2D73TE70

## (undated) DEVICE — TOTE ANGIO CORP PC15AT SAN32CC83O

## (undated) DEVICE — GLOVE ESTEEM BLUE W/NEU-THERA 7.0  2D73PB70

## (undated) DEVICE — KIT CATH BALLOON UROMAX ULTRA 18FRX4CM M0062251220

## (undated) DEVICE — CATH ANGIO INFINITI PIGTAIL 145 6 SH 6FRX110CM  534-652S

## (undated) DEVICE — Device

## (undated) DEVICE — GLOVE PROTEXIS W/NEU-THERA 7.5  2D73TE75

## (undated) DEVICE — INTRO GLIDESHEATH SLENDER 6FR 10X45CM 60-1060

## (undated) DEVICE — PEN MARKING SKIN

## (undated) DEVICE — KIT HAND CONTROL ANGIOTOUCH ACIST 65CM AT-P65

## (undated) DEVICE — PACK EXTREMITY SOP15EXFSD

## (undated) DEVICE — DRSG KERLIX 4 1/2"X4YDS ROLL 6730

## (undated) DEVICE — GLOVE ESTEEM BLUE W/NEU-THERA 7.5  2D73PB75

## (undated) DEVICE — DRAPE STOCKINETTE IMPERVIOUS 12" 1587

## (undated) DEVICE — SPONGE LAP 04X18" 1525

## (undated) DEVICE — CATH ANGIO SUPERTORQUE AL1 6FRX100CM 532-645

## (undated) DEVICE — LABEL MEDICATION SYSTEM  3304

## (undated) DEVICE — NDL PERC ENTRY 21GA 4CM G00280

## (undated) DEVICE — TUBING SUCTION 12"X1/4" N612

## (undated) DEVICE — PREP SKIN SCRUB TRAY 4461A

## (undated) DEVICE — DRAPE C-ARM 60X42" 1013

## (undated) DEVICE — DRSG GAUZE 4X4" TRAY

## (undated) DEVICE — CAST PADDING 6" COTTON WEBRIL UNSTERILE 9086

## (undated) DEVICE — DRAPE U SPLIT 74X120" 29440

## (undated) DEVICE — GOWN XLG DISP 9545

## (undated) DEVICE — DRAPE GYN/UROLOGY FLUID POUCH TUR 29455

## (undated) DEVICE — BNDG ESMARK 6" STERILE

## (undated) DEVICE — SU ETHILON 3-0 PS-2 18" 1669H

## (undated) RX ORDER — ONDANSETRON 2 MG/ML
INJECTION INTRAMUSCULAR; INTRAVENOUS
Status: DISPENSED
Start: 2018-02-08

## (undated) RX ORDER — CEFAZOLIN SODIUM 1 G/3ML
INJECTION, POWDER, FOR SOLUTION INTRAMUSCULAR; INTRAVENOUS
Status: DISPENSED
Start: 2018-03-03

## (undated) RX ORDER — DEXAMETHASONE SODIUM PHOSPHATE 10 MG/ML
INJECTION INTRAMUSCULAR; INTRAVENOUS
Status: DISPENSED
Start: 2018-02-08

## (undated) RX ORDER — ESMOLOL HYDROCHLORIDE 10 MG/ML
INJECTION INTRAVENOUS
Status: DISPENSED
Start: 2018-03-03

## (undated) RX ORDER — PROPOFOL 10 MG/ML
INJECTION, EMULSION INTRAVENOUS
Status: DISPENSED
Start: 2018-02-08

## (undated) RX ORDER — PROPOFOL 10 MG/ML
INJECTION, EMULSION INTRAVENOUS
Status: DISPENSED
Start: 2018-03-03

## (undated) RX ORDER — DEXAMETHASONE SODIUM PHOSPHATE 10 MG/ML
INJECTION INTRAMUSCULAR; INTRAVENOUS
Status: DISPENSED
Start: 2018-03-03

## (undated) RX ORDER — ONDANSETRON 2 MG/ML
INJECTION INTRAMUSCULAR; INTRAVENOUS
Status: DISPENSED
Start: 2018-03-03

## (undated) RX ORDER — FENTANYL CITRATE 50 UG/ML
INJECTION, SOLUTION INTRAMUSCULAR; INTRAVENOUS
Status: DISPENSED
Start: 2018-02-08

## (undated) RX ORDER — LIDOCAINE HYDROCHLORIDE 10 MG/ML
INJECTION, SOLUTION EPIDURAL; INFILTRATION; INTRACAUDAL; PERINEURAL
Status: DISPENSED
Start: 2019-01-01

## (undated) RX ORDER — LIDOCAINE HYDROCHLORIDE 20 MG/ML
INJECTION, SOLUTION EPIDURAL; INFILTRATION; INTRACAUDAL; PERINEURAL
Status: DISPENSED
Start: 2018-03-03

## (undated) RX ORDER — LIDOCAINE HYDROCHLORIDE 20 MG/ML
INJECTION, SOLUTION EPIDURAL; INFILTRATION; INTRACAUDAL; PERINEURAL
Status: DISPENSED
Start: 2018-02-08

## (undated) RX ORDER — FENTANYL CITRATE 50 UG/ML
INJECTION, SOLUTION INTRAMUSCULAR; INTRAVENOUS
Status: DISPENSED
Start: 2018-03-03

## (undated) RX ORDER — PHENYLEPHRINE HCL IN 0.9% NACL 1 MG/10 ML
SYRINGE (ML) INTRAVENOUS
Status: DISPENSED
Start: 2018-03-03

## (undated) RX ORDER — HEPARIN SODIUM 1000 [USP'U]/ML
INJECTION, SOLUTION INTRAVENOUS; SUBCUTANEOUS
Status: DISPENSED
Start: 2019-01-01

## (undated) RX ORDER — VERAPAMIL HYDROCHLORIDE 2.5 MG/ML
INJECTION, SOLUTION INTRAVENOUS
Status: DISPENSED
Start: 2019-01-01

## (undated) RX ORDER — NITROGLYCERIN 5 MG/ML
VIAL (ML) INTRAVENOUS
Status: DISPENSED
Start: 2019-01-01

## (undated) RX ORDER — ASPIRIN 81 MG/1
TABLET ORAL
Status: DISPENSED
Start: 2019-01-01

## (undated) RX ORDER — HEPARIN SODIUM 200 [USP'U]/100ML
INJECTION, SOLUTION INTRAVENOUS
Status: DISPENSED
Start: 2019-01-01